# Patient Record
Sex: MALE | Race: WHITE | NOT HISPANIC OR LATINO | Employment: OTHER | URBAN - METROPOLITAN AREA
[De-identification: names, ages, dates, MRNs, and addresses within clinical notes are randomized per-mention and may not be internally consistent; named-entity substitution may affect disease eponyms.]

---

## 2018-01-30 PROBLEM — R79.89 ELEVATED LFTS: Status: ACTIVE | Noted: 2018-01-30

## 2018-10-01 PROBLEM — Z72.0 TOBACCO ABUSE: Status: ACTIVE | Noted: 2018-10-01

## 2019-02-04 PROBLEM — F10.10 ALCOHOL ABUSE: Status: ACTIVE | Noted: 2019-02-04

## 2019-04-10 PROBLEM — Z95.9 S/P ARTERIAL STENT: Chronic | Status: ACTIVE | Noted: 2019-04-10

## 2019-04-10 PROBLEM — I73.9 PAD (PERIPHERAL ARTERY DISEASE): Chronic | Status: ACTIVE | Noted: 2019-04-10

## 2019-04-10 PROBLEM — J43.2 CENTRILOBULAR EMPHYSEMA: Status: ACTIVE | Noted: 2019-04-10

## 2021-01-01 ENCOUNTER — PATIENT OUTREACH (OUTPATIENT)
Dept: ADMINISTRATIVE | Facility: OTHER | Age: 66
End: 2021-01-01
Payer: MEDICARE

## 2021-01-01 ENCOUNTER — CLINICAL SUPPORT (OUTPATIENT)
Dept: SMOKING CESSATION | Facility: CLINIC | Age: 66
End: 2021-01-01
Payer: COMMERCIAL

## 2021-01-01 ENCOUNTER — HOSPITAL ENCOUNTER (INPATIENT)
Facility: HOSPITAL | Age: 66
LOS: 26 days | DRG: 853 | End: 2022-01-17
Attending: EMERGENCY MEDICINE | Admitting: INTERNAL MEDICINE
Payer: MEDICARE

## 2021-01-01 ENCOUNTER — CLINICAL SUPPORT (OUTPATIENT)
Dept: CARDIOLOGY | Facility: HOSPITAL | Age: 66
DRG: 853 | End: 2021-01-01
Payer: MEDICARE

## 2021-01-01 ENCOUNTER — ANESTHESIA (OUTPATIENT)
Dept: ENDOSCOPY | Facility: HOSPITAL | Age: 66
DRG: 378 | End: 2021-01-01
Payer: MEDICARE

## 2021-01-01 ENCOUNTER — CLINICAL SUPPORT (OUTPATIENT)
Dept: SMOKING CESSATION | Facility: CLINIC | Age: 66
End: 2021-01-01

## 2021-01-01 ENCOUNTER — HOSPITAL ENCOUNTER (EMERGENCY)
Facility: HOSPITAL | Age: 66
Discharge: HOME | End: 2021-06-04
Attending: EMERGENCY MEDICINE
Payer: MEDICARE

## 2021-01-01 ENCOUNTER — HOSPITAL ENCOUNTER (OUTPATIENT)
Dept: RADIOLOGY | Facility: HOSPITAL | Age: 66
Discharge: HOME | End: 2021-07-13
Attending: INTERNAL MEDICINE
Payer: MEDICARE

## 2021-01-01 ENCOUNTER — HOSPITAL ENCOUNTER (OUTPATIENT)
Dept: RADIOLOGY | Facility: HOSPITAL | Age: 66
Discharge: HOME | End: 2021-10-22
Attending: INTERNAL MEDICINE
Payer: MEDICARE

## 2021-01-01 ENCOUNTER — CLINICAL SUPPORT (OUTPATIENT)
Dept: SMOKING CESSATION | Facility: CLINIC | Age: 66
End: 2021-01-01
Payer: MEDICARE

## 2021-01-01 ENCOUNTER — HOSPITAL ENCOUNTER (INPATIENT)
Facility: HOSPITAL | Age: 66
LOS: 5 days | Discharge: HOME HEALTH | DRG: 378 | End: 2021-11-28
Attending: SPECIALIST | Admitting: SPECIALIST
Payer: MEDICARE

## 2021-01-01 ENCOUNTER — HOSPITAL ENCOUNTER (EMERGENCY)
Facility: HOSPITAL | Age: 66
Discharge: ANOTHER HOSPITAL | End: 2021-11-22
Attending: EMERGENCY MEDICINE
Payer: MEDICARE

## 2021-01-01 ENCOUNTER — ANESTHESIA EVENT (OUTPATIENT)
Dept: ENDOSCOPY | Facility: HOSPITAL | Age: 66
DRG: 378 | End: 2021-01-01
Payer: MEDICARE

## 2021-01-01 ENCOUNTER — PATIENT OUTREACH (OUTPATIENT)
Dept: ADMINISTRATIVE | Facility: CLINIC | Age: 66
End: 2021-01-01
Payer: MEDICARE

## 2021-01-01 ENCOUNTER — HOSPITAL ENCOUNTER (EMERGENCY)
Facility: HOSPITAL | Age: 66
Discharge: HOME | End: 2021-12-01
Attending: STUDENT IN AN ORGANIZED HEALTH CARE EDUCATION/TRAINING PROGRAM
Payer: MEDICARE

## 2021-01-01 VITALS
DIASTOLIC BLOOD PRESSURE: 83 MMHG | SYSTOLIC BLOOD PRESSURE: 131 MMHG | TEMPERATURE: 98 F | BODY MASS INDEX: 24.68 KG/M2 | WEIGHT: 172 LBS | OXYGEN SATURATION: 98 % | RESPIRATION RATE: 16 BRPM | HEART RATE: 132 BPM

## 2021-01-01 VITALS
TEMPERATURE: 98 F | HEART RATE: 138 BPM | RESPIRATION RATE: 18 BRPM | BODY MASS INDEX: 23.62 KG/M2 | DIASTOLIC BLOOD PRESSURE: 69 MMHG | HEIGHT: 70 IN | OXYGEN SATURATION: 95 % | WEIGHT: 165 LBS | SYSTOLIC BLOOD PRESSURE: 129 MMHG

## 2021-01-01 VITALS
SYSTOLIC BLOOD PRESSURE: 145 MMHG | WEIGHT: 149 LBS | OXYGEN SATURATION: 97 % | DIASTOLIC BLOOD PRESSURE: 92 MMHG | TEMPERATURE: 98 F | RESPIRATION RATE: 16 BRPM | HEART RATE: 88 BPM | BODY MASS INDEX: 20.78 KG/M2

## 2021-01-01 VITALS
DIASTOLIC BLOOD PRESSURE: 87 MMHG | TEMPERATURE: 98 F | HEIGHT: 70 IN | HEART RATE: 91 BPM | SYSTOLIC BLOOD PRESSURE: 136 MMHG | OXYGEN SATURATION: 95 % | BODY MASS INDEX: 24.08 KG/M2 | RESPIRATION RATE: 18 BRPM | WEIGHT: 168.19 LBS

## 2021-01-01 VITALS
WEIGHT: 170 LBS | BODY MASS INDEX: 24.34 KG/M2 | DIASTOLIC BLOOD PRESSURE: 74 MMHG | HEIGHT: 70 IN | SYSTOLIC BLOOD PRESSURE: 108 MMHG

## 2021-01-01 DIAGNOSIS — F17.210 CIGARETTE SMOKER: Primary | ICD-10-CM

## 2021-01-01 DIAGNOSIS — I48.91 ATRIAL FIBRILLATION WITH RAPID VENTRICULAR RESPONSE: ICD-10-CM

## 2021-01-01 DIAGNOSIS — F17.210 MODERATE CIGARETTE SMOKER (10-19 PER DAY): Primary | ICD-10-CM

## 2021-01-01 DIAGNOSIS — F10.10 ALCOHOL ABUSE: ICD-10-CM

## 2021-01-01 DIAGNOSIS — D64.9 ANEMIA, UNSPECIFIED TYPE: ICD-10-CM

## 2021-01-01 DIAGNOSIS — K92.2 GASTROINTESTINAL HEMORRHAGE, UNSPECIFIED GASTROINTESTINAL HEMORRHAGE TYPE: ICD-10-CM

## 2021-01-01 DIAGNOSIS — R52 PAIN: ICD-10-CM

## 2021-01-01 DIAGNOSIS — F10.931 ALCOHOL WITHDRAWAL SYNDROME, WITH DELIRIUM: ICD-10-CM

## 2021-01-01 DIAGNOSIS — I48.91 ATRIAL FIBRILLATION WITH RVR: Primary | ICD-10-CM

## 2021-01-01 DIAGNOSIS — S20.212A CONTUSION OF LEFT CHEST WALL, INITIAL ENCOUNTER: Primary | ICD-10-CM

## 2021-01-01 DIAGNOSIS — I10 PRIMARY HYPERTENSION: ICD-10-CM

## 2021-01-01 DIAGNOSIS — I73.9 PAD (PERIPHERAL ARTERY DISEASE): Chronic | ICD-10-CM

## 2021-01-01 DIAGNOSIS — Z72.0 TOBACCO USE: ICD-10-CM

## 2021-01-01 DIAGNOSIS — R00.0 TACHYCARDIA: ICD-10-CM

## 2021-01-01 DIAGNOSIS — Z01.810 PREOP CARDIOVASCULAR EXAM: Primary | ICD-10-CM
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
## 2021-01-01 DIAGNOSIS — K29.20 CHRONIC ALCOHOLIC GASTRITIS WITHOUT HEMORRHAGE: ICD-10-CM

## 2021-01-01 DIAGNOSIS — J96.21 ACUTE ON CHRONIC RESPIRATORY FAILURE WITH HYPOXIA: ICD-10-CM

## 2021-01-01 DIAGNOSIS — E87.6 HYPOKALEMIA: ICD-10-CM

## 2021-01-01 DIAGNOSIS — I73.9 PERIPHERAL VASCULAR DISEASE, UNSPECIFIED: Primary | ICD-10-CM

## 2021-01-01 DIAGNOSIS — I48.91 ATRIAL FIBRILLATION WITH RVR: ICD-10-CM

## 2021-01-01 DIAGNOSIS — K92.1 GASTROINTESTINAL HEMORRHAGE WITH MELENA: ICD-10-CM

## 2021-01-01 DIAGNOSIS — D50.0 IRON DEFICIENCY ANEMIA DUE TO CHRONIC BLOOD LOSS: ICD-10-CM

## 2021-01-01 DIAGNOSIS — R53.1 WEAKNESS: ICD-10-CM

## 2021-01-01 DIAGNOSIS — J96.20 ACUTE ON CHRONIC RESPIRATORY FAILURE: ICD-10-CM

## 2021-01-01 DIAGNOSIS — Z01.810 PREOP CARDIOVASCULAR EXAM: ICD-10-CM

## 2021-01-01 DIAGNOSIS — F10.929 ALCOHOLIC INTOXICATION WITH COMPLICATION: ICD-10-CM

## 2021-01-01 DIAGNOSIS — K92.1 MELENA: Primary | ICD-10-CM

## 2021-01-01 DIAGNOSIS — Z72.0 TOBACCO ABUSE: ICD-10-CM

## 2021-01-01 DIAGNOSIS — K92.2 GI BLEED: ICD-10-CM

## 2021-01-01 DIAGNOSIS — F10.20 ALCOHOLISM: ICD-10-CM

## 2021-01-01 DIAGNOSIS — R00.1 VAGAL BRADYCARDIA: ICD-10-CM

## 2021-01-01 DIAGNOSIS — I73.9 PERIPHERAL VASCULAR DISEASE, UNSPECIFIED: ICD-10-CM

## 2021-01-01 DIAGNOSIS — I48.91 AF (ATRIAL FIBRILLATION): ICD-10-CM

## 2021-01-01 DIAGNOSIS — R06.02 SHORTNESS OF BREATH: ICD-10-CM

## 2021-01-01 DIAGNOSIS — N28.9 RENAL INSUFFICIENCY: ICD-10-CM

## 2021-01-01 DIAGNOSIS — J90 PLEURAL EFFUSION: ICD-10-CM

## 2021-01-01 DIAGNOSIS — F10.229 ALCOHOL DEPENDENCE WITH INTOXICATION WITH COMPLICATION: ICD-10-CM

## 2021-01-01 DIAGNOSIS — S22.31XA CLOSED FRACTURE OF ONE RIB OF RIGHT SIDE, INITIAL ENCOUNTER: ICD-10-CM

## 2021-01-01 DIAGNOSIS — F10.231 ALCOHOL DEPENDENCE WITH WITHDRAWAL DELIRIUM: ICD-10-CM

## 2021-01-01 DIAGNOSIS — K92.2 GASTROINTESTINAL HEMORRHAGE, UNSPECIFIED GASTROINTESTINAL HEMORRHAGE TYPE: Primary | ICD-10-CM

## 2021-01-01 DIAGNOSIS — R06.02 SOB (SHORTNESS OF BREATH): ICD-10-CM

## 2021-01-01 LAB
ABO + RH BLD: NORMAL
AC: 14
AC: 18
AC: 24
ALBUMIN SERPL BCP-MCNC: 1.6 G/DL (ref 3.5–5.2)
ALBUMIN SERPL BCP-MCNC: 1.7 G/DL (ref 3.5–5.2)
ALBUMIN SERPL BCP-MCNC: 1.7 G/DL (ref 3.5–5.2)
ALBUMIN SERPL BCP-MCNC: 1.8 G/DL (ref 3.5–5.2)
ALBUMIN SERPL BCP-MCNC: 1.8 G/DL (ref 3.5–5.2)
ALBUMIN SERPL BCP-MCNC: 2 G/DL (ref 3.5–5.2)
ALBUMIN SERPL BCP-MCNC: 2.4 G/DL (ref 3.5–5.2)
ALBUMIN SERPL BCP-MCNC: 2.5 G/DL (ref 3.5–5.2)
ALBUMIN SERPL BCP-MCNC: 2.6 G/DL (ref 3.5–5.2)
ALBUMIN SERPL BCP-MCNC: 2.6 G/DL (ref 3.5–5.2)
ALBUMIN SERPL BCP-MCNC: 2.7 G/DL (ref 3.5–5.2)
ALBUMIN SERPL BCP-MCNC: 2.8 G/DL (ref 3.5–5.2)
ALBUMIN SERPL BCP-MCNC: 2.9 G/DL (ref 3.5–5.2)
ALBUMIN SERPL BCP-MCNC: 2.9 G/DL (ref 3.5–5.2)
ALBUMIN SERPL BCP-MCNC: 3 G/DL (ref 3.5–5.2)
ALBUMIN SERPL BCP-MCNC: 3.1 G/DL (ref 3.5–5.2)
ALBUMIN SERPL BCP-MCNC: 3.2 G/DL (ref 3.5–5.2)
ALBUMIN SERPL BCP-MCNC: 3.2 G/DL (ref 3.5–5.2)
ALBUMIN SERPL BCP-MCNC: 3.3 G/DL (ref 3.5–5.2)
ALBUMIN SERPL BCP-MCNC: 3.7 G/DL (ref 3.5–5.2)
ALP SERPL-CCNC: 102 U/L (ref 55–135)
ALP SERPL-CCNC: 108 U/L (ref 55–135)
ALP SERPL-CCNC: 110 U/L (ref 55–135)
ALP SERPL-CCNC: 111 U/L (ref 55–135)
ALP SERPL-CCNC: 111 U/L (ref 55–135)
ALP SERPL-CCNC: 117 U/L (ref 55–135)
ALP SERPL-CCNC: 124 U/L (ref 55–135)
ALP SERPL-CCNC: 126 U/L (ref 55–135)
ALP SERPL-CCNC: 128 U/L (ref 55–135)
ALP SERPL-CCNC: 134 U/L (ref 55–135)
ALP SERPL-CCNC: 135 U/L (ref 55–135)
ALP SERPL-CCNC: 76 U/L (ref 55–135)
ALP SERPL-CCNC: 78 U/L (ref 55–135)
ALP SERPL-CCNC: 78 U/L (ref 55–135)
ALP SERPL-CCNC: 79 U/L (ref 55–135)
ALP SERPL-CCNC: 80 U/L (ref 55–135)
ALP SERPL-CCNC: 81 U/L (ref 55–135)
ALP SERPL-CCNC: 86 U/L (ref 55–135)
ALP SERPL-CCNC: 89 U/L (ref 55–135)
ALP SERPL-CCNC: 97 U/L (ref 55–135)
ALT SERPL W/O P-5'-P-CCNC: 10 U/L (ref 10–44)
ALT SERPL W/O P-5'-P-CCNC: 10 U/L (ref 10–44)
ALT SERPL W/O P-5'-P-CCNC: 11 U/L (ref 10–44)
ALT SERPL W/O P-5'-P-CCNC: 12 U/L (ref 10–44)
ALT SERPL W/O P-5'-P-CCNC: 135 U/L (ref 10–44)
ALT SERPL W/O P-5'-P-CCNC: 16 U/L (ref 10–44)
ALT SERPL W/O P-5'-P-CCNC: 17 U/L (ref 10–44)
ALT SERPL W/O P-5'-P-CCNC: 18 U/L (ref 10–44)
ALT SERPL W/O P-5'-P-CCNC: 19 U/L (ref 10–44)
ALT SERPL W/O P-5'-P-CCNC: 190 U/L (ref 10–44)
ALT SERPL W/O P-5'-P-CCNC: 20 U/L (ref 10–44)
ALT SERPL W/O P-5'-P-CCNC: 22 U/L (ref 10–44)
ALT SERPL W/O P-5'-P-CCNC: 240 U/L (ref 10–44)
ALT SERPL W/O P-5'-P-CCNC: 35 U/L (ref 10–44)
ALT SERPL W/O P-5'-P-CCNC: 41 U/L (ref 10–44)
ALT SERPL W/O P-5'-P-CCNC: 42 U/L (ref 10–44)
ALT SERPL W/O P-5'-P-CCNC: 56 U/L (ref 10–44)
ALT SERPL W/O P-5'-P-CCNC: 74 U/L (ref 10–44)
ALT SERPL W/O P-5'-P-CCNC: 9 U/L (ref 10–44)
ALT SERPL W/O P-5'-P-CCNC: 98 U/L (ref 10–44)
AMMONIA PLAS-SCNC: 30 UMOL/L (ref 10–50)
AMPHET+METHAMPHET UR QL: ABNORMAL
AMPHET+METHAMPHET UR QL: NEGATIVE
ANION GAP SERPL CALC-SCNC: 10 MMOL/L (ref 8–16)
ANION GAP SERPL CALC-SCNC: 10 MMOL/L (ref 8–16)
ANION GAP SERPL CALC-SCNC: 11 MMOL/L (ref 8–16)
ANION GAP SERPL CALC-SCNC: 12 MMOL/L (ref 8–16)
ANION GAP SERPL CALC-SCNC: 13 MMOL/L (ref 8–16)
ANION GAP SERPL CALC-SCNC: 15 MMOL/L (ref 8–16)
ANION GAP SERPL CALC-SCNC: 15 MMOL/L (ref 8–16)
ANION GAP SERPL CALC-SCNC: 16 MMOL/L (ref 8–16)
ANION GAP SERPL CALC-SCNC: 20 MMOL/L (ref 8–16)
ANION GAP SERPL CALC-SCNC: 5 MMOL/L (ref 8–16)
ANION GAP SERPL CALC-SCNC: 6 MMOL/L (ref 8–16)
ANION GAP SERPL CALC-SCNC: 7 MMOL/L (ref 8–16)
ANION GAP SERPL CALC-SCNC: 7 MMOL/L (ref 8–16)
ANION GAP SERPL CALC-SCNC: 8 MMOL/L (ref 8–16)
ANION GAP SERPL CALC-SCNC: 9 MMOL/L (ref 8–16)
ANION GAP SERPL CALC-SCNC: 9 MMOL/L (ref 8–16)
AORTIC ROOT ANNULUS: 3.06 CM
AORTIC VALVE CUSP SEPERATION: 1.21 CM
APTT BLDCRRT: 23.4 SEC (ref 21–32)
APTT BLDCRRT: 26.4 SEC (ref 21–32)
AST SERPL-CCNC: 118 U/L (ref 10–40)
AST SERPL-CCNC: 122 U/L (ref 10–40)
AST SERPL-CCNC: 139 U/L (ref 10–40)
AST SERPL-CCNC: 235 U/L (ref 10–40)
AST SERPL-CCNC: 26 U/L (ref 10–40)
AST SERPL-CCNC: 27 U/L (ref 10–40)
AST SERPL-CCNC: 28 U/L (ref 10–40)
AST SERPL-CCNC: 29 U/L (ref 10–40)
AST SERPL-CCNC: 30 U/L (ref 10–40)
AST SERPL-CCNC: 32 U/L (ref 10–40)
AST SERPL-CCNC: 36 U/L (ref 10–40)
AST SERPL-CCNC: 40 U/L (ref 10–40)
AST SERPL-CCNC: 45 U/L (ref 10–40)
AST SERPL-CCNC: 468 U/L (ref 10–40)
AST SERPL-CCNC: 47 U/L (ref 10–40)
AST SERPL-CCNC: 49 U/L (ref 10–40)
AST SERPL-CCNC: 51 U/L (ref 10–40)
AST SERPL-CCNC: 69 U/L (ref 10–40)
AST SERPL-CCNC: 77 U/L (ref 10–40)
AST SERPL-CCNC: 899 U/L (ref 10–40)
AV INDEX (PROSTH): 0.55
AV MEAN GRADIENT: 4 MMHG
AV PEAK GRADIENT: 6 MMHG
AV VALVE AREA: 1.68 CM2
AV VELOCITY RATIO: 0.54
BACTERIA #/AREA URNS HPF: ABNORMAL /HPF
BACTERIA #/AREA URNS HPF: NEGATIVE /HPF
BACTERIA BLD CULT: NORMAL
BACTERIA BLD CULT: NORMAL
BACTERIA SPEC AEROBE CULT: NORMAL
BACTERIA UR CULT: ABNORMAL
BACTERIA UR CULT: NO GROWTH
BARBITURATES UR QL SCN>200 NG/ML: NEGATIVE
BARBITURATES UR QL SCN>200 NG/ML: NEGATIVE
BASOPHILS # BLD AUTO: 0.02 K/UL (ref 0–0.2)
BASOPHILS # BLD AUTO: 0.03 K/UL (ref 0–0.2)
BASOPHILS # BLD AUTO: 0.04 K/UL (ref 0–0.2)
BASOPHILS # BLD AUTO: 0.05 K/UL (ref 0–0.2)
BASOPHILS # BLD AUTO: 0.06 K/UL (ref 0–0.2)
BASOPHILS # BLD AUTO: 0.08 K/UL (ref 0–0.2)
BASOPHILS # BLD AUTO: 0.09 K/UL (ref 0–0.2)
BASOPHILS # BLD AUTO: 0.13 K/UL (ref 0–0.2)
BASOPHILS # BLD AUTO: ABNORMAL K/UL (ref 0–0.2)
BASOPHILS NFR BLD: 0 % (ref 0–1.9)
BASOPHILS NFR BLD: 0.2 % (ref 0–1.9)
BASOPHILS NFR BLD: 0.2 % (ref 0–1.9)
BASOPHILS NFR BLD: 0.3 % (ref 0–1.9)
BASOPHILS NFR BLD: 0.3 % (ref 0–1.9)
BASOPHILS NFR BLD: 0.4 % (ref 0–1.9)
BASOPHILS NFR BLD: 0.5 % (ref 0–1.9)
BASOPHILS NFR BLD: 0.6 % (ref 0–1.9)
BASOPHILS NFR BLD: 0.8 % (ref 0–1.9)
BASOPHILS NFR BLD: 0.8 % (ref 0–1.9)
BASOPHILS NFR BLD: 1 % (ref 0–1.9)
BASOPHILS NFR BLD: 1.2 % (ref 0–1.9)
BASOPHILS NFR BLD: 1.7 % (ref 0–1.9)
BASOPHILS NFR BLD: 1.7 % (ref 0–1.9)
BENZODIAZ UR QL SCN>200 NG/ML: ABNORMAL
BENZODIAZ UR QL SCN>200 NG/ML: ABNORMAL
BILIRUB SERPL-MCNC: 0.4 MG/DL (ref 0.1–1)
BILIRUB SERPL-MCNC: 0.5 MG/DL (ref 0.1–1)
BILIRUB SERPL-MCNC: 0.5 MG/DL (ref 0.1–1)
BILIRUB SERPL-MCNC: 0.6 MG/DL (ref 0.1–1)
BILIRUB SERPL-MCNC: 0.7 MG/DL (ref 0.1–1)
BILIRUB SERPL-MCNC: 0.8 MG/DL (ref 0.1–1)
BILIRUB SERPL-MCNC: 0.9 MG/DL (ref 0.1–1)
BILIRUB SERPL-MCNC: 0.9 MG/DL (ref 0.1–1)
BILIRUB SERPL-MCNC: 1 MG/DL (ref 0.1–1)
BILIRUB SERPL-MCNC: 1.1 MG/DL (ref 0.1–1)
BILIRUB SERPL-MCNC: 1.3 MG/DL (ref 0.1–1)
BILIRUB UR QL STRIP: ABNORMAL
BILIRUB UR QL STRIP: NEGATIVE
BILIRUB UR QL STRIP: NEGATIVE
BLD GP AB SCN CELLS X3 SERPL QL: NORMAL
BLD PROD TYP BPU: NORMAL
BLOOD UNIT EXPIRATION DATE: NORMAL
BLOOD UNIT TYPE CODE: 5100
BLOOD UNIT TYPE: NORMAL
BSA FOR ECHO PROCEDURE: 1.95 M2
BUN SERPL-MCNC: 13 MG/DL (ref 8–23)
BUN SERPL-MCNC: 14 MG/DL (ref 8–23)
BUN SERPL-MCNC: 17 MG/DL (ref 8–23)
BUN SERPL-MCNC: 18 MG/DL (ref 8–23)
BUN SERPL-MCNC: 18 MG/DL (ref 8–23)
BUN SERPL-MCNC: 20 MG/DL (ref 8–23)
BUN SERPL-MCNC: 21 MG/DL (ref 8–23)
BUN SERPL-MCNC: 21 MG/DL (ref 8–23)
BUN SERPL-MCNC: 22 MG/DL (ref 8–23)
BUN SERPL-MCNC: 22 MG/DL (ref 8–23)
BUN SERPL-MCNC: 23 MG/DL (ref 8–23)
BUN SERPL-MCNC: 25 MG/DL (ref 8–23)
BUN SERPL-MCNC: 26 MG/DL (ref 8–23)
BUN SERPL-MCNC: 27 MG/DL (ref 8–23)
BUN SERPL-MCNC: 28 MG/DL (ref 8–23)
BUN SERPL-MCNC: 30 MG/DL (ref 8–23)
BUN SERPL-MCNC: 33 MG/DL (ref 8–23)
BUN SERPL-MCNC: 38 MG/DL (ref 8–23)
BZE UR QL SCN: NEGATIVE
BZE UR QL SCN: NEGATIVE
CALCIUM SERPL-MCNC: 7.6 MG/DL (ref 8.7–10.5)
CALCIUM SERPL-MCNC: 7.7 MG/DL (ref 8.7–10.5)
CALCIUM SERPL-MCNC: 7.7 MG/DL (ref 8.7–10.5)
CALCIUM SERPL-MCNC: 7.8 MG/DL (ref 8.7–10.5)
CALCIUM SERPL-MCNC: 7.9 MG/DL (ref 8.7–10.5)
CALCIUM SERPL-MCNC: 7.9 MG/DL (ref 8.7–10.5)
CALCIUM SERPL-MCNC: 8.1 MG/DL (ref 8.7–10.5)
CALCIUM SERPL-MCNC: 8.1 MG/DL (ref 8.7–10.5)
CALCIUM SERPL-MCNC: 8.3 MG/DL (ref 8.7–10.5)
CALCIUM SERPL-MCNC: 8.3 MG/DL (ref 8.7–10.5)
CALCIUM SERPL-MCNC: 8.4 MG/DL (ref 8.7–10.5)
CALCIUM SERPL-MCNC: 8.4 MG/DL (ref 8.7–10.5)
CALCIUM SERPL-MCNC: 8.5 MG/DL (ref 8.7–10.5)
CALCIUM SERPL-MCNC: 8.6 MG/DL (ref 8.7–10.5)
CALCIUM SERPL-MCNC: 8.8 MG/DL (ref 8.7–10.5)
CALCIUM SERPL-MCNC: 8.9 MG/DL (ref 8.7–10.5)
CALCIUM SERPL-MCNC: 9.1 MG/DL (ref 8.7–10.5)
CANNABINOIDS UR QL SCN: NEGATIVE
CANNABINOIDS UR QL SCN: NEGATIVE
CHLORIDE SERPL-SCNC: 100 MMOL/L (ref 95–110)
CHLORIDE SERPL-SCNC: 103 MMOL/L (ref 95–110)
CHLORIDE SERPL-SCNC: 106 MMOL/L (ref 95–110)
CHLORIDE SERPL-SCNC: 106 MMOL/L (ref 95–110)
CHLORIDE SERPL-SCNC: 107 MMOL/L (ref 95–110)
CHLORIDE SERPL-SCNC: 108 MMOL/L (ref 95–110)
CHLORIDE SERPL-SCNC: 109 MMOL/L (ref 95–110)
CHLORIDE SERPL-SCNC: 110 MMOL/L (ref 95–110)
CHLORIDE SERPL-SCNC: 98 MMOL/L (ref 95–110)
CLARITY UR: ABNORMAL
CLARITY UR: CLEAR
CLARITY UR: CLEAR
CO2 SERPL-SCNC: 18 MMOL/L (ref 23–29)
CO2 SERPL-SCNC: 19 MMOL/L (ref 23–29)
CO2 SERPL-SCNC: 21 MMOL/L (ref 23–29)
CO2 SERPL-SCNC: 24 MMOL/L (ref 23–29)
CO2 SERPL-SCNC: 24 MMOL/L (ref 23–29)
CO2 SERPL-SCNC: 25 MMOL/L (ref 23–29)
CO2 SERPL-SCNC: 27 MMOL/L (ref 23–29)
CO2 SERPL-SCNC: 27 MMOL/L (ref 23–29)
CO2 SERPL-SCNC: 28 MMOL/L (ref 23–29)
CO2 SERPL-SCNC: 29 MMOL/L (ref 23–29)
CO2 SERPL-SCNC: 30 MMOL/L (ref 23–29)
CO2 SERPL-SCNC: 32 MMOL/L (ref 23–29)
CODING SYSTEM: NORMAL
COLOR UR: YELLOW
CORRECTED TEMPERATURE (PCO2): 29.8 MMHG
CORRECTED TEMPERATURE (PCO2): 31.8 MMHG
CORRECTED TEMPERATURE (PCO2): 33.1 MMHG
CORRECTED TEMPERATURE (PCO2): 35.5 MMHG
CORRECTED TEMPERATURE (PCO2): 39.9 MMHG
CORRECTED TEMPERATURE (PCO2): 40.7 MMHG
CORRECTED TEMPERATURE (PCO2): 43.3 MMHG
CORRECTED TEMPERATURE (PCO2): 48.7 MMHG
CORRECTED TEMPERATURE (PCO2): 51 MMHG
CORRECTED TEMPERATURE (PH): 7.11
CORRECTED TEMPERATURE (PH): 7.34
CORRECTED TEMPERATURE (PH): 7.37
CORRECTED TEMPERATURE (PH): 7.43
CORRECTED TEMPERATURE (PH): 7.45
CORRECTED TEMPERATURE (PH): 7.46
CORRECTED TEMPERATURE (PH): 7.51
CORRECTED TEMPERATURE (PH): 7.55
CORRECTED TEMPERATURE (PH): 7.56
CORRECTED TEMPERATURE (PO2): 121 MMHG
CORRECTED TEMPERATURE (PO2): 122 MMHG
CORRECTED TEMPERATURE (PO2): 140 MMHG
CORRECTED TEMPERATURE (PO2): 191 MMHG
CORRECTED TEMPERATURE (PO2): 252 MMHG
CORRECTED TEMPERATURE (PO2): 257 MMHG
CORRECTED TEMPERATURE (PO2): 63.9 MMHG
CORRECTED TEMPERATURE (PO2): 79.6 MMHG
CORRECTED TEMPERATURE (PO2): 95.8 MMHG
CREAT SERPL-MCNC: 1.1 MG/DL (ref 0.5–1.4)
CREAT SERPL-MCNC: 1.2 MG/DL (ref 0.5–1.4)
CREAT SERPL-MCNC: 1.3 MG/DL (ref 0.5–1.4)
CREAT SERPL-MCNC: 1.4 MG/DL (ref 0.5–1.4)
CREAT SERPL-MCNC: 1.4 MG/DL (ref 0.5–1.4)
CREAT SERPL-MCNC: 1.5 MG/DL (ref 0.5–1.4)
CREAT SERPL-MCNC: 1.5 MG/DL (ref 0.5–1.4)
CREAT SERPL-MCNC: 1.6 MG/DL (ref 0.5–1.4)
CREAT SERPL-MCNC: 1.7 MG/DL (ref 0.5–1.4)
CREAT SERPL-MCNC: 1.9 MG/DL (ref 0.5–1.4)
CREAT SERPL-MCNC: 2.2 MG/DL (ref 0.5–1.4)
CREAT SERPL-MCNC: 2.6 MG/DL (ref 0.5–1.4)
CREAT SERPL-MCNC: 2.9 MG/DL (ref 0.5–1.4)
CREAT SERPL-MCNC: 2.9 MG/DL (ref 0.5–1.4)
CREAT UR-MCNC: 231 MG/DL (ref 23–375)
CREAT UR-MCNC: 73.8 MG/DL (ref 23–375)
CREAT UR-MCNC: 77.1 MG/DL (ref 23–375)
CTP QC/QA: YES
CTP QC/QA: YES
CV ECHO LV RWT: 0.61 CM
DIFFERENTIAL METHOD: ABNORMAL
DISPENSE STATUS: NORMAL
DOP CALC AO PEAK VEL: 1.2 M/S
DOP CALC AO VTI: 21.91 CM
DOP CALC LVOT AREA: 3.1 CM2
DOP CALC LVOT DIAMETER: 1.98 CM
DOP CALC LVOT PEAK VEL: 0.65 M/S
DOP CALC LVOT STROKE VOLUME: 36.9 CM3
DOP CALCLVOT PEAK VEL VTI: 11.99 CM
E WAVE DECELERATION TIME: 170.36 MSEC
E/A RATIO: 1.83
ECHO LV POSTERIOR WALL: 1.22 CM (ref 0.6–1.1)
EJECTION FRACTION: 46 %
EOSINOPHIL # BLD AUTO: 0 K/UL (ref 0–0.5)
EOSINOPHIL # BLD AUTO: 0.1 K/UL (ref 0–0.5)
EOSINOPHIL # BLD AUTO: 0.3 K/UL (ref 0–0.5)
EOSINOPHIL # BLD AUTO: ABNORMAL K/UL (ref 0–0.5)
EOSINOPHIL NFR BLD: 0 % (ref 0–8)
EOSINOPHIL NFR BLD: 0.1 % (ref 0–8)
EOSINOPHIL NFR BLD: 0.2 % (ref 0–8)
EOSINOPHIL NFR BLD: 0.3 % (ref 0–8)
EOSINOPHIL NFR BLD: 0.4 % (ref 0–8)
EOSINOPHIL NFR BLD: 0.6 % (ref 0–8)
EOSINOPHIL NFR BLD: 0.8 % (ref 0–8)
EOSINOPHIL NFR BLD: 1 % (ref 0–8)
EOSINOPHIL NFR BLD: 1 % (ref 0–8)
EOSINOPHIL NFR BLD: 1.1 % (ref 0–8)
EOSINOPHIL NFR BLD: 1.3 % (ref 0–8)
EOSINOPHIL NFR BLD: 1.5 % (ref 0–8)
EOSINOPHIL NFR BLD: 2 % (ref 0–8)
EOSINOPHIL NFR BLD: 2.1 % (ref 0–8)
EOSINOPHIL NFR BLD: 2.4 % (ref 0–8)
EOSINOPHIL NFR BLD: 2.6 % (ref 0–8)
EOSINOPHIL NFR BLD: 3 % (ref 0–8)
ERYTHROCYTE [DISTWIDTH] IN BLOOD BY AUTOMATED COUNT: 14.5 % (ref 11.5–14.5)
ERYTHROCYTE [DISTWIDTH] IN BLOOD BY AUTOMATED COUNT: 14.9 % (ref 11.5–14.5)
ERYTHROCYTE [DISTWIDTH] IN BLOOD BY AUTOMATED COUNT: 15.5 % (ref 11.5–14.5)
ERYTHROCYTE [DISTWIDTH] IN BLOOD BY AUTOMATED COUNT: 15.5 % (ref 11.5–14.5)
ERYTHROCYTE [DISTWIDTH] IN BLOOD BY AUTOMATED COUNT: 15.8 % (ref 11.5–14.5)
ERYTHROCYTE [DISTWIDTH] IN BLOOD BY AUTOMATED COUNT: 15.8 % (ref 11.5–14.5)
ERYTHROCYTE [DISTWIDTH] IN BLOOD BY AUTOMATED COUNT: 15.9 % (ref 11.5–14.5)
ERYTHROCYTE [DISTWIDTH] IN BLOOD BY AUTOMATED COUNT: 16 % (ref 11.5–14.5)
ERYTHROCYTE [DISTWIDTH] IN BLOOD BY AUTOMATED COUNT: 16.3 % (ref 11.5–14.5)
ERYTHROCYTE [DISTWIDTH] IN BLOOD BY AUTOMATED COUNT: 16.4 % (ref 11.5–14.5)
ERYTHROCYTE [DISTWIDTH] IN BLOOD BY AUTOMATED COUNT: 16.6 % (ref 11.5–14.5)
ERYTHROCYTE [DISTWIDTH] IN BLOOD BY AUTOMATED COUNT: 17 % (ref 11.5–14.5)
ERYTHROCYTE [DISTWIDTH] IN BLOOD BY AUTOMATED COUNT: 17.1 % (ref 11.5–14.5)
ERYTHROCYTE [DISTWIDTH] IN BLOOD BY AUTOMATED COUNT: 17.2 % (ref 11.5–14.5)
ERYTHROCYTE [DISTWIDTH] IN BLOOD BY AUTOMATED COUNT: 17.2 % (ref 11.5–14.5)
ERYTHROCYTE [DISTWIDTH] IN BLOOD BY AUTOMATED COUNT: 17.3 % (ref 11.5–14.5)
ERYTHROCYTE [DISTWIDTH] IN BLOOD BY AUTOMATED COUNT: 17.4 % (ref 11.5–14.5)
EST. GFR  (AFRICAN AMERICAN): 24.9 ML/MIN/1.73 M^2
EST. GFR  (AFRICAN AMERICAN): 24.9 ML/MIN/1.73 M^2
EST. GFR  (AFRICAN AMERICAN): 28.4 ML/MIN/1.73 M^2
EST. GFR  (AFRICAN AMERICAN): 34.8 ML/MIN/1.73 M^2
EST. GFR  (AFRICAN AMERICAN): 41.5 ML/MIN/1.73 M^2
EST. GFR  (AFRICAN AMERICAN): 47.5 ML/MIN/1.73 M^2
EST. GFR  (AFRICAN AMERICAN): 51 ML/MIN/1.73 M^2
EST. GFR  (AFRICAN AMERICAN): 51 ML/MIN/1.73 M^2
EST. GFR  (AFRICAN AMERICAN): 51.1 ML/MIN/1.73 M^2
EST. GFR  (AFRICAN AMERICAN): 55 ML/MIN/1.73 M^2
EST. GFR  (AFRICAN AMERICAN): 55.3 ML/MIN/1.73 M^2
EST. GFR  (AFRICAN AMERICAN): >60 ML/MIN/1.73 M^2
EST. GFR  (NON AFRICAN AMERICAN): 21.6 ML/MIN/1.73 M^2
EST. GFR  (NON AFRICAN AMERICAN): 21.6 ML/MIN/1.73 M^2
EST. GFR  (NON AFRICAN AMERICAN): 24.6 ML/MIN/1.73 M^2
EST. GFR  (NON AFRICAN AMERICAN): 30.1 ML/MIN/1.73 M^2
EST. GFR  (NON AFRICAN AMERICAN): 35.9 ML/MIN/1.73 M^2
EST. GFR  (NON AFRICAN AMERICAN): 41.1 ML/MIN/1.73 M^2
EST. GFR  (NON AFRICAN AMERICAN): 44 ML/MIN/1.73 M^2
EST. GFR  (NON AFRICAN AMERICAN): 44 ML/MIN/1.73 M^2
EST. GFR  (NON AFRICAN AMERICAN): 44.2 ML/MIN/1.73 M^2
EST. GFR  (NON AFRICAN AMERICAN): 47.8 ML/MIN/1.73 M^2
EST. GFR  (NON AFRICAN AMERICAN): 48 ML/MIN/1.73 M^2
EST. GFR  (NON AFRICAN AMERICAN): 52 ML/MIN/1.73 M^2
EST. GFR  (NON AFRICAN AMERICAN): 52 ML/MIN/1.73 M^2
EST. GFR  (NON AFRICAN AMERICAN): 56.9 ML/MIN/1.73 M^2
EST. GFR  (NON AFRICAN AMERICAN): >60 ML/MIN/1.73 M^2
ESTIMATED AVG GLUCOSE: 85 MG/DL (ref 68–131)
ETHANOL SERPL-MCNC: 121 MG/DL
ETHANOL SERPL-MCNC: 363 MG/DL
ETHANOL SERPL-MCNC: 406 MG/DL
FERRITIN SERPL-MCNC: 95 NG/ML (ref 20–300)
FIO2: 100 %
FIO2: 21 %
FIO2: 65 %
FIO2: 70 %
FIO2: 70 %
FIO2: 80 %
FRACTIONAL SHORTENING: 22 % (ref 28–44)
GLUCOSE SERPL-MCNC: 102 MG/DL (ref 70–110)
GLUCOSE SERPL-MCNC: 104 MG/DL (ref 70–110)
GLUCOSE SERPL-MCNC: 107 MG/DL (ref 70–110)
GLUCOSE SERPL-MCNC: 107 MG/DL (ref 70–110)
GLUCOSE SERPL-MCNC: 108 MG/DL (ref 70–110)
GLUCOSE SERPL-MCNC: 109 MG/DL (ref 70–110)
GLUCOSE SERPL-MCNC: 110 MG/DL (ref 70–110)
GLUCOSE SERPL-MCNC: 110 MG/DL (ref 70–110)
GLUCOSE SERPL-MCNC: 116 MG/DL (ref 70–110)
GLUCOSE SERPL-MCNC: 117 MG/DL (ref 70–110)
GLUCOSE SERPL-MCNC: 119 MG/DL (ref 70–110)
GLUCOSE SERPL-MCNC: 125 MG/DL (ref 70–110)
GLUCOSE SERPL-MCNC: 128 MG/DL (ref 70–110)
GLUCOSE SERPL-MCNC: 131 MG/DL (ref 70–110)
GLUCOSE SERPL-MCNC: 147 MG/DL (ref 70–110)
GLUCOSE SERPL-MCNC: 58 MG/DL (ref 70–110)
GLUCOSE SERPL-MCNC: 66 MG/DL (ref 70–110)
GLUCOSE SERPL-MCNC: 74 MG/DL (ref 70–110)
GLUCOSE SERPL-MCNC: 98 MG/DL (ref 70–110)
GLUCOSE SERPL-MCNC: 98 MG/DL (ref 70–110)
GLUCOSE UR QL STRIP: ABNORMAL
GLUCOSE UR QL STRIP: NEGATIVE
GLUCOSE UR QL STRIP: NEGATIVE
GRAM STN SPEC: NORMAL
HBA1C MFR BLD: 4.6 % (ref 4–5.6)
HCO3 UR-SCNC: 14 MMOL/L
HCO3 UR-SCNC: 22.5 MMOL/L
HCO3 UR-SCNC: 26.9 MMOL/L
HCO3 UR-SCNC: 28.2 MMOL/L
HCO3 UR-SCNC: 29.8 MMOL/L
HCO3 UR-SCNC: 32.5 MMOL/L
HCT VFR BLD AUTO: 20.1 % (ref 40–54)
HCT VFR BLD AUTO: 22.6 % (ref 40–54)
HCT VFR BLD AUTO: 25.5 % (ref 40–54)
HCT VFR BLD AUTO: 25.8 % (ref 40–54)
HCT VFR BLD AUTO: 26.1 % (ref 40–54)
HCT VFR BLD AUTO: 26.1 % (ref 40–54)
HCT VFR BLD AUTO: 26.2 % (ref 40–54)
HCT VFR BLD AUTO: 26.3 % (ref 40–54)
HCT VFR BLD AUTO: 26.3 % (ref 40–54)
HCT VFR BLD AUTO: 26.6 % (ref 40–54)
HCT VFR BLD AUTO: 27.3 % (ref 40–54)
HCT VFR BLD AUTO: 27.3 % (ref 40–54)
HCT VFR BLD AUTO: 27.5 % (ref 40–54)
HCT VFR BLD AUTO: 27.9 % (ref 40–54)
HCT VFR BLD AUTO: 28.1 % (ref 40–54)
HCT VFR BLD AUTO: 28.4 % (ref 40–54)
HCT VFR BLD AUTO: 29 % (ref 40–54)
HCT VFR BLD AUTO: 29.3 % (ref 40–54)
HCT VFR BLD AUTO: 34.4 % (ref 40–54)
HCT VFR BLD AUTO: 42.9 % (ref 40–54)
HGB BLD-MCNC: 10.4 G/DL (ref 14–18)
HGB BLD-MCNC: 13.8 G/DL (ref 14–18)
HGB BLD-MCNC: 6 G/DL (ref 14–18)
HGB BLD-MCNC: 6.8 G/DL (ref 14–18)
HGB BLD-MCNC: 7.7 G/DL (ref 14–18)
HGB BLD-MCNC: 7.7 G/DL (ref 14–18)
HGB BLD-MCNC: 7.8 G/DL (ref 14–18)
HGB BLD-MCNC: 7.9 G/DL (ref 14–18)
HGB BLD-MCNC: 8 G/DL (ref 14–18)
HGB BLD-MCNC: 8 G/DL (ref 14–18)
HGB BLD-MCNC: 8.1 G/DL (ref 14–18)
HGB BLD-MCNC: 8.2 G/DL (ref 14–18)
HGB BLD-MCNC: 8.2 G/DL (ref 14–18)
HGB BLD-MCNC: 8.4 G/DL (ref 14–18)
HGB BLD-MCNC: 8.4 G/DL (ref 14–18)
HGB BLD-MCNC: 8.5 G/DL (ref 14–18)
HGB BLD-MCNC: 8.7 G/DL (ref 14–18)
HGB BLD-MCNC: 8.8 G/DL (ref 14–18)
HGB UR QL STRIP: ABNORMAL
HGB UR QL STRIP: NEGATIVE
HGB UR QL STRIP: NEGATIVE
HYALINE CASTS #/AREA URNS LPF: 10 /LPF
HYALINE CASTS #/AREA URNS LPF: 52 /LPF
IMM GRANULOCYTES # BLD AUTO: 0.02 K/UL (ref 0–0.04)
IMM GRANULOCYTES # BLD AUTO: 0.03 K/UL (ref 0–0.04)
IMM GRANULOCYTES # BLD AUTO: 0.03 K/UL (ref 0–0.04)
IMM GRANULOCYTES # BLD AUTO: 0.04 K/UL (ref 0–0.04)
IMM GRANULOCYTES # BLD AUTO: 0.04 K/UL (ref 0–0.04)
IMM GRANULOCYTES # BLD AUTO: 0.05 K/UL (ref 0–0.04)
IMM GRANULOCYTES # BLD AUTO: 0.06 K/UL (ref 0–0.04)
IMM GRANULOCYTES # BLD AUTO: 0.08 K/UL (ref 0–0.04)
IMM GRANULOCYTES # BLD AUTO: 0.1 K/UL (ref 0–0.04)
IMM GRANULOCYTES # BLD AUTO: ABNORMAL K/UL (ref 0–0.04)
IMM GRANULOCYTES NFR BLD AUTO: 0.3 % (ref 0–0.5)
IMM GRANULOCYTES NFR BLD AUTO: 0.4 % (ref 0–0.5)
IMM GRANULOCYTES NFR BLD AUTO: 0.4 % (ref 0–0.5)
IMM GRANULOCYTES NFR BLD AUTO: 0.5 % (ref 0–0.5)
IMM GRANULOCYTES NFR BLD AUTO: 0.7 % (ref 0–0.5)
IMM GRANULOCYTES NFR BLD AUTO: 0.7 % (ref 0–0.5)
IMM GRANULOCYTES NFR BLD AUTO: 0.8 % (ref 0–0.5)
IMM GRANULOCYTES NFR BLD AUTO: 0.8 % (ref 0–0.5)
IMM GRANULOCYTES NFR BLD AUTO: 1 % (ref 0–0.5)
IMM GRANULOCYTES NFR BLD AUTO: 1.1 % (ref 0–0.5)
IMM GRANULOCYTES NFR BLD AUTO: ABNORMAL % (ref 0–0.5)
INR PPP: 1 (ref 0.8–1.2)
INR PPP: 1 (ref 0.8–1.2)
INTERVENTRICULAR SEPTUM: 1.08 CM (ref 0.6–1.1)
IRON SERPL-MCNC: 13 UG/DL (ref 45–160)
KETONES UR QL STRIP: ABNORMAL
LACTATE SERPL-SCNC: 8.4 MMOL/L (ref 0.5–2.2)
LEFT ATRIUM SIZE: 4.02 CM
LEFT INTERNAL DIMENSION IN SYSTOLE: 3.13 CM (ref 2.1–4)
LEFT VENTRICLE DIASTOLIC VOLUME INDEX: 35.77 ML/M2
LEFT VENTRICLE DIASTOLIC VOLUME: 69.75 ML
LEFT VENTRICLE MASS INDEX: 79 G/M2
LEFT VENTRICLE SYSTOLIC VOLUME INDEX: 19.9 ML/M2
LEFT VENTRICLE SYSTOLIC VOLUME: 38.82 ML
LEFT VENTRICULAR INTERNAL DIMENSION IN DIASTOLE: 3.99 CM (ref 3.5–6)
LEFT VENTRICULAR MASS: 154.8 G
LEUKOCYTE ESTERASE UR QL STRIP: ABNORMAL
LEUKOCYTE ESTERASE UR QL STRIP: ABNORMAL
LEUKOCYTE ESTERASE UR QL STRIP: NEGATIVE
LYMPHOCYTES # BLD AUTO: 0.6 K/UL (ref 1–4.8)
LYMPHOCYTES # BLD AUTO: 0.7 K/UL (ref 1–4.8)
LYMPHOCYTES # BLD AUTO: 0.8 K/UL (ref 1–4.8)
LYMPHOCYTES # BLD AUTO: 0.9 K/UL (ref 1–4.8)
LYMPHOCYTES # BLD AUTO: 1.4 K/UL (ref 1–4.8)
LYMPHOCYTES # BLD AUTO: 1.5 K/UL (ref 1–4.8)
LYMPHOCYTES # BLD AUTO: 1.8 K/UL (ref 1–4.8)
LYMPHOCYTES # BLD AUTO: ABNORMAL K/UL (ref 1–4.8)
LYMPHOCYTES NFR BLD: 11 % (ref 18–48)
LYMPHOCYTES NFR BLD: 11.5 % (ref 18–48)
LYMPHOCYTES NFR BLD: 12 % (ref 18–48)
LYMPHOCYTES NFR BLD: 12 % (ref 18–48)
LYMPHOCYTES NFR BLD: 13 % (ref 18–48)
LYMPHOCYTES NFR BLD: 14.3 % (ref 18–48)
LYMPHOCYTES NFR BLD: 14.5 % (ref 18–48)
LYMPHOCYTES NFR BLD: 15 % (ref 18–48)
LYMPHOCYTES NFR BLD: 15 % (ref 18–48)
LYMPHOCYTES NFR BLD: 15.8 % (ref 18–48)
LYMPHOCYTES NFR BLD: 16.9 % (ref 18–48)
LYMPHOCYTES NFR BLD: 17.6 % (ref 18–48)
LYMPHOCYTES NFR BLD: 22.3 % (ref 18–48)
LYMPHOCYTES NFR BLD: 25 % (ref 18–48)
LYMPHOCYTES NFR BLD: 39.4 % (ref 18–48)
LYMPHOCYTES NFR BLD: 42 % (ref 18–48)
LYMPHOCYTES NFR BLD: 7.4 % (ref 18–48)
LYMPHOCYTES NFR BLD: 9 % (ref 18–48)
LYMPHOCYTES NFR BLD: 9 % (ref 18–48)
LYMPHOCYTES NFR BLD: 9.6 % (ref 18–48)
LYMPHOCYTES NFR BLD: 9.6 % (ref 18–48)
Lab: ABNORMAL
MAGNESIUM SERPL-MCNC: 1.4 MG/DL (ref 1.6–2.6)
MAGNESIUM SERPL-MCNC: 1.4 MG/DL (ref 1.6–2.6)
MAGNESIUM SERPL-MCNC: 1.5 MG/DL (ref 1.6–2.6)
MAGNESIUM SERPL-MCNC: 1.6 MG/DL (ref 1.6–2.6)
MAGNESIUM SERPL-MCNC: 1.7 MG/DL (ref 1.6–2.6)
MAGNESIUM SERPL-MCNC: 1.7 MG/DL (ref 1.6–2.6)
MAGNESIUM SERPL-MCNC: 1.8 MG/DL (ref 1.6–2.6)
MAGNESIUM SERPL-MCNC: 1.8 MG/DL (ref 1.6–2.6)
MAGNESIUM SERPL-MCNC: 1.9 MG/DL (ref 1.6–2.6)
MAGNESIUM SERPL-MCNC: 2 MG/DL (ref 1.6–2.6)
MAGNESIUM SERPL-MCNC: 2 MG/DL (ref 1.6–2.6)
MAGNESIUM SERPL-MCNC: 2.1 MG/DL (ref 1.6–2.6)
MAGNESIUM SERPL-MCNC: 2.2 MG/DL (ref 1.6–2.6)
MCH RBC QN AUTO: 27.2 PG (ref 27–31)
MCH RBC QN AUTO: 27.3 PG (ref 27–31)
MCH RBC QN AUTO: 27.6 PG (ref 27–31)
MCH RBC QN AUTO: 27.6 PG (ref 27–31)
MCH RBC QN AUTO: 27.7 PG (ref 27–31)
MCH RBC QN AUTO: 27.7 PG (ref 27–31)
MCH RBC QN AUTO: 27.8 PG (ref 27–31)
MCH RBC QN AUTO: 28.1 PG (ref 27–31)
MCH RBC QN AUTO: 28.4 PG (ref 27–31)
MCH RBC QN AUTO: 28.8 PG (ref 27–31)
MCH RBC QN AUTO: 29.1 PG (ref 27–31)
MCH RBC QN AUTO: 29.3 PG (ref 27–31)
MCH RBC QN AUTO: 29.6 PG (ref 27–31)
MCH RBC QN AUTO: 29.6 PG (ref 27–31)
MCH RBC QN AUTO: 29.8 PG (ref 27–31)
MCH RBC QN AUTO: 29.9 PG (ref 27–31)
MCH RBC QN AUTO: 30.4 PG (ref 27–31)
MCH RBC QN AUTO: 30.5 PG (ref 27–31)
MCH RBC QN AUTO: 30.7 PG (ref 27–31)
MCHC RBC AUTO-ENTMCNC: 28.2 G/DL (ref 32–36)
MCHC RBC AUTO-ENTMCNC: 28.8 G/DL (ref 32–36)
MCHC RBC AUTO-ENTMCNC: 29.6 G/DL (ref 32–36)
MCHC RBC AUTO-ENTMCNC: 29.7 G/DL (ref 32–36)
MCHC RBC AUTO-ENTMCNC: 29.7 G/DL (ref 32–36)
MCHC RBC AUTO-ENTMCNC: 29.8 G/DL (ref 32–36)
MCHC RBC AUTO-ENTMCNC: 30 G/DL (ref 32–36)
MCHC RBC AUTO-ENTMCNC: 30.1 G/DL (ref 32–36)
MCHC RBC AUTO-ENTMCNC: 30.2 G/DL (ref 32–36)
MCHC RBC AUTO-ENTMCNC: 30.3 G/DL (ref 32–36)
MCHC RBC AUTO-ENTMCNC: 30.5 G/DL (ref 32–36)
MCHC RBC AUTO-ENTMCNC: 30.8 G/DL (ref 32–36)
MCHC RBC AUTO-ENTMCNC: 30.9 G/DL (ref 32–36)
MCHC RBC AUTO-ENTMCNC: 30.9 G/DL (ref 32–36)
MCHC RBC AUTO-ENTMCNC: 31 G/DL (ref 32–36)
MCHC RBC AUTO-ENTMCNC: 31.3 G/DL (ref 32–36)
MCHC RBC AUTO-ENTMCNC: 32.2 G/DL (ref 32–36)
MCV RBC AUTO: 100 FL (ref 82–98)
MCV RBC AUTO: 100 FL (ref 82–98)
MCV RBC AUTO: 89 FL (ref 82–98)
MCV RBC AUTO: 90 FL (ref 82–98)
MCV RBC AUTO: 91 FL (ref 82–98)
MCV RBC AUTO: 92 FL (ref 82–98)
MCV RBC AUTO: 92 FL (ref 82–98)
MCV RBC AUTO: 94 FL (ref 82–98)
MCV RBC AUTO: 95 FL (ref 82–98)
MCV RBC AUTO: 96 FL (ref 82–98)
MCV RBC AUTO: 96 FL (ref 82–98)
MCV RBC AUTO: 97 FL (ref 82–98)
MCV RBC AUTO: 98 FL (ref 82–98)
MCV RBC AUTO: 99 FL (ref 82–98)
MCV RBC AUTO: 99 FL (ref 82–98)
METAMYELOCYTES NFR BLD MANUAL: 1 %
METHADONE UR QL SCN>300 NG/ML: NEGATIVE
METHADONE UR QL SCN>300 NG/ML: NEGATIVE
MICROSCOPIC COMMENT: ABNORMAL
MICROSCOPIC COMMENT: ABNORMAL
MODIFIED ALLEN'S TEST: ABNORMAL
MONOCYTES # BLD AUTO: 0.5 K/UL (ref 0.3–1)
MONOCYTES # BLD AUTO: 0.6 K/UL (ref 0.3–1)
MONOCYTES # BLD AUTO: 0.7 K/UL (ref 0.3–1)
MONOCYTES # BLD AUTO: 0.8 K/UL (ref 0.3–1)
MONOCYTES # BLD AUTO: 0.8 K/UL (ref 0.3–1)
MONOCYTES # BLD AUTO: 1 K/UL (ref 0.3–1)
MONOCYTES # BLD AUTO: 1.1 K/UL (ref 0.3–1)
MONOCYTES # BLD AUTO: 1.3 K/UL (ref 0.3–1)
MONOCYTES # BLD AUTO: 3.2 K/UL (ref 0.3–1)
MONOCYTES # BLD AUTO: ABNORMAL K/UL (ref 0.3–1)
MONOCYTES NFR BLD: 10.4 % (ref 4–15)
MONOCYTES NFR BLD: 10.4 % (ref 4–15)
MONOCYTES NFR BLD: 11.2 % (ref 4–15)
MONOCYTES NFR BLD: 11.5 % (ref 4–15)
MONOCYTES NFR BLD: 11.5 % (ref 4–15)
MONOCYTES NFR BLD: 11.8 % (ref 4–15)
MONOCYTES NFR BLD: 13 % (ref 4–15)
MONOCYTES NFR BLD: 13.1 % (ref 4–15)
MONOCYTES NFR BLD: 13.4 % (ref 4–15)
MONOCYTES NFR BLD: 13.6 % (ref 4–15)
MONOCYTES NFR BLD: 13.8 % (ref 4–15)
MONOCYTES NFR BLD: 17.1 % (ref 4–15)
MONOCYTES NFR BLD: 20 % (ref 4–15)
MONOCYTES NFR BLD: 27.4 % (ref 4–15)
MONOCYTES NFR BLD: 29 % (ref 4–15)
MONOCYTES NFR BLD: 31 % (ref 4–15)
MONOCYTES NFR BLD: 32 % (ref 4–15)
MONOCYTES NFR BLD: 6 % (ref 4–15)
MONOCYTES NFR BLD: 8.7 % (ref 4–15)
MONOCYTES NFR BLD: 9 % (ref 4–15)
MONOCYTES NFR BLD: 9.1 % (ref 4–15)
MV PEAK A VEL: 0.59 M/S
MV PEAK E VEL: 1.08 M/S
MV STENOSIS PRESSURE HALF TIME: 49.4 MS
MV VALVE AREA P 1/2 METHOD: 4.45 CM2
NEUTROPHILS # BLD AUTO: 2.1 K/UL (ref 1.8–7.7)
NEUTROPHILS # BLD AUTO: 2.3 K/UL (ref 1.8–7.7)
NEUTROPHILS # BLD AUTO: 3.2 K/UL (ref 1.8–7.7)
NEUTROPHILS # BLD AUTO: 3.7 K/UL (ref 1.8–7.7)
NEUTROPHILS # BLD AUTO: 3.9 K/UL (ref 1.8–7.7)
NEUTROPHILS # BLD AUTO: 4 K/UL (ref 1.8–7.7)
NEUTROPHILS # BLD AUTO: 4.5 K/UL (ref 1.8–7.7)
NEUTROPHILS # BLD AUTO: 4.5 K/UL (ref 1.8–7.7)
NEUTROPHILS # BLD AUTO: 4.7 K/UL (ref 1.8–7.7)
NEUTROPHILS # BLD AUTO: 5.1 K/UL (ref 1.8–7.7)
NEUTROPHILS # BLD AUTO: 5.8 K/UL (ref 1.8–7.7)
NEUTROPHILS # BLD AUTO: 6 K/UL (ref 1.8–7.7)
NEUTROPHILS # BLD AUTO: 6.4 K/UL (ref 1.8–7.7)
NEUTROPHILS # BLD AUTO: 6.9 K/UL (ref 1.8–7.7)
NEUTROPHILS # BLD AUTO: 7.2 K/UL (ref 1.8–7.7)
NEUTROPHILS NFR BLD: 40 % (ref 38–73)
NEUTROPHILS NFR BLD: 44 % (ref 38–73)
NEUTROPHILS NFR BLD: 44.9 % (ref 38–73)
NEUTROPHILS NFR BLD: 49 % (ref 38–73)
NEUTROPHILS NFR BLD: 50 % (ref 38–73)
NEUTROPHILS NFR BLD: 54 % (ref 38–73)
NEUTROPHILS NFR BLD: 56.4 % (ref 38–73)
NEUTROPHILS NFR BLD: 61.3 % (ref 38–73)
NEUTROPHILS NFR BLD: 66.5 % (ref 38–73)
NEUTROPHILS NFR BLD: 67.1 % (ref 38–73)
NEUTROPHILS NFR BLD: 69.5 % (ref 38–73)
NEUTROPHILS NFR BLD: 69.5 % (ref 38–73)
NEUTROPHILS NFR BLD: 71.9 % (ref 38–73)
NEUTROPHILS NFR BLD: 73 % (ref 38–73)
NEUTROPHILS NFR BLD: 73.8 % (ref 38–73)
NEUTROPHILS NFR BLD: 74.4 % (ref 38–73)
NEUTROPHILS NFR BLD: 74.4 % (ref 38–73)
NEUTROPHILS NFR BLD: 78 % (ref 38–73)
NEUTROPHILS NFR BLD: 78 % (ref 38–73)
NEUTROPHILS NFR BLD: 78.1 % (ref 38–73)
NEUTROPHILS NFR BLD: 79.7 % (ref 38–73)
NEUTS BAND NFR BLD MANUAL: 1 %
NEUTS BAND NFR BLD MANUAL: 10 %
NEUTS BAND NFR BLD MANUAL: 10 %
NEUTS BAND NFR BLD MANUAL: 2 %
NEUTS BAND NFR BLD MANUAL: 7 %
NEUTS BAND NFR BLD MANUAL: 9 %
NITRITE UR QL STRIP: NEGATIVE
NOTIFIED BY: ABNORMAL
NRBC BLD-RTO: 0 /100 WBC
NT-PROBNP SERPL-MCNC: 378 PG/ML (ref 5–900)
NT-PROBNP SERPL-MCNC: 5969 PG/ML (ref 5–900)
NT-PROBNP SERPL-MCNC: 783 PG/ML (ref 5–900)
NUM UNITS TRANS PACKED RBC: NORMAL
O2DEVICE: ABNORMAL
OB PNL STL: POSITIVE
OPIATES UR QL SCN: NEGATIVE
OPIATES UR QL SCN: NEGATIVE
OSMOLALITY SERPL: 302 MOSM/KG (ref 280–300)
OSMOLALITY UR: 360 MOSM/KG (ref 50–1200)
PCO2 BLDA: 29.8 MMHG (ref 35–45)
PCO2 BLDA: 31.8 MMHG (ref 35–45)
PCO2 BLDA: 33.1 MMHG (ref 35–45)
PCO2 BLDA: 35.5 MMHG (ref 35–45)
PCO2 BLDA: 39.9 MMHG (ref 35–45)
PCO2 BLDA: 40.7 MMHG (ref 35–45)
PCO2 BLDA: 43.3 MMHG (ref 35–45)
PCO2 BLDA: 48.7 MMHG (ref 35–45)
PCO2 BLDA: 51 MMHG (ref 35–45)
PCP UR QL SCN>25 NG/ML: NEGATIVE
PCP UR QL SCN>25 NG/ML: NEGATIVE
PEAK FLOW: 60
PEAK FLOW: 60
PEEP: 12
PEEP: 12
PEEP: 5
PEEP: 7
PEEP: 7
PEEP: 8
PEEP: 8
PH SMN: 7.11 [PH] (ref 7.34–7.45)
PH SMN: 7.34 [PH] (ref 7.34–7.45)
PH SMN: 7.37 [PH] (ref 7.34–7.45)
PH SMN: 7.43 [PH] (ref 7.34–7.45)
PH SMN: 7.45 [PH] (ref 7.34–7.45)
PH SMN: 7.46 [PH] (ref 7.34–7.45)
PH SMN: 7.51 [PH] (ref 7.34–7.45)
PH SMN: 7.55 [PH] (ref 7.34–7.45)
PH SMN: 7.56 [PH] (ref 7.34–7.45)
PH UR STRIP: 5 [PH] (ref 5–8)
PH UR STRIP: 6 [PH] (ref 5–8)
PH UR STRIP: 6 [PH] (ref 5–8)
PHOSPHATE SERPL-MCNC: 2.4 MG/DL (ref 2.7–4.5)
PHOSPHATE SERPL-MCNC: 3.1 MG/DL (ref 2.7–4.5)
PHOSPHATE SERPL-MCNC: 3.4 MG/DL (ref 2.7–4.5)
PHOSPHATE SERPL-MCNC: 3.5 MG/DL (ref 2.7–4.5)
PHOSPHATE SERPL-MCNC: 3.7 MG/DL (ref 2.7–4.5)
PHOSPHATE SERPL-MCNC: 4.1 MG/DL (ref 2.7–4.5)
PISA MRMAX VEL: 0.03 M/S
PISA TR MAX VEL: 3.1 M/S
PLATELET # BLD AUTO: 105 K/UL (ref 150–450)
PLATELET # BLD AUTO: 124 K/UL (ref 150–450)
PLATELET # BLD AUTO: 148 K/UL (ref 150–450)
PLATELET # BLD AUTO: 159 K/UL (ref 150–450)
PLATELET # BLD AUTO: 159 K/UL (ref 150–450)
PLATELET # BLD AUTO: 174 K/UL (ref 150–450)
PLATELET # BLD AUTO: 207 K/UL (ref 150–450)
PLATELET # BLD AUTO: 45 K/UL (ref 150–450)
PLATELET # BLD AUTO: 54 K/UL (ref 150–450)
PLATELET # BLD AUTO: 57 K/UL (ref 150–450)
PLATELET # BLD AUTO: 61 K/UL (ref 150–450)
PLATELET # BLD AUTO: 65 K/UL (ref 150–450)
PLATELET # BLD AUTO: 66 K/UL (ref 150–450)
PLATELET # BLD AUTO: 67 K/UL (ref 150–450)
PLATELET # BLD AUTO: 67 K/UL (ref 150–450)
PLATELET # BLD AUTO: 72 K/UL (ref 150–450)
PLATELET # BLD AUTO: 78 K/UL (ref 150–450)
PLATELET # BLD AUTO: 84 K/UL (ref 150–450)
PLATELET # BLD AUTO: 85 K/UL (ref 150–450)
PLATELET # BLD AUTO: 97 K/UL (ref 150–450)
PLATELET # BLD AUTO: 97 K/UL (ref 150–450)
PLATELET BLD QL SMEAR: ABNORMAL
PLATELET BLD QL SMEAR: ABNORMAL
PMV BLD AUTO: 10 FL (ref 9.2–12.9)
PMV BLD AUTO: 10.1 FL (ref 9.2–12.9)
PMV BLD AUTO: 10.2 FL (ref 9.2–12.9)
PMV BLD AUTO: 10.8 FL (ref 9.2–12.9)
PMV BLD AUTO: 10.9 FL (ref 9.2–12.9)
PMV BLD AUTO: 11 FL (ref 9.2–12.9)
PMV BLD AUTO: 11 FL (ref 9.2–12.9)
PMV BLD AUTO: 11.1 FL (ref 9.2–12.9)
PMV BLD AUTO: 11.1 FL (ref 9.2–12.9)
PMV BLD AUTO: 11.2 FL (ref 9.2–12.9)
PMV BLD AUTO: 11.2 FL (ref 9.2–12.9)
PMV BLD AUTO: 11.4 FL (ref 9.2–12.9)
PMV BLD AUTO: 11.4 FL (ref 9.2–12.9)
PMV BLD AUTO: 11.5 FL (ref 9.2–12.9)
PMV BLD AUTO: 11.6 FL (ref 9.2–12.9)
PMV BLD AUTO: 11.7 FL (ref 9.2–12.9)
PMV BLD AUTO: 12.5 FL (ref 9.2–12.9)
PMV BLD AUTO: 12.9 FL (ref 9.2–12.9)
PMV BLD AUTO: 13 FL (ref 9.2–12.9)
PMV BLD AUTO: 9.5 FL (ref 9.2–12.9)
PMV BLD AUTO: 9.9 FL (ref 9.2–12.9)
PO2 BLDA: 121 MMHG (ref 80–100)
PO2 BLDA: 122 MMHG (ref 80–100)
PO2 BLDA: 140 MMHG (ref 80–100)
PO2 BLDA: 191 MMHG (ref 80–100)
PO2 BLDA: 252 MMHG (ref 80–100)
PO2 BLDA: 257 MMHG (ref 80–100)
PO2 BLDA: 63.9 MMHG (ref 80–100)
PO2 BLDA: 79.6 MMHG (ref 80–100)
PO2 BLDA: 95.8 MMHG (ref 80–100)
POC BASE DEFICIT: -14.1 MMOL/L
POC BASE DEFICIT: -2.4 MMOL/L
POC BASE DEFICIT: -6.6 MMOL/L
POC BASE DEFICIT: 2.7 MMOL/L
POC BASE DEFICIT: 4.1 MMOL/L
POC BASE DEFICIT: 4.2 MMOL/L
POC BASE DEFICIT: 6.2 MMOL/L
POC BASE DEFICIT: 7.1 MMOL/L
POC BASE DEFICIT: 9.8 MMOL/L
POC NOTIFIED NOTE: ABNORMAL
POC PERFORMED BY: ABNORMAL
POC SATURATED O2: 91 %
POC SATURATED O2: 97.3 %
POC SATURATED O2: 98.6 %
POC SATURATED O2: 98.8 %
POC SATURATED O2: 99.5 %
POC SATURATED O2: 99.8 %
POC TCO2: 15.7 MMOL/L
POC TCO2: 21.8 MMOL/L
POC TCO2: 24.2 MMOL/L
POC TCO2: 24.6 MMOL/L
POC TCO2: 26.7 MMOL/L
POC TCO2: 32 MMOL/L
POC TEMPERATURE: 37 C
POCT GLUCOSE: 100 MG/DL (ref 70–110)
POCT GLUCOSE: 102 MG/DL (ref 70–110)
POCT GLUCOSE: 114 MG/DL (ref 70–110)
POCT GLUCOSE: 115 MG/DL (ref 70–110)
POCT GLUCOSE: 115 MG/DL (ref 70–110)
POCT GLUCOSE: 123 MG/DL (ref 70–110)
POCT GLUCOSE: 127 MG/DL (ref 70–110)
POCT GLUCOSE: 129 MG/DL (ref 70–110)
POCT GLUCOSE: 134 MG/DL (ref 70–110)
POCT GLUCOSE: 145 MG/DL (ref 70–110)
POCT GLUCOSE: 86 MG/DL (ref 70–110)
POCT GLUCOSE: 87 MG/DL (ref 70–110)
POTASSIUM SERPL-SCNC: 3 MMOL/L (ref 3.5–5.1)
POTASSIUM SERPL-SCNC: 3 MMOL/L (ref 3.5–5.1)
POTASSIUM SERPL-SCNC: 3.4 MMOL/L (ref 3.5–5.1)
POTASSIUM SERPL-SCNC: 3.6 MMOL/L (ref 3.5–5.1)
POTASSIUM SERPL-SCNC: 3.6 MMOL/L (ref 3.5–5.1)
POTASSIUM SERPL-SCNC: 3.7 MMOL/L (ref 3.5–5.1)
POTASSIUM SERPL-SCNC: 3.8 MMOL/L (ref 3.5–5.1)
POTASSIUM SERPL-SCNC: 3.9 MMOL/L (ref 3.5–5.1)
POTASSIUM SERPL-SCNC: 3.9 MMOL/L (ref 3.5–5.1)
POTASSIUM SERPL-SCNC: 4 MMOL/L (ref 3.5–5.1)
POTASSIUM SERPL-SCNC: 4.2 MMOL/L (ref 3.5–5.1)
POTASSIUM SERPL-SCNC: 4.4 MMOL/L (ref 3.5–5.1)
POTASSIUM SERPL-SCNC: 4.4 MMOL/L (ref 3.5–5.1)
POTASSIUM SERPL-SCNC: 4.5 MMOL/L (ref 3.5–5.1)
POTASSIUM SERPL-SCNC: 4.9 MMOL/L (ref 3.5–5.1)
POTASSIUM SERPL-SCNC: 5 MMOL/L (ref 3.5–5.1)
PROT SERPL-MCNC: 5 G/DL (ref 6–8.4)
PROT SERPL-MCNC: 5 G/DL (ref 6–8.4)
PROT SERPL-MCNC: 5.3 G/DL (ref 6–8.4)
PROT SERPL-MCNC: 5.3 G/DL (ref 6–8.4)
PROT SERPL-MCNC: 5.4 G/DL (ref 6–8.4)
PROT SERPL-MCNC: 5.5 G/DL (ref 6–8.4)
PROT SERPL-MCNC: 5.6 G/DL (ref 6–8.4)
PROT SERPL-MCNC: 5.8 G/DL (ref 6–8.4)
PROT SERPL-MCNC: 5.8 G/DL (ref 6–8.4)
PROT SERPL-MCNC: 5.9 G/DL (ref 6–8.4)
PROT SERPL-MCNC: 6.1 G/DL (ref 6–8.4)
PROT SERPL-MCNC: 6.1 G/DL (ref 6–8.4)
PROT SERPL-MCNC: 6.3 G/DL (ref 6–8.4)
PROT SERPL-MCNC: 6.4 G/DL (ref 6–8.4)
PROT SERPL-MCNC: 6.8 G/DL (ref 6–8.4)
PROT SERPL-MCNC: 7.2 G/DL (ref 6–8.4)
PROT SERPL-MCNC: 7.4 G/DL (ref 6–8.4)
PROT SERPL-MCNC: 7.5 G/DL (ref 6–8.4)
PROT UR QL STRIP: ABNORMAL
PROT UR-MCNC: 252.6 MG/DL (ref 0–15)
PROT/CREAT UR: 1.09 MG/G{CREAT} (ref 0–0.2)
PROTHROMBIN TIME: 10.7 SEC (ref 9–12.5)
PROTHROMBIN TIME: 10.9 SEC (ref 9–12.5)
PROVIDER NOTIFIED: ABNORMAL
PV PEAK VELOCITY: 0.61 CM/S
RA PRESSURE: 8 MMHG
RA WIDTH: 5.1 CM
RBC # BLD AUTO: 2.3 M/UL (ref 4.6–6.2)
RBC # BLD AUTO: 2.59 M/UL (ref 4.6–6.2)
RBC # BLD AUTO: 2.63 M/UL (ref 4.6–6.2)
RBC # BLD AUTO: 2.63 M/UL (ref 4.6–6.2)
RBC # BLD AUTO: 2.67 M/UL (ref 4.6–6.2)
RBC # BLD AUTO: 2.7 M/UL (ref 4.6–6.2)
RBC # BLD AUTO: 2.74 M/UL (ref 4.6–6.2)
RBC # BLD AUTO: 2.78 M/UL (ref 4.6–6.2)
RBC # BLD AUTO: 2.81 M/UL (ref 4.6–6.2)
RBC # BLD AUTO: 2.85 M/UL (ref 4.6–6.2)
RBC # BLD AUTO: 2.85 M/UL (ref 4.6–6.2)
RBC # BLD AUTO: 2.91 M/UL (ref 4.6–6.2)
RBC # BLD AUTO: 2.91 M/UL (ref 4.6–6.2)
RBC # BLD AUTO: 2.92 M/UL (ref 4.6–6.2)
RBC # BLD AUTO: 2.92 M/UL (ref 4.6–6.2)
RBC # BLD AUTO: 2.93 M/UL (ref 4.6–6.2)
RBC # BLD AUTO: 2.99 M/UL (ref 4.6–6.2)
RBC # BLD AUTO: 3.2 M/UL (ref 4.6–6.2)
RBC # BLD AUTO: 3.22 M/UL (ref 4.6–6.2)
RBC # BLD AUTO: 3.77 M/UL (ref 4.6–6.2)
RBC # BLD AUTO: 4.75 M/UL (ref 4.6–6.2)
RBC #/AREA URNS HPF: 3 /HPF (ref 0–4)
RBC #/AREA URNS HPF: 32 /HPF (ref 0–4)
RETICS/RBC NFR AUTO: 2.1 % (ref 0.4–2)
RIGHT VENTRICULAR END-DIASTOLIC DIMENSION: 2.83 CM
SARS-COV-2 RDRP RESP QL NAA+PROBE: NEGATIVE
SARS-COV-2 RDRP RESP QL NAA+PROBE: NEGATIVE
SATURATED IRON: 4 % (ref 20–50)
SODIUM SERPL-SCNC: 138 MMOL/L (ref 136–145)
SODIUM SERPL-SCNC: 139 MMOL/L (ref 136–145)
SODIUM SERPL-SCNC: 139 MMOL/L (ref 136–145)
SODIUM SERPL-SCNC: 140 MMOL/L (ref 136–145)
SODIUM SERPL-SCNC: 140 MMOL/L (ref 136–145)
SODIUM SERPL-SCNC: 141 MMOL/L (ref 136–145)
SODIUM SERPL-SCNC: 142 MMOL/L (ref 136–145)
SODIUM SERPL-SCNC: 143 MMOL/L (ref 136–145)
SODIUM SERPL-SCNC: 144 MMOL/L (ref 136–145)
SP GR UR STRIP: 1.02 (ref 1–1.03)
SP GR UR STRIP: 1.02 (ref 1–1.03)
SP GR UR STRIP: >=1.03 (ref 1–1.03)
SPECIMEN SOURCE: ABNORMAL
SQUAMOUS #/AREA URNS HPF: 0 /HPF
SQUAMOUS #/AREA URNS HPF: 7 /HPF
TOTAL IRON BINDING CAPACITY: 339 UG/DL (ref 250–450)
TOXICOLOGY INFORMATION: ABNORMAL
TOXICOLOGY INFORMATION: ABNORMAL
TR MAX PG: 38 MMHG
TRANSFERRIN SERPL-MCNC: 229 MG/DL (ref 200–375)
TROPONIN I SERPL DL<=0.01 NG/ML-MCNC: 26.9 PG/ML (ref 0–60)
TROPONIN I SERPL DL<=0.01 NG/ML-MCNC: 28.2 PG/ML (ref 0–60)
TROPONIN I SERPL DL<=0.01 NG/ML-MCNC: 31.1 PG/ML (ref 0–60)
TROPONIN I SERPL DL<=0.01 NG/ML-MCNC: 35 PG/ML (ref 0–60)
TROPONIN I SERPL DL<=0.01 NG/ML-MCNC: <0.02 NG/ML (ref 0–0.03)
TROPONIN I SERPL DL<=0.01 NG/ML-MCNC: <0.02 NG/ML (ref 0–0.03)
TSH SERPL DL<=0.005 MIU/L-ACNC: 2.08 UIU/ML (ref 0.4–4)
TSH SERPL DL<=0.005 MIU/L-ACNC: 3.64 UIU/ML (ref 0.4–4)
TV REST PULMONARY ARTERY PRESSURE: 46 MMHG
URN SPEC COLLECT METH UR: ABNORMAL
UROBILINOGEN UR STRIP-ACNC: 1 EU/DL
UROBILINOGEN UR STRIP-ACNC: NEGATIVE EU/DL
UROBILINOGEN UR STRIP-ACNC: NEGATIVE EU/DL
VT: 400
VT: 450
WBC # BLD AUTO: 11.67 K/UL (ref 3.9–12.7)
WBC # BLD AUTO: 14.04 K/UL (ref 3.9–12.7)
WBC # BLD AUTO: 3.48 K/UL (ref 3.9–12.7)
WBC # BLD AUTO: 4.04 K/UL (ref 3.9–12.7)
WBC # BLD AUTO: 4.38 K/UL (ref 3.9–12.7)
WBC # BLD AUTO: 4.59 K/UL (ref 3.9–12.7)
WBC # BLD AUTO: 4.72 K/UL (ref 3.9–12.7)
WBC # BLD AUTO: 4.91 K/UL (ref 3.9–12.7)
WBC # BLD AUTO: 5.4 K/UL (ref 3.9–12.7)
WBC # BLD AUTO: 5.81 K/UL (ref 3.9–12.7)
WBC # BLD AUTO: 5.99 K/UL (ref 3.9–12.7)
WBC # BLD AUTO: 6 K/UL (ref 3.9–12.7)
WBC # BLD AUTO: 6.14 K/UL (ref 3.9–12.7)
WBC # BLD AUTO: 6.27 K/UL (ref 3.9–12.7)
WBC # BLD AUTO: 6.56 K/UL (ref 3.9–12.7)
WBC # BLD AUTO: 7.49 K/UL (ref 3.9–12.7)
WBC # BLD AUTO: 7.57 K/UL (ref 3.9–12.7)
WBC # BLD AUTO: 7.69 K/UL (ref 3.9–12.7)
WBC # BLD AUTO: 8.89 K/UL (ref 3.9–12.7)
WBC # BLD AUTO: 9.26 K/UL (ref 3.9–12.7)
WBC # BLD AUTO: 9.27 K/UL (ref 3.9–12.7)
WBC #/AREA URNS HPF: 19 /HPF (ref 0–5)
WBC #/AREA URNS HPF: 36 /HPF (ref 0–5)

## 2021-01-01 PROCEDURE — 63600175 PHARM REV CODE 636 W HCPCS: Performed by: STUDENT IN AN ORGANIZED HEALTH CARE EDUCATION/TRAINING PROGRAM

## 2021-01-01 PROCEDURE — 93005 ELECTROCARDIOGRAM TRACING: CPT

## 2021-01-01 PROCEDURE — 36600 WITHDRAWAL OF ARTERIAL BLOOD: CPT

## 2021-01-01 PROCEDURE — 99900035 HC TECH TIME PER 15 MIN (STAT)

## 2021-01-01 PROCEDURE — 96366 THER/PROPH/DIAG IV INF ADDON: CPT

## 2021-01-01 PROCEDURE — 63600175 PHARM REV CODE 636 W HCPCS: Performed by: EMERGENCY MEDICINE

## 2021-01-01 PROCEDURE — 96360 HYDRATION IV INFUSION INIT: CPT

## 2021-01-01 PROCEDURE — 25000003 PHARM REV CODE 250: Performed by: INTERNAL MEDICINE

## 2021-01-01 PROCEDURE — 27000221 HC OXYGEN, UP TO 24 HOURS

## 2021-01-01 PROCEDURE — C9113 INJ PANTOPRAZOLE SODIUM, VIA: HCPCS | Performed by: INTERNAL MEDICINE

## 2021-01-01 PROCEDURE — 25000003 PHARM REV CODE 250: Performed by: NURSE PRACTITIONER

## 2021-01-01 PROCEDURE — 83605 ASSAY OF LACTIC ACID: CPT | Performed by: INTERNAL MEDICINE

## 2021-01-01 PROCEDURE — 83735 ASSAY OF MAGNESIUM: CPT | Performed by: STUDENT IN AN ORGANIZED HEALTH CARE EDUCATION/TRAINING PROGRAM

## 2021-01-01 PROCEDURE — 99900026 HC AIRWAY MAINTENANCE (STAT)

## 2021-01-01 PROCEDURE — 25000003 PHARM REV CODE 250: Performed by: STUDENT IN AN ORGANIZED HEALTH CARE EDUCATION/TRAINING PROGRAM

## 2021-01-01 PROCEDURE — 93010 ELECTROCARDIOGRAM REPORT: CPT | Mod: ,,, | Performed by: INTERNAL MEDICINE

## 2021-01-01 PROCEDURE — 85027 COMPLETE CBC AUTOMATED: CPT | Performed by: NURSE PRACTITIONER

## 2021-01-01 PROCEDURE — S4991 NICOTINE PATCH NONLEGEND: HCPCS | Performed by: INTERNAL MEDICINE

## 2021-01-01 PROCEDURE — 63600175 PHARM REV CODE 636 W HCPCS: Performed by: INTERNAL MEDICINE

## 2021-01-01 PROCEDURE — 94003 VENT MGMT INPAT SUBQ DAY: CPT

## 2021-01-01 PROCEDURE — 83880 ASSAY OF NATRIURETIC PEPTIDE: CPT | Performed by: STUDENT IN AN ORGANIZED HEALTH CARE EDUCATION/TRAINING PROGRAM

## 2021-01-01 PROCEDURE — 36415 COLL VENOUS BLD VENIPUNCTURE: CPT | Performed by: STUDENT IN AN ORGANIZED HEALTH CARE EDUCATION/TRAINING PROGRAM

## 2021-01-01 PROCEDURE — 96376 TX/PRO/DX INJ SAME DRUG ADON: CPT | Mod: 59

## 2021-01-01 PROCEDURE — 80053 COMPREHEN METABOLIC PANEL: CPT | Performed by: NURSE PRACTITIONER

## 2021-01-01 PROCEDURE — 36415 COLL VENOUS BLD VENIPUNCTURE: CPT | Performed by: NURSE PRACTITIONER

## 2021-01-01 PROCEDURE — U0002 COVID-19 LAB TEST NON-CDC: HCPCS | Performed by: EMERGENCY MEDICINE

## 2021-01-01 PROCEDURE — 94640 AIRWAY INHALATION TREATMENT: CPT

## 2021-01-01 PROCEDURE — 99291 CRITICAL CARE FIRST HOUR: CPT | Mod: 25

## 2021-01-01 PROCEDURE — 11000001 HC ACUTE MED/SURG PRIVATE ROOM

## 2021-01-01 PROCEDURE — 86900 BLOOD TYPING SEROLOGIC ABO: CPT | Performed by: STUDENT IN AN ORGANIZED HEALTH CARE EDUCATION/TRAINING PROGRAM

## 2021-01-01 PROCEDURE — 20000000 HC ICU ROOM

## 2021-01-01 PROCEDURE — 25000003 PHARM REV CODE 250: Performed by: EMERGENCY MEDICINE

## 2021-01-01 PROCEDURE — 83735 ASSAY OF MAGNESIUM: CPT | Performed by: NURSE PRACTITIONER

## 2021-01-01 PROCEDURE — 25000003 PHARM REV CODE 250

## 2021-01-01 PROCEDURE — 92526 ORAL FUNCTION THERAPY: CPT

## 2021-01-01 PROCEDURE — 81003 URINALYSIS AUTO W/O SCOPE: CPT | Mod: 59 | Performed by: STUDENT IN AN ORGANIZED HEALTH CARE EDUCATION/TRAINING PROGRAM

## 2021-01-01 PROCEDURE — 84443 ASSAY THYROID STIM HORMONE: CPT | Performed by: EMERGENCY MEDICINE

## 2021-01-01 PROCEDURE — 87040 BLOOD CULTURE FOR BACTERIA: CPT | Performed by: INTERNAL MEDICINE

## 2021-01-01 PROCEDURE — 80053 COMPREHEN METABOLIC PANEL: CPT | Performed by: EMERGENCY MEDICINE

## 2021-01-01 PROCEDURE — 84484 ASSAY OF TROPONIN QUANT: CPT | Performed by: EMERGENCY MEDICINE

## 2021-01-01 PROCEDURE — 99900031 HC PATIENT EDUCATION (STAT)

## 2021-01-01 PROCEDURE — 25000242 PHARM REV CODE 250 ALT 637 W/ HCPCS: Performed by: INTERNAL MEDICINE

## 2021-01-01 PROCEDURE — C9113 INJ PANTOPRAZOLE SODIUM, VIA: HCPCS | Performed by: STUDENT IN AN ORGANIZED HEALTH CARE EDUCATION/TRAINING PROGRAM

## 2021-01-01 PROCEDURE — 80307 DRUG TEST PRSMV CHEM ANLYZR: CPT | Performed by: EMERGENCY MEDICINE

## 2021-01-01 PROCEDURE — 85045 AUTOMATED RETICULOCYTE COUNT: CPT | Performed by: STUDENT IN AN ORGANIZED HEALTH CARE EDUCATION/TRAINING PROGRAM

## 2021-01-01 PROCEDURE — 63600175 PHARM REV CODE 636 W HCPCS

## 2021-01-01 PROCEDURE — 96365 THER/PROPH/DIAG IV INF INIT: CPT

## 2021-01-01 PROCEDURE — 94761 N-INVAS EAR/PLS OXIMETRY MLT: CPT

## 2021-01-01 PROCEDURE — 82803 BLOOD GASES ANY COMBINATION: CPT

## 2021-01-01 PROCEDURE — 85007 BL SMEAR W/DIFF WBC COUNT: CPT | Performed by: NURSE PRACTITIONER

## 2021-01-01 PROCEDURE — 84100 ASSAY OF PHOSPHORUS: CPT | Performed by: STUDENT IN AN ORGANIZED HEALTH CARE EDUCATION/TRAINING PROGRAM

## 2021-01-01 PROCEDURE — 27100108

## 2021-01-01 PROCEDURE — 82272 OCCULT BLD FECES 1-3 TESTS: CPT | Performed by: EMERGENCY MEDICINE

## 2021-01-01 PROCEDURE — 96375 TX/PRO/DX INJ NEW DRUG ADDON: CPT | Mod: 59

## 2021-01-01 PROCEDURE — 85025 COMPLETE CBC W/AUTO DIFF WBC: CPT | Performed by: NURSE PRACTITIONER

## 2021-01-01 PROCEDURE — 27100080 HC AIRWAY ADAPTER-END TIDAL CO2

## 2021-01-01 PROCEDURE — 85025 COMPLETE CBC W/AUTO DIFF WBC: CPT | Performed by: EMERGENCY MEDICINE

## 2021-01-01 PROCEDURE — 85014 HEMATOCRIT: CPT | Performed by: EMERGENCY MEDICINE

## 2021-01-01 PROCEDURE — S4991 NICOTINE PATCH NONLEGEND: HCPCS | Performed by: STUDENT IN AN ORGANIZED HEALTH CARE EDUCATION/TRAINING PROGRAM

## 2021-01-01 PROCEDURE — 83880 ASSAY OF NATRIURETIC PEPTIDE: CPT | Performed by: EMERGENCY MEDICINE

## 2021-01-01 PROCEDURE — 87086 URINE CULTURE/COLONY COUNT: CPT | Performed by: EMERGENCY MEDICINE

## 2021-01-01 PROCEDURE — 36415 COLL VENOUS BLD VENIPUNCTURE: CPT | Mod: 59 | Performed by: STUDENT IN AN ORGANIZED HEALTH CARE EDUCATION/TRAINING PROGRAM

## 2021-01-01 PROCEDURE — 99284 EMERGENCY DEPT VISIT MOD MDM: CPT | Mod: 25

## 2021-01-01 PROCEDURE — 87077 CULTURE AEROBIC IDENTIFY: CPT | Performed by: EMERGENCY MEDICINE

## 2021-01-01 PROCEDURE — 96361 HYDRATE IV INFUSION ADD-ON: CPT

## 2021-01-01 PROCEDURE — 82140 ASSAY OF AMMONIA: CPT | Performed by: EMERGENCY MEDICINE

## 2021-01-01 PROCEDURE — 94002 VENT MGMT INPAT INIT DAY: CPT

## 2021-01-01 PROCEDURE — 85027 COMPLETE CBC AUTOMATED: CPT | Performed by: STUDENT IN AN ORGANIZED HEALTH CARE EDUCATION/TRAINING PROGRAM

## 2021-01-01 PROCEDURE — 97535 SELF CARE MNGMENT TRAINING: CPT

## 2021-01-01 PROCEDURE — P9016 RBC LEUKOCYTES REDUCED: HCPCS | Performed by: EMERGENCY MEDICINE

## 2021-01-01 PROCEDURE — 80053 COMPREHEN METABOLIC PANEL: CPT | Performed by: STUDENT IN AN ORGANIZED HEALTH CARE EDUCATION/TRAINING PROGRAM

## 2021-01-01 PROCEDURE — 84484 ASSAY OF TROPONIN QUANT: CPT | Mod: 91 | Performed by: EMERGENCY MEDICINE

## 2021-01-01 PROCEDURE — 97530 THERAPEUTIC ACTIVITIES: CPT

## 2021-01-01 PROCEDURE — 96376 TX/PRO/DX INJ SAME DRUG ADON: CPT

## 2021-01-01 PROCEDURE — 99233 SBSQ HOSP IP/OBS HIGH 50: CPT | Mod: GC,,, | Performed by: INTERNAL MEDICINE

## 2021-01-01 PROCEDURE — C1751 CATH, INF, PER/CENT/MIDLINE: HCPCS

## 2021-01-01 PROCEDURE — 85610 PROTHROMBIN TIME: CPT | Performed by: EMERGENCY MEDICINE

## 2021-01-01 PROCEDURE — 96374 THER/PROPH/DIAG INJ IV PUSH: CPT

## 2021-01-01 PROCEDURE — 87070 CULTURE OTHR SPECIMN AEROBIC: CPT | Performed by: INTERNAL MEDICINE

## 2021-01-01 PROCEDURE — 99999 PR PBB SHADOW E&M-EST. PATIENT-LVL I: CPT | Mod: PBBFAC,,,

## 2021-01-01 PROCEDURE — 93010 EKG 12-LEAD: ICD-10-PCS | Mod: ,,, | Performed by: INTERNAL MEDICINE

## 2021-01-01 PROCEDURE — 99402 PR PREVENT COUNSEL,INDIV,30 MIN: ICD-10-PCS | Mod: S$GLB,,,

## 2021-01-01 PROCEDURE — 25500020 PHARM REV CODE 255: Performed by: EMERGENCY MEDICINE

## 2021-01-01 PROCEDURE — 36415 COLL VENOUS BLD VENIPUNCTURE: CPT | Performed by: EMERGENCY MEDICINE

## 2021-01-01 PROCEDURE — 84466 ASSAY OF TRANSFERRIN: CPT | Performed by: STUDENT IN AN ORGANIZED HEALTH CARE EDUCATION/TRAINING PROGRAM

## 2021-01-01 PROCEDURE — 93925 LOWER EXTREMITY STUDY: CPT | Mod: TC

## 2021-01-01 PROCEDURE — 96375 TX/PRO/DX INJ NEW DRUG ADDON: CPT

## 2021-01-01 PROCEDURE — 85007 BL SMEAR W/DIFF WBC COUNT: CPT | Performed by: STUDENT IN AN ORGANIZED HEALTH CARE EDUCATION/TRAINING PROGRAM

## 2021-01-01 PROCEDURE — 25000003 PHARM REV CODE 250: Performed by: NURSE ANESTHETIST, CERTIFIED REGISTERED

## 2021-01-01 PROCEDURE — 82728 ASSAY OF FERRITIN: CPT | Performed by: STUDENT IN AN ORGANIZED HEALTH CARE EDUCATION/TRAINING PROGRAM

## 2021-01-01 PROCEDURE — 43235 EGD DIAGNOSTIC BRUSH WASH: CPT | Performed by: INTERNAL MEDICINE

## 2021-01-01 PROCEDURE — 27200966 HC CLOSED SUCTION SYSTEM

## 2021-01-01 PROCEDURE — 86920 COMPATIBILITY TEST SPIN: CPT | Mod: 91 | Performed by: EMERGENCY MEDICINE

## 2021-01-01 PROCEDURE — 85610 PROTHROMBIN TIME: CPT | Performed by: STUDENT IN AN ORGANIZED HEALTH CARE EDUCATION/TRAINING PROGRAM

## 2021-01-01 PROCEDURE — 96360 HYDRATION IV INFUSION INIT: CPT | Mod: 59

## 2021-01-01 PROCEDURE — 63600175 PHARM REV CODE 636 W HCPCS: Performed by: NURSE ANESTHETIST, CERTIFIED REGISTERED

## 2021-01-01 PROCEDURE — 63600175 PHARM REV CODE 636 W HCPCS: Performed by: NURSE PRACTITIONER

## 2021-01-01 PROCEDURE — 99404 PREV MED CNSL INDIV APPRX 60: CPT | Mod: S$GLB,,,

## 2021-01-01 PROCEDURE — 93010 EKG 12-LEAD: ICD-10-PCS | Mod: 77,,, | Performed by: INTERNAL MEDICINE

## 2021-01-01 PROCEDURE — 85730 THROMBOPLASTIN TIME PARTIAL: CPT | Performed by: EMERGENCY MEDICINE

## 2021-01-01 PROCEDURE — 83930 ASSAY OF BLOOD OSMOLALITY: CPT | Performed by: INTERNAL MEDICINE

## 2021-01-01 PROCEDURE — 99223 1ST HOSP IP/OBS HIGH 75: CPT | Mod: ,,, | Performed by: INTERNAL MEDICINE

## 2021-01-01 PROCEDURE — 83036 HEMOGLOBIN GLYCOSYLATED A1C: CPT | Performed by: STUDENT IN AN ORGANIZED HEALTH CARE EDUCATION/TRAINING PROGRAM

## 2021-01-01 PROCEDURE — 81000 URINALYSIS NONAUTO W/SCOPE: CPT | Performed by: INTERNAL MEDICINE

## 2021-01-01 PROCEDURE — 85730 THROMBOPLASTIN TIME PARTIAL: CPT | Performed by: STUDENT IN AN ORGANIZED HEALTH CARE EDUCATION/TRAINING PROGRAM

## 2021-01-01 PROCEDURE — 36410 VNPNXR 3YR/> PHY/QHP DX/THER: CPT

## 2021-01-01 PROCEDURE — 71046 X-RAY EXAM CHEST 2 VIEWS: CPT | Mod: TC

## 2021-01-01 PROCEDURE — 85025 COMPLETE CBC W/AUTO DIFF WBC: CPT | Mod: 91 | Performed by: STUDENT IN AN ORGANIZED HEALTH CARE EDUCATION/TRAINING PROGRAM

## 2021-01-01 PROCEDURE — 85025 COMPLETE CBC W/AUTO DIFF WBC: CPT | Performed by: STUDENT IN AN ORGANIZED HEALTH CARE EDUCATION/TRAINING PROGRAM

## 2021-01-01 PROCEDURE — 82077 ASSAY SPEC XCP UR&BREATH IA: CPT | Performed by: EMERGENCY MEDICINE

## 2021-01-01 PROCEDURE — 82570 ASSAY OF URINE CREATININE: CPT | Performed by: INTERNAL MEDICINE

## 2021-01-01 PROCEDURE — 36430 TRANSFUSION BLD/BLD COMPNT: CPT

## 2021-01-01 PROCEDURE — 87086 URINE CULTURE/COLONY COUNT: CPT | Performed by: INTERNAL MEDICINE

## 2021-01-01 PROCEDURE — 99407 BEHAV CHNG SMOKING > 10 MIN: CPT | Mod: S$PBB,,,

## 2021-01-01 PROCEDURE — 97165 OT EVAL LOW COMPLEX 30 MIN: CPT

## 2021-01-01 PROCEDURE — 99402 PREV MED CNSL INDIV APPRX 30: CPT | Mod: S$GLB,,,

## 2021-01-01 PROCEDURE — 25500020 PHARM REV CODE 255: Performed by: STUDENT IN AN ORGANIZED HEALTH CARE EDUCATION/TRAINING PROGRAM

## 2021-01-01 PROCEDURE — C9113 INJ PANTOPRAZOLE SODIUM, VIA: HCPCS | Performed by: EMERGENCY MEDICINE

## 2021-01-01 PROCEDURE — 97161 PT EVAL LOW COMPLEX 20 MIN: CPT

## 2021-01-01 PROCEDURE — 84484 ASSAY OF TROPONIN QUANT: CPT | Performed by: STUDENT IN AN ORGANIZED HEALTH CARE EDUCATION/TRAINING PROGRAM

## 2021-01-01 PROCEDURE — 85018 HEMOGLOBIN: CPT | Mod: 91 | Performed by: EMERGENCY MEDICINE

## 2021-01-01 PROCEDURE — 97530 THERAPEUTIC ACTIVITIES: CPT | Mod: CQ

## 2021-01-01 PROCEDURE — 37000008 HC ANESTHESIA 1ST 15 MINUTES: Performed by: INTERNAL MEDICINE

## 2021-01-01 PROCEDURE — 92610 EVALUATE SWALLOWING FUNCTION: CPT

## 2021-01-01 PROCEDURE — 93010 ELECTROCARDIOGRAM REPORT: CPT | Mod: 77,,, | Performed by: INTERNAL MEDICINE

## 2021-01-01 PROCEDURE — 86900 BLOOD TYPING SEROLOGIC ABO: CPT | Performed by: EMERGENCY MEDICINE

## 2021-01-01 PROCEDURE — 84443 ASSAY THYROID STIM HORMONE: CPT | Performed by: STUDENT IN AN ORGANIZED HEALTH CARE EDUCATION/TRAINING PROGRAM

## 2021-01-01 PROCEDURE — 97116 GAIT TRAINING THERAPY: CPT | Mod: CQ

## 2021-01-01 PROCEDURE — 83935 ASSAY OF URINE OSMOLALITY: CPT | Performed by: INTERNAL MEDICINE

## 2021-01-01 PROCEDURE — 99223 PR INITIAL HOSPITAL CARE,LEVL III: ICD-10-PCS | Mod: ,,, | Performed by: INTERNAL MEDICINE

## 2021-01-01 PROCEDURE — 80307 DRUG TEST PRSMV CHEM ANLYZR: CPT | Performed by: STUDENT IN AN ORGANIZED HEALTH CARE EDUCATION/TRAINING PROGRAM

## 2021-01-01 PROCEDURE — 99407 PR TOBACCO USE CESSATION INTENSIVE >10 MINUTES: ICD-10-PCS | Mod: S$PBB,,,

## 2021-01-01 PROCEDURE — 83735 ASSAY OF MAGNESIUM: CPT | Performed by: EMERGENCY MEDICINE

## 2021-01-01 PROCEDURE — 99406 BEHAV CHNG SMOKING 3-10 MIN: CPT | Mod: S$GLB,,,

## 2021-01-01 PROCEDURE — 87205 SMEAR GRAM STAIN: CPT | Performed by: INTERNAL MEDICINE

## 2021-01-01 PROCEDURE — 87186 SC STD MICRODIL/AGAR DIL: CPT | Performed by: EMERGENCY MEDICINE

## 2021-01-01 PROCEDURE — 36415 COLL VENOUS BLD VENIPUNCTURE: CPT | Performed by: INTERNAL MEDICINE

## 2021-01-01 PROCEDURE — 43235 PR EGD, FLEX, DIAGNOSTIC: ICD-10-PCS | Mod: ,,, | Performed by: INTERNAL MEDICINE

## 2021-01-01 PROCEDURE — 99999 PR PBB SHADOW E&M-EST. PATIENT-LVL I: ICD-10-PCS | Mod: PBBFAC,,,

## 2021-01-01 PROCEDURE — 37000009 HC ANESTHESIA EA ADD 15 MINS: Performed by: INTERNAL MEDICINE

## 2021-01-01 PROCEDURE — 99406 PT REFUSED TOBACCO CESSATION: ICD-10-PCS | Mod: S$GLB,,,

## 2021-01-01 PROCEDURE — 99404 PR PREVENT COUNSEL,INDIV,60 MIN: ICD-10-PCS | Mod: S$GLB,,,

## 2021-01-01 PROCEDURE — 87088 URINE BACTERIA CULTURE: CPT | Performed by: EMERGENCY MEDICINE

## 2021-01-01 PROCEDURE — 80053 COMPREHEN METABOLIC PANEL: CPT | Mod: 91 | Performed by: STUDENT IN AN ORGANIZED HEALTH CARE EDUCATION/TRAINING PROGRAM

## 2021-01-01 PROCEDURE — 93306 TTE W/DOPPLER COMPLETE: CPT

## 2021-01-01 PROCEDURE — 99233 PR SUBSEQUENT HOSPITAL CARE,LEVL III: ICD-10-PCS | Mod: GC,,, | Performed by: INTERNAL MEDICINE

## 2021-01-01 PROCEDURE — 43235 EGD DIAGNOSTIC BRUSH WASH: CPT | Mod: ,,, | Performed by: INTERNAL MEDICINE

## 2021-01-01 PROCEDURE — 83735 ASSAY OF MAGNESIUM: CPT | Mod: 91 | Performed by: STUDENT IN AN ORGANIZED HEALTH CARE EDUCATION/TRAINING PROGRAM

## 2021-01-01 PROCEDURE — 81000 URINALYSIS NONAUTO W/SCOPE: CPT | Mod: 59 | Performed by: EMERGENCY MEDICINE

## 2021-01-01 PROCEDURE — 94760 N-INVAS EAR/PLS OXIMETRY 1: CPT

## 2021-01-01 PROCEDURE — 25000242 PHARM REV CODE 250 ALT 637 W/ HCPCS: Performed by: NURSE PRACTITIONER

## 2021-01-01 RX ORDER — ATORVASTATIN CALCIUM 40 MG/1
40 TABLET, FILM COATED ORAL DAILY
Status: DISCONTINUED | OUTPATIENT
Start: 2021-01-01 | End: 2022-01-01

## 2021-01-01 RX ORDER — DILTIAZEM HYDROCHLORIDE 5 MG/ML
10 INJECTION INTRAVENOUS
Status: COMPLETED | OUTPATIENT
Start: 2021-01-01 | End: 2021-01-01

## 2021-01-01 RX ORDER — LINEZOLID 2 MG/ML
600 INJECTION, SOLUTION INTRAVENOUS
Status: COMPLETED | OUTPATIENT
Start: 2021-01-01 | End: 2022-01-01

## 2021-01-01 RX ORDER — LORAZEPAM 2 MG/ML
1 INJECTION INTRAMUSCULAR
Status: DISCONTINUED | OUTPATIENT
Start: 2021-01-01 | End: 2021-01-01

## 2021-01-01 RX ORDER — MAGNESIUM SULFATE 1 G/100ML
1 INJECTION INTRAVENOUS ONCE
Status: COMPLETED | OUTPATIENT
Start: 2021-01-01 | End: 2021-01-01

## 2021-01-01 RX ORDER — MIDAZOLAM HYDROCHLORIDE 1 MG/ML
INJECTION, SOLUTION INTRAMUSCULAR; INTRAVENOUS
Status: DISCONTINUED | OUTPATIENT
Start: 2021-01-01 | End: 2021-01-01

## 2021-01-01 RX ORDER — ENOXAPARIN SODIUM 100 MG/ML
30 INJECTION SUBCUTANEOUS EVERY 24 HOURS
Status: DISCONTINUED | OUTPATIENT
Start: 2021-01-01 | End: 2021-01-01

## 2021-01-01 RX ORDER — FOLIC ACID 1 MG/1
1 TABLET ORAL DAILY
Qty: 90 TABLET | Refills: 3 | OUTPATIENT
Start: 2021-01-01 | End: 2022-11-28

## 2021-01-01 RX ORDER — MICONAZOLE NITRATE 2 %
CREAM (GRAM) TOPICAL
Qty: 380 EACH | Refills: 0 | Status: SHIPPED | OUTPATIENT
Start: 2021-01-01

## 2021-01-01 RX ORDER — SODIUM CHLORIDE, SODIUM LACTATE, POTASSIUM CHLORIDE, CALCIUM CHLORIDE 600; 310; 30; 20 MG/100ML; MG/100ML; MG/100ML; MG/100ML
INJECTION, SOLUTION INTRAVENOUS CONTINUOUS
Status: DISCONTINUED | OUTPATIENT
Start: 2021-01-01 | End: 2021-01-01

## 2021-01-01 RX ORDER — METOPROLOL TARTRATE 50 MG/1
50 TABLET ORAL 2 TIMES DAILY
COMMUNITY

## 2021-01-01 RX ORDER — MAGNESIUM SULFATE HEPTAHYDRATE 40 MG/ML
4 INJECTION, SOLUTION INTRAVENOUS ONCE
Status: COMPLETED | OUTPATIENT
Start: 2021-01-01 | End: 2021-01-01

## 2021-01-01 RX ORDER — HYDROCODONE BITARTRATE AND ACETAMINOPHEN 500; 5 MG/1; MG/1
TABLET ORAL
Status: DISCONTINUED | OUTPATIENT
Start: 2021-01-01 | End: 2021-01-01 | Stop reason: HOSPADM

## 2021-01-01 RX ORDER — LANOLIN ALCOHOL/MO/W.PET/CERES
1 CREAM (GRAM) TOPICAL DAILY
Status: DISCONTINUED | OUTPATIENT
Start: 2021-01-01 | End: 2021-01-01 | Stop reason: HOSPADM

## 2021-01-01 RX ORDER — DILTIAZEM HCL 1 MG/ML
5 INJECTION, SOLUTION INTRAVENOUS CONTINUOUS
Status: DISCONTINUED | OUTPATIENT
Start: 2021-01-01 | End: 2021-01-01

## 2021-01-01 RX ORDER — ACETAMINOPHEN 500 MG
500 TABLET ORAL EVERY 6 HOURS PRN
Status: DISCONTINUED | OUTPATIENT
Start: 2021-01-01 | End: 2022-01-01 | Stop reason: HOSPADM

## 2021-01-01 RX ORDER — METOPROLOL TARTRATE 1 MG/ML
5 INJECTION, SOLUTION INTRAVENOUS
Status: COMPLETED | OUTPATIENT
Start: 2021-01-01 | End: 2021-01-01

## 2021-01-01 RX ORDER — DILTIAZEM HCL 1 MG/ML
0-15 INJECTION, SOLUTION INTRAVENOUS CONTINUOUS
Status: DISCONTINUED | OUTPATIENT
Start: 2021-01-01 | End: 2021-01-01

## 2021-01-01 RX ORDER — ONDANSETRON 2 MG/ML
4 INJECTION INTRAMUSCULAR; INTRAVENOUS EVERY 8 HOURS PRN
Status: DISCONTINUED | OUTPATIENT
Start: 2021-01-01 | End: 2021-01-01 | Stop reason: HOSPADM

## 2021-01-01 RX ORDER — LEVOFLOXACIN 5 MG/ML
750 INJECTION, SOLUTION INTRAVENOUS
Status: DISCONTINUED | OUTPATIENT
Start: 2021-01-01 | End: 2022-01-01

## 2021-01-01 RX ORDER — METOPROLOL TARTRATE 1 MG/ML
5 INJECTION, SOLUTION INTRAVENOUS EVERY 5 MIN PRN
Status: DISCONTINUED | OUTPATIENT
Start: 2021-01-01 | End: 2021-01-01

## 2021-01-01 RX ORDER — SODIUM CHLORIDE 0.9 % (FLUSH) 0.9 %
10 SYRINGE (ML) INJECTION
Status: DISCONTINUED | OUTPATIENT
Start: 2021-01-01 | End: 2021-01-01 | Stop reason: HOSPADM

## 2021-01-01 RX ORDER — DEXMEDETOMIDINE HYDROCHLORIDE 4 UG/ML
0-1.4 INJECTION, SOLUTION INTRAVENOUS CONTINUOUS
Status: DISCONTINUED | OUTPATIENT
Start: 2021-01-01 | End: 2022-01-01 | Stop reason: HOSPADM

## 2021-01-01 RX ORDER — ESMOLOL HYDROCHLORIDE 20 MG/ML
0-300 INJECTION, SOLUTION INTRAVENOUS CONTINUOUS
Status: DISCONTINUED | OUTPATIENT
Start: 2021-01-01 | End: 2021-01-01

## 2021-01-01 RX ORDER — POTASSIUM CHLORIDE 14.9 MG/ML
40 INJECTION INTRAVENOUS ONCE
Status: DISCONTINUED | OUTPATIENT
Start: 2021-01-01 | End: 2021-01-01

## 2021-01-01 RX ORDER — SODIUM CHLORIDE, SODIUM LACTATE, POTASSIUM CHLORIDE, CALCIUM CHLORIDE 600; 310; 30; 20 MG/100ML; MG/100ML; MG/100ML; MG/100ML
INJECTION, SOLUTION INTRAVENOUS CONTINUOUS
Status: ACTIVE | OUTPATIENT
Start: 2021-01-01 | End: 2021-01-01

## 2021-01-01 RX ORDER — LORAZEPAM 2 MG/ML
2 INJECTION INTRAMUSCULAR ONCE
Status: COMPLETED | OUTPATIENT
Start: 2021-01-01 | End: 2021-01-01

## 2021-01-01 RX ORDER — METOPROLOL TARTRATE 1 MG/ML
5 INJECTION, SOLUTION INTRAVENOUS
Status: DISCONTINUED | OUTPATIENT
Start: 2021-01-01 | End: 2021-01-01

## 2021-01-01 RX ORDER — LORAZEPAM 2 MG/ML
INJECTION INTRAMUSCULAR
Status: DISCONTINUED
Start: 2021-01-01 | End: 2021-01-01 | Stop reason: WASHOUT

## 2021-01-01 RX ORDER — DILTIAZEM HYDROCHLORIDE 120 MG/1
240 CAPSULE, COATED, EXTENDED RELEASE ORAL DAILY
Status: DISCONTINUED | OUTPATIENT
Start: 2021-01-01 | End: 2021-01-01

## 2021-01-01 RX ORDER — SODIUM,POTASSIUM PHOSPHATES 280-250MG
1 POWDER IN PACKET (EA) ORAL
Status: COMPLETED | OUTPATIENT
Start: 2021-01-01 | End: 2021-01-01

## 2021-01-01 RX ORDER — METOPROLOL TARTRATE 1 MG/ML
2.5 INJECTION, SOLUTION INTRAVENOUS
Status: COMPLETED | OUTPATIENT
Start: 2021-01-01 | End: 2021-01-01

## 2021-01-01 RX ORDER — MAGNESIUM SULFATE HEPTAHYDRATE 40 MG/ML
2 INJECTION, SOLUTION INTRAVENOUS ONCE
Status: COMPLETED | OUTPATIENT
Start: 2021-01-01 | End: 2021-01-01

## 2021-01-01 RX ORDER — LIDOCAINE HCL/PF 100 MG/5ML
SYRINGE (ML) INTRAVENOUS
Status: DISCONTINUED | OUTPATIENT
Start: 2021-01-01 | End: 2021-01-01

## 2021-01-01 RX ORDER — ATORVASTATIN CALCIUM 20 MG/1
20 TABLET, FILM COATED ORAL DAILY
Status: DISCONTINUED | OUTPATIENT
Start: 2021-01-01 | End: 2021-01-01

## 2021-01-01 RX ORDER — SODIUM CHLORIDE 9 MG/ML
INJECTION, SOLUTION INTRAVENOUS CONTINUOUS
Status: DISCONTINUED | OUTPATIENT
Start: 2021-01-01 | End: 2021-01-01 | Stop reason: HOSPADM

## 2021-01-01 RX ORDER — HALOPERIDOL 5 MG/ML
10 INJECTION INTRAMUSCULAR EVERY 4 HOURS PRN
Status: DISCONTINUED | OUTPATIENT
Start: 2021-01-01 | End: 2021-01-01

## 2021-01-01 RX ORDER — LORAZEPAM 2 MG/ML
4 INJECTION INTRAMUSCULAR ONCE
Status: COMPLETED | OUTPATIENT
Start: 2021-01-01 | End: 2021-01-01

## 2021-01-01 RX ORDER — LORAZEPAM 2 MG/ML
1 INJECTION INTRAMUSCULAR
Status: COMPLETED | OUTPATIENT
Start: 2021-01-01 | End: 2021-01-01

## 2021-01-01 RX ORDER — BENZONATATE 100 MG/1
100 CAPSULE ORAL EVERY 8 HOURS PRN
Status: DISCONTINUED | OUTPATIENT
Start: 2021-01-01 | End: 2021-01-01 | Stop reason: HOSPADM

## 2021-01-01 RX ORDER — DM/P-EPHED/ACETAMINOPH/DOXYLAM 30-7.5/3
LIQUID (ML) ORAL
Qty: 168 LOZENGE | Refills: 0 | Status: SHIPPED | OUTPATIENT
Start: 2021-01-01

## 2021-01-01 RX ORDER — CHLORDIAZEPOXIDE HYDROCHLORIDE 25 MG/1
25 CAPSULE, GELATIN COATED ORAL EVERY 12 HOURS
Status: DISCONTINUED | OUTPATIENT
Start: 2021-01-01 | End: 2021-01-01

## 2021-01-01 RX ORDER — FLUCONAZOLE 2 MG/ML
200 INJECTION, SOLUTION INTRAVENOUS
Status: DISCONTINUED | OUTPATIENT
Start: 2021-01-01 | End: 2022-01-01

## 2021-01-01 RX ORDER — ATROPINE SULFATE 0.1 MG/ML
INJECTION INTRAVENOUS
Status: COMPLETED
Start: 2021-01-01 | End: 2021-01-01

## 2021-01-01 RX ORDER — DILTIAZEM HYDROCHLORIDE 5 MG/ML
10 INJECTION INTRAVENOUS ONCE
Status: COMPLETED | OUTPATIENT
Start: 2021-01-01 | End: 2021-01-01

## 2021-01-01 RX ORDER — SODIUM CHLORIDE 9 MG/ML
INJECTION, SOLUTION INTRAVENOUS ONCE
Status: COMPLETED | OUTPATIENT
Start: 2021-01-01 | End: 2021-01-01

## 2021-01-01 RX ORDER — PROPOFOL 10 MG/ML
0-50 INJECTION, EMULSION INTRAVENOUS CONTINUOUS
Status: DISCONTINUED | OUTPATIENT
Start: 2021-01-01 | End: 2022-01-01 | Stop reason: HOSPADM

## 2021-01-01 RX ORDER — ATROPINE SULFATE 0.1 MG/ML
0.4 INJECTION INTRAVENOUS ONCE
Status: COMPLETED | OUTPATIENT
Start: 2021-01-01 | End: 2021-01-01

## 2021-01-01 RX ORDER — ROSUVASTATIN CALCIUM 20 MG/1
20 TABLET, COATED ORAL DAILY
COMMUNITY
End: 2021-01-01 | Stop reason: CLARIF

## 2021-01-01 RX ORDER — CALCIUM CARBONATE 200(500)MG
1000 TABLET,CHEWABLE ORAL EVERY 12 HOURS
Status: DISCONTINUED | OUTPATIENT
Start: 2021-01-01 | End: 2021-01-01

## 2021-01-01 RX ORDER — EPINEPHRINE 1 MG/ML
INJECTION, SOLUTION INTRACARDIAC; INTRAMUSCULAR; INTRAVENOUS; SUBCUTANEOUS
Status: COMPLETED
Start: 2021-01-01 | End: 2021-01-01

## 2021-01-01 RX ORDER — LORAZEPAM 2 MG/ML
2 INJECTION INTRAMUSCULAR
Status: DISCONTINUED | OUTPATIENT
Start: 2021-01-01 | End: 2021-01-01

## 2021-01-01 RX ORDER — FUROSEMIDE 10 MG/ML
40 INJECTION INTRAMUSCULAR; INTRAVENOUS
Status: DISCONTINUED | OUTPATIENT
Start: 2021-01-01 | End: 2022-01-01

## 2021-01-01 RX ORDER — CARVEDILOL 6.25 MG/1
6.25 TABLET ORAL 2 TIMES DAILY WITH MEALS
Status: DISCONTINUED | OUTPATIENT
Start: 2021-01-01 | End: 2021-01-01 | Stop reason: HOSPADM

## 2021-01-01 RX ORDER — POTASSIUM CHLORIDE 7.45 MG/ML
10 INJECTION INTRAVENOUS
Status: COMPLETED | OUTPATIENT
Start: 2021-01-01 | End: 2021-01-01

## 2021-01-01 RX ORDER — DILTIAZEM HCL 1 MG/ML
5 INJECTION, SOLUTION INTRAVENOUS
Status: COMPLETED | OUTPATIENT
Start: 2021-01-01 | End: 2021-01-01

## 2021-01-01 RX ORDER — LORAZEPAM 2 MG/ML
1 INJECTION INTRAMUSCULAR ONCE
Status: COMPLETED | OUTPATIENT
Start: 2021-01-01 | End: 2021-01-01

## 2021-01-01 RX ORDER — HYDROCORTISONE 25 MG/G
CREAM TOPICAL 2 TIMES DAILY
Status: DISCONTINUED | OUTPATIENT
Start: 2021-01-01 | End: 2022-01-01

## 2021-01-01 RX ORDER — ROCURONIUM BROMIDE 10 MG/ML
INJECTION, SOLUTION INTRAVENOUS
Status: COMPLETED
Start: 2021-01-01 | End: 2021-01-01

## 2021-01-01 RX ORDER — PANTOPRAZOLE SODIUM 40 MG/10ML
40 INJECTION, POWDER, LYOPHILIZED, FOR SOLUTION INTRAVENOUS DAILY
Status: DISCONTINUED | OUTPATIENT
Start: 2021-01-01 | End: 2022-01-01 | Stop reason: HOSPADM

## 2021-01-01 RX ORDER — CLOPIDOGREL BISULFATE 75 MG/1
75 TABLET ORAL DAILY
Status: DISCONTINUED | OUTPATIENT
Start: 2021-01-01 | End: 2021-01-01 | Stop reason: HOSPADM

## 2021-01-01 RX ORDER — ONDANSETRON 2 MG/ML
4 INJECTION INTRAMUSCULAR; INTRAVENOUS
Status: COMPLETED | OUTPATIENT
Start: 2021-01-01 | End: 2021-01-01

## 2021-01-01 RX ORDER — SODIUM BICARBONATE 1 MEQ/ML
SYRINGE (ML) INTRAVENOUS
Status: COMPLETED
Start: 2021-01-01 | End: 2021-01-01

## 2021-01-01 RX ORDER — IBUPROFEN 200 MG
1 TABLET ORAL DAILY
Status: DISCONTINUED | OUTPATIENT
Start: 2021-01-01 | End: 2021-01-01

## 2021-01-01 RX ORDER — VANCOMYCIN HCL IN 5 % DEXTROSE 1G/250ML
1000 PLASTIC BAG, INJECTION (ML) INTRAVENOUS
Status: DISCONTINUED | OUTPATIENT
Start: 2021-01-01 | End: 2021-01-01

## 2021-01-01 RX ORDER — ACETAMINOPHEN 325 MG/1
650 TABLET ORAL EVERY 6 HOURS PRN
Status: COMPLETED | OUTPATIENT
Start: 2021-01-01 | End: 2021-01-01

## 2021-01-01 RX ORDER — SODIUM CHLORIDE 9 MG/ML
INJECTION, SOLUTION INTRAVENOUS CONTINUOUS
Status: DISCONTINUED | OUTPATIENT
Start: 2021-01-01 | End: 2021-01-01

## 2021-01-01 RX ORDER — FUROSEMIDE 10 MG/ML
20 INJECTION INTRAMUSCULAR; INTRAVENOUS DAILY
Status: DISCONTINUED | OUTPATIENT
Start: 2021-01-01 | End: 2021-01-01

## 2021-01-01 RX ORDER — ETOMIDATE 2 MG/ML
INJECTION INTRAVENOUS
Status: COMPLETED
Start: 2021-01-01 | End: 2021-01-01

## 2021-01-01 RX ORDER — AMIODARONE HYDROCHLORIDE 200 MG/1
200 TABLET ORAL DAILY
Status: DISCONTINUED | OUTPATIENT
Start: 2021-01-01 | End: 2021-01-01

## 2021-01-01 RX ORDER — AMIODARONE HYDROCHLORIDE 200 MG/1
400 TABLET ORAL DAILY
Status: DISCONTINUED | OUTPATIENT
Start: 2021-01-01 | End: 2021-01-01 | Stop reason: HOSPADM

## 2021-01-01 RX ORDER — SUCCINYLCHOLINE CHLORIDE 20 MG/ML
INJECTION INTRAMUSCULAR; INTRAVENOUS
Status: DISCONTINUED
Start: 2021-01-01 | End: 2021-01-01 | Stop reason: WASHOUT

## 2021-01-01 RX ORDER — FOLIC ACID 5 MG/ML
1 INJECTION, SOLUTION INTRAMUSCULAR; INTRAVENOUS; SUBCUTANEOUS DAILY
Status: DISCONTINUED | OUTPATIENT
Start: 2021-01-01 | End: 2021-01-01

## 2021-01-01 RX ORDER — MAGNESIUM SULFATE 1 G/100ML
1 INJECTION INTRAVENOUS
Status: COMPLETED | OUTPATIENT
Start: 2021-01-01 | End: 2021-01-01

## 2021-01-01 RX ORDER — LOSARTAN POTASSIUM 50 MG/1
50 TABLET ORAL DAILY
COMMUNITY
End: 2021-01-01 | Stop reason: CLARIF

## 2021-01-01 RX ORDER — ETOMIDATE 2 MG/ML
15 INJECTION INTRAVENOUS
Status: COMPLETED | OUTPATIENT
Start: 2021-01-01 | End: 2021-01-01

## 2021-01-01 RX ORDER — MAGNESIUM SULFATE 1 G/100ML
1 INJECTION INTRAVENOUS ONCE
Status: DISCONTINUED | OUTPATIENT
Start: 2021-01-01 | End: 2021-01-01

## 2021-01-01 RX ORDER — LEVOFLOXACIN 5 MG/ML
500 INJECTION, SOLUTION INTRAVENOUS
Status: DISCONTINUED | OUTPATIENT
Start: 2021-01-01 | End: 2021-01-01

## 2021-01-01 RX ORDER — PROPOFOL 10 MG/ML
VIAL (ML) INTRAVENOUS
Status: DISCONTINUED | OUTPATIENT
Start: 2021-01-01 | End: 2021-01-01

## 2021-01-01 RX ORDER — PANTOPRAZOLE SODIUM 40 MG/10ML
40 INJECTION, POWDER, LYOPHILIZED, FOR SOLUTION INTRAVENOUS EVERY 12 HOURS
Status: DISCONTINUED | OUTPATIENT
Start: 2021-01-01 | End: 2021-01-01 | Stop reason: HOSPADM

## 2021-01-01 RX ORDER — DILTIAZEM HYDROCHLORIDE 5 MG/ML
0.25 INJECTION INTRAVENOUS
Status: COMPLETED | OUTPATIENT
Start: 2021-01-01 | End: 2021-01-01

## 2021-01-01 RX ORDER — DILTIAZEM HYDROCHLORIDE 5 MG/ML
0.35 INJECTION INTRAVENOUS
Status: COMPLETED | OUTPATIENT
Start: 2021-01-01 | End: 2021-01-01

## 2021-01-01 RX ORDER — GUAIFENESIN/DEXTROMETHORPHAN 100-10MG/5
5 SYRUP ORAL EVERY 4 HOURS PRN
Status: DISCONTINUED | OUTPATIENT
Start: 2021-01-01 | End: 2021-01-01 | Stop reason: HOSPADM

## 2021-01-01 RX ORDER — IBUPROFEN 200 MG
1 TABLET ORAL DAILY
Status: DISCONTINUED | OUTPATIENT
Start: 2021-01-01 | End: 2021-01-01 | Stop reason: HOSPADM

## 2021-01-01 RX ORDER — MUPIROCIN 20 MG/G
OINTMENT TOPICAL 2 TIMES DAILY
Status: COMPLETED | OUTPATIENT
Start: 2021-01-01 | End: 2021-01-01

## 2021-01-01 RX ORDER — PANTOPRAZOLE SODIUM 40 MG/10ML
80 INJECTION, POWDER, LYOPHILIZED, FOR SOLUTION INTRAVENOUS
Status: COMPLETED | OUTPATIENT
Start: 2021-01-01 | End: 2021-01-01

## 2021-01-01 RX ORDER — DEXTROMETHORPHAN/PSEUDOEPHED 2.5-7.5/.8
DROPS ORAL
Status: COMPLETED | OUTPATIENT
Start: 2021-01-01 | End: 2021-01-01

## 2021-01-01 RX ORDER — PROPOFOL 10 MG/ML
INJECTION, EMULSION INTRAVENOUS
Status: DISCONTINUED
Start: 2021-01-01 | End: 2021-01-01 | Stop reason: WASHOUT

## 2021-01-01 RX ORDER — FOLIC ACID 1 MG/1
1 TABLET ORAL DAILY
Status: DISCONTINUED | OUTPATIENT
Start: 2021-01-01 | End: 2021-01-01 | Stop reason: HOSPADM

## 2021-01-01 RX ORDER — AMIODARONE HYDROCHLORIDE 200 MG/1
400 TABLET ORAL DAILY
Status: DISCONTINUED | OUTPATIENT
Start: 2021-01-01 | End: 2021-01-01

## 2021-01-01 RX ORDER — ROCURONIUM BROMIDE 10 MG/ML
INJECTION, SOLUTION INTRAVENOUS
Status: DISCONTINUED
Start: 2021-01-01 | End: 2021-01-01 | Stop reason: WASHOUT

## 2021-01-01 RX ORDER — LORAZEPAM 2 MG/ML
2 INJECTION INTRAMUSCULAR EVERY 4 HOURS PRN
Status: DISCONTINUED | OUTPATIENT
Start: 2021-01-01 | End: 2021-01-01

## 2021-01-01 RX ORDER — SODIUM CHLORIDE 0.9 % (FLUSH) 0.9 %
10 SYRINGE (ML) INJECTION
Status: DISCONTINUED | OUTPATIENT
Start: 2021-01-01 | End: 2022-01-01 | Stop reason: HOSPADM

## 2021-01-01 RX ORDER — AMIODARONE HYDROCHLORIDE 200 MG/1
400 TABLET ORAL 2 TIMES DAILY
Status: DISCONTINUED | OUTPATIENT
Start: 2021-01-01 | End: 2022-01-01

## 2021-01-01 RX ORDER — DIAZEPAM 10 MG/2ML
10 INJECTION INTRAMUSCULAR EVERY 4 HOURS PRN
Status: DISCONTINUED | OUTPATIENT
Start: 2021-01-01 | End: 2021-01-01

## 2021-01-01 RX ORDER — ASPIRIN 81 MG/1
81 TABLET ORAL DAILY
Status: DISCONTINUED | OUTPATIENT
Start: 2021-01-01 | End: 2021-01-01 | Stop reason: HOSPADM

## 2021-01-01 RX ORDER — DILTIAZEM HYDROCHLORIDE 5 MG/ML
INJECTION INTRAVENOUS
Status: COMPLETED
Start: 2021-01-01 | End: 2021-01-01

## 2021-01-01 RX ORDER — LEVALBUTEROL 1.25 MG/.5ML
1.25 SOLUTION, CONCENTRATE RESPIRATORY (INHALATION)
Status: DISCONTINUED | OUTPATIENT
Start: 2021-01-01 | End: 2022-01-01

## 2021-01-01 RX ORDER — LANOLIN ALCOHOL/MO/W.PET/CERES
400 CREAM (GRAM) TOPICAL 2 TIMES DAILY
Status: DISCONTINUED | OUTPATIENT
Start: 2021-01-01 | End: 2022-01-01 | Stop reason: HOSPADM

## 2021-01-01 RX ORDER — METOPROLOL TARTRATE 1 MG/ML
5 INJECTION, SOLUTION INTRAVENOUS
Status: ACTIVE | OUTPATIENT
Start: 2021-01-01 | End: 2021-01-01

## 2021-01-01 RX ORDER — ENOXAPARIN SODIUM 100 MG/ML
40 INJECTION SUBCUTANEOUS EVERY 24 HOURS
Status: DISCONTINUED | OUTPATIENT
Start: 2021-01-01 | End: 2022-01-01

## 2021-01-01 RX ORDER — ROCURONIUM BROMIDE 10 MG/ML
100 INJECTION, SOLUTION INTRAVENOUS ONCE
Status: COMPLETED | OUTPATIENT
Start: 2021-01-01 | End: 2021-01-01

## 2021-01-01 RX ORDER — LANSOPRAZOLE 15 MG/1
15 TABLET, ORALLY DISINTEGRATING, DELAYED RELEASE ORAL DAILY
Qty: 30 TABLET | Refills: 11 | Status: SHIPPED | OUTPATIENT
Start: 2021-01-01 | End: 2021-01-01 | Stop reason: CLARIF

## 2021-01-01 RX ORDER — METOPROLOL TARTRATE 50 MG/1
50 TABLET ORAL 2 TIMES DAILY
Status: DISCONTINUED | OUTPATIENT
Start: 2021-01-01 | End: 2021-01-01

## 2021-01-01 RX ORDER — ATORVASTATIN CALCIUM 20 MG/1
20 TABLET, FILM COATED ORAL ONCE
Status: COMPLETED | OUTPATIENT
Start: 2021-01-01 | End: 2021-01-01

## 2021-01-01 RX ORDER — LORAZEPAM 2 MG/ML
1 INJECTION INTRAMUSCULAR EVERY 4 HOURS PRN
Status: DISCONTINUED | OUTPATIENT
Start: 2021-01-01 | End: 2021-01-01

## 2021-01-01 RX ORDER — IPRATROPIUM BROMIDE AND ALBUTEROL SULFATE 2.5; .5 MG/3ML; MG/3ML
3 SOLUTION RESPIRATORY (INHALATION) EVERY 6 HOURS PRN
Status: DISCONTINUED | OUTPATIENT
Start: 2021-01-01 | End: 2021-01-01 | Stop reason: HOSPADM

## 2021-01-01 RX ORDER — LEVALBUTEROL 1.25 MG/.5ML
1.25 SOLUTION, CONCENTRATE RESPIRATORY (INHALATION) EVERY 8 HOURS
Status: DISCONTINUED | OUTPATIENT
Start: 2021-01-01 | End: 2021-01-01

## 2021-01-01 RX ORDER — LOSARTAN POTASSIUM 50 MG/1
50 TABLET ORAL DAILY
Status: DISCONTINUED | OUTPATIENT
Start: 2021-01-01 | End: 2021-01-01 | Stop reason: HOSPADM

## 2021-01-01 RX ORDER — TALC
6 POWDER (GRAM) TOPICAL NIGHTLY PRN
Status: DISCONTINUED | OUTPATIENT
Start: 2021-01-01 | End: 2021-01-01

## 2021-01-01 RX ADMIN — PROPOFOL 35 MCG/KG/MIN: 10 INJECTION, EMULSION INTRAVENOUS at 03:12

## 2021-01-01 RX ADMIN — AZITHROMYCIN MONOHYDRATE 500 MG: 500 INJECTION, POWDER, LYOPHILIZED, FOR SOLUTION INTRAVENOUS at 05:12

## 2021-01-01 RX ADMIN — NOREPINEPHRINE BITARTRATE 0.6 MCG/KG/MIN: 1 INJECTION, SOLUTION, CONCENTRATE INTRAVENOUS at 12:12

## 2021-01-01 RX ADMIN — LORAZEPAM 2 MG: 2 INJECTION INTRAMUSCULAR; INTRAVENOUS at 02:11

## 2021-01-01 RX ADMIN — FOLIC ACID 1 MG: 1 TABLET ORAL at 08:11

## 2021-01-01 RX ADMIN — ATORVASTATIN CALCIUM 40 MG: 40 TABLET, FILM COATED ORAL at 08:12

## 2021-01-01 RX ADMIN — CEFTRIAXONE 1 G: 1 INJECTION, SOLUTION INTRAVENOUS at 04:12

## 2021-01-01 RX ADMIN — Medication 15 MG/HR: at 05:12

## 2021-01-01 RX ADMIN — LEVALBUTEROL 1.25 MG: 1.25 SOLUTION, CONCENTRATE RESPIRATORY (INHALATION) at 07:12

## 2021-01-01 RX ADMIN — ENOXAPARIN SODIUM 40 MG: 40 INJECTION SUBCUTANEOUS at 05:12

## 2021-01-01 RX ADMIN — ESMOLOL HYDROCHLORIDE 50 MCG/KG/MIN: 20 INJECTION INTRAVENOUS at 11:11

## 2021-01-01 RX ADMIN — PANTOPRAZOLE SODIUM 40 MG: 40 INJECTION, POWDER, LYOPHILIZED, FOR SOLUTION INTRAVENOUS at 10:11

## 2021-01-01 RX ADMIN — MUPIROCIN: 20 OINTMENT TOPICAL at 08:11

## 2021-01-01 RX ADMIN — LEVALBUTEROL 1.25 MG: 1.25 SOLUTION, CONCENTRATE RESPIRATORY (INHALATION) at 12:12

## 2021-01-01 RX ADMIN — PANTOPRAZOLE SODIUM 40 MG: 40 INJECTION, POWDER, LYOPHILIZED, FOR SOLUTION INTRAVENOUS at 09:11

## 2021-01-01 RX ADMIN — PROPOFOL 40 MCG/KG/MIN: 10 INJECTION, EMULSION INTRAVENOUS at 08:12

## 2021-01-01 RX ADMIN — SODIUM CHLORIDE: 0.9 INJECTION, SOLUTION INTRAVENOUS at 04:12

## 2021-01-01 RX ADMIN — SODIUM CHLORIDE: 0.9 INJECTION, SOLUTION INTRAVENOUS at 10:12

## 2021-01-01 RX ADMIN — POTASSIUM BICARBONATE 20 MEQ: 391 TABLET, EFFERVESCENT ORAL at 03:12

## 2021-01-01 RX ADMIN — SODIUM CHLORIDE 10 ML/HR: 0.9 INJECTION, SOLUTION INTRAVENOUS at 09:11

## 2021-01-01 RX ADMIN — POTASSIUM CHLORIDE 10 MEQ: 7.46 INJECTION, SOLUTION INTRAVENOUS at 11:12

## 2021-01-01 RX ADMIN — SODIUM CHLORIDE 1000 ML: 0.9 INJECTION, SOLUTION INTRAVENOUS at 04:11

## 2021-01-01 RX ADMIN — CEFTRIAXONE 1 G: 1 INJECTION, SOLUTION INTRAVENOUS at 08:12

## 2021-01-01 RX ADMIN — DEXMEDETOMIDINE HYDROCHLORIDE 1.1 MCG/KG/HR: 4 INJECTION, SOLUTION INTRAVENOUS at 12:12

## 2021-01-01 RX ADMIN — AMIODARONE HYDROCHLORIDE 400 MG: 200 TABLET ORAL at 08:12

## 2021-01-01 RX ADMIN — IOHEXOL 80 ML: 350 INJECTION, SOLUTION INTRAVENOUS at 05:12

## 2021-01-01 RX ADMIN — CARVEDILOL 6.25 MG: 6.25 TABLET, FILM COATED ORAL at 09:11

## 2021-01-01 RX ADMIN — DILTIAZEM HYDROCHLORIDE 10 MG: 5 INJECTION INTRAVENOUS at 06:12

## 2021-01-01 RX ADMIN — LORAZEPAM 1 MG: 2 INJECTION INTRAMUSCULAR; INTRAVENOUS at 12:11

## 2021-01-01 RX ADMIN — NOREPINEPHRINE BITARTRATE 0.07 MCG/KG/MIN: 1 INJECTION, SOLUTION, CONCENTRATE INTRAVENOUS at 06:12

## 2021-01-01 RX ADMIN — PROPOFOL 30 MCG/KG/MIN: 10 INJECTION, EMULSION INTRAVENOUS at 02:12

## 2021-01-01 RX ADMIN — Medication 1 PATCH: at 08:12

## 2021-01-01 RX ADMIN — ENOXAPARIN SODIUM 40 MG: 40 INJECTION SUBCUTANEOUS at 04:12

## 2021-01-01 RX ADMIN — ROCURONIUM BROMIDE: 10 INJECTION, SOLUTION INTRAVENOUS at 10:12

## 2021-01-01 RX ADMIN — DILTIAZEM HYDROCHLORIDE 240 MG: 120 CAPSULE, COATED, EXTENDED RELEASE ORAL at 09:12

## 2021-01-01 RX ADMIN — APIXABAN 5 MG: 5 TABLET, FILM COATED ORAL at 09:12

## 2021-01-01 RX ADMIN — HYDROCORTISONE: 25 CREAM TOPICAL at 08:12

## 2021-01-01 RX ADMIN — DEXMEDETOMIDINE HYDROCHLORIDE 0.6 MCG/KG/HR: 4 INJECTION, SOLUTION INTRAVENOUS at 08:12

## 2021-01-01 RX ADMIN — Medication 1 PATCH: at 09:11

## 2021-01-01 RX ADMIN — ATROPINE SULFATE: 0.1 INJECTION PARENTERAL at 10:12

## 2021-01-01 RX ADMIN — PROPOFOL 35 MCG/KG/MIN: 10 INJECTION, EMULSION INTRAVENOUS at 01:12

## 2021-01-01 RX ADMIN — ONDANSETRON HYDROCHLORIDE 4 MG: 2 SOLUTION INTRAMUSCULAR; INTRAVENOUS at 05:12

## 2021-01-01 RX ADMIN — Medication 400 MG: at 08:12

## 2021-01-01 RX ADMIN — NOREPINEPHRINE BITARTRATE 1 MCG/KG/MIN: 1 INJECTION, SOLUTION, CONCENTRATE INTRAVENOUS at 12:12

## 2021-01-01 RX ADMIN — MUPIROCIN: 20 OINTMENT TOPICAL at 09:11

## 2021-01-01 RX ADMIN — DILTIAZEM HYDROCHLORIDE 27.5 MG: 5 INJECTION INTRAVENOUS at 03:12

## 2021-01-01 RX ADMIN — METOROPROLOL TARTRATE 5 MG: 5 INJECTION, SOLUTION INTRAVENOUS at 09:12

## 2021-01-01 RX ADMIN — PANTOPRAZOLE SODIUM 40 MG: 40 INJECTION, POWDER, FOR SOLUTION INTRAVENOUS at 08:12

## 2021-01-01 RX ADMIN — AMIODARONE HYDROCHLORIDE 0.5 MG/MIN: 1.8 INJECTION, SOLUTION INTRAVENOUS at 01:11

## 2021-01-01 RX ADMIN — DEXMEDETOMIDINE HYDROCHLORIDE 0.8 MCG/KG/HR: 4 INJECTION, SOLUTION INTRAVENOUS at 03:12

## 2021-01-01 RX ADMIN — MUPIROCIN: 20 OINTMENT TOPICAL at 08:12

## 2021-01-01 RX ADMIN — GUAIFENESIN AND DEXTROMETHORPHAN 5 ML: 100; 10 SYRUP ORAL at 06:11

## 2021-01-01 RX ADMIN — DILTIAZEM HYDROCHLORIDE 10 MG: 5 INJECTION INTRAVENOUS at 12:11

## 2021-01-01 RX ADMIN — DEXMEDETOMIDINE HYDROCHLORIDE 0.6 MCG/KG/HR: 4 INJECTION, SOLUTION INTRAVENOUS at 11:12

## 2021-01-01 RX ADMIN — MIDAZOLAM 2 MG: 1 INJECTION INTRAMUSCULAR; INTRAVENOUS at 02:11

## 2021-01-01 RX ADMIN — METOPROLOL TARTRATE 50 MG: 50 TABLET, FILM COATED ORAL at 09:12

## 2021-01-01 RX ADMIN — AZITHROMYCIN MONOHYDRATE 500 MG: 500 INJECTION, POWDER, LYOPHILIZED, FOR SOLUTION INTRAVENOUS at 04:12

## 2021-01-01 RX ADMIN — METOPROLOL TARTRATE 75 MG: 50 TABLET, FILM COATED ORAL at 08:12

## 2021-01-01 RX ADMIN — PROPOFOL 40 MCG/KG/MIN: 10 INJECTION, EMULSION INTRAVENOUS at 01:12

## 2021-01-01 RX ADMIN — DEXMEDETOMIDINE HYDROCHLORIDE 0.2 MCG/KG/HR: 4 INJECTION, SOLUTION INTRAVENOUS at 02:12

## 2021-01-01 RX ADMIN — ATORVASTATIN CALCIUM 20 MG: 20 TABLET, FILM COATED ORAL at 12:12

## 2021-01-01 RX ADMIN — PROPOFOL 50 MCG/KG/MIN: 10 INJECTION, EMULSION INTRAVENOUS at 08:12

## 2021-01-01 RX ADMIN — PANTOPRAZOLE SODIUM 80 MG: 40 INJECTION, POWDER, FOR SOLUTION INTRAVENOUS at 03:11

## 2021-01-01 RX ADMIN — PIPERACILLIN AND TAZOBACTAM 4.5 G: 4; .5 INJECTION, POWDER, LYOPHILIZED, FOR SOLUTION INTRAVENOUS; PARENTERAL at 09:12

## 2021-01-01 RX ADMIN — Medication 1 PATCH: at 11:11

## 2021-01-01 RX ADMIN — ETOMIDATE: 2 INJECTION INTRAVENOUS at 10:12

## 2021-01-01 RX ADMIN — DILTIAZEM HYDROCHLORIDE 19.5 MG: 5 INJECTION INTRAVENOUS at 02:12

## 2021-01-01 RX ADMIN — SODIUM CHLORIDE, SODIUM LACTATE, POTASSIUM CHLORIDE, AND CALCIUM CHLORIDE 1000 ML: .6; .31; .03; .02 INJECTION, SOLUTION INTRAVENOUS at 06:12

## 2021-01-01 RX ADMIN — LORAZEPAM 1 MG: 2 INJECTION INTRAMUSCULAR; INTRAVENOUS at 02:12

## 2021-01-01 RX ADMIN — CARVEDILOL 6.25 MG: 6.25 TABLET, FILM COATED ORAL at 08:11

## 2021-01-01 RX ADMIN — AMIODARONE HYDROCHLORIDE 400 MG: 200 TABLET ORAL at 01:12

## 2021-01-01 RX ADMIN — FUROSEMIDE 40 MG: 10 INJECTION, SOLUTION INTRAVENOUS at 08:12

## 2021-01-01 RX ADMIN — HYDROCORTISONE: 25 CREAM TOPICAL at 10:12

## 2021-01-01 RX ADMIN — DEXMEDETOMIDINE HYDROCHLORIDE 0.4 MCG/KG/HR: 4 INJECTION, SOLUTION INTRAVENOUS at 07:12

## 2021-01-01 RX ADMIN — AMIODARONE HYDROCHLORIDE 0.5 MG/MIN: 1.8 INJECTION, SOLUTION INTRAVENOUS at 06:11

## 2021-01-01 RX ADMIN — POTASSIUM CHLORIDE 10 MEQ: 7.46 INJECTION, SOLUTION INTRAVENOUS at 12:12

## 2021-01-01 RX ADMIN — AMIODARONE HYDROCHLORIDE 200 MG: 200 TABLET ORAL at 09:11

## 2021-01-01 RX ADMIN — SODIUM CHLORIDE, SODIUM LACTATE, POTASSIUM CHLORIDE, AND CALCIUM CHLORIDE: .6; .31; .03; .02 INJECTION, SOLUTION INTRAVENOUS at 06:11

## 2021-01-01 RX ADMIN — FUROSEMIDE 20 MG: 10 INJECTION, SOLUTION INTRAMUSCULAR; INTRAVENOUS at 08:12

## 2021-01-01 RX ADMIN — FOLIC ACID: 5 INJECTION, SOLUTION INTRAMUSCULAR; INTRAVENOUS; SUBCUTANEOUS at 04:11

## 2021-01-01 RX ADMIN — LORAZEPAM 2 MG: 2 INJECTION INTRAMUSCULAR; INTRAVENOUS at 08:11

## 2021-01-01 RX ADMIN — DEXMEDETOMIDINE HYDROCHLORIDE 0.6 MCG/KG/HR: 4 INJECTION, SOLUTION INTRAVENOUS at 02:12

## 2021-01-01 RX ADMIN — CLOPIDOGREL 75 MG: 75 TABLET, FILM COATED ORAL at 09:11

## 2021-01-01 RX ADMIN — SODIUM CHLORIDE 1000 ML: 0.9 INJECTION, SOLUTION INTRAVENOUS at 02:11

## 2021-01-01 RX ADMIN — AMIODARONE HYDROCHLORIDE 1 MG/MIN: 1.8 INJECTION, SOLUTION INTRAVENOUS at 04:11

## 2021-01-01 RX ADMIN — Medication 1 PATCH: at 09:12

## 2021-01-01 RX ADMIN — MAGNESIUM SULFATE 2 G: 2 INJECTION INTRAVENOUS at 12:12

## 2021-01-01 RX ADMIN — ASPIRIN 81 MG: 81 TABLET, COATED ORAL at 09:11

## 2021-01-01 RX ADMIN — AMIODARONE HYDROCHLORIDE 0.5 MG/MIN: 1.8 INJECTION, SOLUTION INTRAVENOUS at 10:11

## 2021-01-01 RX ADMIN — LEVALBUTEROL 1.25 MG: 1.25 SOLUTION, CONCENTRATE RESPIRATORY (INHALATION) at 01:12

## 2021-01-01 RX ADMIN — DILTIAZEM HYDROCHLORIDE 10 MG: 5 INJECTION INTRAVENOUS at 04:11

## 2021-01-01 RX ADMIN — METOPROLOL TARTRATE 75 MG: 50 TABLET, FILM COATED ORAL at 09:12

## 2021-01-01 RX ADMIN — PROPOFOL 35 MCG/KG/MIN: 10 INJECTION, EMULSION INTRAVENOUS at 06:12

## 2021-01-01 RX ADMIN — THIAMINE HYDROCHLORIDE 100 MG: 100 INJECTION, SOLUTION INTRAMUSCULAR; INTRAVENOUS at 02:11

## 2021-01-01 RX ADMIN — APIXABAN 5 MG: 5 TABLET, FILM COATED ORAL at 08:12

## 2021-01-01 RX ADMIN — DEXMEDETOMIDINE HYDROCHLORIDE 0.8 MCG/KG/HR: 4 INJECTION, SOLUTION INTRAVENOUS at 02:12

## 2021-01-01 RX ADMIN — NOREPINEPHRINE BITARTRATE 0.5 MCG/KG/MIN: 1 INJECTION, SOLUTION, CONCENTRATE INTRAVENOUS at 02:12

## 2021-01-01 RX ADMIN — PROPOFOL 50 MCG/KG/MIN: 10 INJECTION, EMULSION INTRAVENOUS at 12:12

## 2021-01-01 RX ADMIN — PIPERACILLIN AND TAZOBACTAM 4.5 G: 4; .5 INJECTION, POWDER, LYOPHILIZED, FOR SOLUTION INTRAVENOUS; PARENTERAL at 05:12

## 2021-01-01 RX ADMIN — POTASSIUM & SODIUM PHOSPHATES POWDER PACK 280-160-250 MG 1 PACKET: 280-160-250 PACK at 02:11

## 2021-01-01 RX ADMIN — THIAMINE HYDROCHLORIDE 100 MG: 100 INJECTION, SOLUTION INTRAMUSCULAR; INTRAVENOUS at 09:11

## 2021-01-01 RX ADMIN — SODIUM CHLORIDE: 0.9 INJECTION, SOLUTION INTRAVENOUS at 05:12

## 2021-01-01 RX ADMIN — AMIODARONE HYDROCHLORIDE 150 MG: 1.5 INJECTION, SOLUTION INTRAVENOUS at 04:11

## 2021-01-01 RX ADMIN — PANTOPRAZOLE SODIUM 40 MG: 40 INJECTION, POWDER, LYOPHILIZED, FOR SOLUTION INTRAVENOUS at 08:11

## 2021-01-01 RX ADMIN — PROPOFOL 100 MG: 10 INJECTION, EMULSION INTRAVENOUS at 02:11

## 2021-01-01 RX ADMIN — PANTOPRAZOLE SODIUM 8 MG/HR: 40 INJECTION, POWDER, FOR SOLUTION INTRAVENOUS at 03:11

## 2021-01-01 RX ADMIN — AMIODARONE HYDROCHLORIDE 400 MG: 200 TABLET ORAL at 09:12

## 2021-01-01 RX ADMIN — NOREPINEPHRINE BITARTRATE 0.08 MCG/KG/MIN: 1 INJECTION, SOLUTION, CONCENTRATE INTRAVENOUS at 12:12

## 2021-01-01 RX ADMIN — DEXMEDETOMIDINE HYDROCHLORIDE 0.8 MCG/KG/HR: 4 INJECTION, SOLUTION INTRAVENOUS at 07:12

## 2021-01-01 RX ADMIN — CARVEDILOL 6.25 MG: 6.25 TABLET, FILM COATED ORAL at 04:11

## 2021-01-01 RX ADMIN — LOSARTAN POTASSIUM 50 MG: 50 TABLET, FILM COATED ORAL at 09:11

## 2021-01-01 RX ADMIN — ONDANSETRON 4 MG: 2 INJECTION INTRAMUSCULAR; INTRAVENOUS at 02:11

## 2021-01-01 RX ADMIN — APIXABAN 5 MG: 5 TABLET, FILM COATED ORAL at 10:11

## 2021-01-01 RX ADMIN — DEXTROSE 8 MG/HR: 50 INJECTION, SOLUTION INTRAVENOUS at 06:11

## 2021-01-01 RX ADMIN — DEXTROSE 8 MG/HR: 50 INJECTION, SOLUTION INTRAVENOUS at 02:11

## 2021-01-01 RX ADMIN — MAGNESIUM SULFATE IN DEXTROSE 1 G: 10 INJECTION, SOLUTION INTRAVENOUS at 03:12

## 2021-01-01 RX ADMIN — SODIUM BICARBONATE: 84 INJECTION, SOLUTION INTRAVENOUS at 03:12

## 2021-01-01 RX ADMIN — LEVALBUTEROL 1.25 MG: 1.25 SOLUTION, CONCENTRATE RESPIRATORY (INHALATION) at 04:12

## 2021-01-01 RX ADMIN — SODIUM CHLORIDE, SODIUM LACTATE, POTASSIUM CHLORIDE, AND CALCIUM CHLORIDE: .6; .31; .03; .02 INJECTION, SOLUTION INTRAVENOUS at 01:11

## 2021-01-01 RX ADMIN — LIDOCAINE HYDROCHLORIDE 100 MG: 20 INJECTION, SOLUTION INTRAVENOUS at 02:11

## 2021-01-01 RX ADMIN — LORAZEPAM 2 MG: 2 INJECTION INTRAMUSCULAR; INTRAVENOUS at 04:11

## 2021-01-01 RX ADMIN — NOREPINEPHRINE BITARTRATE 0.04 MCG/KG/MIN: 1 INJECTION, SOLUTION, CONCENTRATE INTRAVENOUS at 06:12

## 2021-01-01 RX ADMIN — Medication 5 MG/HR: at 06:12

## 2021-01-01 RX ADMIN — MUPIROCIN: 20 OINTMENT TOPICAL at 09:12

## 2021-01-01 RX ADMIN — PROPOFOL 40 MCG/KG/MIN: 10 INJECTION, EMULSION INTRAVENOUS at 10:12

## 2021-01-01 RX ADMIN — PROPOFOL 10 MCG/KG/MIN: 10 INJECTION, EMULSION INTRAVENOUS at 06:12

## 2021-01-01 RX ADMIN — LEVALBUTEROL 1.25 MG: 1.25 SOLUTION, CONCENTRATE RESPIRATORY (INHALATION) at 08:12

## 2021-01-01 RX ADMIN — MAGNESIUM SULFATE IN WATER 2 G: 40 INJECTION, SOLUTION INTRAVENOUS at 08:11

## 2021-01-01 RX ADMIN — SODIUM CHLORIDE: 0.9 INJECTION, SOLUTION INTRAVENOUS at 09:12

## 2021-01-01 RX ADMIN — LOSARTAN POTASSIUM 50 MG: 50 TABLET, FILM COATED ORAL at 08:11

## 2021-01-01 RX ADMIN — METOROPROLOL TARTRATE 5 MG: 5 INJECTION, SOLUTION INTRAVENOUS at 06:12

## 2021-01-01 RX ADMIN — PROPOFOL 35 MCG/KG/MIN: 10 INJECTION, EMULSION INTRAVENOUS at 12:12

## 2021-01-01 RX ADMIN — DEXMEDETOMIDINE HYDROCHLORIDE 1.1 MCG/KG/HR: 4 INJECTION, SOLUTION INTRAVENOUS at 06:12

## 2021-01-01 RX ADMIN — CHLORDIAZEPOXIDE HYDROCHLORIDE 25 MG: 25 CAPSULE ORAL at 07:11

## 2021-01-01 RX ADMIN — PROPOFOL 40 MCG/KG/MIN: 10 INJECTION, EMULSION INTRAVENOUS at 11:12

## 2021-01-01 RX ADMIN — PROPOFOL 40 MCG/KG/MIN: 10 INJECTION, EMULSION INTRAVENOUS at 02:12

## 2021-01-01 RX ADMIN — FOLIC ACID 1 MG: 5 INJECTION, SOLUTION INTRAMUSCULAR; INTRAVENOUS; SUBCUTANEOUS at 10:11

## 2021-01-01 RX ADMIN — PROPOFOL 40 MCG/KG/MIN: 10 INJECTION, EMULSION INTRAVENOUS at 05:12

## 2021-01-01 RX ADMIN — LEVOFLOXACIN 500 MG: 500 INJECTION, SOLUTION INTRAVENOUS at 09:12

## 2021-01-01 RX ADMIN — SODIUM BICARBONATE: 84 INJECTION, SOLUTION INTRAVENOUS at 06:12

## 2021-01-01 RX ADMIN — ASPIRIN 81 MG: 81 TABLET, COATED ORAL at 08:11

## 2021-01-01 RX ADMIN — PROPOFOL 20 MG: 10 INJECTION, EMULSION INTRAVENOUS at 02:11

## 2021-01-01 RX ADMIN — DILTIAZEM HYDROCHLORIDE 240 MG: 120 CAPSULE, COATED, EXTENDED RELEASE ORAL at 08:12

## 2021-01-01 RX ADMIN — PROPOFOL 50 MCG/KG/MIN: 10 INJECTION, EMULSION INTRAVENOUS at 04:12

## 2021-01-01 RX ADMIN — MUPIROCIN: 20 OINTMENT TOPICAL at 10:11

## 2021-01-01 RX ADMIN — METOPROLOL TARTRATE 2.5 MG: 5 INJECTION, SOLUTION INTRAVENOUS at 10:11

## 2021-01-01 RX ADMIN — DEXMEDETOMIDINE HYDROCHLORIDE 0.6 MCG/KG/HR: 4 INJECTION, SOLUTION INTRAVENOUS at 06:12

## 2021-01-01 RX ADMIN — LORAZEPAM 2 MG: 2 INJECTION INTRAMUSCULAR; INTRAVENOUS at 07:11

## 2021-01-01 RX ADMIN — ACETAMINOPHEN 650 MG: 325 TABLET ORAL at 03:12

## 2021-01-01 RX ADMIN — CLOPIDOGREL 75 MG: 75 TABLET, FILM COATED ORAL at 08:11

## 2021-01-01 RX ADMIN — SODIUM BICARBONATE: 84 INJECTION INTRAVENOUS at 12:12

## 2021-01-01 RX ADMIN — LORAZEPAM 2 MG: 2 INJECTION INTRAMUSCULAR; INTRAVENOUS at 01:11

## 2021-01-01 RX ADMIN — EPINEPHRINE: 1 INJECTION INTRAMUSCULAR; INTRAVENOUS; SUBCUTANEOUS at 10:12

## 2021-01-01 RX ADMIN — Medication 1 PATCH: at 08:11

## 2021-01-01 RX ADMIN — PIPERACILLIN AND TAZOBACTAM 4.5 G: 4; .5 INJECTION, POWDER, LYOPHILIZED, FOR SOLUTION INTRAVENOUS; PARENTERAL at 01:12

## 2021-01-01 RX ADMIN — SODIUM CHLORIDE, SODIUM LACTATE, POTASSIUM CHLORIDE, AND CALCIUM CHLORIDE: .6; .31; .03; .02 INJECTION, SOLUTION INTRAVENOUS at 02:12

## 2021-01-01 RX ADMIN — POTASSIUM & SODIUM PHOSPHATES POWDER PACK 280-160-250 MG 1 PACKET: 280-160-250 PACK at 05:11

## 2021-01-01 RX ADMIN — PROPOFOL 30 MCG/KG/MIN: 10 INJECTION, EMULSION INTRAVENOUS at 12:12

## 2021-01-01 RX ADMIN — SIMETHICONE 66 MG: 20 SUSPENSION/ DROPS ORAL at 02:11

## 2021-01-01 RX ADMIN — LINEZOLID 600 MG: 600 INJECTION, SOLUTION INTRAVENOUS at 11:12

## 2021-01-01 RX ADMIN — ROCURONIUM BROMIDE 100 MG: 10 SOLUTION INTRAVENOUS at 11:12

## 2021-01-01 RX ADMIN — SODIUM BICARBONATE: 84 INJECTION, SOLUTION INTRAVENOUS at 02:12

## 2021-01-01 RX ADMIN — LORAZEPAM 2 MG: 2 INJECTION INTRAMUSCULAR; INTRAVENOUS at 11:11

## 2021-01-01 RX ADMIN — HALOPERIDOL LACTATE 10 MG: 5 INJECTION, SOLUTION INTRAMUSCULAR at 12:12

## 2021-01-01 RX ADMIN — DEXMEDETOMIDINE HYDROCHLORIDE 0.8 MCG/KG/HR: 4 INJECTION, SOLUTION INTRAVENOUS at 08:12

## 2021-01-01 RX ADMIN — LORAZEPAM 1 MG: 2 INJECTION INTRAMUSCULAR; INTRAVENOUS at 10:12

## 2021-01-01 RX ADMIN — CEFTRIAXONE 1 G: 1 INJECTION, SOLUTION INTRAVENOUS at 07:12

## 2021-01-01 RX ADMIN — METOPROLOL TARTRATE 2.5 MG: 5 INJECTION, SOLUTION INTRAVENOUS at 07:11

## 2021-01-01 RX ADMIN — FERROUS SULFATE TAB 325 MG (65 MG ELEMENTAL FE) 1 EACH: 325 (65 FE) TAB at 09:11

## 2021-01-01 RX ADMIN — DILTIAZEM HYDROCHLORIDE 10 MG: 5 INJECTION INTRAVENOUS at 05:12

## 2021-01-01 RX ADMIN — LORAZEPAM 1 MG: 2 INJECTION INTRAMUSCULAR; INTRAVENOUS at 06:12

## 2021-01-01 RX ADMIN — LEVALBUTEROL 1.25 MG: 1.25 SOLUTION, CONCENTRATE RESPIRATORY (INHALATION) at 09:12

## 2021-01-01 RX ADMIN — Medication 400 MG: at 09:12

## 2021-01-01 RX ADMIN — LORAZEPAM 2 MG: 2 INJECTION INTRAMUSCULAR; INTRAVENOUS at 09:11

## 2021-01-01 RX ADMIN — DIAZEPAM 10 MG: 5 INJECTION, SOLUTION INTRAMUSCULAR; INTRAVENOUS at 05:11

## 2021-01-01 RX ADMIN — PROPOFOL 30 MCG/KG/MIN: 10 INJECTION, EMULSION INTRAVENOUS at 04:12

## 2021-01-01 RX ADMIN — AMIODARONE HYDROCHLORIDE 0.5 MG/MIN: 1.8 INJECTION, SOLUTION INTRAVENOUS at 08:11

## 2021-01-01 RX ADMIN — PROPOFOL 45 MCG/KG/MIN: 10 INJECTION, EMULSION INTRAVENOUS at 05:12

## 2021-01-01 RX ADMIN — NOREPINEPHRINE BITARTRATE 0.08 MCG/KG/MIN: 1 INJECTION, SOLUTION, CONCENTRATE INTRAVENOUS at 08:12

## 2021-01-01 RX ADMIN — FOLIC ACID 1 MG: 1 TABLET ORAL at 09:11

## 2021-01-01 RX ADMIN — PROPOFOL 10 MCG/KG/MIN: 10 INJECTION, EMULSION INTRAVENOUS at 08:12

## 2021-01-01 RX ADMIN — SODIUM CHLORIDE 1000 ML: 0.9 INJECTION, SOLUTION INTRAVENOUS at 05:06

## 2021-01-01 RX ADMIN — BENZONATATE 100 MG: 100 CAPSULE ORAL at 09:11

## 2021-01-01 RX ADMIN — LEVOFLOXACIN 750 MG: 750 INJECTION, SOLUTION INTRAVENOUS at 08:12

## 2021-01-01 RX ADMIN — HYDROCORTISONE: 25 CREAM TOPICAL at 09:12

## 2021-01-01 RX ADMIN — AZITHROMYCIN MONOHYDRATE 500 MG: 500 INJECTION, POWDER, LYOPHILIZED, FOR SOLUTION INTRAVENOUS at 07:12

## 2021-01-01 RX ADMIN — SODIUM CHLORIDE, SODIUM LACTATE, POTASSIUM CHLORIDE, AND CALCIUM CHLORIDE: .6; .31; .03; .02 INJECTION, SOLUTION INTRAVENOUS at 09:11

## 2021-01-01 RX ADMIN — AMIODARONE HYDROCHLORIDE 0.5 MG/MIN: 1.8 INJECTION, SOLUTION INTRAVENOUS at 09:11

## 2021-01-01 RX ADMIN — Medication 6 MG: at 11:12

## 2021-01-01 RX ADMIN — PROPOFOL 40 MCG/KG/MIN: 10 INJECTION, EMULSION INTRAVENOUS at 04:12

## 2021-01-01 RX ADMIN — NOREPINEPHRINE BITARTRATE 0.18 MCG/KG/MIN: 1 INJECTION, SOLUTION, CONCENTRATE INTRAVENOUS at 11:12

## 2021-01-01 RX ADMIN — ATROPINE SULFATE: 0.1 INJECTION INTRAVENOUS at 10:12

## 2021-01-01 RX ADMIN — DEXMEDETOMIDINE HYDROCHLORIDE 0.08 MCG/KG/HR: 4 INJECTION, SOLUTION INTRAVENOUS at 06:12

## 2021-01-01 RX ADMIN — VANCOMYCIN HYDROCHLORIDE 1500 MG: 1.5 INJECTION, POWDER, LYOPHILIZED, FOR SOLUTION INTRAVENOUS at 11:12

## 2021-01-01 RX ADMIN — APIXABAN 5 MG: 5 TABLET, FILM COATED ORAL at 08:11

## 2021-01-01 RX ADMIN — MAGNESIUM SULFATE IN WATER 4 G: 40 INJECTION, SOLUTION INTRAVENOUS at 08:11

## 2021-01-01 RX ADMIN — LEVALBUTEROL 1.25 MG: 1.25 SOLUTION, CONCENTRATE RESPIRATORY (INHALATION) at 03:12

## 2021-01-01 RX ADMIN — ACETAMINOPHEN 500 MG: 500 TABLET ORAL at 08:12

## 2021-01-01 RX ADMIN — HALOPERIDOL LACTATE 10 MG: 5 INJECTION, SOLUTION INTRAMUSCULAR at 04:12

## 2021-01-01 RX ADMIN — METOROPROLOL TARTRATE 5 MG: 5 INJECTION, SOLUTION INTRAVENOUS at 05:12

## 2021-01-01 RX ADMIN — CALCIUM GLUCONATE 1000 MG: 98 INJECTION, SOLUTION INTRAVENOUS at 11:11

## 2021-01-01 RX ADMIN — PIPERACILLIN AND TAZOBACTAM 4.5 G: 4; .5 INJECTION, POWDER, LYOPHILIZED, FOR SOLUTION INTRAVENOUS; PARENTERAL at 12:12

## 2021-01-01 RX ADMIN — ATORVASTATIN CALCIUM 40 MG: 40 TABLET, FILM COATED ORAL at 09:12

## 2021-01-01 RX ADMIN — SODIUM CHLORIDE 125 ML/HR: 0.9 INJECTION, SOLUTION INTRAVENOUS at 10:12

## 2021-01-01 RX ADMIN — FOLIC ACID 1 MG: 5 INJECTION, SOLUTION INTRAMUSCULAR; INTRAVENOUS; SUBCUTANEOUS at 09:11

## 2021-01-01 RX ADMIN — ESMOLOL HYDROCHLORIDE 50 MCG/KG/MIN: 20 INJECTION INTRAVENOUS at 06:11

## 2021-01-01 RX ADMIN — PROPOFOL 50 MCG/KG/MIN: 10 INJECTION, EMULSION INTRAVENOUS at 05:12

## 2021-01-01 RX ADMIN — PROPOFOL 35 MCG/KG/MIN: 10 INJECTION, EMULSION INTRAVENOUS at 07:12

## 2021-01-01 RX ADMIN — PROPOFOL 45 MCG/KG/MIN: 10 INJECTION, EMULSION INTRAVENOUS at 12:12

## 2021-01-01 RX ADMIN — PROPOFOL 50 MCG/KG/MIN: 10 INJECTION, EMULSION INTRAVENOUS at 09:12

## 2021-01-01 RX ADMIN — AMIODARONE HYDROCHLORIDE 400 MG: 200 TABLET ORAL at 04:12

## 2021-01-01 RX ADMIN — DILTIAZEM HYDROCHLORIDE 10 MG: 5 INJECTION INTRAVENOUS at 01:11

## 2021-01-01 RX ADMIN — SODIUM CHLORIDE: 0.9 INJECTION, SOLUTION INTRAVENOUS at 01:12

## 2021-01-01 RX ADMIN — DEXMEDETOMIDINE HYDROCHLORIDE 0.8 MCG/KG/HR: 4 INJECTION, SOLUTION INTRAVENOUS at 09:12

## 2021-01-01 RX ADMIN — FERROUS SULFATE TAB 325 MG (65 MG ELEMENTAL FE) 1 EACH: 325 (65 FE) TAB at 08:11

## 2021-01-01 RX ADMIN — METOROPROLOL TARTRATE 5 MG: 5 INJECTION, SOLUTION INTRAVENOUS at 03:12

## 2021-01-01 RX ADMIN — PROPOFOL 40 MCG/KG/MIN: 10 INJECTION, EMULSION INTRAVENOUS at 06:12

## 2021-01-01 RX ADMIN — METOROPROLOL TARTRATE 5 MG: 5 INJECTION, SOLUTION INTRAVENOUS at 02:11

## 2021-01-01 RX ADMIN — DEXMEDETOMIDINE HYDROCHLORIDE 0.4 MCG/KG/HR: 4 INJECTION, SOLUTION INTRAVENOUS at 10:12

## 2021-01-01 RX ADMIN — DEXMEDETOMIDINE HYDROCHLORIDE 1.1 MCG/KG/HR: 4 INJECTION, SOLUTION INTRAVENOUS at 03:12

## 2021-01-01 RX ADMIN — ESMOLOL HYDROCHLORIDE 100 MCG/KG/MIN: 20 INJECTION INTRAVENOUS at 03:11

## 2021-01-01 RX ADMIN — POTASSIUM CHLORIDE 10 MEQ: 7.46 INJECTION, SOLUTION INTRAVENOUS at 08:12

## 2021-01-01 RX ADMIN — Medication 6 MG: at 09:12

## 2021-01-01 RX ADMIN — DEXMEDETOMIDINE HYDROCHLORIDE 0.08 MCG/KG/HR: 4 INJECTION, SOLUTION INTRAVENOUS at 02:12

## 2021-01-01 RX ADMIN — LORAZEPAM 4 MG: 2 INJECTION INTRAMUSCULAR; INTRAVENOUS at 02:11

## 2021-01-01 RX ADMIN — PANTOPRAZOLE SODIUM 40 MG: 40 INJECTION, POWDER, FOR SOLUTION INTRAVENOUS at 09:12

## 2021-01-01 RX ADMIN — DEXMEDETOMIDINE HYDROCHLORIDE 0.8 MCG/KG/HR: 4 INJECTION, SOLUTION INTRAVENOUS at 12:12

## 2021-01-01 RX ADMIN — Medication 5 MG/HR: at 02:11

## 2021-01-01 RX ADMIN — MAGNESIUM SULFATE 1 G: 1 INJECTION INTRAVENOUS at 10:11

## 2021-01-01 RX ADMIN — LEVOFLOXACIN 500 MG: 500 INJECTION, SOLUTION INTRAVENOUS at 10:12

## 2021-01-01 RX ADMIN — HALOPERIDOL LACTATE 10 MG: 5 INJECTION, SOLUTION INTRAMUSCULAR at 06:12

## 2021-01-01 RX ADMIN — AMIODARONE HYDROCHLORIDE 0.5 MG/MIN: 1.8 INJECTION, SOLUTION INTRAVENOUS at 04:11

## 2021-01-01 RX ADMIN — DEXMEDETOMIDINE HYDROCHLORIDE 0.3 MCG/KG/HR: 4 INJECTION, SOLUTION INTRAVENOUS at 09:12

## 2021-01-01 RX ADMIN — PIPERACILLIN AND TAZOBACTAM 4.5 G: 4; .5 INJECTION, POWDER, LYOPHILIZED, FOR SOLUTION INTRAVENOUS; PARENTERAL at 08:12

## 2021-01-01 RX ADMIN — DEXMEDETOMIDINE HYDROCHLORIDE 0.6 MCG/KG/HR: 4 INJECTION, SOLUTION INTRAVENOUS at 01:12

## 2021-01-01 RX ADMIN — BENZONATATE 100 MG: 100 CAPSULE ORAL at 10:11

## 2021-01-01 RX ADMIN — DILTIAZEM HYDROCHLORIDE 240 MG: 120 CAPSULE, COATED, EXTENDED RELEASE ORAL at 07:12

## 2021-01-01 RX ADMIN — FLUCONAZOLE 200 MG: 2 INJECTION, SOLUTION INTRAVENOUS at 10:12

## 2021-01-01 RX ADMIN — FUROSEMIDE 40 MG: 10 INJECTION, SOLUTION INTRAVENOUS at 07:12

## 2021-01-01 RX ADMIN — ATORVASTATIN CALCIUM 20 MG: 20 TABLET, FILM COATED ORAL at 09:12

## 2021-01-01 RX ADMIN — LORAZEPAM 2 MG: 2 INJECTION INTRAMUSCULAR; INTRAVENOUS at 12:11

## 2021-01-01 RX ADMIN — POTASSIUM & SODIUM PHOSPHATES POWDER PACK 280-160-250 MG 1 PACKET: 280-160-250 PACK at 09:11

## 2021-01-01 RX ADMIN — LORAZEPAM 1 MG: 2 INJECTION INTRAMUSCULAR; INTRAVENOUS at 11:12

## 2021-01-01 RX ADMIN — IOHEXOL 100 ML: 350 INJECTION, SOLUTION INTRAVENOUS at 02:12

## 2021-01-01 RX ADMIN — LORAZEPAM 1 MG: 2 INJECTION INTRAMUSCULAR; INTRAVENOUS at 01:11

## 2021-01-01 RX ADMIN — SODIUM CHLORIDE 1000 ML: 0.9 INJECTION, SOLUTION INTRAVENOUS at 04:12

## 2021-01-01 RX ADMIN — CARVEDILOL 6.25 MG: 6.25 TABLET, FILM COATED ORAL at 05:11

## 2021-01-01 RX ADMIN — APIXABAN 5 MG: 5 TABLET, FILM COATED ORAL at 09:11

## 2021-01-01 RX ADMIN — FLUCONAZOLE 200 MG: 2 INJECTION, SOLUTION INTRAVENOUS at 09:12

## 2021-01-01 RX ADMIN — DEXMEDETOMIDINE HYDROCHLORIDE 1 MCG/KG/HR: 4 INJECTION, SOLUTION INTRAVENOUS at 04:12

## 2021-01-01 RX ADMIN — SODIUM CHLORIDE: 0.9 INJECTION, SOLUTION INTRAVENOUS at 12:12

## 2021-11-23 PROBLEM — Z72.0 TOBACCO ABUSE: Status: RESOLVED | Noted: 2018-10-01 | Resolved: 2021-01-01

## 2021-11-23 PROBLEM — F10.939 ALCOHOL WITHDRAWAL: Status: ACTIVE | Noted: 2021-01-01

## 2021-11-23 PROBLEM — K92.2 GI BLEED: Status: ACTIVE | Noted: 2021-01-01

## 2021-11-23 PROBLEM — F33.1 MODERATE RECURRENT MAJOR DEPRESSION: Status: ACTIVE | Noted: 2021-01-01

## 2021-11-23 PROBLEM — F10.20 ALCOHOL DEPENDENCE: Status: ACTIVE | Noted: 2019-02-04

## 2021-11-23 PROBLEM — Z72.0 TOBACCO USER: Status: ACTIVE | Noted: 2021-01-01

## 2021-11-23 PROBLEM — I48.91 ATRIAL FIBRILLATION WITH RAPID VENTRICULAR RESPONSE: Status: ACTIVE | Noted: 2021-01-01

## 2021-11-23 PROBLEM — I10 ESSENTIAL HYPERTENSION: Status: ACTIVE | Noted: 2021-01-01

## 2021-12-21 NOTE — Clinical Note
Is this patient a high probability for COVID-19?: No   Diagnosis: Atrial fibrillation with RVR [[id number]]   Future Attending Provider: AMAYA MAIER [[id number]]   Admitting Provider:: AMAYA MAIER. [[id number]]

## 2021-12-21 NOTE — Clinical Note
"Neymar Patel (Samuel) was seen and treated in our emergency department on 12/21/2021.  He may return to work on 12/23/2021.       If you have any questions or concerns, please don't hesitate to call.      Myrtle ELIZABETH RN    "

## 2021-12-22 NOTE — H&P
"[facility] Emergency Department  [facility]  History & Physical    Patient Name: Neymar Patel  MRN: [id number]  Patient Class: IP- Inpatient  Admission Date: 12/21/2021  Attending Physician: Raul Gan III, MD  Primary Care Provider: Landen Brown MD         Patient information was obtained from patient and ER records.     Subjective:     Principal Problem:Atrial fibrillation with rapid ventricular response    Chief Complaint:   Chief Complaint   Patient presents with    Weakness     "I'm dizzy and weak." onset this morning        HPI: Patient is a 66-year-old male with a history of alcohol misuse abuse atrial fibrillation hypertension peripheral artery disease fatty liver disease who recently had an episode of bronchitis and was treated in the outpatient setting.  The patient then began in having increasing weakness and lightheadedness and noticed his heart rate was elevated.  He presented to the emergency department and was found to have elevated alcohol levels and a heart rate in the 130s to 140s.  The patient is currently in AFib with RVR and has been started on a Cardizem drip by the emergency department.  We have made several medication changes reconcile home medicines and are asking his cardiologist to see the patient.      Past Medical History:   Diagnosis Date    A-fib     Alcohol abuse     Atrial fibrillation     Disorder of kidney and ureter     Emphysema lung     Fatty liver     Hypertension     Liver disease     PAD (peripheral artery disease)     Shingles     Stroke     TIA (transient ischemic attack)        Past Surgical History:   Procedure Laterality Date    ANGIOGRAM, CORONARY, WITH LEFT HEART CATHETERIZATION      ESOPHAGOGASTRODUODENOSCOPY N/A 11/24/2021    Procedure: EGD (ESOPHAGOGASTRODUODENOSCOPY);  Surgeon: Ilan De Los Santos MD;  Location: [facility];  Service: Endoscopy;  Laterality: N/A;    TONSILLECTOMY         Review of patient's allergies indicates:  No Known " Allergies    No current facility-administered medications on file prior to encounter.     Current Outpatient Medications on File Prior to Encounter   Medication Sig    apixaban (ELIQUIS) 5 mg Tab Take 5 mg by mouth 2 (two) times daily.    atorvastatin (LIPITOR) 20 MG tablet Take 1 tablet (20 mg total) by mouth once daily.    metoprolol tartrate (LOPRESSOR) 50 MG tablet Take 50 mg by mouth 2 (two) times daily.    nicotine polacrilex 2 MG Lozg Take 1 lozenge by mouth as needed for cravings.  Do not exceed more than 10 lozenges in a 24 hour period    nicotine, polacrilex, (NICORETTE) 2 mg Gum Chew and park 1 gum by mouth as needed for cravings.  Do not exceed more than 20 pieces in a 24 hour period     Family History     Problem Relation (Age of Onset)    Cancer Mother, Father    Clotting disorder Mother    Crohn's disease Mother    Lung cancer Father        Tobacco Use    Smoking status: Current Every Day Smoker     Packs/day: 2.00     Years: 53.00     Pack years: 106.00     Start date: 1968    Smokeless tobacco: Never Used    Tobacco comment: Pt enrolled in the [employer] on 3/21/16 ([employer] Member ID # [id number]).  Ambulatory referral to Smoking Cessation Program.   Substance and Sexual Activity    Alcohol use: Yes     Comment: vodka 3 drinks once weekly     Drug use: No    Sexual activity: Not on file     Review of Systems   Constitutional: Positive for activity change, appetite change and fatigue. Negative for chills, diaphoresis, fever and unexpected weight change.   HENT: Negative for congestion, dental problem, ear discharge, ear pain, facial swelling, mouth sores, nosebleeds, rhinorrhea, sinus pain, sore throat, tinnitus, trouble swallowing and voice change.    Eyes: Negative for photophobia, pain, discharge, redness, itching and visual disturbance.   Respiratory: Positive for cough. Negative for apnea, choking, chest tightness, shortness of breath, wheezing and stridor.    Cardiovascular: Negative  for chest pain, palpitations and leg swelling.   Gastrointestinal: Negative for abdominal distention, abdominal pain, anal bleeding, blood in stool, constipation, diarrhea, nausea, rectal pain and vomiting.   Endocrine: Positive for cold intolerance. Negative for heat intolerance, polydipsia, polyphagia and polyuria.   Genitourinary: Negative for dysuria, flank pain, frequency, genital sores, hematuria and urgency.   Musculoskeletal: Positive for arthralgias. Negative for back pain, joint swelling, myalgias, neck pain and neck stiffness.   Skin: Negative for color change, rash and wound.   Allergic/Immunologic: Negative for environmental allergies, food allergies and immunocompromised state.   Neurological: Positive for tremors and weakness. Negative for dizziness, syncope, facial asymmetry, speech difficulty, light-headedness, numbness and headaches.   Hematological: Negative for adenopathy. Does not bruise/bleed easily.   Psychiatric/Behavioral: Negative for agitation, confusion, decreased concentration, hallucinations, self-injury and suicidal ideas.     Objective:     Vital Signs (Most Recent):  Temp: 97.9 °F (36.6 °C) (12/22/21 0715)  Pulse: 68 (12/22/21 1302)  Resp: 18 (12/22/21 1302)  BP: 113/78 (12/22/21 1302)  SpO2: 97 % (12/22/21 1302) Vital Signs (24h Range):  Temp:  [97.6 °F (36.4 °C)-98.1 °F (36.7 °C)] 97.9 °F (36.6 °C)  Pulse:  [] 68  Resp:  [14-22] 18  SpO2:  [95 %-100 %] 97 %  BP: ()/() 113/78     Weight: 77.1 kg (170 lb)  Body mass index is 24.39 kg/m².    Physical Exam  Vitals and nursing note reviewed.   Constitutional:       General: He is awake. He is not in acute distress.     Appearance: He is ill-appearing. He is not toxic-appearing or diaphoretic.   HENT:      Head: Normocephalic and atraumatic.      Nose: Nose normal. No congestion or rhinorrhea.      Mouth/Throat:      Mouth: Mucous membranes are moist.      Pharynx: Oropharynx is clear. No oropharyngeal exudate or  posterior oropharyngeal erythema.   Eyes:      General: No scleral icterus.        Right eye: No discharge.         Left eye: No discharge.      Extraocular Movements: Extraocular movements intact.      Pupils: Pupils are equal, round, and reactive to light.   Neck:      Thyroid: No thyroid mass or thyromegaly.      Vascular: No carotid bruit.      Meningeal: Brudzinski's sign and Kernig's sign absent.   Cardiovascular:      Rate and Rhythm: Tachycardia present. Rhythm irregular.      Chest Wall: PMI is not displaced. No thrill.      Pulses: Normal pulses.      Heart sounds: Murmur heard.   No friction rub. No gallop.    Pulmonary:      Effort: Pulmonary effort is normal. No tachypnea, accessory muscle usage, prolonged expiration or respiratory distress.      Breath sounds: Normal breath sounds. No stridor or decreased air movement. No wheezing, rhonchi or rales.   Chest:      Chest wall: No tenderness.   Abdominal:      General: Bowel sounds are normal. There is no distension.      Palpations: Abdomen is soft. There is no hepatomegaly, splenomegaly or mass.      Tenderness: There is no abdominal tenderness. There is no right CVA tenderness, left CVA tenderness, guarding or rebound.      Hernia: No hernia is present.   Musculoskeletal:         General: No swelling, tenderness, deformity or signs of injury. Normal range of motion.      Cervical back: Normal range of motion and neck supple. No rigidity. No muscular tenderness.      Right lower leg: No edema.      Left lower leg: No edema.   Lymphadenopathy:      Cervical: No cervical adenopathy.   Skin:     General: Skin is warm and dry.      Coloration: Skin is not cyanotic, jaundiced or pale.      Findings: No bruising, erythema, lesion, petechiae or rash.   Neurological:      General: No focal deficit present.      Mental Status: He is alert and oriented to person, place, and time. Mental status is at baseline.      Cranial Nerves: No cranial nerve deficit,  dysarthria or facial asymmetry.      Motor: No tremor.   Psychiatric:         Mood and Affect: Mood normal. Mood is not anxious or depressed. Affect is not flat.         Speech: Speech is not rapid and pressured or slurred.         Behavior: Behavior normal. Behavior is not agitated, aggressive or combative.         Thought Content: Thought content normal. Thought content is not paranoid or delusional.         Cognition and Memory: Cognition is not impaired. Memory is not impaired.         Judgment: Judgment normal.           CRANIAL NERVES     CN III, IV, VI   Pupils are equal, round, and reactive to light.       Significant Labs: All pertinent labs within the past 24 hours have been reviewed.    Significant Imaging: I have reviewed all pertinent imaging results/findings within the past 24 hours.    Assessment/Plan:     * Atrial fibrillation with rapid ventricular response        Tobacco user        Moderate recurrent major depression        Centrilobular emphysema        Alcohol dependence        Elevated LFTs        Hypertension          VTE Risk Mitigation (From admission, onward)         Ordered     apixaban tablet 5 mg  2 times daily         12/22/21 0855     IP VTE HIGH RISK PATIENT  Once         12/21/21 2233     Reason for No Pharmacological VTE Prophylaxis  Once        Question:  Reasons:  Answer:  Already adequately anticoagulated on oral Anticoagulants    12/21/21 2233                   Raul Gan III, MD  Department of Hospital Medicine   [place] - Emergency Department

## 2021-12-22 NOTE — SUBJECTIVE & OBJECTIVE
Past Medical History:   Diagnosis Date    A-fib     Alcohol abuse     Atrial fibrillation     Disorder of kidney and ureter     Emphysema lung     Fatty liver     Hypertension     Liver disease     PAD (peripheral artery disease)     Shingles     Stroke     TIA (transient ischemic attack)        Past Surgical History:   Procedure Laterality Date    ANGIOGRAM, CORONARY, WITH LEFT HEART CATHETERIZATION      ESOPHAGOGASTRODUODENOSCOPY N/A 11/24/2021    Procedure: EGD (ESOPHAGOGASTRODUODENOSCOPY);  Surgeon: Ilan De Los Santos MD;  Location: [facility];  Service: Endoscopy;  Laterality: N/A;    TONSILLECTOMY         Review of patient's allergies indicates:  No Known Allergies    No current facility-administered medications on file prior to encounter.     Current Outpatient Medications on File Prior to Encounter   Medication Sig    apixaban (ELIQUIS) 5 mg Tab Take 5 mg by mouth 2 (two) times daily.    atorvastatin (LIPITOR) 20 MG tablet Take 1 tablet (20 mg total) by mouth once daily.    metoprolol tartrate (LOPRESSOR) 50 MG tablet Take 50 mg by mouth 2 (two) times daily.    nicotine polacrilex 2 MG Lozg Take 1 lozenge by mouth as needed for cravings.  Do not exceed more than 10 lozenges in a 24 hour period    nicotine, polacrilex, (NICORETTE) 2 mg Gum Chew and park 1 gum by mouth as needed for cravings.  Do not exceed more than 20 pieces in a 24 hour period     Family History     Problem Relation (Age of Onset)    Cancer Mother, Father    Clotting disorder Mother    Crohn's disease Mother    Lung cancer Father        Tobacco Use    Smoking status: Current Every Day Smoker     Packs/day: 2.00     Years: 53.00     Pack years: 106.00     Start date: 1968    Smokeless tobacco: Never Used    Tobacco comment: Pt enrolled in the [employer] on 3/21/16 ([employer] Member ID # [id number]).  Ambulatory referral to Smoking Cessation Program.   Substance and Sexual Activity    Alcohol use: Yes     Comment: jose cruz 3  drinks once weekly     Drug use: No    Sexual activity: Not on file     Review of Systems   Constitutional: Positive for activity change, appetite change and fatigue. Negative for chills, diaphoresis, fever and unexpected weight change.   HENT: Negative for congestion, dental problem, ear discharge, ear pain, facial swelling, mouth sores, nosebleeds, rhinorrhea, sinus pain, sore throat, tinnitus, trouble swallowing and voice change.    Eyes: Negative for photophobia, pain, discharge, redness, itching and visual disturbance.   Respiratory: Positive for cough. Negative for apnea, choking, chest tightness, shortness of breath, wheezing and stridor.    Cardiovascular: Negative for chest pain, palpitations and leg swelling.   Gastrointestinal: Negative for abdominal distention, abdominal pain, anal bleeding, blood in stool, constipation, diarrhea, nausea, rectal pain and vomiting.   Endocrine: Positive for cold intolerance. Negative for heat intolerance, polydipsia, polyphagia and polyuria.   Genitourinary: Negative for dysuria, flank pain, frequency, genital sores, hematuria and urgency.   Musculoskeletal: Positive for arthralgias. Negative for back pain, joint swelling, myalgias, neck pain and neck stiffness.   Skin: Negative for color change, rash and wound.   Allergic/Immunologic: Negative for environmental allergies, food allergies and immunocompromised state.   Neurological: Positive for tremors and weakness. Negative for dizziness, syncope, facial asymmetry, speech difficulty, light-headedness, numbness and headaches.   Hematological: Negative for adenopathy. Does not bruise/bleed easily.   Psychiatric/Behavioral: Negative for agitation, confusion, decreased concentration, hallucinations, self-injury and suicidal ideas.     Objective:     Vital Signs (Most Recent):  Temp: 97.9 °F (36.6 °C) (12/22/21 0715)  Pulse: 68 (12/22/21 1302)  Resp: 18 (12/22/21 1302)  BP: 113/78 (12/22/21 1302)  SpO2: 97 % (12/22/21 1302)  Vital Signs (24h Range):  Temp:  [97.6 °F (36.4 °C)-98.1 °F (36.7 °C)] 97.9 °F (36.6 °C)  Pulse:  [] 68  Resp:  [14-22] 18  SpO2:  [95 %-100 %] 97 %  BP: ()/() 113/78     Weight: 77.1 kg (170 lb)  Body mass index is 24.39 kg/m².    Physical Exam  Vitals and nursing note reviewed.   Constitutional:       General: He is awake. He is not in acute distress.     Appearance: He is ill-appearing. He is not toxic-appearing or diaphoretic.   HENT:      Head: Normocephalic and atraumatic.      Nose: Nose normal. No congestion or rhinorrhea.      Mouth/Throat:      Mouth: Mucous membranes are moist.      Pharynx: Oropharynx is clear. No oropharyngeal exudate or posterior oropharyngeal erythema.   Eyes:      General: No scleral icterus.        Right eye: No discharge.         Left eye: No discharge.      Extraocular Movements: Extraocular movements intact.      Pupils: Pupils are equal, round, and reactive to light.   Neck:      Thyroid: No thyroid mass or thyromegaly.      Vascular: No carotid bruit.      Meningeal: Brudzinski's sign and Kernig's sign absent.   Cardiovascular:      Rate and Rhythm: Tachycardia present. Rhythm irregular.      Chest Wall: PMI is not displaced. No thrill.      Pulses: Normal pulses.      Heart sounds: Murmur heard.   No friction rub. No gallop.    Pulmonary:      Effort: Pulmonary effort is normal. No tachypnea, accessory muscle usage, prolonged expiration or respiratory distress.      Breath sounds: Normal breath sounds. No stridor or decreased air movement. No wheezing, rhonchi or rales.   Chest:      Chest wall: No tenderness.   Abdominal:      General: Bowel sounds are normal. There is no distension.      Palpations: Abdomen is soft. There is no hepatomegaly, splenomegaly or mass.      Tenderness: There is no abdominal tenderness. There is no right CVA tenderness, left CVA tenderness, guarding or rebound.      Hernia: No hernia is present.   Musculoskeletal:         General:  No swelling, tenderness, deformity or signs of injury. Normal range of motion.      Cervical back: Normal range of motion and neck supple. No rigidity. No muscular tenderness.      Right lower leg: No edema.      Left lower leg: No edema.   Lymphadenopathy:      Cervical: No cervical adenopathy.   Skin:     General: Skin is warm and dry.      Coloration: Skin is not cyanotic, jaundiced or pale.      Findings: No bruising, erythema, lesion, petechiae or rash.   Neurological:      General: No focal deficit present.      Mental Status: He is alert and oriented to person, place, and time. Mental status is at baseline.      Cranial Nerves: No cranial nerve deficit, dysarthria or facial asymmetry.      Motor: No tremor.   Psychiatric:         Mood and Affect: Mood normal. Mood is not anxious or depressed. Affect is not flat.         Speech: Speech is not rapid and pressured or slurred.         Behavior: Behavior normal. Behavior is not agitated, aggressive or combative.         Thought Content: Thought content normal. Thought content is not paranoid or delusional.         Cognition and Memory: Cognition is not impaired. Memory is not impaired.         Judgment: Judgment normal.           CRANIAL NERVES     CN III, IV, VI   Pupils are equal, round, and reactive to light.       Significant Labs: All pertinent labs within the past 24 hours have been reviewed.    Significant Imaging: I have reviewed all pertinent imaging results/findings within the past 24 hours.

## 2021-12-22 NOTE — HPI
Perpetual History  Patient is a 66-year-old male with a history of alcohol abuse atrial fibrillation hypertension peripheral artery disease fatty liver disease who recently had an episode of bronchitis and was treated in the outpatient setting.  The patient then began in having increasing weakness and lightheadedness and noticed his heart rate was elevated.  He presented to the emergency department and was found to have elevated alcohol levels and a heart rate in the 130s to 140s.  The patient is currently in AFib with RVR and has been started on a Cardizem drip by the emergency department.

## 2021-12-22 NOTE — PLAN OF CARE
Pt downgraded from ICU to Medsurg. Pt progressing in a timely mannor. Vitals stable, no complaints.    Problem: Adult Inpatient Plan of Care  Goal: Plan of Care Review  Outcome: Ongoing, Progressing     Problem: Adult Inpatient Plan of Care  Goal: Patient-Specific Goal (Individualized)  Outcome: Ongoing, Progressing     Problem: Adult Inpatient Plan of Care  Goal: Absence of Hospital-Acquired Illness or Injury  Outcome: Ongoing, Progressing     Problem: Adult Inpatient Plan of Care  Goal: Readiness for Transition of Care  Outcome: Ongoing, Progressing

## 2021-12-22 NOTE — ED PROVIDER NOTES
"Encounter Date: 12/21/2021       History     Chief Complaint   Patient presents with    Weakness     "I'm dizzy and weak." onset this morning     Patient is a 66-year-old male with past medical history significant for prior TIA/stroke, peripheral artery disease, hypertension, chronic alcohol abuse, 50 pack-year smoker, emphysema COPD and atrial fibrillation who presents to the emergency department with complaints of generalized weakness.  Patient states the last drink of alcohol was yesterday but does admit to some palpitations.  Patient denies syncope, chest pain, shortness of breath and states he is compliant with his medications        Review of patient's allergies indicates:  No Known Allergies  Past Medical History:   Diagnosis Date    A-fib     Alcohol abuse     Atrial fibrillation     Disorder of kidney and ureter     Emphysema lung     Fatty liver     Hypertension     Liver disease     PAD (peripheral artery disease)     Shingles     Stroke     TIA (transient ischemic attack)      Past Surgical History:   Procedure Laterality Date    ANGIOGRAM, CORONARY, WITH LEFT HEART CATHETERIZATION      ESOPHAGOGASTRODUODENOSCOPY N/A 11/24/2021    Procedure: EGD (ESOPHAGOGASTRODUODENOSCOPY);  Surgeon: Ilan De Los Santos MD;  [facility];  Service: Endoscopy;  Laterality: N/A;    TONSILLECTOMY       Family History   Problem Relation Age of Onset    Cancer Mother     Clotting disorder Mother     Crohn's disease Mother     Cancer Father     Lung cancer Father      Social History     Tobacco Use    Smoking status: Current Every Day Smoker     Packs/day: 2.00     Years: 53.00     Pack years: 106.00     Start date: 1968    Smokeless tobacco: Never Used    Tobacco comment: Pt enrolled in the [employer] on 3/21/16 ([employer] Member ID # [id number]).  Ambulatory referral to Smoking Cessation Program.   Substance Use Topics    Alcohol use: Yes     Comment: vodka 3 drinks once weekly     Drug use: No "     Review of Systems   Constitutional: Positive for fatigue. Negative for chills, diaphoresis and fever.   HENT: Negative for congestion.    Respiratory: Negative for cough, chest tightness, shortness of breath, wheezing and stridor.    Cardiovascular: Positive for palpitations. Negative for chest pain and leg swelling.   Gastrointestinal: Negative for abdominal pain, diarrhea, nausea and vomiting.   Genitourinary: Negative for dysuria, flank pain, frequency and hematuria.   Musculoskeletal: Negative for back pain and neck pain.   Neurological: Negative for dizziness and headaches.   All other systems reviewed and are negative.      Physical Exam     Initial Vitals [12/21/21 1612]   BP Pulse Resp Temp SpO2   (!) 168/90 (!) 142 20 98.1 °F (36.7 °C) 97 %      MAP       --         Physical Exam    Nursing note and vitals reviewed.  Constitutional: He appears well-developed and well-nourished. He is not diaphoretic. No distress.   HENT:   Head: Normocephalic and atraumatic.   Eyes: EOM are normal. Pupils are equal, round, and reactive to light.   Neck: Neck supple.   Normal range of motion.  Cardiovascular: Normal heart sounds and intact distal pulses.   Irregularly irregular rhythm, tachycardic   Pulmonary/Chest: Breath sounds normal. No respiratory distress. He has no wheezes. He has no rhonchi. He has no rales.   Abdominal: Abdomen is soft. He exhibits no distension. There is no abdominal tenderness. There is no rebound and no guarding.   Musculoskeletal:         General: Normal range of motion.      Cervical back: Normal range of motion and neck supple.     Neurological: He is alert and oriented to person, place, and time. He has normal strength.   Skin: Skin is warm and dry.         ED Course   Procedures  Labs Reviewed   CBC W/ AUTO DIFFERENTIAL - Abnormal; Notable for the following components:       Result Value    RBC 3.77 (*)     Hemoglobin 10.4 (*)     Hematocrit 34.4 (*)     MCHC 30.2 (*)     RDW 15.5 (*)      Platelets 72 (*)     Immature Granulocytes 0.8 (*)     Lymph # 0.6 (*)     Gran % 74.4 (*)     Lymph % 13.0 (*)     All other components within normal limits   COMPREHENSIVE METABOLIC PANEL - Abnormal; Notable for the following components:    Glucose 117 (*)     Calcium 8.4 (*)     Albumin 3.1 (*)     AST 69 (*)     eGFR if non  56.9 (*)     All other components within normal limits   ALCOHOL,MEDICAL (ETHANOL) - Abnormal; Notable for the following components:    Alcohol, Serum 121 (*)     All other components within normal limits   NT-PRO NATRIURETIC PEPTIDE   MAGNESIUM   TROPONIN I HIGH SENSITIVITY   DRUG SCREEN PANEL, URINE EMERGENCY   URINALYSIS, REFLEX TO URINE CULTURE   SARS-COV-2 RDRP GENE          Imaging Results          CT Chest With Contrast (Final result)  Result time 12/21/21 18:07:07    Final result by Evangelist Mcgarry MD (12/21/21 18:07:07)                 Impression:      1. Small to moderate amount of right pleural fluid, increased when compared to prior CT of 12/01/2021.  The fluid is of low attenuation.  2. Interval resolution of small left pleural effusion.  3. Mild pulmonary emphysema.  4. Bronchial wall thickening in the right lower lobe, may reflect bronchitis.  5. A nondisplaced fracture lateral aspect of right 6th rib, as noted on chest CT of 12/01/2021.      Electronically signed by: Evangelist Mcgarry MD  Date:    12/21/2021  Time:    18:07             Narrative:    EXAMINATION:  CT CHEST WITH CONTRAST    CLINICAL HISTORY:  Pleural effusion, malignancy suspected; history of a fall with a right rib fracture on chest CT of 12/01/2021.    TECHNIQUE:  Axial CT images were obtained. Iterative reconstruction technique was used.  CT/cardiac nuclear exam/s in prior 12 months: 4.    COMPARISON:  Portable chest 12/21/2021, CT chest with IV contrast 12/01/2021..    FINDINGS:  Mild pulmonary emphysema.    Bronchial wall thickening in the right lower lobe bronchi.    Small to  moderate amount of right pleural fluid, increased when compared to prior exam of 12/01/2021.  There is associated compressive atelectasis.    No pneumothorax.    The left lung is clear.    There is no aortic aneurysm.  Atherosclerotic calcification of the thoracic aorta and coronary arteries.    No hilar or mediastinal lymph node enlargement.    No pericardial fluid.    Visualized upper abdomen appears unremarkable.    No axillary lymph node enlargement.    Nondisplaced fracture lateral aspect of right 6th rib, as noted on prior examination.                               X-Ray Chest 1 View (Final result)  Result time 12/21/21 17:01:22    Final result by Enedina Mcnair MD (12/21/21 17:01:22)                 Impression:      Increase in size of the right pleural effusion since 12/01/2021 with increased adjacent atelectasis or pneumonia    Enlarged cardiac silhouette      Electronically signed by: Enedina Mcnair MD  Date:    12/21/2021  Time:    17:01             Narrative:    EXAMINATION:  XR CHEST 1 VIEW    CLINICAL HISTORY:  Weakness    COMPARISON:  12/01/2021    FINDINGS:  Significant increase in size of the right pleural effusion since 12/01/2021.  Adjacent atelectasis versus pneumonia.  Upper right lung and left lung are clear.  Cardiac silhouette is enlarged, as before.                                 Medications   cefTRIAXone (ROCEPHIN) 1 g/50 mL D5W IVPB (has no administration in time range)   azithromycin 500 mg in dextrose 5 % 250 mL IVPB (ready to mix system) (has no administration in time range)   diltiaZEM 125 mg in D5W 125 mL infusion (has no administration in time range)   sodium chloride 0.9% bolus 1,000 mL (0 mLs Intravenous Stopped 12/21/21 1817)   diltiaZEM injection 10 mg (10 mg Intravenous Given 12/21/21 1709)   ondansetron injection 4 mg (4 mg Intravenous Given 12/21/21 1725)   iohexoL (OMNIPAQUE 350) injection 80 mL (80 mLs Intravenous Given 12/21/21 1749)   diltiaZEM injection 10 mg (10 mg  Intravenous Given 12/21/21 1814)   lorazepam injection 1 mg (1 mg Intravenous Given 12/21/21 1814)   metoprolol injection 5 mg (5 mg Intravenous Given 12/21/21 1819)     Medical Decision Making:   ED Management:  Critical care 40 minutes             ED Course as of 12/21/21 1845   Tue Dec 21, 2021   1814 EKG evaluated by me.  Atrial flutter with a 2-1 av conduction, rate of 140.  [RB]   1829 Patient with continued difficulty and rate control.  There are a myriad of reasons as to why pts rate is poorly controlled.  Mild Hypoxia due to increased pleural effusion, ETOH use/withdrawal, afib RVR with med noncompliance.  Will obs pt to attempt improved rate control with PO meds with attempting control with cardizem gtt [RB]      ED Course User Index  [RB] Romero Colunga MD             Clinical Impression:   Final diagnoses:  [R53.1] Weakness  [I48.91] Atrial fibrillation with RVR (Primary)  [J90] Pleural effusion  [F10.10] Alcohol abuse          ED Disposition Condition    Observation               Romero Colunga MD  12/21/21 1845

## 2021-12-22 NOTE — PLAN OF CARE
[facility] - Emergency Department  Initial Discharge Assessment       Primary Care Provider: Landen Brown MD    Admission Diagnosis: Atrial fibrillation with RVR [I48.91]    Admission Date: 12/21/2021  Expected Discharge Date:          Payor: MEDICARE / Plan: MEDICARE PART A & B / Product Type: Government /     Extended Emergency Contact Information  Primary Emergency Contact: briseyda sepulveda  Mobile Phone: [phone]  Relation: Relative  Preferred language: English   needed? No  Secondary Emergency Contact: JESIMARIA  Address: [address]           [address]  Home Phone: [phone]  Mobile Phone: [phone]  Relation: Relative  Preferred language: English   needed? No    Discharge Plan A: Home with family,Home Health  Discharge Plan B: Home with family,Home Health      [facility] - [place], [place] - [address]  [address]  [place] [address]  Phone: [phone] Fax: [phone]      Initial Assessment (most recent)     Adult Discharge Assessment - 12/22/21 1248        Discharge Assessment    Assessment Type Discharge Planning Assessment     Confirmed/corrected address, phone number and insurance Yes     Confirmed Demographics Correct on Facesheet     Source of Information patient     When was your last doctors appointment? --   The patient stated that his last appointment was a couple of weeks ago    Reason For Admission Atrial fibrillation with rapid ventricular response     Lives With child(gisella), adult;friend(s);grandparent(s)     Do you expect to return to your current living situation? Yes     Do you have help at home or someone to help you manage your care at home? Yes     Who are your caregiver(s) and their phone number(s)? Briseyda (daughter-in-law)     Prior to hospitilization cognitive status: Alert/Oriented     Current cognitive status: Alert/Oriented     Walking or Climbing Stairs Difficulty  none     Dressing/Bathing Difficulty none     Home Layout Able to live on 1st floor     Equipment Currently Used at Home shower chair;wheelchair;bedside commode   The patient stated that he has a wheelchair he uses as needed.    Do you currently have service(s) that help you manage your care at home? Yes     Name and Contact number of agency [facility]- [phone]     Is the pt/caregiver preference to resume services with current agency Yes     Do you take prescription medications? Yes     Do you have prescription coverage? Yes     Do you have any problems affording any of your prescribed medications? No     Is the patient taking medications as prescribed? no     If no, which medications is patient not taking? --   The patient stated he is not taking Eliquis, and he was unable to verify the other medications.    Who is going to help you get home at discharge? family     How do you get to doctors appointments? family or friend will provide     Are you on dialysis? No     Discharge Plan A Home with family;Home Health     Discharge Plan B Home with family;Home Health     DME Needed Upon Discharge  --   TBD    Discharge Plan discussed with: Patient               Initial discharge assessment is completed. ASHER went to see the patient in his room He was alert and oriented to the questions being asked. The patient stated that he lives with his daughter, her boyfriend, and her child. He has a wheelchair (he uses as needed), shower chair, and bedside commode. The patient has home health with [facility]. He expects to return home once he is discharged.       ASHER offered the patient resources , and he stated that he is aware of several community resources.

## 2021-12-22 NOTE — CONSULTS
[place] - Emergency Department  Cardiology  Consult Note    Patient Name: Neymar Patel  Patient : 1955  MRN: [id number]  Admission Date: 2021  Hospital Length of Stay: 0 days  Code Status: Full Code   Attending Provider: Raul Gan III, MD   Consulting Provider: GERALD Lewis  Primary Care Physician: Landen Brown MD  Principal Problem:<principal problem not specified>      Patient information was obtained from patient, past medical records and ER records.     Consults  Subjective:     Chief Complaint:     Dizziness     HPI:   Patient is a 66-year-old male with persistent atrial flutter, PAD, history of CVA, hypertension, hyperlipidemia, tobacco use and alcohol abuse.    Presented to hospital for shortness of breath and dizziness.  Continues to have atrial fibrillation with RVR.  Continues with ETOH abuse.  States he has quit smoking.  Reports diagnosis of bronchitis about 2 weeks ago and symptoms have progressed.  Denies chest pain.    Past Medical History:   Diagnosis Date    A-fib     Alcohol abuse     Atrial fibrillation     Disorder of kidney and ureter     Emphysema lung     Fatty liver     Hypertension     Liver disease     PAD (peripheral artery disease)     Shingles     Stroke     TIA (transient ischemic attack)        Past Surgical History:   Procedure Laterality Date    ANGIOGRAM, CORONARY, WITH LEFT HEART CATHETERIZATION      ESOPHAGOGASTRODUODENOSCOPY N/A 2021    Procedure: EGD (ESOPHAGOGASTRODUODENOSCOPY);  Surgeon: Ilan De Los Santos MD;  Location: [facility];  Service: Endoscopy;  Laterality: N/A;    TONSILLECTOMY         Review of patient's allergies indicates:  No Known Allergies    No current facility-administered medications on file prior to encounter.     Current Outpatient Medications on File Prior to Encounter   Medication Sig    apixaban (ELIQUIS) 5 mg Tab Take 5 mg by mouth 2 (two) times daily.    atorvastatin (LIPITOR) 20 MG tablet Take 1  tablet (20 mg total) by mouth once daily.    metoprolol tartrate (LOPRESSOR) 50 MG tablet Take 50 mg by mouth 2 (two) times daily.    nicotine polacrilex 2 MG Lozg Take 1 lozenge by mouth as needed for cravings.  Do not exceed more than 10 lozenges in a 24 hour period    nicotine, polacrilex, (NICORETTE) 2 mg Gum Chew and park 1 gum by mouth as needed for cravings.  Do not exceed more than 20 pieces in a 24 hour period     Family History     Problem Relation (Age of Onset)    Cancer Mother, Father    Clotting disorder Mother    Crohn's disease Mother    Lung cancer Father        Tobacco Use    Smoking status: Current Every Day Smoker     Packs/day: 2.00     Years: 53.00     Pack years: 106.00     Start date: 1968    Smokeless tobacco: Never Used    Tobacco comment: Pt enrolled in the "[employer] on 3/21/16 ([employer] Member ID # [id number]).  Ambulatory referral to Smoking Cessation Program.   Substance and Sexual Activity    Alcohol use: Yes     Comment: vodka 3 drinks once weekly     Drug use: No    Sexual activity: Not on file     Review of Systems   Constitutional: Negative.    HENT: Negative.    Eyes: Negative.    Respiratory: Positive for cough and shortness of breath.    Cardiovascular: Negative.    Gastrointestinal: Negative.    Endocrine: Negative.    Genitourinary: Negative.    Musculoskeletal: Negative.    Skin: Negative.    Allergic/Immunologic: Negative.    Neurological: Positive for dizziness, tremors and weakness.   Hematological: Negative.    Psychiatric/Behavioral: Negative.      Objective:     Vital Signs (Most Recent):  Temp: 97.9 °F (36.6 °C) (12/22/21 0715)  Pulse: 109 (12/22/21 0801)  Resp: 18 (12/22/21 0801)  BP: 127/86 (hr 134) (12/22/21 0906)  SpO2: 96 % (12/22/21 0801) Vital Signs (24h Range):  Temp:  [97.6 °F (36.4 °C)-98.1 °F (36.7 °C)] 97.9 °F (36.6 °C)  Pulse:  [] 109  Resp:  [14-22] 18  SpO2:  [95 %-100 %] 96 %  BP: (127-185)/() 127/86     Weight: 77.1 kg (170  lb)  Body mass index is 24.39 kg/m².    SpO2: 96 %  O2 Device (Oxygen Therapy): room air      Intake/Output Summary (Last 24 hours) at 12/22/2021 0931  Last data filed at 12/22/2021 0524  Gross per 24 hour   Intake 1413 ml   Output 950 ml   Net 463 ml       Lines/Drains/Airways     Peripheral Intravenous Line                 Peripheral IV - Single Lumen 12/21/21 1645 20 G Right;Posterior Forearm <1 day         Peripheral IV - Single Lumen 12/21/21 1908 22 G Anterior;Right Forearm <1 day                Physical Exam  Vitals and nursing note reviewed.   Constitutional:       Appearance: He is ill-appearing.   HENT:      Head: Normocephalic.      Nose: Nose normal.      Mouth/Throat:      Mouth: Mucous membranes are moist.   Cardiovascular:      Rate and Rhythm: Tachycardia present. Rhythm irregular.      Heart sounds: No murmur heard.      Pulmonary:      Effort: Pulmonary effort is normal.      Breath sounds: Wheezing present.      Comments: Coarse breath sounds bilaterally   Abdominal:      General: Abdomen is flat.   Musculoskeletal:      Right lower leg: No edema.      Left lower leg: No edema.   Skin:     General: Skin is warm.      Capillary Refill: Capillary refill takes less than 2 seconds.   Neurological:      General: No focal deficit present.      Mental Status: He is alert.   Psychiatric:         Mood and Affect: Mood normal.         Behavior: Behavior normal.         Significant Labs:  All pertinent lab results from the last 24 hours have been reviewed.   Recent Lab Results  (Last 5 results in the past 72 hours)      12/22/21  0550   12/22/21  0020   12/21/21  1951   12/21/21  1845   12/21/21  1843        Base Deficit         -2.4       Performed By:         Marlo       Correct Temperature (PH)         7.368       Corrected Temperature (pCO2)         39.9       Corrected Temperature (pO2)         63.9       Benzodiazepines     Presumptive Positive           Methadone metabolites     Negative            Phencyclidine     Negative           Specimen source         Arterial       Amphetamine Screen, Ur     Negative           Appearance, UA     Clear           Bacteria, UA     Few           Barbiturate Screen, Ur     Negative           Bilirubin (UA)     Negative           Cocaine (Metab.)     Negative           Color, UA     Yellow           Creatinine, Urine     73.8           FiO2         21.0       Glucose, UA     1+           Hyaline Casts, UA     10           Ketones, UA     1+           Leukocytes, UA     Trace           Microscopic Comment     SEE COMMENT  Comment: Other formed elements not mentioned in the report are not   present in the microscopic examination.              MODIFIED BON'S TEST         NA       NITRITE UA     Negative           O2DEVICE         Room Air       Occult Blood UA     Negative           Opiate Scrn, Ur     Negative           pH, UA     6.0           POC HCO3         22.5       POC PCO2         39.9       POC PH         7.368       POC PO2         63.9  Comment: Value below reference range       POC SATURATED O2         91.0       POC TCO2         21.8       POC Temp         37.0       Protein, UA     1+  Comment: Recommend a 24 hour urine protein or a urine   protein/creatinine ratio if globulin induced proteinuria is  clinically suspected.              Acceptable       Yes         RBC, UA     3           SARS-CoV-2 RNA, Amplification, Qual       Negative         Specific Gravity, UA     >=1.030           Specimen UA     Urine, Clean Catch           Squam Epithel, UA     0           Marijuana (THC) Metabolite     Negative           Toxicology Information     SEE COMMENT  Comment: This screen includes the following classes of drugs at the   listed cut-off:    Benzodiazepines                  200 ng/ml  Methadone                        300 ng/ml  Cocaine metabolite               300 ng/ml  Opiates                         2000 ng/ml  Barbiturates                      200 ng/ml  Amphetamines                    1000 ng/ml  Marijuana metabs (THC)            50 ng/ml  Phencyclidine (PCP)               25 ng/ml    **Intended use : The UDS methods provide only a preliminary result.    A more   specific alternate chemical method must be used in order to obtain a   confirmed analytical result.  Gas chromatography mass spectrometry   (GC/MS)   is the preferred confirmatory method.  Clinical consideration and   professional judgement should be applied to any drug of abuse test   result.    Particularly when preliminary results are used.       ** Results:  Positive results are only preliminary and only suggest   the   sample may contain the analyte being tested.  Negative results   indicate that   the sample does not contain the analyte or the analyte is present in   concentrations below the cut-off level.             Troponin I High Sensitivity 31.1   35.0             UROBILINOGEN UA     Negative           WBC, UA     36                                Significant Imaging:  Imaging Results          CT Chest With Contrast (Final result)  Result time 12/21/21 18:07:07    Final result by Evangelist Mcgarry MD (12/21/21 18:07:07)                 Impression:      1. Small to moderate amount of right pleural fluid, increased when compared to prior CT of 12/01/2021.  The fluid is of low attenuation.  2. Interval resolution of small left pleural effusion.  3. Mild pulmonary emphysema.  4. Bronchial wall thickening in the right lower lobe, may reflect bronchitis.  5. A nondisplaced fracture lateral aspect of right 6th rib, as noted on chest CT of 12/01/2021.      Electronically signed by: Evangelist Mcgarry MD  Date:    12/21/2021  Time:    18:07             Narrative:    EXAMINATION:  CT CHEST WITH CONTRAST    CLINICAL HISTORY:  Pleural effusion, malignancy suspected; history of a fall with a right rib fracture on chest CT of 12/01/2021.    TECHNIQUE:  Axial CT images were obtained. Iterative  reconstruction technique was used.  CT/cardiac nuclear exam/s in prior 12 months: 4.    COMPARISON:  Portable chest 12/21/2021, CT chest with IV contrast 12/01/2021..    FINDINGS:  Mild pulmonary emphysema.    Bronchial wall thickening in the right lower lobe bronchi.    Small to moderate amount of right pleural fluid, increased when compared to prior exam of 12/01/2021.  There is associated compressive atelectasis.    No pneumothorax.    The left lung is clear.    There is no aortic aneurysm.  Atherosclerotic calcification of the thoracic aorta and coronary arteries.    No hilar or mediastinal lymph node enlargement.    No pericardial fluid.    Visualized upper abdomen appears unremarkable.    No axillary lymph node enlargement.    Nondisplaced fracture lateral aspect of right 6th rib, as noted on prior examination.                               X-Ray Chest 1 View (Final result)  Result time 12/21/21 17:01:22    Final result by Enedina Mcnair MD (12/21/21 17:01:22)                 Impression:      Increase in size of the right pleural effusion since 12/01/2021 with increased adjacent atelectasis or pneumonia    Enlarged cardiac silhouette      Electronically signed by: Enedina Mcnair MD  Date:    12/21/2021  Time:    17:01             Narrative:    EXAMINATION:  XR CHEST 1 VIEW    CLINICAL HISTORY:  Weakness    COMPARISON:  12/01/2021    FINDINGS:  Significant increase in size of the right pleural effusion since 12/01/2021.  Adjacent atelectasis versus pneumonia.  Upper right lung and left lung are clear.  Cardiac silhouette is enlarged, as before.                                          ECHO:  Will obtain echo today       TELEMETRY:  Atrial fibrillation with rvr (rate 134 bpm)      LAST LIPIDS:    Lab Results   Component Value Date    CHOL 221 (H) 03/06/2019    CHOL 158 02/07/2018    CHOL 172 08/11/2017     Lab Results   Component Value Date    HDL 61 03/06/2019     (H) 02/07/2018    HDL 85 (H)  08/11/2017     Lab Results   Component Value Date    LDLCALC 133.8 03/06/2019    LDLCALC 46.2 (L) 02/07/2018    LDLCALC 73.6 08/11/2017     Lab Results   Component Value Date    TRIG 131 03/06/2019    TRIG 54 02/07/2018    TRIG 67 08/11/2017     Lab Results   Component Value Date    CHOLHDL 27.6 03/06/2019    CHOLHDL 63.9 (H) 02/07/2018    CHOLHDL 49.4 08/11/2017       MEDICATIONS:     Current Facility-Administered Medications:     0.9%  NaCl infusion, , Intravenous, Continuous, Romero Colunga MD, Last Rate: 125 mL/hr at 12/22/21 0521, New Bag at 12/22/21 0521    apixaban tablet 5 mg, 5 mg, Oral, BID, Francis HAlejandro Gan III, MD, 5 mg at 12/22/21 0925    atorvastatin tablet 20 mg, 20 mg, Oral, Daily, Francis HAlejandro Gan III, MD, 20 mg at 12/22/21 0926    diltiaZEM 125 mg in D5W 125 mL infusion, 5 mg/hr, Intravenous, Continuous, Jamila Tolbert NP, Last Rate: 15 mL/hr at 12/22/21 0524, 15 mg/hr at 12/22/21 0524    diltiaZEM 24 hr capsule 240 mg, 240 mg, Oral, Daily, GERALD Lewis, 240 mg at 12/22/21 0758    lorazepam injection 1 mg, 1 mg, Intravenous, Q3H PRN, Romero Colunga MD, 1 mg at 12/21/21 2305    melatonin tablet 6 mg, 6 mg, Oral, Nightly PRN, Romero Colunga MD, 6 mg at 12/21/21 2306    metoprolol injection 5 mg, 5 mg, Intravenous, Q3H PRN, Jamila Tolbert NP, 5 mg at 12/22/21 0906    metoprolol tartrate (LOPRESSOR) tablet 50 mg, 50 mg, Oral, BID, Francis H. Elvia TAVERA MD, 50 mg at 12/22/21 0926    nicotine 21 mg/24 hr 1 patch, 1 patch, Transdermal, Daily, Francis HAlejandro Gan III, MD, 1 patch at 12/22/21 0925    sodium chloride 0.9% flush 10 mL, 10 mL, Intravenous, PRN, Romero Colunga MD    Current Outpatient Medications:     apixaban (ELIQUIS) 5 mg Tab, Take 5 mg by mouth 2 (two) times daily., Disp: , Rfl:     atorvastatin (LIPITOR) 20 MG tablet, Take 1 tablet (20 mg total) by mouth once daily., Disp: 30 tablet, Rfl: 5    metoprolol tartrate (LOPRESSOR) 50 MG tablet, Take 50 mg by mouth 2 (two) times  daily., Disp: , Rfl:     nicotine polacrilex 2 MG Lozg, Take 1 lozenge by mouth as needed for cravings.  Do not exceed more than 10 lozenges in a 24 hour period, Disp: 168 lozenge, Rfl: 0    nicotine, polacrilex, (NICORETTE) 2 mg Gum, Chew and park 1 gum by mouth as needed for cravings.  Do not exceed more than 20 pieces in a 24 hour period, Disp: 380 each, Rfl: 0      Assessment and Plan:     Active Diagnoses:    Diagnosis Date Noted POA    Moderate recurrent major depression [F33.1] 11/23/2021 Yes    Tobacco user [Z72.0] 11/23/2021 Yes    Atrial fibrillation with rapid ventricular response [I48.91] 11/23/2021 Yes    PAD (peripheral artery disease) [I73.9] 04/10/2019 Yes     Chronic    Centrilobular emphysema [J43.2] 04/10/2019 Yes    Alcohol dependence [F10.20] 02/04/2019 Yes    Elevated LFTs [R79.89] 01/30/2018 Yes    Hypertension [I10] 07/06/2016 Yes      Problems Resolved During this Admission:       VTE Risk Mitigation (From admission, onward)         Ordered     apixaban tablet 5 mg  2 times daily         12/22/21 0855     IP VTE HIGH RISK PATIENT  Once         12/21/21 2233     Reason for No Pharmacological VTE Prophylaxis  Once        Question:  Reasons:  Answer:  Already adequately anticoagulated on oral Anticoagulants    12/21/21 2233                1.  Acute bronchitis vs PNA:  Defer to IM for management.  IV Abx infusing.     2.  Atrial fibrillation with RVR:  Start oral diltiazem and add amiodarone  Continue home eliquis     3.  ETOH abuse:    Must abstain.      4.  History of CVA:  No acute neurological deficits     5.  HTN:  BP stable.     6.  Hyperlipidemia:  Continue statin.       Thank you for your consult.          Deja Mc, GERALD  Cardiology  [place] - Emergency Department  12/22/2021

## 2021-12-23 PROBLEM — Z79.899 DVT PROPHYLAXIS: Status: ACTIVE | Noted: 2021-01-01

## 2021-12-23 NOTE — ED NOTES
Speaking to pharmacy about pt's amiodarone, whether it should be retimed due to the timing it was given today or if it is still safe to give the medication.

## 2021-12-23 NOTE — PROGRESS NOTES
[facility] - Emergency Department  Cardiology  Progress Note    Patient Name: Neymar Patel  Patient :  1955  MRN: [id number]  Admission Date: 2021  Hospital Length of Stay: 1 days  Code Status: Full Code   Attending Physician: Raul Gan III, MD   Primary Care Physician: Landen Brown MD  Expected Discharge Date:   Principal Problem:Atrial fibrillation with rapid ventricular response    Subjective:     Brief HPI:    Chief Complaint:     Dizziness      HPI:   Patient is a 66-year-old male with persistent atrial flutter, PAD, history of CVA, hypertension, hyperlipidemia, tobacco use and alcohol abuse.     Presented to hospital for shortness of breath and dizziness.  Continues to have atrial fibrillation with RVR.  Continues with ETOH abuse.  States he has quit smoking.  Reports diagnosis of bronchitis about 2 weeks ago and symptoms have progressed.  Denies chest pain.        Interval History:   2021:  Shortness of breath improving. Rate improved but still has intermittent tachycardia.     Review of Systems:  Review of Systems   Constitutional: Negative.    HENT: Negative.    Eyes: Negative.    Respiratory: Positive for shortness of breath.    Cardiovascular: Negative.    Gastrointestinal: Negative.    Endocrine: Negative.    Genitourinary: Negative.    Musculoskeletal: Negative.    Skin: Negative.    Allergic/Immunologic: Negative.    Neurological: Negative.    Hematological: Negative.    Psychiatric/Behavioral: Negative.        Social History:  Social History     Tobacco Use    Smoking status: Current Every Day Smoker     Packs/day: 2.00     Years: 53.00     Pack years: 106.00     Start date:     Smokeless tobacco: Never Used    Tobacco comment: Pt enrolled in the [employer] on 3/21/16 (SCT Member ID # [id number]).  Ambulatory referral to Smoking Cessation Program.   Substance Use Topics    Alcohol use: Yes     Comment: vodka 3 drinks once weekly        Objective:     Vital  Signs (Most Recent):  Temp: 98.2 °F (36.8 °C) (12/23/21 0713)  Pulse: (!) 116 (12/23/21 0713)  Resp: 16 (12/23/21 0713)  BP: 138/79 (12/23/21 0713)  SpO2: 95 % (12/23/21 0713) Vital Signs (24h Range):  Temp:  [98.2 °F (36.8 °C)] 98.2 °F (36.8 °C)  Pulse:  [] 116  Resp:  [16-18] 16  SpO2:  [94 %-98 %] 95 %  BP: ()/(54-86) 138/79     Weight: 77.1 kg (170 lb)  Body mass index is 24.39 kg/m².    SpO2: 95 %  O2 Device (Oxygen Therapy): room air      Intake/Output Summary (Last 24 hours) at 12/23/2021 0835  Last data filed at 12/22/2021 1725  Gross per 24 hour   Intake 80.25 ml   Output 200 ml   Net -119.75 ml       Lines/Drains/Airways     Peripheral Intravenous Line                 Peripheral IV - Single Lumen 12/21/21 1645 20 G Right;Posterior Forearm 1 day         Peripheral IV - Single Lumen 12/21/21 1908 22 G Anterior;Right Forearm 1 day                VTE Risk Mitigation (From admission, onward)         Ordered     apixaban tablet 5 mg  2 times daily         12/22/21 0855     IP VTE HIGH RISK PATIENT  Once         12/21/21 2233     Reason for No Pharmacological VTE Prophylaxis  Once        Question:  Reasons:  Answer:  Already adequately anticoagulated on oral Anticoagulants    12/21/21 2233                Significant Labs:   Recent Lab Results  (Last 5 results in the past 72 hours)      12/22/21  1229   12/22/21  0550   12/22/21  0020   12/21/21  1951   12/21/21  1845        Benzodiazepines       Presumptive Positive         Methadone metabolites       Negative         Phencyclidine       Negative         Amphetamine Screen, Ur       Negative         Appearance, UA       Clear         Bacteria, UA       Few         Barbiturate Screen, Ur       Negative         Bilirubin (UA)       Negative         Cocaine (Metab.)       Negative         Color, UA       Yellow         Creatinine, Urine       73.8         Glucose, UA       1+         Hyaline Casts, UA       10         Ketones, UA       1+          Leukocytes, UA       Trace         Microscopic Comment       SEE COMMENT  Comment: Other formed elements not mentioned in the report are not   present in the microscopic examination.            NITRITE UA       Negative         Occult Blood UA       Negative         Opiate Scrn, Ur       Negative         pH, UA       6.0         Protein, UA       1+  Comment: Recommend a 24 hour urine protein or a urine   protein/creatinine ratio if globulin induced proteinuria is  clinically suspected.            Acceptable         Yes       RBC, UA       3         SARS-CoV-2 RNA, Amplification, Qual         Negative       Specific Gravity, UA       >=1.030         Specimen UA       Urine, Clean Catch         Squam Epithel, UA       0         Marijuana (THC) Metabolite       Negative         Toxicology Information       SEE COMMENT  Comment: This screen includes the following classes of drugs at the   listed cut-off:    Benzodiazepines                  200 ng/ml  Methadone                        300 ng/ml  Cocaine metabolite               300 ng/ml  Opiates                         2000 ng/ml  Barbiturates                     200 ng/ml  Amphetamines                    1000 ng/ml  Marijuana metabs (THC)            50 ng/ml  Phencyclidine (PCP)               25 ng/ml    **Intended use : The UDS methods provide only a preliminary result.    A more   specific alternate chemical method must be used in order to obtain a   confirmed analytical result.  Gas chromatography mass spectrometry   (GC/MS)   is the preferred confirmatory method.  Clinical consideration and   professional judgement should be applied to any drug of abuse test   result.    Particularly when preliminary results are used.       ** Results:  Positive results are only preliminary and only suggest   the   sample may contain the analyte being tested.  Negative results   indicate that   the sample does not contain the analyte or the analyte is present in    concentrations below the cut-off level.           Troponin I High Sensitivity 28.2   31.1   35.0           UROBILINOGEN UA       Negative         WBC, UA       36                              Significant Imaging:   Imaging Results          CT Chest With Contrast (Final result)  Result time 12/21/21 18:07:07    Final result by Evangelist Mcgarry MD (12/21/21 18:07:07)                 Impression:      1. Small to moderate amount of right pleural fluid, increased when compared to prior CT of 12/01/2021.  The fluid is of low attenuation.  2. Interval resolution of small left pleural effusion.  3. Mild pulmonary emphysema.  4. Bronchial wall thickening in the right lower lobe, may reflect bronchitis.  5. A nondisplaced fracture lateral aspect of right 6th rib, as noted on chest CT of 12/01/2021.      Electronically signed by: Evangelist Mcgarry MD  Date:    12/21/2021  Time:    18:07             Narrative:    EXAMINATION:  CT CHEST WITH CONTRAST    CLINICAL HISTORY:  Pleural effusion, malignancy suspected; history of a fall with a right rib fracture on chest CT of 12/01/2021.    TECHNIQUE:  Axial CT images were obtained. Iterative reconstruction technique was used.  CT/cardiac nuclear exam/s in prior 12 months: 4.    COMPARISON:  Portable chest 12/21/2021, CT chest with IV contrast 12/01/2021..    FINDINGS:  Mild pulmonary emphysema.    Bronchial wall thickening in the right lower lobe bronchi.    Small to moderate amount of right pleural fluid, increased when compared to prior exam of 12/01/2021.  There is associated compressive atelectasis.    No pneumothorax.    The left lung is clear.    There is no aortic aneurysm.  Atherosclerotic calcification of the thoracic aorta and coronary arteries.    No hilar or mediastinal lymph node enlargement.    No pericardial fluid.    Visualized upper abdomen appears unremarkable.    No axillary lymph node enlargement.    Nondisplaced fracture lateral aspect of right 6th rib, as  noted on prior examination.                               X-Ray Chest 1 View (Final result)  Result time 12/21/21 17:01:22    Final result by Enedina Mcnair MD (12/21/21 17:01:22)                 Impression:      Increase in size of the right pleural effusion since 12/01/2021 with increased adjacent atelectasis or pneumonia    Enlarged cardiac silhouette      Electronically signed by: Enedina Mcnair MD  Date:    12/21/2021  Time:    17:01             Narrative:    EXAMINATION:  XR CHEST 1 VIEW    CLINICAL HISTORY:  Weakness    COMPARISON:  12/01/2021    FINDINGS:  Significant increase in size of the right pleural effusion since 12/01/2021.  Adjacent atelectasis versus pneumonia.  Upper right lung and left lung are clear.  Cardiac silhouette is enlarged, as before.                                Recent Diagnostics:      Last Lipids:   Lab Results   Component Value Date    CHOL 221 (H) 03/06/2019    CHOL 158 02/07/2018    CHOL 172 08/11/2017     Lab Results   Component Value Date    HDL 61 03/06/2019     (H) 02/07/2018    HDL 85 (H) 08/11/2017     Lab Results   Component Value Date    LDLCALC 133.8 03/06/2019    LDLCALC 46.2 (L) 02/07/2018    LDLCALC 73.6 08/11/2017     Lab Results   Component Value Date    TRIG 131 03/06/2019    TRIG 54 02/07/2018    TRIG 67 08/11/2017     Lab Results   Component Value Date    CHOLHDL 27.6 03/06/2019    CHOLHDL 63.9 (H) 02/07/2018    CHOLHDL 49.4 08/11/2017       Telemetry:  Atrial fibrillation with RVR, rate 115 bpm         MEDICATIONS:    Current Facility-Administered Medications:     0.9%  NaCl infusion, , Intravenous, Continuous, Romero Colunga MD, Last Rate: 125 mL/hr at 12/22/21 1326, New Bag at 12/22/21 1326    amiodarone tablet 400 mg, 400 mg, Oral, BID, GERALD Lewis, 400 mg at 12/22/21 2101    apixaban tablet 5 mg, 5 mg, Oral, BID, Raul Gan III, MD, 5 mg at 12/22/21 2101    atorvastatin tablet 40 mg, 40 mg, Oral, Daily, GERALD Lewis     diltiaZEM 24 hr capsule 240 mg, 240 mg, Oral, Daily, Deja Mc, FNP, 240 mg at 12/22/21 0758    haloperidol lactate injection 10 mg, 10 mg, Intramuscular, Q4H PRN, Raul Gan III, MD, 10 mg at 12/23/21 0622    lorazepam injection 1 mg, 1 mg, Intravenous, Q3H PRN, Romero Colunga MD, 1 mg at 12/23/21 0259    melatonin tablet 6 mg, 6 mg, Oral, Nightly PRN, Romero Colunga MD, 6 mg at 12/22/21 2100    metoprolol injection 5 mg, 5 mg, Intravenous, Q3H PRN, Jamila Tlobert NP, 5 mg at 12/22/21 0906    metoprolol tartrate (LOPRESSOR) tablet 50 mg, 50 mg, Oral, BID, Raul Alejandro Gan III, MD, 50 mg at 12/22/21 2101    nicotine 21 mg/24 hr 1 patch, 1 patch, Transdermal, Daily, Raul Alejandro Gan III, MD, 1 patch at 12/22/21 0925    sodium chloride 0.9% flush 10 mL, 10 mL, Intravenous, PRN, Romero Colunga MD    Current Outpatient Medications:     apixaban (ELIQUIS) 5 mg Tab, Take 5 mg by mouth 2 (two) times daily., Disp: , Rfl:     atorvastatin (LIPITOR) 20 MG tablet, Take 1 tablet (20 mg total) by mouth once daily., Disp: 30 tablet, Rfl: 5    metoprolol tartrate (LOPRESSOR) 50 MG tablet, Take 50 mg by mouth 2 (two) times daily., Disp: , Rfl:     nicotine polacrilex 2 MG Lozg, Take 1 lozenge by mouth as needed for cravings.  Do not exceed more than 10 lozenges in a 24 hour period, Disp: 168 lozenge, Rfl: 0    nicotine, polacrilex, (NICORETTE) 2 mg Gum, Chew and park 1 gum by mouth as needed for cravings.  Do not exceed more than 20 pieces in a 24 hour period, Disp: 380 each, Rfl: 0    Physical Exam:  Physical Exam  Vitals and nursing note reviewed.   Constitutional:       Appearance: Normal appearance. He is not ill-appearing.   HENT:      Head: Normocephalic.      Nose: Nose normal.      Mouth/Throat:      Mouth: Mucous membranes are moist.   Eyes:      Pupils: Pupils are equal, round, and reactive to light.   Cardiovascular:      Rate and Rhythm: Tachycardia present. Rhythm irregular.      Pulses: Normal pulses.    Pulmonary:      Effort: Pulmonary effort is normal.      Breath sounds: Wheezing present.   Abdominal:      General: Abdomen is flat.      Palpations: Abdomen is soft.   Musculoskeletal:      Right lower leg: No edema.      Left lower leg: No edema.   Skin:     General: Skin is warm.      Capillary Refill: Capillary refill takes less than 2 seconds.   Neurological:      General: No focal deficit present.      Mental Status: He is alert.   Psychiatric:         Mood and Affect: Mood normal.         Behavior: Behavior normal.         Assessment:     Active Diagnoses:    Diagnosis Date Noted POA    PRINCIPAL PROBLEM:  Atrial fibrillation with rapid ventricular response [I48.91] 11/23/2021 Yes    Moderate recurrent major depression [F33.1] 11/23/2021 Yes    Tobacco user [Z72.0] 11/23/2021 Yes    PAD (peripheral artery disease) [I73.9] 04/10/2019 Yes     Chronic    Centrilobular emphysema [J43.2] 04/10/2019 Yes    Alcohol dependence [F10.20] 02/04/2019 Yes    Elevated LFTs [R79.89] 01/30/2018 Yes    Hypertension [I10] 07/06/2016 Yes      Problems Resolved During this Admission:       VTE Risk Mitigation (From admission, onward)         Ordered     apixaban tablet 5 mg  2 times daily         12/22/21 0855     IP VTE HIGH RISK PATIENT  Once         12/21/21 2233     Reason for No Pharmacological VTE Prophylaxis  Once        Question:  Reasons:  Answer:  Already adequately anticoagulated on oral Anticoagulants    12/21/21 2233                    Plan:            1.  Acute bronchitis vs PNA:  Defer to IM for management.  IV Abx infusing.   12/23/2021:  Defer to IM services      2.  Atrial fibrillation with RVR:  Start oral diltiazem and add amiodarone  Continue home eliquis   12/23/2021:  Rate improved.  Continue therapy for now.  Plan for outpatient echo.       3.  ETOH abuse:    Must abstain.       4.  History of CVA:  No acute neurological deficits      5.  HTN:  BP stable.      6.  Hyperlipidemia:  Continue  statin.           GERALD Lewis  Cardiology  [facility] - Emergency Department  12/23/2021

## 2021-12-23 NOTE — PLAN OF CARE
12/23/21 1122   Medicare Message   Important Message from Medicare regarding Discharge Appeal Rights Given to patient/caregiver;Explained to patient/caregiver;Signed/date by patient/caregiver   Date IMM was signed 12/23/21   Time IMM was signed 1121

## 2021-12-23 NOTE — ED NOTES
Pt reports seeing and hearing things that aren't there.  Answering service called to give update to RAJAN Marino.

## 2021-12-23 NOTE — HOSPITAL COURSE
12/23/21 [facility] patient reports that he is feeling better today, was able to get rest last night. Heart rate still elevated will adjust medications and monitor. Patient with hallucinations last night, patient is appropriate this am  12/24/21:   Rapid Response Call #1: Blood pressure initially low and patient heart rate in the 50s sinus Nakul.  Glucose within normal limits.  Fluid bolus 1 L was given with improvement in blood pressure.  Advised to hold any medication that will lower patient's heart rate.  Patient also complains of shortness of breath but according to respiratory therapist, patient did not take his breathing treatment because he did not think he needed it.  Patient wheezy expiratory and saturating in the high 80s on room air. not on any antibiotics currently so will start.  Labs show that patient has TANIKA possibly due to dehydration.  Will resuscitate with fluid.  Will recheck basic blood work and continue to hydrate.  Treat accordingly will at started if patient is not already on it.  Patient more alert and oriented x4 moving all extremities with improvement of blood pressure into the 90s and heart rate remains in the 50s sinus Nakul saturating 94% on 2 L  Rapid Response Call #2:Rapid response team called again with subsequent intubation and transfer to MICU.   12/25/21: On minimal sedation and pressor support in the MICU.   12/26/21: Able to wean off of all pressor support, pH of 5 while on bicarb on AM labs. Will get rid of bicarb drip and switch to gentle IV hydration with LR. SBT tomorrow?  12/27/21:  Events over the weekend noted.  Patient is now intubated on the ventilator.  Patient was admitted with pneumonia atrial fibrillation and alcohol abuse and now also has a enterococcal urinary tract infection with sensitivities pending.  Patient has required to sedatives for agitation and combativeness.  12/28/21:  Patient's ABG has been reviewed.  Patient has a mild respiratory alkalosis and his  respiratory rate has been changed.  We have encouraged respiratory and nursing to wean his sedation and titrate his tube feeds.  The patient's chest x-ray also seems like he is slightly volume overloaded therefore will discontinue IV fluids and give low dosing of Lasix.  Patient has enterococcal infection was ampicillin sensitive therefore Rocephin should be adequate.  Will change Zithromax to Levaquin.  12/29/21 FM:  Patient had an episode of desaturation yesterday.  Respiratory was able to deep suction and several mucus plugs and thick secretions were removed.  I will broaden patient antibiotics today to Zosyn and otherwise plan on increasing his diuretics today.  Patient has bilateral pulmonary infiltrates consistent with pulmonary edema.  12/30/21 FM:  Patient had a elevated temperature through the night.  Rocephin was changed to Zosyn yesterday and consideration of drug fever should be entertained.  Will add medicine for MRSA infection today as well as antifungals.  Patient's chest x-ray seems to be improved with diuretics.  Continue diuretics cautiously.  12/31/21 CG: Antibiotic course broadened, tachycardic with an increase in PEEP. He is net negative over the past 24 hours.    1/1/22 CG: No acute overnight events. PEEP decreased to 10.   1/2/22 CG: CXR shows worsening fluid burden, heart rates in 110s-120s.   1/3/22 FM:  Patient is on an FiO2 of 55%.  His chest x-ray still shows pulmonary edema which is also consistent with his CT scan.  We will replace electrolytes transfuse and continue to diurese.  Hopefully we can wean his FiO2 down this weekend and wean him off the ventilator by the end of the week.  1/4/22 FM:  Patient's vital signs are stable this morning and he is sedated.  Nursing reports that when sedation is weaned the patient is awake opens eyes and is alert.  We are attempting a CPAP trial this morning as the patient is tolerating and FiO2 of 50% and could probably be weaned further.  Will  reduce dosing of diuretics today.  Continue to replete potassium.  1/5/22 FM:  Yesterday afternoon the patient had a desaturation his oxygen and his chest x-ray showed a worsening right-sided pleural effusion.  A CT scan done 2 days ago showed some layering and there was a discussion about possible loculations.  The patient's chest tube initially had 200 cc of a serous drainage which is now bloody.  Patient's chest x-ray has improved after placement.  Patient's oxygenation today is improved but his condition is precarious.  I have spoken to the family and let them know a high risk and possible poor outcome.  1/6/22 FM:  Patient had a left-sided pleural effusion drained and about 600 cc of a serous fluid was removed.  Patient's right chest tube had 10 cc overnight of a bloody fluid.  Diagnostic testing is all pending.  Today the patient's chest x-ray is noted in his left lung seems to be hypoinflated.  I am increasing the tidal volume slightly today and watching his airway pressures.  1/7/22 FM:  Patient's ABG shows a respiratory alkalosis.  We are reducing his minute ventilation.  Patient's chest x-ray is significantly improved today.  Will have Respiratory and Nursing try to wean sedation and CPAP trial today.  Patient is now on ventilator day 14.  Patient has had intermittent bradycardia and will hold amiodarone for now.  1/8 AA, weekend crosscover, patient on ventilator, repeat chest x-ray pending, amiodarone stopped secondary to bradycardia, history of AFib, patient was on amiodarone, stopped secondary to bradycardia, now patient increased heart rate, will restart amiodarone, will decrease dose, blood pressure appears stable, continue to wean nor epi, on stress dose steroids, surgery following, H&H dropped, possible transfusion  1/9 AA, weekend crosscover, patient started to have afib with RVR, eICU was called, adjustment made to pressors, started on metoprolol, on levophed, drop in H/H, had chest tube removed  on 7th, no melena or BRBPR reported, surgery called to evaluate, suspect possible from previous chest tube site, transfuse prbc, serial H/Hs, follow up with surgery recs.  1/10/22 FM:  Events over the weekend noted.  Patient is now back on amiodarone and his heart rate is slightly improved.  Chest tube is back in place with 100 cc of a bloody thin fluid.  All cultures have been reviewed and there is no significant growth.  Patient has now been on broad-spectrum antibiotics and antifungals for greater than 10 days.  Patient's WBC count is going up.  Spoke with General surgery today about placing tracheostomy.  1/11/22 FM:  Patient's chest x-ray has improved this morning with better ventilation.  Yesterday the patient's ET tube was in his right mainstem and the tube was pulled back.  The patient has a worsening anemia today and has had bleeding related to his chest tube.  General surgery is planning on doing a tracheostomy toward the end of the week.  Patient's white blood cell count is improved.  Patient's atrial fibrillation has worsened and we are titrating his amiodarone.  1/12/22 FM:  We are weaning the patient's oxygen today.  Patient's chest x-ray shows improved aeration.  The plan is to have the patient have a tracheostomy your at the end of this week or this weekend and then to moved to an LTAC for vent weaning.  1/13/2022 FM:  Patient today has not had much change to the night.  For some reason the patient is back on 100% FiO2 and is satting 100% today.  I have turned this down and encouraged weaning of oxygen.  The patient's chest x-ray has been reviewed.  Patient's chest tube output was 33 cc through the night and his creatinine is 0.9.  1/14/2021 FM:  Patient had an acute episode of hypoxia through the night.  His atrial fibrillation with RVR also worsened.  Patient has been loaded on digoxin and has been changed to an amiodarone drip.  Patient today also had tube feeds and his ET tube suctioning.   Patient has coarse bilateral breath sounds and acute worsening on his chest x-ray this morning.  I have spoken with family and informed them that complications continue to arise and despite making some gains and planning to have tracheostomy placed the patient has now declined again.  1/15 WAC:  Patient remains critically ill.  Hemoglobin has dropped this morning requiring blood transfusion.  Continue current care suspect poor outcome despite our best efforts.   WC:  Patient remains critically ill.  Minimal improvement from yesterday.  Status post packed red blood cells.  22 FM:  Over the weekend the patient has continued to decline.  Patient is requiring transfusions of packed red blood cells but no evidence of gross bleeding.  Chest tube output noted.  Question if hemolysis versus splenic sequestration versus other.  At this point the patient has now been on the ventilator for 3 weeks he has COPD chronic heart disease and a multitude of other chronic illnesses.  Feel that continuing to treat may be futile.  Patient's son feels that beginning to withdrawal care would be appropriate but his daughter is not quite at that point.  We will write for transfusion of packed red blood cells today and if care is continued will need to start TPN.  Patient with multiorgan failure and has continued to decline despite maximum treatment.    Death Note:  Unfortunately despite aggressive care the patient continued to decline over the last few days and  peacefully.  The patient had multiorgan dysfunction and it seems the primary diagnoses was sepsis and septic shock likely related to pneumonia and please see above the above discussions and other diagnoses for details.  The patient was pronounced and body released to the  home.

## 2021-12-23 NOTE — PROGRESS NOTES
[facility] Medicine  Progress Note    Patient Name: Neymar Patel  MRN: [id number]  Patient Class: IP- Inpatient   Admission Date: 12/21/2021  Length of Stay: 1 days  Attending Physician: Yarely Buckley MD  Primary Care Provider: Landen Brown MD        Subjective:     Principal Problem:Atrial fibrillation with rapid ventricular response        HPI:  Patient is a 66-year-old male with a history of alcohol misuse abuse atrial fibrillation hypertension peripheral artery disease fatty liver disease who recently had an episode of bronchitis and was treated in the outpatient setting.  The patient then began in having increasing weakness and lightheadedness and noticed his heart rate was elevated.  He presented to the emergency department and was found to have elevated alcohol levels and a heart rate in the 130s to 140s.  The patient is currently in AFib with RVR and has been started on a Cardizem drip by the emergency department.  We have made several medication changes reconcile home medicines and are asking his cardiologist to see the patient.      Overview/Hospital Course:  12/2321 [facility] patient reports that he is feeling better today, was able to get rest last night. Heart rate still elevated will adjust medications and monitor. Patient with hallucinations last night, patient is appropriate this am      Past Medical History:   Diagnosis Date    A-fib     Alcohol abuse     Atrial fibrillation     Disorder of kidney and ureter     Emphysema lung     Fatty liver     Hypertension     Liver disease     PAD (peripheral artery disease)     Shingles     Stroke     TIA (transient ischemic attack)        Past Surgical History:   Procedure Laterality Date    ANGIOGRAM, CORONARY, WITH LEFT HEART CATHETERIZATION      ESOPHAGOGASTRODUODENOSCOPY N/A 11/24/2021    Procedure: EGD (ESOPHAGOGASTRODUODENOSCOPY);  Surgeon: Ilan De Los Santos MD;  Location: [facility];  Service: Endoscopy;  Laterality:  N/A;    TONSILLECTOMY         Review of patient's allergies indicates:  No Known Allergies    No current facility-administered medications on file prior to encounter.     Current Outpatient Medications on File Prior to Encounter   Medication Sig    apixaban (ELIQUIS) 5 mg Tab Take 5 mg by mouth 2 (two) times daily.    atorvastatin (LIPITOR) 20 MG tablet Take 1 tablet (20 mg total) by mouth once daily.    metoprolol tartrate (LOPRESSOR) 50 MG tablet Take 50 mg by mouth 2 (two) times daily.    nicotine polacrilex 2 MG Lozg Take 1 lozenge by mouth as needed for cravings.  Do not exceed more than 10 lozenges in a 24 hour period    nicotine, polacrilex, (NICORETTE) 2 mg Gum Chew and park 1 gum by mouth as needed for cravings.  Do not exceed more than 20 pieces in a 24 hour period     Family History     Problem Relation (Age of Onset)    Cancer Mother, Father    Clotting disorder Mother    Crohn's disease Mother    Lung cancer Father        Tobacco Use    Smoking status: Current Every Day Smoker     Packs/day: 2.00     Years: 53.00     Pack years: 106.00     Start date: 1968    Smokeless tobacco: Never Used    Tobacco comment: Pt enrolled in the [employer] on 3/21/16 (SCT Member ID # [id number]).  Ambulatory referral to Smoking Cessation Program.   Substance and Sexual Activity    Alcohol use: Yes     Comment: vodka 3 drinks once weekly     Drug use: No    Sexual activity: Not on file     Review of Systems   Constitutional: Positive for activity change, appetite change and fatigue. Negative for chills, diaphoresis, fever and unexpected weight change.   HENT: Negative for congestion, dental problem, ear discharge, ear pain, facial swelling, mouth sores, nosebleeds, rhinorrhea, sinus pain, sore throat, tinnitus, trouble swallowing and voice change.    Eyes: Negative for photophobia, pain, discharge, redness, itching and visual disturbance.   Respiratory: Positive for cough. Negative for apnea, choking, chest  tightness, shortness of breath, wheezing and stridor.    Cardiovascular: Negative for chest pain, palpitations and leg swelling.   Gastrointestinal: Negative for abdominal distention, abdominal pain, anal bleeding, blood in stool, constipation, diarrhea, nausea, rectal pain and vomiting.   Endocrine: Positive for cold intolerance. Negative for heat intolerance, polydipsia, polyphagia and polyuria.   Genitourinary: Negative for dysuria, flank pain, frequency, genital sores, hematuria and urgency.   Musculoskeletal: Positive for arthralgias. Negative for back pain, joint swelling, myalgias, neck pain and neck stiffness.   Skin: Negative for color change, rash and wound.   Allergic/Immunologic: Negative for environmental allergies, food allergies and immunocompromised state.   Neurological: Positive for tremors and weakness. Negative for dizziness, syncope, facial asymmetry, speech difficulty, light-headedness, numbness and headaches.   Hematological: Negative for adenopathy. Does not bruise/bleed easily.   Psychiatric/Behavioral: Negative for agitation, confusion, decreased concentration, hallucinations, self-injury and suicidal ideas.     Objective:     Vital Signs (Most Recent):  Temp: 98.2 °F (36.8 °C) (12/23/21 0713)  Pulse: (!) 120 (12/23/21 0847)  Resp: 16 (12/23/21 0847)  BP: 123/82 (12/23/21 0847)  SpO2: 95 % (12/23/21 0847) Vital Signs (24h Range):  Temp:  [98.2 °F (36.8 °C)] 98.2 °F (36.8 °C)  Pulse:  [] 120  Resp:  [16-18] 16  SpO2:  [94 %-98 %] 95 %  BP: ()/(54-82) 123/82     Weight: 77.1 kg (170 lb)  Body mass index is 24.39 kg/m².    Physical Exam  Vitals and nursing note reviewed.   Constitutional:       General: He is awake. He is not in acute distress.     Appearance: He is ill-appearing. He is not toxic-appearing or diaphoretic.   HENT:      Head: Normocephalic and atraumatic.      Nose: Nose normal. No congestion or rhinorrhea.      Mouth/Throat:      Mouth: Mucous membranes are moist.       Pharynx: Oropharynx is clear. No oropharyngeal exudate or posterior oropharyngeal erythema.   Eyes:      General: No scleral icterus.        Right eye: No discharge.         Left eye: No discharge.      Extraocular Movements: Extraocular movements intact.      Pupils: Pupils are equal, round, and reactive to light.   Neck:      Thyroid: No thyroid mass or thyromegaly.      Vascular: No carotid bruit.      Meningeal: Brudzinski's sign and Kernig's sign absent.   Cardiovascular:      Rate and Rhythm: Tachycardia present. Rhythm irregular.      Chest Wall: PMI is not displaced. No thrill.      Pulses: Normal pulses.      Heart sounds: Murmur heard.   No friction rub. No gallop.    Pulmonary:      Effort: Pulmonary effort is normal. No tachypnea, accessory muscle usage, prolonged expiration or respiratory distress.      Breath sounds: Normal breath sounds. No stridor or decreased air movement. No wheezing, rhonchi or rales.   Chest:      Chest wall: No tenderness.   Abdominal:      General: Bowel sounds are normal. There is no distension.      Palpations: Abdomen is soft. There is no hepatomegaly, splenomegaly or mass.      Tenderness: There is no abdominal tenderness. There is no right CVA tenderness, left CVA tenderness, guarding or rebound.      Hernia: No hernia is present.   Musculoskeletal:         General: No swelling, tenderness, deformity or signs of injury. Normal range of motion.      Cervical back: Normal range of motion and neck supple. No rigidity. No muscular tenderness.      Right lower leg: No edema.      Left lower leg: No edema.   Lymphadenopathy:      Cervical: No cervical adenopathy.   Skin:     General: Skin is warm and dry.      Coloration: Skin is not cyanotic, jaundiced or pale.      Findings: No bruising, erythema, lesion, petechiae or rash.   Neurological:      General: No focal deficit present.      Mental Status: He is alert and oriented to person, place, and time. Mental status is at  baseline.      Cranial Nerves: No cranial nerve deficit, dysarthria or facial asymmetry.      Motor: No tremor.   Psychiatric:         Mood and Affect: Mood normal. Mood is not anxious or depressed. Affect is not flat.         Speech: Speech is not rapid and pressured or slurred.         Behavior: Behavior normal. Behavior is not agitated, aggressive or combative.         Thought Content: Thought content normal. Thought content is not paranoid or delusional.         Cognition and Memory: Cognition is not impaired. Memory is not impaired.         Judgment: Judgment normal.           CRANIAL NERVES     CN III, IV, VI   Pupils are equal, round, and reactive to light.       Significant Labs:   All pertinent labs within the past 24 hours have been reviewed.  ABGs:   Recent Labs   Lab 12/21/21  1843   PH 7.368   PCO2 39.9   HCO3 22.5   POCSATURATED 91.0   PO2 63.9*     CBC:   Recent Labs   Lab 12/21/21  1631   WBC 4.91   HGB 10.4*   HCT 34.4*   PLT 72*     CMP:   Recent Labs   Lab 12/21/21  1630      K 4.0      CO2 24   *   BUN 21   CREATININE 1.3   CALCIUM 8.4*   PROT 7.4   ALBUMIN 3.1*   BILITOT 0.7   ALKPHOS 134   AST 69*   ALT 20   ANIONGAP 15   EGFRNONAA 56.9*     Troponin: No results for input(s): TROPONINI in the last 48 hours.  Urine Culture: No results for input(s): LABURIN in the last 48 hours.  Urine Studies:   Recent Labs   Lab 12/21/21 1951   COLORU Yellow   APPEARANCEUA Clear   PHUR 6.0   SPECGRAV >=1.030*   PROTEINUA 1+*   GLUCUA 1+*   KETONESU 1+*   BILIRUBINUA Negative   OCCULTUA Negative   NITRITE Negative   UROBILINOGEN Negative   LEUKOCYTESUR Trace*   RBCUA 3   WBCUA 36*   BACTERIA Few*   SQUAMEPITHEL 0   HYALINECASTS 10*       Significant Imaging: CT of chest: 1. Small to moderate amount of right pleural fluid, increased when compared to prior CT of 12/01/2021.  The fluid is of low attenuation.   2. Interval resolution of small left pleural effusion.   3. Mild pulmonary emphysema.    4. Bronchial wall thickening in the right lower lobe, may reflect bronchitis.   5. A nondisplaced fracture lateral aspect of right 6th rib, as noted on chest CT of 12/01/2021.       Assessment/Plan:      * Atrial fibrillation with rapid ventricular response  Cards following, adjust medication and monitor, continue tele      DVT prophylaxis  eliquis bid      Tobacco user  Educated to avoid smoking      Moderate recurrent major depression        PAD (peripheral artery disease)  On eliquis      Centrilobular emphysema  Add resp treatments and monitor.       Alcohol dependence  Patient encouraged to avoid alcohol, monitor for DT's      Elevated LFTs  Repeat labs pending      Hypertension  bp good continue medications and monitor        VTE Risk Mitigation (From admission, onward)         Ordered     apixaban tablet 5 mg  2 times daily         12/22/21 0855     IP VTE HIGH RISK PATIENT  Once         12/21/21 2233     Reason for No Pharmacological VTE Prophylaxis  Once        Question:  Reasons:  Answer:  Already adequately anticoagulated on oral Anticoagulants    12/21/21 2233                Discharge Planning   DAXA:      Code Status: Full Code   Is the patient medically ready for discharge?:     Reason for patient still in hospital (select all that apply): Patient trending condition, Laboratory test, Treatment and PT / OT recommendations  Discharge Plan A: Home with family,Home Health                  Lashonda Syed NP  Department of Hospital Medicine   [facility] Surg

## 2021-12-23 NOTE — SUBJECTIVE & OBJECTIVE
Past Medical History:   Diagnosis Date    A-fib     Alcohol abuse     Atrial fibrillation     Disorder of kidney and ureter     Emphysema lung     Fatty liver     Hypertension     Liver disease     PAD (peripheral artery disease)     Shingles     Stroke     TIA (transient ischemic attack)        Past Surgical History:   Procedure Laterality Date    ANGIOGRAM, CORONARY, WITH LEFT HEART CATHETERIZATION      ESOPHAGOGASTRODUODENOSCOPY N/A 11/24/2021    Procedure: EGD (ESOPHAGOGASTRODUODENOSCOPY);  Surgeon: Ilan De Los Santos MD;  Location: [facility];  Service: Endoscopy;  Laterality: N/A;    TONSILLECTOMY         Review of patient's allergies indicates:  No Known Allergies    No current facility-administered medications on file prior to encounter.     Current Outpatient Medications on File Prior to Encounter   Medication Sig    apixaban (ELIQUIS) 5 mg Tab Take 5 mg by mouth 2 (two) times daily.    atorvastatin (LIPITOR) 20 MG tablet Take 1 tablet (20 mg total) by mouth once daily.    metoprolol tartrate (LOPRESSOR) 50 MG tablet Take 50 mg by mouth 2 (two) times daily.    nicotine polacrilex 2 MG Lozg Take 1 lozenge by mouth as needed for cravings.  Do not exceed more than 10 lozenges in a 24 hour period    nicotine, polacrilex, (NICORETTE) 2 mg Gum Chew and park 1 gum by mouth as needed for cravings.  Do not exceed more than 20 pieces in a 24 hour period     Family History     Problem Relation (Age of Onset)    Cancer Mother, Father    Clotting disorder Mother    Crohn's disease Mother    Lung cancer Father        Tobacco Use    Smoking status: Current Every Day Smoker     Packs/day: 2.00     Years: 53.00     Pack years: 106.00     Start date: 1968    Smokeless tobacco: Never Used    Tobacco comment: Pt enrolled in the [employer] on 3/21/16 (SCT Member ID # [id number]).  Ambulatory referral to Smoking Cessation Program.   Substance and Sexual Activity    Alcohol use: Yes     Comment: jose cruz 3  drinks once weekly     Drug use: No    Sexual activity: Not on file     Review of Systems   Constitutional: Positive for activity change, appetite change and fatigue. Negative for chills, diaphoresis, fever and unexpected weight change.   HENT: Negative for congestion, dental problem, ear discharge, ear pain, facial swelling, mouth sores, nosebleeds, rhinorrhea, sinus pain, sore throat, tinnitus, trouble swallowing and voice change.    Eyes: Negative for photophobia, pain, discharge, redness, itching and visual disturbance.   Respiratory: Positive for cough. Negative for apnea, choking, chest tightness, shortness of breath, wheezing and stridor.    Cardiovascular: Negative for chest pain, palpitations and leg swelling.   Gastrointestinal: Negative for abdominal distention, abdominal pain, anal bleeding, blood in stool, constipation, diarrhea, nausea, rectal pain and vomiting.   Endocrine: Positive for cold intolerance. Negative for heat intolerance, polydipsia, polyphagia and polyuria.   Genitourinary: Negative for dysuria, flank pain, frequency, genital sores, hematuria and urgency.   Musculoskeletal: Positive for arthralgias. Negative for back pain, joint swelling, myalgias, neck pain and neck stiffness.   Skin: Negative for color change, rash and wound.   Allergic/Immunologic: Negative for environmental allergies, food allergies and immunocompromised state.   Neurological: Positive for tremors and weakness. Negative for dizziness, syncope, facial asymmetry, speech difficulty, light-headedness, numbness and headaches.   Hematological: Negative for adenopathy. Does not bruise/bleed easily.   Psychiatric/Behavioral: Negative for agitation, confusion, decreased concentration, hallucinations, self-injury and suicidal ideas.     Objective:     Vital Signs (Most Recent):  Temp: 98.2 °F (36.8 °C) (12/23/21 0713)  Pulse: (!) 120 (12/23/21 0847)  Resp: 16 (12/23/21 0847)  BP: 123/82 (12/23/21 0847)  SpO2: 95 % (12/23/21  0847) Vital Signs (24h Range):  Temp:  [98.2 °F (36.8 °C)] 98.2 °F (36.8 °C)  Pulse:  [] 120  Resp:  [16-18] 16  SpO2:  [94 %-98 %] 95 %  BP: ()/(54-82) 123/82     Weight: 77.1 kg (170 lb)  Body mass index is 24.39 kg/m².    Physical Exam  Vitals and nursing note reviewed.   Constitutional:       General: He is awake. He is not in acute distress.     Appearance: He is ill-appearing. He is not toxic-appearing or diaphoretic.   HENT:      Head: Normocephalic and atraumatic.      Nose: Nose normal. No congestion or rhinorrhea.      Mouth/Throat:      Mouth: Mucous membranes are moist.      Pharynx: Oropharynx is clear. No oropharyngeal exudate or posterior oropharyngeal erythema.   Eyes:      General: No scleral icterus.        Right eye: No discharge.         Left eye: No discharge.      Extraocular Movements: Extraocular movements intact.      Pupils: Pupils are equal, round, and reactive to light.   Neck:      Thyroid: No thyroid mass or thyromegaly.      Vascular: No carotid bruit.      Meningeal: Brudzinski's sign and Kernig's sign absent.   Cardiovascular:      Rate and Rhythm: Tachycardia present. Rhythm irregular.      Chest Wall: PMI is not displaced. No thrill.      Pulses: Normal pulses.      Heart sounds: Murmur heard.   No friction rub. No gallop.    Pulmonary:      Effort: Pulmonary effort is normal. No tachypnea, accessory muscle usage, prolonged expiration or respiratory distress.      Breath sounds: Normal breath sounds. No stridor or decreased air movement. No wheezing, rhonchi or rales.   Chest:      Chest wall: No tenderness.   Abdominal:      General: Bowel sounds are normal. There is no distension.      Palpations: Abdomen is soft. There is no hepatomegaly, splenomegaly or mass.      Tenderness: There is no abdominal tenderness. There is no right CVA tenderness, left CVA tenderness, guarding or rebound.      Hernia: No hernia is present.   Musculoskeletal:         General: No swelling,  tenderness, deformity or signs of injury. Normal range of motion.      Cervical back: Normal range of motion and neck supple. No rigidity. No muscular tenderness.      Right lower leg: No edema.      Left lower leg: No edema.   Lymphadenopathy:      Cervical: No cervical adenopathy.   Skin:     General: Skin is warm and dry.      Coloration: Skin is not cyanotic, jaundiced or pale.      Findings: No bruising, erythema, lesion, petechiae or rash.   Neurological:      General: No focal deficit present.      Mental Status: He is alert and oriented to person, place, and time. Mental status is at baseline.      Cranial Nerves: No cranial nerve deficit, dysarthria or facial asymmetry.      Motor: No tremor.   Psychiatric:         Mood and Affect: Mood normal. Mood is not anxious or depressed. Affect is not flat.         Speech: Speech is not rapid and pressured or slurred.         Behavior: Behavior normal. Behavior is not agitated, aggressive or combative.         Thought Content: Thought content normal. Thought content is not paranoid or delusional.         Cognition and Memory: Cognition is not impaired. Memory is not impaired.         Judgment: Judgment normal.           CRANIAL NERVES     CN III, IV, VI   Pupils are equal, round, and reactive to light.       Significant Labs:   All pertinent labs within the past 24 hours have been reviewed.  ABGs:   Recent Labs   Lab 12/21/21  1843   PH 7.368   PCO2 39.9   HCO3 22.5   POCSATURATED 91.0   PO2 63.9*     CBC:   Recent Labs   Lab 12/21/21  1631   WBC 4.91   HGB 10.4*   HCT 34.4*   PLT 72*     CMP:   Recent Labs   Lab 12/21/21  1630      K 4.0      CO2 24   *   BUN 21   CREATININE 1.3   CALCIUM 8.4*   PROT 7.4   ALBUMIN 3.1*   BILITOT 0.7   ALKPHOS 134   AST 69*   ALT 20   ANIONGAP 15   EGFRNONAA 56.9*     Troponin: No results for input(s): TROPONINI in the last 48 hours.  Urine Culture: No results for input(s): LABURIN in the last 48 hours.  Urine  Studies:   Recent Labs   Lab 12/21/21 1951   COLORU Yellow   APPEARANCEUA Clear   PHUR 6.0   SPECGRAV >=1.030*   PROTEINUA 1+*   GLUCUA 1+*   KETONESU 1+*   BILIRUBINUA Negative   OCCULTUA Negative   NITRITE Negative   UROBILINOGEN Negative   LEUKOCYTESUR Trace*   RBCUA 3   WBCUA 36*   BACTERIA Few*   SQUAMEPITHEL 0   HYALINECASTS 10*       Significant Imaging: CT of chest: 1. Small to moderate amount of right pleural fluid, increased when compared to prior CT of 12/01/2021.  The fluid is of low attenuation.   2. Interval resolution of small left pleural effusion.   3. Mild pulmonary emphysema.   4. Bronchial wall thickening in the right lower lobe, may reflect bronchitis.   5. A nondisplaced fracture lateral aspect of right 6th rib, as noted on chest CT of 12/01/2021.

## 2021-12-24 NOTE — NURSING
Called rapid response to notify Dr. Bustillo of pt on bsc and vagal response. Also pt had diltiazem and metoprolol po. BP88/46 manual. Pulse was 49. Skin color grey with poor O2 perfusion. O2 sat 85 on room air. New order for NS 1000 ml bolus, stat EKG, Stat cxr, New meds for abx. WN admin per respiratory, O2 infusing at 2LPM O2 sat 98% on O2. Pt color continuing to improve.  Pt becoming more verbally abusive, ie. More aroused and belligerent.

## 2021-12-24 NOTE — NURSING
Noise heard from room, pt found sitting on floor behind the door along with the bedside commode. Pt has IV dislodged to the right forearm and blood noted in the bedside commode. Pt denies pain, v/s 111/66, 96, 24, 95%. Pt amb to bed without pain. Voices hungry, staff assisted. Dr. Buckley and Briseyda Roberts informed of above stated. Pt completed snack and going back to sleep. Orders to continue to monitor for pain.

## 2021-12-24 NOTE — PROGRESS NOTES
[facility] Medicine  Progress Note    Patient Name: Neymar Patel  MRN: [id number]  Patient Class: IP- Inpatient   Admission Date: 12/21/2021  Length of Stay: 3 days  Attending Physician: Migue Bustillo DO  Primary Care Provider: Landen Brown MD        Subjective:     Principal Problem:Atrial fibrillation with rapid ventricular response        HPI:  Patient is a 66-year-old male with a history of alcohol misuse abuse atrial fibrillation hypertension peripheral artery disease fatty liver disease who recently had an episode of bronchitis and was treated in the outpatient setting.  The patient then began in having increasing weakness and lightheadedness and noticed his heart rate was elevated.  He presented to the emergency department and was found to have elevated alcohol levels and a heart rate in the 130s to 140s.  The patient is currently in AFib with RVR and has been started on a Cardizem drip by the emergency department.  We have made several medication changes reconcile home medicines and are asking his cardiologist to see the patient.      Overview/Hospital Course:  12/2321 [facility] patient reports that he is feeling better today, was able to get rest last night. Heart rate still elevated will adjust medications and monitor. Patient with hallucinations last night, patient is appropriate this am  12/24 Was requesting to be discharged, says he wants to go home    Past Medical History:   Diagnosis Date    A-fib      Alcohol abuse      Atrial fibrillation      Disorder of kidney and ureter      Emphysema lung      Fatty liver      Hypertension      Liver disease      PAD (peripheral artery disease)      Shingles      Stroke      TIA (transient ischemic attack)                 Past Surgical History:   Procedure Laterality Date    ANGIOGRAM, CORONARY, WITH LEFT HEART CATHETERIZATION        ESOPHAGOGASTRODUODENOSCOPY N/A 11/24/2021     Procedure: EGD (ESOPHAGOGASTRODUODENOSCOPY);   Surgeon: Ilan De Los Santos MD;  Location: [facility];  Service: Endoscopy;  Laterality: N/A;    TONSILLECTOMY             Review of patient's allergies indicates:  No Known Allergies     No current facility-administered medications on file prior to encounter.           Current Outpatient Medications on File Prior to Encounter   Medication Sig    apixaban (ELIQUIS) 5 mg Tab Take 5 mg by mouth 2 (two) times daily.    atorvastatin (LIPITOR) 20 MG tablet Take 1 tablet (20 mg total) by mouth once daily.    metoprolol tartrate (LOPRESSOR) 50 MG tablet Take 50 mg by mouth 2 (two) times daily.    nicotine polacrilex 2 MG Lozg Take 1 lozenge by mouth as needed for cravings.  Do not exceed more than 10 lozenges in a 24 hour period    nicotine, polacrilex, (NICORETTE) 2 mg Gum Chew and park 1 gum by mouth as needed for cravings.  Do not exceed more than 20 pieces in a 24 hour period           Family History      Problem Relation (Age of Onset)     Cancer Mother, Father     Clotting disorder Mother     Crohn's disease Mother     Lung cancer Father                Tobacco Use    Smoking status: Current Every Day Smoker       Packs/day: 2.00       Years: 53.00       Pack years: 106.00       Start date: 1968    Smokeless tobacco: Never Used    Tobacco comment: Pt enrolled in the [employer] on 3/21/16 ([employer] Member ID # [id number]).  Ambulatory referral to Smoking Cessation Program.   Substance and Sexual Activity    Alcohol use: Yes       Comment: vodka 3 drinks once weekly     Drug use: No    Sexual activity: Not on file      Review of Systems   Constitutional: Positive for activity change, appetite change and fatigue. Negative for chills, diaphoresis, fever and unexpected weight change.   HENT: Negative for congestion, dental problem, ear discharge, ear pain, facial swelling, mouth sores, nosebleeds, rhinorrhea, sinus pain, sore throat, tinnitus, trouble swallowing and voice change.    Eyes: Negative for photophobia,  pain, discharge, redness, itching and visual disturbance.   Respiratory: Positive for cough. Negative for apnea, choking, chest tightness, shortness of breath, wheezing and stridor.    Cardiovascular: Negative for chest pain, palpitations and leg swelling.   Gastrointestinal: Negative for abdominal distention, abdominal pain, anal bleeding, blood in stool, constipation, diarrhea, nausea, rectal pain and vomiting.   Endocrine: Positive for cold intolerance. Negative for heat intolerance, polydipsia, polyphagia and polyuria.   Genitourinary: Negative for dysuria, flank pain, frequency, genital sores, hematuria and urgency.   Musculoskeletal: Positive for arthralgias. Negative for back pain, joint swelling, myalgias, neck pain and neck stiffness.   Skin: Negative for color change, rash and wound.   Allergic/Immunologic: Negative for environmental allergies, food allergies and immunocompromised state.   Neurological: Positive for tremors and weakness. Negative for dizziness, syncope, facial asymmetry, speech difficulty, light-headedness, numbness and headaches.   Hematological: Negative for adenopathy. Does not bruise/bleed easily.   Psychiatric/Behavioral: Negative for agitation, confusion, decreased concentration, hallucinations, self-injury and suicidal ideas.      Objective:      Vitals:    12/24/21 1530   BP:    Pulse: (!) 50   Resp: (!) 24   Temp:             Weight: 77.1 kg (170 lb)  Body mass index is 24.39 kg/m².     Physical Exam  Vitals and nursing note reviewed.   Constitutional:       General: He is awake. He is not in acute distress.     Appearance: He is ill-appearing. He is not toxic-appearing or diaphoretic.   HENT:      Head: Normocephalic and atraumatic.      Nose: Nose normal. No congestion or rhinorrhea.      Mouth/Throat:      Mouth: Mucous membranes are moist.      Pharynx: Oropharynx is clear. No oropharyngeal exudate or posterior oropharyngeal erythema.   Eyes:      General: No scleral icterus.         Right eye: No discharge.         Left eye: No discharge.      Extraocular Movements: Extraocular movements intact.      Pupils: Pupils are equal, round, and reactive to light.   Neck:      Thyroid: No thyroid mass or thyromegaly.      Vascular: No carotid bruit.      Meningeal: Brudzinski's sign and Kernig's sign absent.   Cardiovascular:      Rate and Rhythm: Tachycardia present. Rhythm irregular.      Chest Wall: PMI is not displaced. No thrill.      Pulses: Normal pulses.      Heart sounds: Murmur heard.   No friction rub. No gallop.    Pulmonary:      Effort: Pulmonary effort is normal. No tachypnea, accessory muscle usage, prolonged expiration or respiratory distress.      Breath sounds: Normal breath sounds. No stridor or decreased air movement. No wheezing, rhonchi or rales.   Chest:      Chest wall: No tenderness.   Abdominal:      General: Bowel sounds are normal. There is no distension.      Palpations: Abdomen is soft. There is no hepatomegaly, splenomegaly or mass.      Tenderness: There is no abdominal tenderness. There is no right CVA tenderness, left CVA tenderness, guarding or rebound.      Hernia: No hernia is present.   Musculoskeletal:         General: No swelling, tenderness, deformity or signs of injury. Normal range of motion.      Cervical back: Normal range of motion and neck supple. No rigidity. No muscular tenderness.      Right lower leg: No edema.      Left lower leg: No edema.   Lymphadenopathy:      Cervical: No cervical adenopathy.   Skin:     General: Skin is warm and dry.      Coloration: Skin is not cyanotic, jaundiced or pale.      Findings: No bruising, erythema, lesion, petechiae or rash.   Neurological:      General: No focal deficit present.      Mental Status: He is alert and oriented to person, place, and time. Mental status is at baseline.      Cranial Nerves: No cranial nerve deficit, dysarthria or facial asymmetry.      Motor: No tremor.   Psychiatric:         Mood  and Affect: Mood normal. Mood is not anxious or depressed. Affect is not flat.         Speech: Speech is not rapid and pressured or slurred.         Behavior: Behavior normal. Behavior is not agitated, aggressive or combative.         Thought Content: Thought content normal. Thought content is not paranoid or delusional.         Cognition and Memory: Cognition is not impaired. Memory is not impaired.         Judgment: Judgment normal.               CRANIAL NERVES      CN III, IV, VI   Pupils are equal, round, and reactive to light.        Significant Labs: All pertinent labs within the past 24 hours have been reviewed.     Significant Imaging: I have reviewed all pertinent imaging results/findings within the past 24 hours.      Assessment/Plan:      * Atrial fibrillation with rapid ventricular response  Cards following, adjust medication and monitor, continue tele      DVT prophylaxis  eliquis bid      Tobacco user  Educated to avoid smoking      Moderate recurrent major depression        PAD (peripheral artery disease)  On eliquis      Centrilobular emphysema  Add resp treatments and monitor.       Alcohol dependence  Patient encouraged to avoid alcohol, monitor for DT's      Elevated LFTs  Repeat labs pending      Hypertension  bp good continue medications and monitor      VTE Risk Mitigation (From admission, onward)         Ordered     apixaban tablet 5 mg  2 times daily         12/22/21 0855     IP VTE HIGH RISK PATIENT  Once         12/21/21 2233     Reason for No Pharmacological VTE Prophylaxis  Once        Question:  Reasons:  Answer:  Already adequately anticoagulated on oral Anticoagulants    12/21/21 2233                Discharge Planning   DAXA:      Code Status: Full Code   Is the patient medically ready for discharge?:     Reason for patient still in hospital (select all that apply): Patient trending condition, Laboratory test, Treatment and PT / OT recommendations  Discharge Plan A: Home with  family,Home Health                  Darren Bustillo, DO  Department of Hospital Medicine   [facility]

## 2021-12-24 NOTE — CODE DOCUMENTATION
Rapid response to floor:  SOB, Hypotension and syncope      66-year-old male with history of AFib on blood thinners, COPD currently being admitted to the hospital for pneumonia and failure to thrive call to floor after syncopal episode.  Patient was on bedside commode when he syncopized.  Blood pressure initially low and patient heart rate in the 50s sinus Nakul.  Glucose within normal limits.  Fluid bolus 1 L was given with improvement in blood pressure.  Advised to hold any medication that will lower patient's heart rate.  Patient also complains of shortness of breath but according to respiratory therapist, patient did not take his breathing treatment because he did not think he needed it.  Patient wheezy expiratory and saturating in the high 80s on room air. not on any antibiotics currently so will start.  Labs show that patient has TANIKA possibly due to dehydration.  Will resuscitate with fluid.  Will recheck basic blood work and continue to hydrate.  Treat accordingly will at started if patient is not already on it.  Patient more alert and oriented x4 moving all extremities with improvement of blood pressure into the 90s and heart rate remains in the 50s sinus Nakul saturating 94% on 2 L.

## 2021-12-25 PROBLEM — I10 ESSENTIAL HYPERTENSION: Status: RESOLVED | Noted: 2021-01-01 | Resolved: 2021-01-01

## 2021-12-25 PROBLEM — J96.01 ACUTE HYPOXEMIC RESPIRATORY FAILURE: Status: ACTIVE | Noted: 2021-01-01

## 2021-12-25 PROBLEM — J96.01 ACUTE HYPOXEMIC RESPIRATORY FAILURE: Status: RESOLVED | Noted: 2021-01-01 | Resolved: 2021-01-01

## 2021-12-25 PROBLEM — J43.2 CENTRILOBULAR EMPHYSEMA: Status: RESOLVED | Noted: 2019-04-10 | Resolved: 2021-01-01

## 2021-12-25 PROBLEM — J96.20 ACUTE ON CHRONIC RESPIRATORY FAILURE: Status: ACTIVE | Noted: 2021-01-01

## 2021-12-25 PROBLEM — F10.939 ALCOHOL WITHDRAWAL: Status: RESOLVED | Noted: 2021-01-01 | Resolved: 2021-01-01

## 2021-12-25 PROBLEM — F33.1 MODERATE RECURRENT MAJOR DEPRESSION: Status: RESOLVED | Noted: 2021-01-01 | Resolved: 2021-01-01

## 2021-12-25 NOTE — PROGRESS NOTES
[facility] Medicine  Progress Note    Patient Name: Neymar Patel  MRN: [id number]  Patient Class: IP- Inpatient   Admission Date: 12/21/2021  Length of Stay: 3 days  Attending Physician: Yarely Buckley MD  Primary Care Provider: Landen Brown MD        Subjective:     Principal Problem:Acute on chronic respiratory failure        HPI:  Perpetual History  Patient is a 66-year-old male with a history of alcohol abuse atrial fibrillation hypertension peripheral artery disease fatty liver disease who recently had an episode of bronchitis and was treated in the outpatient setting.  The patient then began in having increasing weakness and lightheadedness and noticed his heart rate was elevated.  He presented to the emergency department and was found to have elevated alcohol levels and a heart rate in the 130s to 140s.  The patient is currently in AFib with RVR and has been started on a Cardizem drip by the emergency department.     Interval History  Rapid response called yesterday around 1700 hrs.   Patient was intubated overnight for increased work of breathing, worsening SOB.         Overview/Hospital Course:  12/2321 [facility] patient reports that he is feeling better today, was able to get rest last night. Heart rate still elevated will adjust medications and monitor. Patient with hallucinations last night, patient is appropriate this am  12/24/21: Requesting to be let home because he is feeling better. Rapid response team called x 2 with subsequent intubation and transfer to MICU.   12/25/21: On minimal sedation and pressor support in the MICU.       Interval History:     Review of Systems   Unable to perform ROS: Severe respiratory distress     Objective:     Vital Signs (Most Recent):  Temp: 97.8 °F (36.6 °C) (12/25/21 0800)  Pulse: (!) 56 (12/25/21 0735)  Resp: (!) 24 (12/25/21 0735)  BP: 116/64 (12/25/21 0600)  SpO2: 100 % (12/25/21 0735) Vital Signs (24h Range):  Temp:  [96.1 °F (35.6  °C)-97.9 °F (36.6 °C)] 97.8 °F (36.6 °C)  Pulse:  [] 56  Resp:  [14-31] 24  SpO2:  [69 %-100 %] 100 %  BP: ()/(44-87) 116/64     Weight: 77.1 kg (170 lb)  Body mass index is 24.39 kg/m².    Intake/Output Summary (Last 24 hours) at 12/25/2021 0902  Last data filed at 12/25/2021 0700  Gross per 24 hour   Intake 677 ml   Output --   Net 677 ml      Physical Exam  Constitutional:       General: He is in acute distress.      Appearance: He is ill-appearing.   HENT:      Head: Normocephalic and atraumatic.      Nose: Nose normal.      Mouth/Throat:      Comments: ET tube in place, free of secretions,   Eyes:      Pupils: Pupils are equal, round, and reactive to light.   Cardiovascular:      Rate and Rhythm: Normal rate and regular rhythm.      Heart sounds: Normal heart sounds.   Pulmonary:      Breath sounds: Decreased air movement present. Examination of the right-upper field reveals decreased breath sounds and wheezing. Examination of the left-upper field reveals decreased breath sounds and wheezing. Examination of the right-middle field reveals decreased breath sounds. Examination of the left-middle field reveals decreased breath sounds. Examination of the right-lower field reveals decreased breath sounds. Examination of the left-lower field reveals decreased breath sounds. Decreased breath sounds and wheezing present.      Comments: Crackles scattered  Musculoskeletal:      Cervical back: Neck supple.   Skin:     General: Skin is cool.      Coloration: Skin is pale.   Neurological:      Deep Tendon Reflexes: Babinski sign present on the left side.      Comments: Sedated and intubated   Psychiatric:      Comments: Sedated and intubated         Significant Labs:   All pertinent labs within the past 24 hours have been reviewed.  CBC:   Recent Labs   Lab 12/24/21  0518 12/24/21  1716 12/25/21  0459   WBC 7.49 8.89 14.04*   HGB 8.1* 7.7* 8.4*   HCT 28.1* 27.3* 28.4*   PLT 57* 54* 65*     CMP:   Recent Labs    Lab 12/24/21  0518 12/24/21  1716 12/25/21  0459    141 141   K 4.2 4.9 4.0    107 109   CO2 19* 18* 21*   * 128* 131*   BUN 17 18 23   CREATININE 2.2* 2.9* 2.9*   CALCIUM 7.8* 7.7* 7.7*   PROT 6.4 5.9* 5.8*   ALBUMIN 2.6* 2.6* 2.4*   BILITOT 0.8 0.8 0.9   ALKPHOS 126 135 128   * 139* 899*   ALT 35 42 240*   ANIONGAP 13 16 11   EGFRNONAA 30.1* 21.6* 21.6*     Lactic Acid:   Recent Labs   Lab 12/24/21  1920   LACTATE 8.4*       Significant Imaging: I have reviewed all pertinent imaging results/findings within the past 24 hours.      Assessment/Plan:      * Acute on chronic respiratory failure  Intubated overnight for critical illness, worsening resp distress. Will let rest on vent today, hold on SBT  Mechanical Ventilation day #0.   ABG:   PH: 7.110/7.341  PCO2: 48.7/35.5  PO2: 140/257    TANIKA (acute kidney injury)  Meets the KDIGO criteria for Stage 2 TANIKA likely in the setting of hypotension. This will be our working diagnosis for now.   -Urine Electrolytes for  FENa calculation to delineate prerenal from intrinsic renal etiologies of the patient's TANIKA.  -Renal ultrasonography   -Urine Protein/Cr Ratio    Elevated LFTs  Elevated, likely in the setting of hypoperfusion.         Alcohol dependence  [facility] Protocol      DVT prophylaxis  eliquis bid      Atrial fibrillation with rapid ventricular response  Cards following  NSR on tele  Continue current medical regimen      Tobacco user  PRN Nicotine patch    PAD (peripheral artery disease)  On eliquis        VTE Risk Mitigation (From admission, onward)         Ordered     apixaban tablet 5 mg  2 times daily         12/22/21 0855     IP VTE HIGH RISK PATIENT  Once         12/21/21 2233     Reason for No Pharmacological VTE Prophylaxis  Once        Question:  Reasons:  Answer:  Already adequately anticoagulated on oral Anticoagulants    12/21/21 2233                Discharge Planning   DAXA:      Code Status: Full Code   Is the patient medically  ready for discharge?:     Reason for patient still in hospital (select all that apply): Patient unstable  Discharge Plan A: Home with family,Home Health        Critical Care time 45 mins    Darren Bustillo DO  Department of Hospital Medicine   [place] [facility]

## 2021-12-25 NOTE — EICU
Vidyo rounding complete  Patient mechanically ventilated, Levophed and propfol infusing  Respiratory therapy at bedside  Patient does not seem to be in any distress

## 2021-12-25 NOTE — PROCEDURES - EMERGENCY DEPT.
ED Procedure Notes:   Intubation  Location procedure was performed: [facility] MEDICAL SURGICAL UNIT 5TH FLOOR-REMI  Performed by: Marcial Bah MD  Authorized by: Yarely Buckley MD   Consent Done: Emergent Situation  Indications: respiratory failure  Intubation method: video-assisted  Patient status: paralyzed (RSI)  Preoxygenation: BVM and nonrebreather mask  Sedatives: etomidate  Paralytic: rocuronium  Laryngoscope size: Glide 4  Tube size: 7.5 mm  Tube type: cuffed  Number of attempts: 2  Ventilation between attempts: BVM  Cricoid pressure: yes  Cords visualized: yes  Post-procedure assessment: chest rise,  ETCO2 monitor,  CO2 detector and esophageal detector  Cuff inflated: yes  ETT to lip: 24 cm  Tube secured with: ETT barrow  Chest x-ray interpreted by me.  Chest x-ray findings: endotracheal tube in appropriate position  Patient tolerance: Patient tolerated the procedure well with no immediate complications  Complications: No  Specimens: No  Implants: No

## 2021-12-25 NOTE — SUBJECTIVE & OBJECTIVE
Interval History:     Review of Systems   Unable to perform ROS: Severe respiratory distress     Objective:     Vital Signs (Most Recent):  Temp: 97.8 °F (36.6 °C) (12/25/21 0800)  Pulse: (!) 56 (12/25/21 0735)  Resp: (!) 24 (12/25/21 0735)  BP: 116/64 (12/25/21 0600)  SpO2: 100 % (12/25/21 0735) Vital Signs (24h Range):  Temp:  [96.1 °F (35.6 °C)-97.9 °F (36.6 °C)] 97.8 °F (36.6 °C)  Pulse:  [] 56  Resp:  [14-31] 24  SpO2:  [69 %-100 %] 100 %  BP: ()/(44-87) 116/64     Weight: 77.1 kg (170 lb)  Body mass index is 24.39 kg/m².    Intake/Output Summary (Last 24 hours) at 12/25/2021 0902  Last data filed at 12/25/2021 0700  Gross per 24 hour   Intake 677 ml   Output --   Net 677 ml      Physical Exam  Constitutional:       General: He is in acute distress.      Appearance: He is ill-appearing.   HENT:      Head: Normocephalic and atraumatic.      Nose: Nose normal.      Mouth/Throat:      Comments: ET tube in place, free of secretions,   Eyes:      Pupils: Pupils are equal, round, and reactive to light.   Cardiovascular:      Rate and Rhythm: Normal rate and regular rhythm.      Heart sounds: Normal heart sounds.   Pulmonary:      Breath sounds: Decreased air movement present. Examination of the right-upper field reveals decreased breath sounds and wheezing. Examination of the left-upper field reveals decreased breath sounds and wheezing. Examination of the right-middle field reveals decreased breath sounds. Examination of the left-middle field reveals decreased breath sounds. Examination of the right-lower field reveals decreased breath sounds. Examination of the left-lower field reveals decreased breath sounds. Decreased breath sounds and wheezing present.      Comments: Crackles scattered  Musculoskeletal:      Cervical back: Neck supple.   Skin:     General: Skin is cool.      Coloration: Skin is pale.   Neurological:      Deep Tendon Reflexes: Babinski sign present on the left side.      Comments:  Sedated and intubated   Psychiatric:      Comments: Sedated and intubated         Significant Labs:   All pertinent labs within the past 24 hours have been reviewed.  CBC:   Recent Labs   Lab 12/24/21 0518 12/24/21 1716 12/25/21  0459   WBC 7.49 8.89 14.04*   HGB 8.1* 7.7* 8.4*   HCT 28.1* 27.3* 28.4*   PLT 57* 54* 65*     CMP:   Recent Labs   Lab 12/24/21 0518 12/24/21 1716 12/25/21  0459    141 141   K 4.2 4.9 4.0    107 109   CO2 19* 18* 21*   * 128* 131*   BUN 17 18 23   CREATININE 2.2* 2.9* 2.9*   CALCIUM 7.8* 7.7* 7.7*   PROT 6.4 5.9* 5.8*   ALBUMIN 2.6* 2.6* 2.4*   BILITOT 0.8 0.8 0.9   ALKPHOS 126 135 128   * 139* 899*   ALT 35 42 240*   ANIONGAP 13 16 11   EGFRNONAA 30.1* 21.6* 21.6*     Lactic Acid:   Recent Labs   Lab 12/24/21  1920   LACTATE 8.4*       Significant Imaging: I have reviewed all pertinent imaging results/findings within the past 24 hours.

## 2021-12-25 NOTE — EICU
Rounding (Video Assessment):  Yes    Intervention Initiated From:  COR / EICU    Comments: Video rounds complete. Patient being admitted at this time. On Vent support. Remote ICU MD notified that patient has arrived to unit and bedside nurse is requesting orders.

## 2021-12-25 NOTE — ASSESSMENT & PLAN NOTE
Meets the KDIGO criteria for Stage 2 TANIKA likely in the setting of hypotension. This will be our working diagnosis for now.   -Urine Electrolytes for  FENa calculation to delineate prerenal from intrinsic renal etiologies of the patient's TANIKA.  -Renal ultrasonography   -Urine Protein/Cr Ratio

## 2021-12-25 NOTE — PROVIDER PROGRESS NOTES - EMERGENCY DEPT.
Encounter Date: 12/21/2021    ED Physician Progress Notes             Charting delayed due to patient care    I was called to patient's bedside due to rapid response as patient was reportedly becoming more short of breath and altered.    Right arrive patient was unresponsive, tachypneic, and hypotensive.  Automatic blood pressure cuff could not  patient's BP and distal pulses were thready.    .  Patient had been admitted to the hospital for weakness.  He has a history of AFib with RVR and alcohol abuse.    Patient was started on epinephrine drip.  Patient was intubated in room by self.  Please see procedure note    After intubation patient was setting adequately on vent.  Chest x-ray confirmed placement.    Patient's blood pressure improved and patient was normotensive on epinephrine drip.    Patient was transfer to ICU

## 2021-12-25 NOTE — EICU
67 yo male active smoker w/ PMHx Alcohol abuse, AF on apixaban, HTN here w/ palpitation noted to be in AF w/ RVR.   Was started on amio gtt, admitted to the tele floors.     Earlier this evening he was noted to have difficulty breathing and in respiratory failure, cause unclear. There was no loss of pulse. BP was able to be recorded according to the bedside RN.   On arrival to the ICU, he looked very mottled but is much better now.   Still not responding to stimulus w/o sedation, not clear what was used for intubation.     Will f/up mental status w/o sedation.   Hold TTM for now.     MAP> 65 requested, on minimal dose of levo gtt now.   HR 90s in AF.     Post arrest LA is 9.   TANIKA w/ Cr 2.9 < 1.1, 3 days earlier noted.   Pt is s/p CT chest w/ contrast, not sure if this is contrast induced.     Adding 100meq HCO3 in 0.45% NS @ 75ml/hr.   Avoid fluid boluses.   Levo gtt for MAP> 65.   On ceftriaxone and azithro. F/up Cx.     Post intubation CXR  - ETT and NGT in good position.   - B/L pulmonary congestion noted.     Post intubation ABG is 7.11/48/140 on AC /400/+5/14 --> 100/450/+8/100%, maintaining higher PO2 for ? Arrest.     No s/s bleeding.   On apixaban.     Rapid COVID negative at the time of admission.  CIWA protocol to continue.   Pantoprazole IV for SUP.     If enchephalopathy continues, would need neuro imaging and also other metabolic causes to be r/o.

## 2021-12-25 NOTE — ASSESSMENT & PLAN NOTE
Intubated overnight for critical illness, worsening resp distress. Will let rest on vent today, hold on SBT  Mechanical Ventilation day #0.   ABG:   PH: 7.110/7.341  PCO2: 48.7/35.5  PO2: 140/257

## 2021-12-25 NOTE — PLAN OF CARE
Remains on vent sedated - - weaning levophed down at tolerated - ua sent today for analysis - max temp 99.8 axillary - scds in place - will continue to monitor

## 2021-12-25 NOTE — CODE/ RAPID DOCUMENTATION
INITIAL BP: 78/48  Pads connected, HR48  2208:1mg atropin  2213:EPI DRIP STARTING @ 5MCG  2217:2 AMPS BICARB  2219:HR 46 PER AED  2219:ETOMIDATE, 100 JACLYN  2221: ATTEMPTING INTUBATION UNSUCCESSFUL  2224: HR 66  2225: 137/94,   2225: SUCCESSFUL INTUBATION; RT NICHOLAS BAGGING  2228: HR 67  2230: 16FR GARCIA INSERTED VIA STERILE TECHNIQUE  2234: IVF STOPPED, APPROX 750ML ABSORBED

## 2021-12-26 PROBLEM — J96.21 ACUTE ON CHRONIC RESPIRATORY FAILURE WITH HYPOXIA: Status: ACTIVE | Noted: 2021-01-01

## 2021-12-26 PROBLEM — I73.9 PAD (PERIPHERAL ARTERY DISEASE): Chronic | Status: RESOLVED | Noted: 2019-04-10 | Resolved: 2021-01-01

## 2021-12-26 NOTE — PLAN OF CARE
Patient's VSS stable, weaned off the Levophed. Patient tolerating the ventilator  Well. U/O is good. O2 sat above 97% tonight.

## 2021-12-26 NOTE — ASSESSMENT & PLAN NOTE
Meets the KDIGO criteria for Stage 2 TANIKA likely in the setting of severe hypoperfusion. This will be our working diagnosis for now. Urine Electrolytes, FENa convincing for a prerenal insult.  Creatinine improved to 2.6 today. Improved from 2.9 yesterday.   -Renal ultrasonography pending.

## 2021-12-26 NOTE — PROGRESS NOTES
[facility] Medicine  Progress Note    Patient Name: Neymar Patel  MRN: [id number]  Patient Class: IP- Inpatient   Admission Date: 12/21/2021  Length of Stay: 4 days  Attending Physician: Yarely Buckley MD  Primary Care Provider: Landen Brown MD        Subjective:     Principal Problem:Acute on chronic respiratory failure with hypoxia        HPI:  Perpetual History  Patient is a 66-year-old male with a history of alcohol abuse atrial fibrillation hypertension peripheral artery disease fatty liver disease who recently had an episode of bronchitis and was treated in the outpatient setting.  The patient then began in having increasing weakness and lightheadedness and noticed his heart rate was elevated.  He presented to the emergency department and was found to have elevated alcohol levels and a heart rate in the 130s to 140s.  The patient is currently in AFib with RVR and has been started on a Cardizem drip by the emergency department.         Overview/Hospital Course:  12/23/21 [facility] patient reports that he is feeling better today, was able to get rest last night. Heart rate still elevated will adjust medications and monitor. Patient with hallucinations last night, patient is appropriate this am  12/24/21:   Rapid Response Call #1: Blood pressure initially low and patient heart rate in the 50s sinus Nakul.  Glucose within normal limits.  Fluid bolus 1 L was given with improvement in blood pressure.  Advised to hold any medication that will lower patient's heart rate.  Patient also complains of shortness of breath but according to respiratory therapist, patient did not take his breathing treatment because he did not think he needed it.  Patient wheezy expiratory and saturating in the high 80s on room air. not on any antibiotics currently so will start.  Labs show that patient has TANIKA possibly due to dehydration.  Will resuscitate with fluid.  Will recheck basic blood work and continue to  hydrate.  Treat accordingly will at started if patient is not already on it.  Patient more alert and oriented x4 moving all extremities with improvement of blood pressure into the 90s and heart rate remains in the 50s sinus Nakul saturating 94% on 2 L  Rapid Response Call #2:Rapid response team called again with subsequent intubation and transfer to MICU.   12/25/21: On minimal sedation and pressor support in the MICU.   12/26/21: Able to wean off of all pressor support, pH of 5 while on bicarb on AM labs. Will get rid of bicarb drip and switch to gentle IV hydration with LR. SBT tomorrow?      No new subjective & objective note has been filed under this hospital service since the last note was generated.      Assessment/Plan:      * Acute on chronic respiratory failure with hypoxia  Intubated for critical illness, worsening resp distress.   Likely in the setting of PNA, COPD.   Mechanical Ventilation Day #2.   Antibiotics Day #2  ABG:   PH: 7.110/7.341  PCO2: 48.7/35.5  PO2: 140/257  - Continue antibiotic therapy with Rocephin and Azithromycin.  - Tailor antibiotic therapy as cultures resolve.  - Nebulizer's  - SBT tomorrow       TANIKA (acute kidney injury)  Meets the KDIGO criteria for Stage 2 TANIKA likely in the setting of severe hypoperfusion. This will be our working diagnosis for now. Urine Electrolytes, FENa convincing for a prerenal insult.  Creatinine improved to 2.6 today. Improved from 2.9 yesterday.   -Renal ultrasonography pending.      Elevated LFTs  Elevated, likely in the setting of hypoperfusion.   - Continue to follow liver enzymes daily. Improving.         Alcohol dependence  [facility] Protocol      DVT prophylaxis  Hold eliquis for now in setting of TANIKA.       Atrial fibrillation with rapid ventricular response  Cards following  NSR on tele  Continue current medical regimen      Tobacco user  PRN Nicotine patch      VTE Risk Mitigation (From admission, onward)         Ordered     IP VTE HIGH RISK PATIENT   Once         12/21/21 2233     Reason for No Pharmacological VTE Prophylaxis  Once        Question:  Reasons:  Answer:  Already adequately anticoagulated on oral Anticoagulants    12/21/21 2233                Discharge Planning   DAXA:      Code Status: Full Code   Is the patient medically ready for discharge?:     Reason for patient still in hospital (select all that apply): Patient unstable  Discharge Plan A: Home with family,Home Health                  Darren Bustillo DO  Department of Hospital Medicine   [place] [facility]

## 2021-12-26 NOTE — ASSESSMENT & PLAN NOTE
Elevated, likely in the setting of hypoperfusion.   - Continue to follow liver enzymes daily. Improving.

## 2021-12-26 NOTE — PLAN OF CARE
Remains on vent with diprivan and precedex - off levophed - maintaining blood pressure on levophed - chg bath given with linen change - extremities elevated - no signs of distress or pain -

## 2021-12-26 NOTE — EICU
Rounding (Video Assessment):  Video rounding completed.  Pt resting quiet on ventilator support w/ FIO2 70%, peep +8.  No distress noted.  Infusing Precedex gtt at 0.6 mcg/kg/hr, Norepinephrine gtt at 0.04 mcg/kg/min, Propofol gtt at 40 mcg/kg/min & Na+ bicarb gtt at 75 cc/hr.  B/P 122/71, HR 60, RR 24, POx 100%.

## 2021-12-26 NOTE — PROGRESS NOTES
Pharmacist Renal Dose Adjustment Note    Neymar Patel is a 66 y.o. male being treated with the medication Lovenox.    Patient Data:    Vital Signs (Most Recent):  Temp: 97.5 °F (36.4 °C) (12/26/21 1532)  Pulse: 97 (12/26/21 1445)  Resp: (!) 24 (12/26/21 1445)  BP: (!) 129/90 (12/26/21 1445)  SpO2: 100 % (12/26/21 1445)   Vital Signs (72h Range):  Temp:  [95.72 °F (35.4 °C)-99.7 °F (37.6 °C)]   Pulse:  []   Resp:  [14-31]   BP: ()/()   SpO2:  [69 %-100 %]      Recent Labs   Lab 12/24/21  1716 12/25/21  0459 12/26/21  0440   CREATININE 2.9* 2.9* 2.6*     Serum creatinine: 2.6 mg/dL (H) 12/26/21 0440  Estimated creatinine clearance: 28.9 mL/min (A)    Lovenox 40 mg Q24H will be changed to Lovenox 30 mg Q24H.    Pharmacist's Name: Adelina Day  Pharmacist's Extension: [phone]

## 2021-12-26 NOTE — EICU
Rounding (Video Assessment):  Yes    Intervention Initiated From:  [facility] / EICU    Comments: Video rounds complete. Patient remains on Vent support w/ FiO2= 70%/ PEEP=8.0. No alarms or distress noted at this time. Remains on pressor support.

## 2021-12-26 NOTE — PROGRESS NOTES
[facility] Medicine  Progress Note    Patient Name: Neymar Patel  MRN: [id number]  Patient Class: IP- Inpatient   Admission Date: 12/21/2021  Length of Stay: 4 days  Attending Physician: Yarely Buckley MD  Primary Care Provider: Landen Brown MD        Subjective:     Principal Problem:Acute on chronic respiratory failure with hypoxia        HPI:  Perpetual History  Patient is a 66-year-old male with a history of alcohol abuse atrial fibrillation hypertension peripheral artery disease fatty liver disease who recently had an episode of bronchitis and was treated in the outpatient setting.  The patient then began in having increasing weakness and lightheadedness and noticed his heart rate was elevated.  He presented to the emergency department and was found to have elevated alcohol levels and a heart rate in the 130s to 140s.  The patient is currently in AFib with RVR and has been started on a Cardizem drip by the emergency department.       Overview/Hospital Course:  12/23/21 [facility] patient reports that he is feeling better today, was able to get rest last night. Heart rate still elevated will adjust medications and monitor. Patient with hallucinations last night, patient is appropriate this am  12/24/21:   Rapid Response Call #1: Blood pressure initially low and patient heart rate in the 50s sinus Nakul.  Glucose within normal limits.  Fluid bolus 1 L was given with improvement in blood pressure.  Advised to hold any medication that will lower patient's heart rate.  Patient also complains of shortness of breath but according to respiratory therapist, patient did not take his breathing treatment because he did not think he needed it.  Patient wheezy expiratory and saturating in the high 80s on room air. not on any antibiotics currently so will start.  Labs show that patient has TANIKA possibly due to dehydration.  Will resuscitate with fluid.  Will recheck basic blood work and continue to  hydrate.  Treat accordingly will at started if patient is not already on it.  Patient more alert and oriented x4 moving all extremities with improvement of blood pressure into the 90s and heart rate remains in the 50s sinus Nakul saturating 94% on 2 L  Rapid Response Call #2:Rapid response team called again with subsequent intubation and transfer to MICU.   12/25/21: On minimal sedation and pressor support in the MICU.   12/26/21: Able to wean off of all pressor support, pH of 5 while on bicarb on AM labs. Will get rid of bicarb drip and switch to gentle IV hydration with LR. SBT tomorrow?      No new subjective & objective note has been filed under this hospital service since the last note was generated.      Assessment/Plan:      * Acute on chronic respiratory failure with hypoxia  Intubated for critical illness, worsening resp distress.   Likely in the setting of PNA, COPD.   Mechanical Ventilation Day #2.   Antibiotics Day #2  ABG:   PH: 7.110/7.341  PCO2: 48.7/35.5  PO2: 140/257  - Continue antibiotic therapy with Rocephin and Azithromycin.  - Tailor antibiotic therapy as cultures resolve.  - Nebulizer's  - SBT tomorrow       TANIKA (acute kidney injury)  Meets the KDIGO criteria for Stage 2 TANIKA likely in the setting of severe hypoperfusion. This will be our working diagnosis for now. Urine Electrolytes, FENa convincing for a prerenal insult.  Creatinine improved to 2.6 today. Improved from 2.9 yesterday.   -Renal ultrasonography pending.      Elevated LFTs  Elevated, likely in the setting of hypoperfusion.   - Continue to follow liver enzymes daily. Improving.         Alcohol dependence  [facility] Protocol      DVT prophylaxis  eliquis bid      Atrial fibrillation with rapid ventricular response  Cards following  NSR on tele  Continue current medical regimen      Tobacco user  PRN Nicotine patch      VTE Risk Mitigation (From admission, onward)         Ordered     apixaban tablet 5 mg  2 times daily         12/22/21  0855      VTE HIGH RISK PATIENT  Once         12/21/21 2233     Reason for No Pharmacological VTE Prophylaxis  Once        Question:  Reasons:  Answer:  Already adequately anticoagulated on oral Anticoagulants    12/21/21 2233                Discharge Planning   DAXA:      Code Status: Full Code   Is the patient medically ready for discharge?:     Reason for patient still in hospital (select all that apply): Treatment  Discharge Plan A: Home with family,Home Health                  Darren Bustillo DO  Department of Hospital Medicine   [place] [facility]

## 2021-12-26 NOTE — ASSESSMENT & PLAN NOTE
Intubated for critical illness, worsening resp distress.   Likely in the setting of PNA, COPD.   Mechanical Ventilation Day #3.   Antibiotics Day #3  ABG:   PH: 7.110/7.341  PCO2: 48.7/35.5  PO2: 140/257  - Continue antibiotic therapy with Rocephin and Azithromycin.  - Tailor antibiotic therapy as cultures resolve.  - Nebulizer's  - SBT tomorrow

## 2021-12-27 PROBLEM — A41.9 SEVERE SEPSIS: Status: ACTIVE | Noted: 2021-01-01

## 2021-12-27 PROBLEM — R65.20 SEVERE SEPSIS: Status: ACTIVE | Noted: 2021-01-01

## 2021-12-27 PROBLEM — N39.0 UTI (URINARY TRACT INFECTION): Status: ACTIVE | Noted: 2021-01-01

## 2021-12-27 NOTE — PROGRESS NOTES
Pharmacokinetic Initial Assessment: IV Vancomycin    Assessment/Plan:    Initiate intravenous vancomycin with loading dose of 1500 mg once (20mg/kg)followed by a maintenance dose of vancomycin 1000mg IV every 24 hours  Desired empiric serum trough concentration is 15 to 20 mcg/mL  Draw vancomycin trough level 60 min prior to third dose on 12/29/21 at approximately 1000  Pharmacy will continue to follow and monitor vancomycin.      Please contact pharmacy at extension [phone] with any questions regarding this assessment.     Thank you for the consult,   HANNAH LEMA       Patient brief summary:  Neymar Patel is a 66 y.o. male initiated on antimicrobial therapy with IV Vancomycin for treatment of suspected urinary tract infection    Drug Allergies:   Review of patient's allergies indicates:  No Known Allergies    Actual Body Weight:   77.1kg     Renal Function:   Estimated Creatinine Clearance: 39.5 mL/min (A) (based on SCr of 1.9 mg/dL (H)).,     Dialysis Method (if applicable):  N/A    CBC (last 72 hours):  Recent Labs   Lab Result Units 12/24/21  1716 12/25/21  0459 12/26/21  0440 12/27/21  0345   WBC K/uL 8.89 14.04* 9.26 5.81   Hemoglobin g/dL 7.7* 8.4* 8.1* 8.1*   Hematocrit % 27.3* 28.4* 26.1* 26.1*   Platelets K/uL 54* 65* 84* 97*   Gran % % 78.1* 73.0 69.5 69.5   Lymph % % 9.0* 11.0* 15.8* 15.0*   Mono % % 11.5 6.0 13.6 13.4   Eosinophil % % 0.1 0.0 0.6 1.5   Basophil % % 0.2 0.0 0.2 0.3   Differential Method  Automated Manual Automated Automated       Metabolic Panel (last 72 hours):  Recent Labs   Lab Result Units 12/24/21  1716 12/25/21  0459 12/25/21  1025 12/26/21  0440 12/27/21  0345   Sodium mmol/L 141 141  --  142 142   Potassium mmol/L 4.9 4.0  --  3.0* 3.0*   Chloride mmol/L 107 109  --  108 108   CO2 mmol/L 18* 21*  --  24 27   Glucose mg/dL 128* 131*  --  109 102   Glucose, UA   --   --  Negative  --   --    BUN mg/dL 18 23  --  25* 21   Creatinine mg/dL 2.9* 2.9*  --  2.6* 1.9*   Creatinine,  Urine mg/dL  --   --  231.0  --   --    Albumin g/dL 2.6* 2.4*  --  2.0* 1.8*   Total Bilirubin mg/dL 0.8 0.9  --  0.6 0.7   Alkaline Phosphatase U/L 135 128  --  110 108   AST U/L 139* 899*  --  468* 235*   ALT U/L 42 240*  --  190* 135*   Magnesium mg/dL 2.2 2.2  --  2.1 1.9       Drug levels (last 3 results):  No results for input(s): VANCOMYCINRA, VANCOMYCINPE, VANCOMYCINTR in the last 72 hours.    Microbiologic Results:  Microbiology Results (last 7 days)       Procedure Component Value Units Date/Time    Urine culture [[id number]] Collected: 12/25/21 1025    Order Status: Completed Specimen: Urine Updated: 12/26/21 2350     Urine Culture, Routine No growth    Narrative:      Preferred Collection Type->Urine, Catheterized  Specimen Source->Urine    Blood culture [[id number]] Collected: 12/25/21 0459    Order Status: Completed Specimen: Blood Updated: 12/26/21 1622     Blood Culture, Routine No Growth to date      No Growth to date    Blood culture [[id number]] Collected: 12/25/21 0505    Order Status: Completed Specimen: Blood Updated: 12/26/21 1622     Blood Culture, Routine No Growth to date      No Growth to date    Culture, Respiratory with Gram Stain [[id number]] Collected: 12/24/21 2257    Order Status: Sent Specimen: Respiratory from Sputum Updated: 12/24/21 2351    Urine culture [[id number]]  (Abnormal) Collected: 12/21/21 1951    Order Status: Completed Specimen: Urine Updated: 12/24/21 1347     Urine Culture, Routine ENTEROCOCCUS FAECALIS  >100,000 cfu/ml  susceptibility pending      Narrative:      Preferred Collection Type->Urine, Clean Catch  Specimen Source->Urine             Statement Selected

## 2021-12-27 NOTE — NURSING
Pt is on 70% fio2 on ventilator. Pt still on propofol and precedex. Pt in afib 's HR. MAP >60. Started TF. Pt drops sats with turns to 70's. Quickly recovers. SCD's on.

## 2021-12-27 NOTE — PLAN OF CARE
Patient on the ventilator, VSS temp runs low warmer on pt. B/P has been stable enough not to require pressors. U/O is fair. Pt has been sedated while intubated

## 2021-12-27 NOTE — EICU
Rounding (Video Assessment):  Video rounding completed.  Pt resting quiet on ventilator support w/ FIO2 70%, peep +7.  Infusing Precedex gtt at 0.6 mcg/kg/hr, LR at 75 cc/hr & Propofol gtt at 40 mcg/kg/min.  B/P 142/82, HR 60, RR 24, POx 100%.

## 2021-12-27 NOTE — PLAN OF CARE
Recommendations     Recommendation/Intervention:   1. EN Recs: Intiate Vital HP @ 10 ml/hr increasing q4-6h as tolerated to goal rate of 55 ml/hr.               -With propofol infusing at 13.9 ml/hr,this will provide a total of 1686 kcal (97% EEN), 115 gm protein (100% EPN), and 1,103 ml water.      2. Rec'd FWF of 30 ml q4-6h to prevent clogging.      3. RD to monitor and make recs accordingly.     Goals: Tube feeding goal rate acheived within 48 hours of start.  Nutrition Goal Status: new  Communication of RD Recs: reviewed with physician        Eleazar Jones, MEENAN, LDN

## 2021-12-27 NOTE — SUBJECTIVE & OBJECTIVE
Interval History:     Review of Systems   Unable to perform ROS: Severe respiratory distress     Objective:     Vital Signs (Most Recent):  Temp: 98 °F (36.7 °C) (12/27/21 0400)  Pulse: 60 (12/27/21 0755)  Resp: (!) 24 (12/27/21 0755)  BP: 139/77 (12/27/21 0600)  SpO2: 100 % (12/27/21 0755) Vital Signs (24h Range):  Temp:  [93.56 °F (34.2 °C)-98.24 °F (36.8 °C)] 98 °F (36.7 °C)  Pulse:  [41-99] 60  Resp:  [24] 24  SpO2:  [81 %-100 %] 100 %  BP: (110-160)/() 139/77     Weight: 77.1 kg (170 lb)  Body mass index is 24.39 kg/m².    Intake/Output Summary (Last 24 hours) at 12/27/2021 0829  Last data filed at 12/27/2021 0610  Gross per 24 hour   Intake 2926.77 ml   Output 2000 ml   Net 926.77 ml      Physical Exam  Constitutional:       General: He is in acute distress.      Appearance: He is ill-appearing.   HENT:      Head: Normocephalic and atraumatic.      Nose: Nose normal.      Mouth/Throat:      Comments: ET tube in place, free of secretions,   Eyes:      Pupils: Pupils are equal, round, and reactive to light.   Cardiovascular:      Rate and Rhythm: Tachycardia present. Rhythm irregular.      Heart sounds: Normal heart sounds.   Pulmonary:      Breath sounds: Decreased air movement present. Examination of the right-upper field reveals decreased breath sounds and wheezing. Examination of the left-upper field reveals decreased breath sounds and wheezing. Examination of the right-middle field reveals decreased breath sounds. Examination of the left-middle field reveals decreased breath sounds. Examination of the right-lower field reveals decreased breath sounds. Examination of the left-lower field reveals decreased breath sounds. Decreased breath sounds and wheezing present.      Comments: Crackles scattered  Abdominal:      General: There is distension.      Palpations: There is no mass.      Tenderness: There is no abdominal tenderness. There is no guarding.   Musculoskeletal:         General: Swelling  present.      Cervical back: Neck supple.   Skin:     General: Skin is cool.      Coloration: Skin is pale.   Neurological:      Deep Tendon Reflexes: Babinski sign present on the left side.      Comments: Sedated and intubated   Psychiatric:      Comments: Sedated and intubated         Significant Labs:   All pertinent labs within the past 24 hours have been reviewed.  CBC:   Recent Labs   Lab 12/26/21  0440 12/27/21  0345   WBC 9.26 5.81   HGB 8.1* 8.1*   HCT 26.1* 26.1*   PLT 84* 97*     CMP:   Recent Labs   Lab 12/26/21  0440 12/27/21  0345    142   K 3.0* 3.0*    108   CO2 24 27    102   BUN 25* 21   CREATININE 2.6* 1.9*   CALCIUM 7.8* 7.6*   PROT 5.4* 5.0*   ALBUMIN 2.0* 1.8*   BILITOT 0.6 0.7   ALKPHOS 110 108   * 235*   * 135*   ANIONGAP 10 7*   EGFRNONAA 24.6* 35.9*     Lactic Acid:   No results for input(s): LACTATE in the last 48 hours.    Significant Imaging: I have reviewed all pertinent imaging results/findings within the past 24 hours.

## 2021-12-27 NOTE — PROGRESS NOTES
[facility] Medicine  Progress Note    Patient Name: Neymar Patel  MRN: [id number]  Patient Class: IP- Inpatient   Admission Date: 12/21/2021  Length of Stay: 5 days  Attending Physician: Raul Gan III, MD  Primary Care Provider: Landen Brown MD        Subjective:     Principal Problem:Acute on chronic respiratory failure with hypoxia        HPI:  Perpetual History  Patient is a 66-year-old male with a history of alcohol abuse atrial fibrillation hypertension peripheral artery disease fatty liver disease who recently had an episode of bronchitis and was treated in the outpatient setting.  The patient then began in having increasing weakness and lightheadedness and noticed his heart rate was elevated.  He presented to the emergency department and was found to have elevated alcohol levels and a heart rate in the 130s to 140s.  The patient is currently in AFib with RVR and has been started on a Cardizem drip by the emergency department.         Overview/Hospital Course:  12/23/21 [facility] patient reports that he is feeling better today, was able to get rest last night. Heart rate still elevated will adjust medications and monitor. Patient with hallucinations last night, patient is appropriate this am  12/24/21:   Rapid Response Call #1: Blood pressure initially low and patient heart rate in the 50s sinus Nakul.  Glucose within normal limits.  Fluid bolus 1 L was given with improvement in blood pressure.  Advised to hold any medication that will lower patient's heart rate.  Patient also complains of shortness of breath but according to respiratory therapist, patient did not take his breathing treatment because he did not think he needed it.  Patient wheezy expiratory and saturating in the high 80s on room air. not on any antibiotics currently so will start.  Labs show that patient has TANIKA possibly due to dehydration.  Will resuscitate with fluid.  Will recheck basic blood work and continue  to hydrate.  Treat accordingly will at started if patient is not already on it.  Patient more alert and oriented x4 moving all extremities with improvement of blood pressure into the 90s and heart rate remains in the 50s sinus Nakul saturating 94% on 2 L  Rapid Response Call #2:Rapid response team called again with subsequent intubation and transfer to MICU.   12/25/21: On minimal sedation and pressor support in the MICU.   12/26/21: Able to wean off of all pressor support, pH of 5 while on bicarb on AM labs. Will get rid of bicarb drip and switch to gentle IV hydration with LR. SBT tomorrow?  12/27/21:  Events over the weekend noted.  Patient is now intubated on the ventilator.  Patient was admitted with pneumonia atrial fibrillation and alcohol abuse and now also has a enterococcal urinary tract infection with sensitivities pending.  Patient has required to sedatives for agitation and combativeness.      Interval History:     Review of Systems   Unable to perform ROS: Severe respiratory distress     Objective:     Vital Signs (Most Recent):  Temp: 98 °F (36.7 °C) (12/27/21 0400)  Pulse: 60 (12/27/21 0755)  Resp: (!) 24 (12/27/21 0755)  BP: 139/77 (12/27/21 0600)  SpO2: 100 % (12/27/21 0755) Vital Signs (24h Range):  Temp:  [93.56 °F (34.2 °C)-98.24 °F (36.8 °C)] 98 °F (36.7 °C)  Pulse:  [41-99] 60  Resp:  [24] 24  SpO2:  [81 %-100 %] 100 %  BP: (110-160)/() 139/77     Weight: 77.1 kg (170 lb)  Body mass index is 24.39 kg/m².    Intake/Output Summary (Last 24 hours) at 12/27/2021 0829  Last data filed at 12/27/2021 0610  Gross per 24 hour   Intake 2926.77 ml   Output 2000 ml   Net 926.77 ml      Physical Exam  Constitutional:       General: He is in acute distress.      Appearance: He is ill-appearing.   HENT:      Head: Normocephalic and atraumatic.      Nose: Nose normal.      Mouth/Throat:      Comments: ET tube in place, free of secretions,   Eyes:      Pupils: Pupils are equal, round, and reactive to  light.   Cardiovascular:      Rate and Rhythm: Tachycardia present. Rhythm irregular.      Heart sounds: Normal heart sounds.   Pulmonary:      Breath sounds: Decreased air movement present. Examination of the right-upper field reveals decreased breath sounds and wheezing. Examination of the left-upper field reveals decreased breath sounds and wheezing. Examination of the right-middle field reveals decreased breath sounds. Examination of the left-middle field reveals decreased breath sounds. Examination of the right-lower field reveals decreased breath sounds. Examination of the left-lower field reveals decreased breath sounds. Decreased breath sounds and wheezing present.      Comments: Crackles scattered  Abdominal:      General: There is distension.      Palpations: There is no mass.      Tenderness: There is no abdominal tenderness. There is no guarding.   Musculoskeletal:         General: Swelling present.      Cervical back: Neck supple.   Skin:     General: Skin is cool.      Coloration: Skin is pale.   Neurological:      Deep Tendon Reflexes: Babinski sign present on the left side.      Comments: Sedated and intubated   Psychiatric:      Comments: Sedated and intubated         Significant Labs:   All pertinent labs within the past 24 hours have been reviewed.  CBC:   Recent Labs   Lab 12/26/21  0440 12/27/21  0345   WBC 9.26 5.81   HGB 8.1* 8.1*   HCT 26.1* 26.1*   PLT 84* 97*     CMP:   Recent Labs   Lab 12/26/21  0440 12/27/21  0345    142   K 3.0* 3.0*    108   CO2 24 27    102   BUN 25* 21   CREATININE 2.6* 1.9*   CALCIUM 7.8* 7.6*   PROT 5.4* 5.0*   ALBUMIN 2.0* 1.8*   BILITOT 0.6 0.7   ALKPHOS 110 108   * 235*   * 135*   ANIONGAP 10 7*   EGFRNONAA 24.6* 35.9*     Lactic Acid:   No results for input(s): LACTATE in the last 48 hours.    Significant Imaging: I have reviewed all pertinent imaging results/findings within the past 24 hours.      Assessment/Plan:      * Acute  on chronic respiratory failure with hypoxia  Intubated for critical illness, worsening resp distress.   Likely in the setting of PNA, COPD.   Mechanical Ventilation Day #3.   Antibiotics Day #3  ABG:   PH: 7.110/7.341  PCO2: 48.7/35.5  PO2: 140/257  - Continue antibiotic therapy with Rocephin and Azithromycin.  - Tailor antibiotic therapy as cultures resolve.  - Nebulizer's  - SBT tomorrow       Severe sepsis        UTI (urinary tract infection)  Add vanc, + enterococcus      DVT prophylaxis  Hold eliquis for now in setting of TANIKA.       Atrial fibrillation with rapid ventricular response  Cards following  NSR on tele  Continue current medical regimen      Tobacco user  PRN Nicotine patch    Alcohol dependence  CIWA Protocol      Elevated LFTs  Elevated, likely in the setting of hypoperfusion.   - Continue to follow liver enzymes daily. Improving.         TANIKA (acute kidney injury)  Meets the KDIGO criteria for Stage 2 TANIKA likely in the setting of severe hypoperfusion. This will be our working diagnosis for now. Urine Electrolytes, FENa convincing for a prerenal insult.  Creatinine improved to 2.6 today. Improved from 2.9 yesterday.   -Renal ultrasonography pending.        VTE Risk Mitigation (From admission, onward)         Ordered     IP VTE HIGH RISK PATIENT  Once         12/21/21 2233     Reason for No Pharmacological VTE Prophylaxis  Once        Question:  Reasons:  Answer:  Already adequately anticoagulated on oral Anticoagulants    12/21/21 2233                Discharge Planning   DAXA:      Code Status: Full Code   Is the patient medically ready for discharge?:     Reason for patient still in hospital (select all that apply): Patient unstable  Discharge Plan A: Home with family,Home Health                  Raul Gan III, MD  Department of Hospital Medicine   [facility] - Intensive Care

## 2021-12-27 NOTE — EICU
Rounding (Video Assessment):  Yes    Intervention Initiated From:  [facility] / EICU    Comments: Video rounds complete. Patient remains on Vent support w/ FiO2= 100%/ PEEP= 7.0. No alarms, or distress noted at this time.

## 2021-12-27 NOTE — PROGRESS NOTES
"[facility] - Intensive Care  Adult Nutrition  Progress Note    SUMMARY       Recommendations    Recommendation/Intervention:   1. EN Recs: Intiate Vital HP @ 10 ml/hr increasing q4-6h as tolerated to goal rate of 55 ml/hr.    -With propofol infusing at 13.9 ml/hr,this will provide a total of 1686 kcal (97% EEN), 115 gm protein (100% EPN), and 1,103 ml water.     2. Rec'd FWF of 30 ml q4-6h to prevent clogging.     3. RD to monitor and make recs accordingly.    Goals: Tube feeding goal rate acheived within 48 hours of start.  Nutrition Goal Status: new  Communication of RD Recs: reviewed with physician    Assessment and Plan  Nutrition Problem  Inadequate protein-energy intake    Related to (etiology):   NPO/vent status    Signs and Symptoms (as evidenced by):   EEN < 25%     Interventions/Recommendations (treatment strategy):  1. EN Recs: Intiate Vital HP @ 10 ml/hr increasing q4-6h as tolerated to goal rate of 55 ml/hr.    -With propofol infusing at 13.9 ml/hr,this will provide a total of 1686 kcal (97% EEN), 115 gm protein (100% EPN), and 1,103 ml water.     2. Rec'd FWF of 30 ml q4-6h to prevent clogging.     3. RD to monitor and make recs accordingly.    Nutrition Diagnosis Status:   New      Reason for Assessment    Reason For Assessment: identified at risk by screening criteria  Diagnosis: pulmonary disease  Relevant Medical History: HTN, a-FIB, PAD, Stroke, liver disease  General Information Comments: RD triggered for intubation. Intubated on 12/24. Reached out to MD for tube feeding initiation- MD approved. Will start trickle feedings today. No longer requiring pressors support. Propofol @ 13.9 ml/hr (366 kcal).  Nutrition Discharge Planning: TBD    Nutrition Risk Screen    Nutrition Risk Screen: tube feeding or parenteral nutrition    Nutrition/Diet History    Food Allergies: NKFA  Factors Affecting Nutritional Intake: NPO,on mechanical ventilation    Anthropometrics    Temp: 98 °F (36.7 °C)  Height: 5' 10" " (177.8 cm)  Height (inches): 70 in  Weight Method: Standard Scale  Weight: 77.1 kg (170 lb)  Weight (lb): 170 lb  Ideal Body Weight (IBW), Male: 166 lb  % Ideal Body Weight, Male (lb): 102.41 %  BMI (Calculated): 24.4  BMI Grade: 18.5-24.9 - normal       Lab/Procedures/Meds    Pertinent Labs Reviewed: reviewed  Pertinent Medications Reviewed: reviewed    Physical Findings/Assessment         Estimated/Assessed Needs    Weight Used For Calorie Calculations: 77.1 kg (170 lb)        Protein Requirements:  (1.2-1.5 gm/kg CBW)  Weight Used For Protein Calculations: 77.1 kg (170 lb)     Estimated Fluid Requirement Method: RDA Method            Nutrition Prescription Ordered    Current Diet Order: NPO    Evaluation of Received Nutrient/Fluid Intake    Energy Calories Required: not meeting needs  Protein Required: not meeting needs  Fluid Required: not meeting needs  % Intake of Estimated Energy Needs: 0 - 25 %  % Meal Intake: NPO    Nutrition Risk    Level of Risk/Frequency of Follow-up: moderate - high     Monitor and Evaluation    Food and Nutrient Intake: enteral nutrition intake  Food and Nutrient Adminstration: enteral and parenteral nutrition administration  Anthropometric Measurements: weight change,weight  Biochemical Data, Medical Tests and Procedures: electrolyte and renal panel,glucose/endocrine profile,lipid profile,inflammatory profile  Nutrition-Focused Physical Findings: overall appearance     Nutrition Follow-Up    RD Follow-up?: Yes

## 2021-12-27 NOTE — PLAN OF CARE
Problem: Adult Inpatient Plan of Care  Goal: Plan of Care Review  Outcome: Ongoing, Progressing  Goal: Patient-Specific Goal (Individualized)  Outcome: Ongoing, Progressing  Goal: Absence of Hospital-Acquired Illness or Injury  Outcome: Ongoing, Progressing  Goal: Optimal Comfort and Wellbeing  Outcome: Ongoing, Progressing  Goal: Readiness for Transition of Care  Outcome: Ongoing, Progressing     Problem: Fall Injury Risk  Goal: Absence of Fall and Fall-Related Injury  Outcome: Ongoing, Progressing     Problem: Skin Injury Risk Increased  Goal: Skin Health and Integrity  Outcome: Ongoing, Progressing     Problem: Communication Impairment (Mechanical Ventilation, Invasive)  Goal: Effective Communication  Outcome: Ongoing, Progressing     Problem: Device-Related Complication Risk (Mechanical Ventilation, Invasive)  Goal: Optimal Device Function  Outcome: Ongoing, Progressing     Problem: Inability to Wean (Mechanical Ventilation, Invasive)  Goal: Mechanical Ventilation Liberation  Outcome: Ongoing, Progressing     Problem: Nutrition Impairment (Mechanical Ventilation, Invasive)  Goal: Optimal Nutrition Delivery  Outcome: Ongoing, Progressing     Problem: Skin and Tissue Injury (Mechanical Ventilation, Invasive)  Goal: Absence of Device-Related Skin and Tissue Injury  Outcome: Ongoing, Progressing     Problem: Ventilator-Induced Lung Injury (Mechanical Ventilation, Invasive)  Goal: Absence of Ventilator-Induced Lung Injury  Outcome: Ongoing, Progressing     Problem: Communication Impairment (Artificial Airway)  Goal: Effective Communication  Outcome: Ongoing, Progressing     Problem: Device-Related Complication Risk (Artificial Airway)  Goal: Optimal Device Function  Outcome: Ongoing, Progressing     Problem: Skin and Tissue Injury (Artificial Airway)  Goal: Absence of Device-Related Skin or Tissue Injury  Outcome: Ongoing, Progressing     Problem: Infection  Goal: Absence of Infection Signs and Symptoms  Outcome:  Ongoing, Progressing     Problem: Fluid and Electrolyte Imbalance (Acute Kidney Injury/Impairment)  Goal: Fluid and Electrolyte Balance  Outcome: Ongoing, Progressing     Problem: Oral Intake Inadequate (Acute Kidney Injury/Impairment)  Goal: Optimal Nutrition Intake  Outcome: Ongoing, Progressing     Problem: Renal Function Impairment (Acute Kidney Injury/Impairment)  Goal: Effective Renal Function  Outcome: Ongoing, Progressing     Problem: Restraint, Nonbehavioral (Nonviolent)  Goal: Absence of Harm or Injury  Outcome: Ongoing, Progressing     Problem: Adjustment to Illness (Sepsis/Septic Shock)  Goal: Optimal Coping  Outcome: Ongoing, Progressing     Problem: Bleeding (Sepsis/Septic Shock)  Goal: Absence of Bleeding  Outcome: Ongoing, Progressing     Problem: Glycemic Control Impaired (Sepsis/Septic Shock)  Goal: Blood Glucose Level Within Desired Range  Outcome: Ongoing, Progressing     Problem: Infection Progression (Sepsis/Septic Shock)  Goal: Absence of Infection Signs and Symptoms  Outcome: Ongoing, Progressing     Problem: Nutrition Impaired (Sepsis/Septic Shock)  Goal: Optimal Nutrition Intake  Outcome: Ongoing, Progressing

## 2021-12-28 NOTE — PROGRESS NOTES
[facility] Medicine  Progress Note    Patient Name: Neymar Patel  MRN: [id number]  Patient Class: IP- Inpatient   Admission Date: 12/21/2021  Length of Stay: 6 days  Attending Physician: Raul Gan III, MD  Primary Care Provider: Landen Brown MD        Subjective:     Principal Problem:Acute on chronic respiratory failure with hypoxia        HPI:  Perpetual History  Patient is a 66-year-old male with a history of alcohol abuse atrial fibrillation hypertension peripheral artery disease fatty liver disease who recently had an episode of bronchitis and was treated in the outpatient setting.  The patient then began in having increasing weakness and lightheadedness and noticed his heart rate was elevated.  He presented to the emergency department and was found to have elevated alcohol levels and a heart rate in the 130s to 140s.  The patient is currently in AFib with RVR and has been started on a Cardizem drip by the emergency department.         Overview/Hospital Course:  12/23/21 [facility] patient reports that he is feeling better today, was able to get rest last night. Heart rate still elevated will adjust medications and monitor. Patient with hallucinations last night, patient is appropriate this am  12/24/21:   Rapid Response Call #1: Blood pressure initially low and patient heart rate in the 50s sinus Nakul.  Glucose within normal limits.  Fluid bolus 1 L was given with improvement in blood pressure.  Advised to hold any medication that will lower patient's heart rate.  Patient also complains of shortness of breath but according to respiratory therapist, patient did not take his breathing treatment because he did not think he needed it.  Patient wheezy expiratory and saturating in the high 80s on room air. not on any antibiotics currently so will start.  Labs show that patient has TANIKA possibly due to dehydration.  Will resuscitate with fluid.  Will recheck basic blood work and continue  to hydrate.  Treat accordingly will at started if patient is not already on it.  Patient more alert and oriented x4 moving all extremities with improvement of blood pressure into the 90s and heart rate remains in the 50s sinus Nakul saturating 94% on 2 L  Rapid Response Call #2:Rapid response team called again with subsequent intubation and transfer to MICU.   12/25/21: On minimal sedation and pressor support in the MICU.   12/26/21: Able to wean off of all pressor support, pH of 5 while on bicarb on AM labs. Will get rid of bicarb drip and switch to gentle IV hydration with LR. SBT tomorrow?  12/27/21:  Events over the weekend noted.  Patient is now intubated on the ventilator.  Patient was admitted with pneumonia atrial fibrillation and alcohol abuse and now also has a enterococcal urinary tract infection with sensitivities pending.  Patient has required to sedatives for agitation and combativeness.  12/28/21:  Patient's ABG has been reviewed.  Patient has a mild respiratory alkalosis and his respiratory rate has been changed.  We have encouraged respiratory and nursing to wean his sedation and titrate his tube feeds.  The patient's chest x-ray also seems like he is slightly volume overloaded therefore will discontinue IV fluids and give low dosing of Lasix.  Patient has enterococcal infection was ampicillin sensitive therefore Rocephin should be adequate.  Will change Zithromax to Levaquin.      Interval History:     Review of Systems   Unable to perform ROS: Severe respiratory distress     Objective:     Vital Signs (Most Recent):  Temp: 96.5 °F (35.8 °C) (12/28/21 0500)  Pulse: 75 (12/28/21 0600)  Resp: (!) 24 (12/28/21 0600)  BP: 109/62 (12/28/21 0600)  SpO2: 95 % (12/28/21 0600) Vital Signs (24h Range):  Temp:  [95.6 °F (35.3 °C)-97.8 °F (36.6 °C)] 96.5 °F (35.8 °C)  Pulse:  [] 75  Resp:  [24-32] 24  SpO2:  [88 %-100 %] 95 %  BP: ()/() 109/62     Weight: 77.1 kg (170 lb)  Body mass index is  24.39 kg/m².    Intake/Output Summary (Last 24 hours) at 12/28/2021 0811  Last data filed at 12/28/2021 0500  Gross per 24 hour   Intake 3446 ml   Output 1900 ml   Net 1546 ml      Physical Exam  Constitutional:       General: He is in acute distress.      Appearance: He is ill-appearing.   HENT:      Head: Normocephalic and atraumatic.      Nose: Nose normal.      Mouth/Throat:      Comments: ET tube in place, free of secretions,   Eyes:      Pupils: Pupils are equal, round, and reactive to light.   Cardiovascular:      Rate and Rhythm: Tachycardia present. Rhythm irregular.      Heart sounds: Normal heart sounds.   Pulmonary:      Breath sounds: Decreased air movement present. Examination of the right-upper field reveals decreased breath sounds and wheezing. Examination of the left-upper field reveals decreased breath sounds and wheezing. Examination of the right-middle field reveals decreased breath sounds. Examination of the left-middle field reveals decreased breath sounds. Examination of the right-lower field reveals decreased breath sounds. Examination of the left-lower field reveals decreased breath sounds. Decreased breath sounds, wheezing and rhonchi present.      Comments: Crackles scattered  Abdominal:      General: There is distension.      Palpations: There is no mass.      Tenderness: There is no abdominal tenderness. There is no guarding.   Musculoskeletal:         General: Swelling present.      Cervical back: Neck supple.   Skin:     General: Skin is cool.      Coloration: Skin is pale.   Neurological:      Deep Tendon Reflexes: Babinski sign present on the left side.      Comments: Sedated and intubated   Psychiatric:      Comments: Sedated and intubated         Significant Labs:   All pertinent labs within the past 24 hours have been reviewed.  CBC:   Recent Labs   Lab 12/27/21  0345   WBC 5.81   HGB 8.1*   HCT 26.1*   PLT 97*     CMP:   Recent Labs   Lab 12/27/21  0345 12/28/21  0719     143   K 3.0* 4.0    110   CO2 27 25    110   BUN 21 18   CREATININE 1.9* 1.4   CALCIUM 7.6* 7.8*   PROT 5.0* 5.0*   ALBUMIN 1.8* 1.6*   BILITOT 0.7 0.5   ALKPHOS 108 111   * 122*   * 98*   ANIONGAP 7* 8   EGFRNONAA 35.9* 52.0*     Lactic Acid:   No results for input(s): LACTATE in the last 48 hours.    Significant Imaging: I have reviewed all pertinent imaging results/findings within the past 24 hours.      Assessment/Plan:      * Acute on chronic respiratory failure with hypoxia  Abx modified slightly, stop vanc, vent changes for low Pco2      Severe sepsis  Cont. Abx, improving slowly.      UTI (urinary tract infection)  See abx changes.      DVT prophylaxis  Hold eliquis for now in setting of TANIKA.       Atrial fibrillation with rapid ventricular response  Controlled with meds.    Tobacco user  PRN Nicotine patch    Alcohol dependence  CIWA Protocol      Elevated LFTs  Elevated, likely in the setting of hypoperfusion.   - Continue to follow liver enzymes daily. Improving.         TANIKA (acute kidney injury)  All 2nd acute illness/volume/hypotension        VTE Risk Mitigation (From admission, onward)         Ordered     IP VTE HIGH RISK PATIENT  Once         12/21/21 2233     Reason for No Pharmacological VTE Prophylaxis  Once        Question:  Reasons:  Answer:  Already adequately anticoagulated on oral Anticoagulants    12/21/21 2233                Discharge Planning   DAXA:      Code Status: Full Code   Is the patient medically ready for discharge?:     Reason for patient still in hospital (select all that apply): Patient unstable  Discharge Plan A: Home with family,Home Health                  Raul Gan III, MD  Department of Hospital Medicine   [facility]

## 2021-12-28 NOTE — PLAN OF CARE
Pt vitals stable, weaned off levophed. Pt tolerating feedings, remains on precedex and propofol for sedation, unable to wean Fio2 still on 70%.

## 2021-12-28 NOTE — EICU
Rounding (Video Assessment):  Yes    Intervention Initiated From:  [facility] / EICU    Alessandra Communicated with Bedside Nurse regarding:  Other    Comments:   Video assessment complete.

## 2021-12-28 NOTE — NURSING
Pt on 70% fio2 on vent. BP labile in AM. started Levo. On propofol, levo and precedex. Pt in restraints. Pt bucking vent, increased sedation. Pt now synchronous with ventilator. Tube feeds increased to 50/h. No residuals. Bard[place] in place. 1800 pt desat to 70's. Now 100% on vent. RT at bedside. Suctioned several times. Bagged patient. MD aware. Chest Xray performed. Pending results. Pt now 100% sat on 100% fio2 on vent.

## 2021-12-28 NOTE — PLAN OF CARE
Problem: Adult Inpatient Plan of Care  Goal: Plan of Care Review  Outcome: Ongoing, Progressing  Goal: Patient-Specific Goal (Individualized)  Outcome: Ongoing, Progressing  Goal: Absence of Hospital-Acquired Illness or Injury  Outcome: Ongoing, Progressing  Goal: Optimal Comfort and Wellbeing  Outcome: Ongoing, Progressing  Goal: Readiness for Transition of Care  Outcome: Ongoing, Progressing     Problem: Fall Injury Risk  Goal: Absence of Fall and Fall-Related Injury  Outcome: Ongoing, Progressing     Problem: Skin Injury Risk Increased  Goal: Skin Health and Integrity  Outcome: Ongoing, Progressing     Problem: Communication Impairment (Mechanical Ventilation, Invasive)  Goal: Effective Communication  Outcome: Ongoing, Progressing     Problem: Device-Related Complication Risk (Mechanical Ventilation, Invasive)  Goal: Optimal Device Function  Outcome: Ongoing, Progressing     Problem: Inability to Wean (Mechanical Ventilation, Invasive)  Goal: Mechanical Ventilation Liberation  Outcome: Ongoing, Progressing     Problem: Nutrition Impairment (Mechanical Ventilation, Invasive)  Goal: Optimal Nutrition Delivery  Outcome: Ongoing, Progressing     Problem: Skin and Tissue Injury (Mechanical Ventilation, Invasive)  Goal: Absence of Device-Related Skin and Tissue Injury  Outcome: Ongoing, Progressing     Problem: Ventilator-Induced Lung Injury (Mechanical Ventilation, Invasive)  Goal: Absence of Ventilator-Induced Lung Injury  Outcome: Ongoing, Progressing     Problem: Communication Impairment (Artificial Airway)  Goal: Effective Communication  Outcome: Ongoing, Progressing     Problem: Device-Related Complication Risk (Artificial Airway)  Goal: Optimal Device Function  Outcome: Ongoing, Progressing     Problem: Skin and Tissue Injury (Artificial Airway)  Goal: Absence of Device-Related Skin or Tissue Injury  Outcome: Ongoing, Progressing

## 2021-12-28 NOTE — EICU
Rounding (Video Assessment):  Yes    Intervention Initiated From:  [facility] / EICU      Comments: pt remains on fio2 of 70% on vent. Vss. NAD observed. Pt has Propofol, Precedex, Levo, and LR infusing.

## 2021-12-28 NOTE — SUBJECTIVE & OBJECTIVE
Interval History:     Review of Systems   Unable to perform ROS: Severe respiratory distress     Objective:     Vital Signs (Most Recent):  Temp: 96.5 °F (35.8 °C) (12/28/21 0500)  Pulse: 75 (12/28/21 0600)  Resp: (!) 24 (12/28/21 0600)  BP: 109/62 (12/28/21 0600)  SpO2: 95 % (12/28/21 0600) Vital Signs (24h Range):  Temp:  [95.6 °F (35.3 °C)-97.8 °F (36.6 °C)] 96.5 °F (35.8 °C)  Pulse:  [] 75  Resp:  [24-32] 24  SpO2:  [88 %-100 %] 95 %  BP: ()/() 109/62     Weight: 77.1 kg (170 lb)  Body mass index is 24.39 kg/m².    Intake/Output Summary (Last 24 hours) at 12/28/2021 0811  Last data filed at 12/28/2021 0500  Gross per 24 hour   Intake 3446 ml   Output 1900 ml   Net 1546 ml      Physical Exam  Constitutional:       General: He is in acute distress.      Appearance: He is ill-appearing.   HENT:      Head: Normocephalic and atraumatic.      Nose: Nose normal.      Mouth/Throat:      Comments: ET tube in place, free of secretions,   Eyes:      Pupils: Pupils are equal, round, and reactive to light.   Cardiovascular:      Rate and Rhythm: Tachycardia present. Rhythm irregular.      Heart sounds: Normal heart sounds.   Pulmonary:      Breath sounds: Decreased air movement present. Examination of the right-upper field reveals decreased breath sounds and wheezing. Examination of the left-upper field reveals decreased breath sounds and wheezing. Examination of the right-middle field reveals decreased breath sounds. Examination of the left-middle field reveals decreased breath sounds. Examination of the right-lower field reveals decreased breath sounds. Examination of the left-lower field reveals decreased breath sounds. Decreased breath sounds, wheezing and rhonchi present.      Comments: Crackles scattered  Abdominal:      General: There is distension.      Palpations: There is no mass.      Tenderness: There is no abdominal tenderness. There is no guarding.   Musculoskeletal:         General: Swelling  present.      Cervical back: Neck supple.   Skin:     General: Skin is cool.      Coloration: Skin is pale.   Neurological:      Deep Tendon Reflexes: Babinski sign present on the left side.      Comments: Sedated and intubated   Psychiatric:      Comments: Sedated and intubated         Significant Labs:   All pertinent labs within the past 24 hours have been reviewed.  CBC:   Recent Labs   Lab 12/27/21  0345   WBC 5.81   HGB 8.1*   HCT 26.1*   PLT 97*     CMP:   Recent Labs   Lab 12/27/21  0345 12/28/21  0719    143   K 3.0* 4.0    110   CO2 27 25    110   BUN 21 18   CREATININE 1.9* 1.4   CALCIUM 7.6* 7.8*   PROT 5.0* 5.0*   ALBUMIN 1.8* 1.6*   BILITOT 0.7 0.5   ALKPHOS 108 111   * 122*   * 98*   ANIONGAP 7* 8   EGFRNONAA 35.9* 52.0*     Lactic Acid:   No results for input(s): LACTATE in the last 48 hours.    Significant Imaging: I have reviewed all pertinent imaging results/findings within the past 24 hours.

## 2021-12-28 NOTE — CARE UPDATE
12/28/21 1757   PRE-TX-O2   O2 Device (Oxygen Therapy) ventilator   Oxygen Concentration (%) 100   SpO2 95 %   Pulse (!) 118   Resp (!) 26   Vent Select   Charged w/in last 24h YES   Preset Conventional Ventilator Settings   Ventilation Type VC   Vent Mode A/C   Set Rate 18 BPM   Vt Set 450 mL   PEEP/CPAP 12 cmH20   Peak Flow 60 L/min   Plateau Set/Insp. Hold (sec) 0   I-Trigger Type  V-TRIG   Trigger Sensitivity Flow/I-Trigger 3 L/min   Patient Ventilator Parameters   Resp Rate Total 25 br/min   Peak Airway Pressure 37 cmH2O   Mean Airway Pressure 21 cmH20   Plateau Pressure 21 cmH20   Exhaled Vt 391 mL   Total Ve 11.6 mL   I:E Ratio Measured 1:1.60   Auto PEEP 0 cmH20   Conventional Ventilator Alarms   Ve High Alarm 24 L/min   Ve Low Alarm 4 L/min   Resp Rate High Alarm 40 br/min   Press High Alarm 60 cmH2O   Apnea Rate 26   Apnea Volume (mL) 450 mL   Apnea Oxygen Concentration  100   Apnea Flow Rate (L/min) 60   T Apnea 10 sec(s)   1745-(notified by Nusrat ELIZABETH that Ett was at 22 cm) Ett found at 22 cm. Ett advanced to 24 cm as previously secured at.    1758-RT at bedside. Spo2 was in low 80's. Pt ambu bagged and lavaged and suctioned three times. Retrieved large amounts white sputum. Pt placed back on vent with Fio2 to 100% and peep +12. Spo2 improved to 91-95%

## 2021-12-29 NOTE — PLAN OF CARE
Patient remains intubated. Levo is at 0.03 and propofol is at 50, Precedex is at .8. Uneventful night

## 2021-12-29 NOTE — PLAN OF CARE
Remains on vent with sedation - fio2 decreased to 80 percent today and is maintaining sat - blood pressure labile - increased levophed to 0.07 to maintain map of 65 - no signs of distress or pain - two new antiobiotics started today - tolerating feedings - will continue to monitor

## 2021-12-29 NOTE — PROGRESS NOTES
[facility] Medicine  Progress Note    Patient Name: Neymar Patel  MRN: [id number]  Patient Class: IP- Inpatient   Admission Date: 12/21/2021  Length of Stay: 7 days  Attending Physician: Raul Gan III, MD  Primary Care Provider: Landen Brown MD        Subjective:     Principal Problem:Acute on chronic respiratory failure with hypoxia        HPI:  Perpetual History  Patient is a 66-year-old male with a history of alcohol abuse atrial fibrillation hypertension peripheral artery disease fatty liver disease who recently had an episode of bronchitis and was treated in the outpatient setting.  The patient then began in having increasing weakness and lightheadedness and noticed his heart rate was elevated.  He presented to the emergency department and was found to have elevated alcohol levels and a heart rate in the 130s to 140s.  The patient is currently in AFib with RVR and has been started on a Cardizem drip by the emergency department.         Overview/Hospital Course:  12/23/21 [facility] patient reports that he is feeling better today, was able to get rest last night. Heart rate still elevated will adjust medications and monitor. Patient with hallucinations last night, patient is appropriate this am  12/24/21:   Rapid Response Call #1: Blood pressure initially low and patient heart rate in the 50s sinus Nakul.  Glucose within normal limits.  Fluid bolus 1 L was given with improvement in blood pressure.  Advised to hold any medication that will lower patient's heart rate.  Patient also complains of shortness of breath but according to respiratory therapist, patient did not take his breathing treatment because he did not think he needed it.  Patient wheezy expiratory and saturating in the high 80s on room air. not on any antibiotics currently so will start.  Labs show that patient has TANIKA possibly due to dehydration.  Will resuscitate with fluid.  Will recheck basic blood work and continue  to hydrate.  Treat accordingly will at started if patient is not already on it.  Patient more alert and oriented x4 moving all extremities with improvement of blood pressure into the 90s and heart rate remains in the 50s sinus Nakul saturating 94% on 2 L  Rapid Response Call #2:Rapid response team called again with subsequent intubation and transfer to MICU.   12/25/21: On minimal sedation and pressor support in the MICU.   12/26/21: Able to wean off of all pressor support, pH of 5 while on bicarb on AM labs. Will get rid of bicarb drip and switch to gentle IV hydration with LR. SBT tomorrow?  12/27/21:  Events over the weekend noted.  Patient is now intubated on the ventilator.  Patient was admitted with pneumonia atrial fibrillation and alcohol abuse and now also has a enterococcal urinary tract infection with sensitivities pending.  Patient has required to sedatives for agitation and combativeness.  12/28/21:  Patient's ABG has been reviewed.  Patient has a mild respiratory alkalosis and his respiratory rate has been changed.  We have encouraged respiratory and nursing to wean his sedation and titrate his tube feeds.  The patient's chest x-ray also seems like he is slightly volume overloaded therefore will discontinue IV fluids and give low dosing of Lasix.  Patient has enterococcal infection was ampicillin sensitive therefore Rocephin should be adequate.  Will change Zithromax to Levaquin.  12/29/21 FM:  Patient had an episode of desaturation yesterday.  Respiratory was able to deep suction and several mucus plugs and thick secretions were removed.  I will broaden patient antibiotics today to Zosyn and otherwise plan on increasing his diuretics today.  Patient has bilateral pulmonary infiltrates consistent with pulmonary edema.      Interval History:     Review of Systems   Unable to perform ROS: Severe respiratory distress     Objective:     Vital Signs (Most Recent):  Temp: 97.5 °F (36.4 °C) (12/29/21  0730)  Pulse: 105 (12/29/21 0845)  Resp: 19 (12/29/21 0845)  BP: 99/62 (12/29/21 0830)  SpO2: 97 % (12/29/21 0845) Vital Signs (24h Range):  Temp:  [97.4 °F (36.3 °C)-98.6 °F (37 °C)] 97.5 °F (36.4 °C)  Pulse:  [] 105  Resp:  [18-30] 19  SpO2:  [80 %-100 %] 97 %  BP: ()/(50-87) 99/62     Weight: 77.1 kg (170 lb)  Body mass index is 24.39 kg/m².    Intake/Output Summary (Last 24 hours) at 12/29/2021 0849  Last data filed at 12/29/2021 0600  Gross per 24 hour   Intake 1406 ml   Output 2325 ml   Net -919 ml      Physical Exam  Constitutional:       General: He is in acute distress.      Appearance: He is ill-appearing.   HENT:      Head: Normocephalic and atraumatic.      Nose: Nose normal.      Mouth/Throat:      Comments: ET tube in place, free of secretions,   Eyes:      Pupils: Pupils are equal, round, and reactive to light.   Cardiovascular:      Rate and Rhythm: Tachycardia present. Rhythm irregular.      Heart sounds: Normal heart sounds.   Pulmonary:      Breath sounds: Decreased air movement present. Examination of the right-upper field reveals decreased breath sounds and wheezing. Examination of the left-upper field reveals decreased breath sounds and wheezing. Examination of the right-middle field reveals decreased breath sounds. Examination of the left-middle field reveals decreased breath sounds. Examination of the right-lower field reveals decreased breath sounds. Examination of the left-lower field reveals decreased breath sounds. Decreased breath sounds, wheezing and rhonchi present.      Comments: Crackles scattered  Abdominal:      General: There is distension.      Palpations: There is no mass.      Tenderness: There is no abdominal tenderness. There is no guarding.   Musculoskeletal:         General: Swelling present.      Cervical back: Neck supple.   Skin:     General: Skin is cool.      Coloration: Skin is pale.   Neurological:      Deep Tendon Reflexes: Babinski sign present on the  left side.      Comments: Sedated and intubated   Psychiatric:      Comments: Sedated and intubated         Significant Labs:   All pertinent labs within the past 24 hours have been reviewed.  CBC:   Recent Labs   Lab 12/28/21 0719 12/29/21  0520   WBC 4.38 6.56   HGB 8.1* 8.1*   HCT 26.2* 26.3*   PLT 66* 124*     CMP:   Recent Labs   Lab 12/28/21 0719 12/29/21  0520    143   K 4.0 4.0    109   CO2 25 28    104   BUN 18 25*   CREATININE 1.4 1.7*   CALCIUM 7.8* 7.9*   PROT 5.0* 5.3*   ALBUMIN 1.6* 1.7*   BILITOT 0.5 0.4   ALKPHOS 111 117   * 77*   ALT 98* 74*   ANIONGAP 8 6*   EGFRNONAA 52.0* 41.1*     Lactic Acid:   No results for input(s): LACTATE in the last 48 hours.    Significant Imaging: I have reviewed all pertinent imaging results/findings within the past 24 hours.      Assessment/Plan:      * Acute on chronic respiratory failure with hypoxia  Increase diuretics today.      Severe sepsis  Cont. Abx, improving slowly.  Will change rocephin to zosyn today.      UTI (urinary tract infection)  See abx changes.      DVT prophylaxis  Hold eliquis for now in setting of TANIKA.       Atrial fibrillation with rapid ventricular response  Controlled with meds.    Tobacco user  PRN Nicotine patch    Alcohol dependence  [facility] Protocol      Elevated LFTs  Elevated, likely in the setting of hypoperfusion.   - Continue to follow liver enzymes daily. Improving.         TANIKA (acute kidney injury)  All 2nd acute illness/volume/hypotension        VTE Risk Mitigation (From admission, onward)         Ordered     enoxaparin injection 40 mg  Daily         12/28/21 0816     IP VTE HIGH RISK PATIENT  Once         12/21/21 2233     Reason for No Pharmacological VTE Prophylaxis  Once        Question:  Reasons:  Answer:  Already adequately anticoagulated on oral Anticoagulants    12/21/21 2233                Discharge Planning   DAXA:      Code Status: Full Code   Is the patient medically ready for discharge?:      Reason for patient still in hospital (select all that apply): Patient unstable  Discharge Plan A: Home with family,Home Health                  Raul Gan III, MD  Department of Hospital Medicine   [place] - Intensive [facility] Department

## 2021-12-29 NOTE — PHYSICIAN QUERY
PT Name: Neymar Patel  MR #: [id number]    DOCUMENTATION CLARIFICATION     CDS/: HONEY Tse, RN, CDS               Contact information:[email or web address]      This form is a permanent document in the medical record.     Query Date: December 29, 2021    By submitting this query, we are merely seeking further clarification of documentation. Please utilize your independent clinical judgment when addressing the question(s) below.    The Medical Record contains the following:   Indicators Supporting Clinical Findings Location in Medical Record   X Atrial Fibrillation  Afib with RVR with med non compliance     Past medical history of atrial fibrillation     Atrial fibrillation with RVR     Presented to hospital for shortness of breath and dizziness.  Continues to have atrial fibrillation with RVR    ED, Dr. Colunga, 12/21     H&P, Dr. Gan, III, 12/22         Cards, GERALD Rowley/Dr. Kinney, 12/23          X EKG Results  EKG evaluated by me.  Atrial flutter with a 2-1 av conduction, rate of 140     Telemetry:  Atrial fibrillation with rvr (rate 134 bpm)     Telemetry:  Atrial fibrillation with RVR, rate 115 bpm      EKG interpretation:  Baseline Artifact   Atrial flutter with slow ventricular response   Low voltage QRS   Prolonged QT   Abnormal ECG   When compared with ECG of 21-DEC-2021 16:25,   Ventricular rate slower by 92 bpm   Decrease in QRS voltage      NSR on tele     Rhythm: NSR in and out of atrial fibrillation      Rhythm: Sinus tachycardia     Rhythm: Atrial fibrillation     ED, Dr. Colunga, 12/21       [place], GERALD Rowley/Dr. Kinney, 12/22     GOMEZ Núñez FNP/Dr. Kinney, 12/24     EKG, 12/24                     , Dr. Bustillo, 12/25     Flow sheet, 12/28 at 1623     Flow sheet, 12/28 at 2000     Flow sheet, 12/29 at 0718    Medication     X Treatment  Improved rate with PO meds with attempting control with cardizem gtt     HR and rhythm much better controlled with amiodarone. Switch to oral  diltiazem      Start oral diltiazem and add amiodarone  Continue home eliquis     ED, Dr. Colunga, 12/21       GOMEZ Núñez FNP/Dr. Kinney, 12/23   X Other  Irregularly irregular rhythm, tachycardic     ED, Dr. Colunga, 12/21       The clinical guidelines noted are only a system guideline. It does not replace the provider's clinical judgment.    Provider, please specify the type of Atrial Fibrillation (AF):    [  ] Paroxysmal - defined as AF that terminates spontaneously or with intervention within < 7 days of onset, episodes may recur with variable frequency   [  ] Long-standing persistent - AF that has lasted for > 12 months    [ x ] Other Persistent - defined as AF that sustained for ?7 days; Episodes often require pharmacologic or electrical cardioversion to restore sinus rhythm   [  ] Chronic, not otherwise specified      [  ] Unspecified Atrial Fibrillation   [  ] Other cardiac diagnosis (please specify): ____________   [  ] Clinically Undetermined             Please document in your progress notes daily for the duration of treatment until resolved, and include in your discharge summary.    Reference:  SEYMOUR Alejandra MD. (2020, September 14). Overview of atrial fibrillation (RADAMES Hussein MD & DARREN Allen MD, Eds.). Retrieved October 22, 2020, from [email or web address]&source=search_result&selectedTitle=1~150&usage_type=default&display_rank=1    Form No. 08041

## 2021-12-29 NOTE — SUBJECTIVE & OBJECTIVE
Interval History:     Review of Systems   Unable to perform ROS: Severe respiratory distress     Objective:     Vital Signs (Most Recent):  Temp: 97.5 °F (36.4 °C) (12/29/21 0730)  Pulse: 105 (12/29/21 0845)  Resp: 19 (12/29/21 0845)  BP: 99/62 (12/29/21 0830)  SpO2: 97 % (12/29/21 0845) Vital Signs (24h Range):  Temp:  [97.4 °F (36.3 °C)-98.6 °F (37 °C)] 97.5 °F (36.4 °C)  Pulse:  [] 105  Resp:  [18-30] 19  SpO2:  [80 %-100 %] 97 %  BP: ()/(50-87) 99/62     Weight: 77.1 kg (170 lb)  Body mass index is 24.39 kg/m².    Intake/Output Summary (Last 24 hours) at 12/29/2021 0849  Last data filed at 12/29/2021 0600  Gross per 24 hour   Intake 1406 ml   Output 2325 ml   Net -919 ml      Physical Exam  Constitutional:       General: He is in acute distress.      Appearance: He is ill-appearing.   HENT:      Head: Normocephalic and atraumatic.      Nose: Nose normal.      Mouth/Throat:      Comments: ET tube in place, free of secretions,   Eyes:      Pupils: Pupils are equal, round, and reactive to light.   Cardiovascular:      Rate and Rhythm: Tachycardia present. Rhythm irregular.      Heart sounds: Normal heart sounds.   Pulmonary:      Breath sounds: Decreased air movement present. Examination of the right-upper field reveals decreased breath sounds and wheezing. Examination of the left-upper field reveals decreased breath sounds and wheezing. Examination of the right-middle field reveals decreased breath sounds. Examination of the left-middle field reveals decreased breath sounds. Examination of the right-lower field reveals decreased breath sounds. Examination of the left-lower field reveals decreased breath sounds. Decreased breath sounds, wheezing and rhonchi present.      Comments: Crackles scattered  Abdominal:      General: There is distension.      Palpations: There is no mass.      Tenderness: There is no abdominal tenderness. There is no guarding.   Musculoskeletal:         General: Swelling  present.      Cervical back: Neck supple.   Skin:     General: Skin is cool.      Coloration: Skin is pale.   Neurological:      Deep Tendon Reflexes: Babinski sign present on the left side.      Comments: Sedated and intubated   Psychiatric:      Comments: Sedated and intubated         Significant Labs:   All pertinent labs within the past 24 hours have been reviewed.  CBC:   Recent Labs   Lab 12/28/21  0719 12/29/21  0520   WBC 4.38 6.56   HGB 8.1* 8.1*   HCT 26.2* 26.3*   PLT 66* 124*     CMP:   Recent Labs   Lab 12/28/21  0719 12/29/21  0520    143   K 4.0 4.0    109   CO2 25 28    104   BUN 18 25*   CREATININE 1.4 1.7*   CALCIUM 7.8* 7.9*   PROT 5.0* 5.3*   ALBUMIN 1.6* 1.7*   BILITOT 0.5 0.4   ALKPHOS 111 117   * 77*   ALT 98* 74*   ANIONGAP 8 6*   EGFRNONAA 52.0* 41.1*     Lactic Acid:   No results for input(s): LACTATE in the last 48 hours.    Significant Imaging: I have reviewed all pertinent imaging results/findings within the past 24 hours.

## 2021-12-29 NOTE — PROGRESS NOTES
Pharmacist Renal Dose Adjustment Note    Neymar Patel is a 66 y.o. male being treated with the medication Levofloxacin.     Patient Data:    Vital Signs (Most Recent):  Temp: 97.5 °F (36.4 °C) (12/29/21 0730)  Pulse: 103 (12/29/21 0930)  Resp: 19 (12/29/21 0930)  BP: (!) 75/57 (12/29/21 0930)  SpO2: 98 % (12/29/21 0930)   Vital Signs (72h Range):  Temp:  [93.56 °F (34.2 °C)-98.6 °F (37 °C)]   Pulse:  []   Resp:  [18-32]   BP: ()/()   SpO2:  [80 %-100 %]      Recent Labs   Lab 12/27/21  0345 12/28/21  0719 12/29/21  0520   CREATININE 1.9* 1.4 1.7*     Serum creatinine: 1.7 mg/dL (H) 12/29/21 0520  Estimated creatinine clearance: 44.1 mL/min (A)    Medication:Levofloxacin dose: 500mg frequency daily will be changed to medication:Levofloxacin dose:750mg frequency:every 48 hours.     Pharmacist's Name: HANNAH LEMA  Pharmacist's Extension: [phone]

## 2021-12-29 NOTE — EICU
Rounding (Video Assessment):  Yes    Intervention Initiated From:  [facility] / EICU      Comments: pt remains on fio2 of 100% on vent. Tolerating well. Precedex, Propofol, and Levo drips infusing.  VSS. NAD observed.

## 2021-12-29 NOTE — CARE UPDATE
Dr lj estes in assessing pt, labs, and medications - fio2 decreased to 80 percent by dr lj estes - will continue to monitor

## 2021-12-29 NOTE — PROGRESS NOTES
[place] - Intensive Care  Adult Nutrition  Progress Note    SUMMARY       Recommendations    Recommendation/Intervention:   1. EN Recs: Intiate Vital HP @ 10 ml/hr increasing q4-6h as tolerated to goal rate of 55 ml/hr.               -With propofol infusing at 23.1 ml/hr,this will provide a total of 1929 kcal (108% EEN), 115 gm protein (100% EPN), and 1,103 ml water.          2. Rec'd FWF of 30 ml q4-6h to prevent clogging.          3. RD to monitor and make recs accordingly.    Goals: Goal rate by RD f/u.  Nutrition Goal Status: new  Communication of RD Recs: reviewed with physician    Assessment and Plan  Nutrition Problem  Inadequate protein-energy intake     Related to (etiology):   NPO/vent status     Signs and Symptoms (as evidenced by):   EEN < 25%      Interventions/Recommendations (treatment strategy):  1. EN Recs: Intiate Vital HP @ 10 ml/hr increasing q4-6h as tolerated to goal rate of 55 ml/hr.               -With propofol infusing at 23.1 ml/hr,this will provide a total of 1929 kcal (108% EEN), 115 gm protein (100% EPN), and 1,103 ml water.     2. Rec'd FWF of 30 ml q4-6h to prevent clogging.      3. RD to monitor and make recs accordingly.     Nutrition Diagnosis Status:   New  Reason for Assessment    Reason For Assessment: RD follow-up  Diagnosis: pulmonary disease  Relevant Medical History: Tolerating TF at 40 ml/hr. Continue to advance to goal of 55. Propofol @ 23.1 ml/hr  General Information Comments: RD triggered for intubation. Intubated on 12/24. Reached out to MD for tube feeding initiation- MD approved. Will start trickle feedings today. No longer requiring pressors support. Propofol @ 13.9 ml/hr (366 kcal).  Nutrition Discharge Planning: TBD    Nutrition Risk Screen    Nutrition Risk Screen: tube feeding or parenteral nutrition    Nutrition/Diet History    Food Allergies: NKFA  Factors Affecting Nutritional Intake: NPO,on mechanical ventilation    Anthropometrics    Temp: 98.6 °F (37  "°C)  Height: 5' 10" (177.8 cm)  Height (inches): 70 in  Weight Method: Standard Scale  Weight: 77.1 kg (170 lb)  Weight (lb): 170 lb  Ideal Body Weight (IBW), Male: 166 lb  % Ideal Body Weight, Male (lb): 102.41 %  BMI (Calculated): 24.4  BMI Grade: 18.5-24.9 - normal       Lab/Procedures/Meds    Pertinent Labs Reviewed: reviewed  Pertinent Medications Reviewed: reviewed    Physical Findings/Assessment         Estimated/Assessed Needs    Weight Used For Calorie Calculations: 77.1 kg (170 lb)     Energy Need Method: [facility]  Protein Requirements:  (1.2-1.5 gm/kg CBW)  Weight Used For Protein Calculations: 77.1 kg (170 lb)     Estimated Fluid Requirement Method: RDA Method            Nutrition Prescription Ordered    Current Diet Order: NPO  Current Nutrition Support Formula Ordered: Other (Comment) (Vital HP)  Current Nutrition Support Rate Ordered: 40 (ml)    Evaluation of Received Nutrient/Fluid Intake    Enteral Calories (kcal): 960  Enteral Protein (gm): 84  Enteral (Free Water) Fluid (mL): 802  % Kcal Needs: 88% with propofol @ 23.1 ml/hr  % Protein Needs: 91%  Energy Calories Required: not meeting needs  Protein Required: not meeting needs  Fluid Required: not meeting needs  % Intake of Estimated Energy Needs: 75 - 100 %  % Meal Intake: NPO    Nutrition Risk    Level of Risk/Frequency of Follow-up: moderate - high     Monitor and Evaluation    Food and Nutrient Intake: enteral nutrition intake  Food and Nutrient Adminstration: enteral and parenteral nutrition administration  Anthropometric Measurements: weight change,weight  Biochemical Data, Medical Tests and Procedures: electrolyte and renal panel,glucose/endocrine profile,lipid profile,inflammatory profile  Nutrition-Focused Physical Findings: overall appearance     Nutrition Follow-Up    RD Follow-up?: Yes    "

## 2021-12-29 NOTE — RESPIRATORY THERAPY
12/29/21 0819   PRE-TX-O2   Oxygen Concentration (%) (S)  80  (Changed at this time per Dr. Gan.)

## 2021-12-29 NOTE — EICU
Rounding (Video Assessment):  Yes      Comments: EICU rounding done. Pt intubated on vent, appears to be resting. Chart and meds reviewed.  VS stable.

## 2021-12-30 PROBLEM — J18.9 PNA (PNEUMONIA): Status: ACTIVE | Noted: 2021-01-01

## 2021-12-30 NOTE — CARE UPDATE
Dr calhoun iii in assessing pt, labs, and medications - is aware of elevated temp last night and today - new orders noted

## 2021-12-30 NOTE — EICU
Rounding (Video Assessment):  Yes      Comments: EICU rounding done. Pt on vent, appears to be resting and comfortable.  Chart and meds reviewed.  VS stable.

## 2021-12-30 NOTE — NURSING
Patient noted to be tachycardiac on the monitor. Respiratory rate also up. Axillary temp was found to be 101.5. spoke with Jamila Mendez NP thru the answering service. Advised to give a dose of tylenol and she would discuss with Dr. Gan in the morning.

## 2021-12-30 NOTE — PLAN OF CARE
Pt remains on vent with sedation - max temp today 102.4 - new antibiotics started this am - pt now on 4 antibiotics - temp decreased this pm - fio2 decreased today now at 65 percent and tolerating changes - remains on small dose of levophed for blood pressure control - resp status monitored - will continue to monitor pt - no signs of pain or distress noted

## 2021-12-30 NOTE — EICU
Rounding (Video Assessment):  Yes  Comments:  Video assessment completed.   Continues on vent support (AC 18, , PEEP 12, FiO2 80%), sedated on Precedex & Propofol:  Norepinephrine IV drip also in progress, 's, /76, RR 22, POx 100%.

## 2021-12-30 NOTE — SUBJECTIVE & OBJECTIVE
Interval History:     Review of Systems   Unable to perform ROS: Intubated     Objective:     Vital Signs (Most Recent):  Temp: (!) 102.4 °F (39.1 °C) (12/30/21 0702)  Pulse: (!) 119 (12/30/21 0745)  Resp: 19 (12/30/21 0745)  BP: (!) 105/59 (12/30/21 0702)  SpO2: 100 % (12/30/21 0745) Vital Signs (24h Range):  Temp:  [97.1 °F (36.2 °C)-102.4 °F (39.1 °C)] 102.4 °F (39.1 °C)  Pulse:  [103-133] 119  Resp:  [18-26] 19  SpO2:  [86 %-100 %] 100 %  BP: ()/(42-89) 105/59     Weight: 77.1 kg (170 lb)  Body mass index is 24.39 kg/m².    Intake/Output Summary (Last 24 hours) at 12/30/2021 0816  Last data filed at 12/30/2021 0600  Gross per 24 hour   Intake 3683.37 ml   Output 4900 ml   Net -1216.63 ml      Physical Exam  Constitutional:       General: He is in acute distress.      Appearance: He is ill-appearing.   HENT:      Head: Normocephalic and atraumatic.      Nose: Nose normal.      Mouth/Throat:      Comments: ET tube in place  Eyes:      Pupils: Pupils are equal, round, and reactive to light.   Cardiovascular:      Rate and Rhythm: Tachycardia present. Rhythm irregular.      Heart sounds: Normal heart sounds.   Pulmonary:      Breath sounds: No stridor. Rhonchi and rales present. No wheezing.      Comments: Crackles scattered  Abdominal:      General: There is distension.      Palpations: There is no mass.      Tenderness: There is no abdominal tenderness. There is no guarding.   Musculoskeletal:         General: Swelling present.      Cervical back: Neck supple.   Skin:     General: Skin is cool.      Coloration: Skin is pale.   Neurological:      Deep Tendon Reflexes: Babinski sign present on the left side.      Comments: Sedated and intubated   Psychiatric:      Comments: Sedated and intubated         Significant Labs:   All pertinent labs within the past 24 hours have been reviewed.  CBC:   Recent Labs   Lab 12/29/21  0520 12/30/21  0355   WBC 6.56 9.27   HGB 8.1* 8.7*   HCT 26.3* 29.3*   * 159      CMP:   Recent Labs   Lab 12/29/21  0520 12/30/21  0355    140   K 4.0 4.0    103   CO2 28 30*    119*   BUN 25* 28*   CREATININE 1.7* 1.7*   CALCIUM 7.9* 8.3*   PROT 5.3* 6.1   ALBUMIN 1.7* 1.8*   BILITOT 0.4 0.5   ALKPHOS 117 124   AST 77* 51*   ALT 74* 56*   ANIONGAP 6* 7*   EGFRNONAA 41.1* 41.1*     Lactic Acid:   No results for input(s): LACTATE in the last 48 hours.    Significant Imaging: I have reviewed all pertinent imaging results/findings within the past 24 hours.

## 2021-12-30 NOTE — PLAN OF CARE
Patient remains intubated and sedated. He became febrile during the night, provider notified.Temp 99.5

## 2021-12-30 NOTE — PROGRESS NOTES
[facility] Medicine  Progress Note    Patient Name: Neymar Patel  MRN: [id number]  Patient Class: IP- Inpatient   Admission Date: 12/21/2021  Length of Stay: 8 days  Attending Physician: Raul Gan III, MD  Primary Care Provider: Landen Brown MD        Subjective:     Principal Problem:Acute on chronic respiratory failure with hypoxia        HPI:  Perpetual History  Patient is a 66-year-old male with a history of alcohol abuse atrial fibrillation hypertension peripheral artery disease fatty liver disease who recently had an episode of bronchitis and was treated in the outpatient setting.  The patient then began in having increasing weakness and lightheadedness and noticed his heart rate was elevated.  He presented to the emergency department and was found to have elevated alcohol levels and a heart rate in the 130s to 140s.  The patient is currently in AFib with RVR and has been started on a Cardizem drip by the emergency department.         Overview/Hospital Course:  12/23/21 [facility] patient reports that he is feeling better today, was able to get rest last night. Heart rate still elevated will adjust medications and monitor. Patient with hallucinations last night, patient is appropriate this am  12/24/21:   Rapid Response Call #1: Blood pressure initially low and patient heart rate in the 50s sinus Nakul.  Glucose within normal limits.  Fluid bolus 1 L was given with improvement in blood pressure.  Advised to hold any medication that will lower patient's heart rate.  Patient also complains of shortness of breath but according to respiratory therapist, patient did not take his breathing treatment because he did not think he needed it.  Patient wheezy expiratory and saturating in the high 80s on room air. not on any antibiotics currently so will start.  Labs show that patient has TANIKA possibly due to dehydration.  Will resuscitate with fluid.  Will recheck basic blood work and continue  to hydrate.  Treat accordingly will at started if patient is not already on it.  Patient more alert and oriented x4 moving all extremities with improvement of blood pressure into the 90s and heart rate remains in the 50s sinus Nakul saturating 94% on 2 L  Rapid Response Call #2:Rapid response team called again with subsequent intubation and transfer to MICU.   12/25/21: On minimal sedation and pressor support in the MICU.   12/26/21: Able to wean off of all pressor support, pH of 5 while on bicarb on AM labs. Will get rid of bicarb drip and switch to gentle IV hydration with LR. SBT tomorrow?  12/27/21:  Events over the weekend noted.  Patient is now intubated on the ventilator.  Patient was admitted with pneumonia atrial fibrillation and alcohol abuse and now also has a enterococcal urinary tract infection with sensitivities pending.  Patient has required to sedatives for agitation and combativeness.  12/28/21:  Patient's ABG has been reviewed.  Patient has a mild respiratory alkalosis and his respiratory rate has been changed.  We have encouraged respiratory and nursing to wean his sedation and titrate his tube feeds.  The patient's chest x-ray also seems like he is slightly volume overloaded therefore will discontinue IV fluids and give low dosing of Lasix.  Patient has enterococcal infection was ampicillin sensitive therefore Rocephin should be adequate.  Will change Zithromax to Levaquin.  12/29/21 FM:  Patient had an episode of desaturation yesterday.  Respiratory was able to deep suction and several mucus plugs and thick secretions were removed.  I will broaden patient antibiotics today to Zosyn and otherwise plan on increasing his diuretics today.  Patient has bilateral pulmonary infiltrates consistent with pulmonary edema.  12/20/21 FM:  Patient had a elevated temperature through the night.  Rocephin was changed to Zosyn yesterday and consideration of drug fever should be entertained.  Will add medicine for  MRSA infection today as well as antifungals.  Patient's chest x-ray seems to be improved with diuretics.  Continue diuretics cautiously.      Interval History:     Review of Systems   Unable to perform ROS: Intubated     Objective:     Vital Signs (Most Recent):  Temp: (!) 102.4 °F (39.1 °C) (12/30/21 0702)  Pulse: (!) 119 (12/30/21 0745)  Resp: 19 (12/30/21 0745)  BP: (!) 105/59 (12/30/21 0702)  SpO2: 100 % (12/30/21 0745) Vital Signs (24h Range):  Temp:  [97.1 °F (36.2 °C)-102.4 °F (39.1 °C)] 102.4 °F (39.1 °C)  Pulse:  [103-133] 119  Resp:  [18-26] 19  SpO2:  [86 %-100 %] 100 %  BP: ()/(42-89) 105/59     Weight: 77.1 kg (170 lb)  Body mass index is 24.39 kg/m².    Intake/Output Summary (Last 24 hours) at 12/30/2021 0816  Last data filed at 12/30/2021 0600  Gross per 24 hour   Intake 3683.37 ml   Output 4900 ml   Net -1216.63 ml      Physical Exam  Constitutional:       General: He is in acute distress.      Appearance: He is ill-appearing.   HENT:      Head: Normocephalic and atraumatic.      Nose: Nose normal.      Mouth/Throat:      Comments: ET tube in place  Eyes:      Pupils: Pupils are equal, round, and reactive to light.   Cardiovascular:      Rate and Rhythm: Tachycardia present. Rhythm irregular.      Heart sounds: Normal heart sounds.   Pulmonary:      Breath sounds: No stridor. Rhonchi and rales present. No wheezing.      Comments: Crackles scattered  Abdominal:      General: There is distension.      Palpations: There is no mass.      Tenderness: There is no abdominal tenderness. There is no guarding.   Musculoskeletal:         General: Swelling present.      Cervical back: Neck supple.   Skin:     General: Skin is cool.      Coloration: Skin is pale.   Neurological:      Deep Tendon Reflexes: Babinski sign present on the left side.      Comments: Sedated and intubated   Psychiatric:      Comments: Sedated and intubated         Significant Labs:   All pertinent labs within the past 24 hours have  been reviewed.  CBC:   Recent Labs   Lab 12/29/21  0520 12/30/21  0355   WBC 6.56 9.27   HGB 8.1* 8.7*   HCT 26.3* 29.3*   * 159     CMP:   Recent Labs   Lab 12/29/21  0520 12/30/21  0355    140   K 4.0 4.0    103   CO2 28 30*    119*   BUN 25* 28*   CREATININE 1.7* 1.7*   CALCIUM 7.9* 8.3*   PROT 5.3* 6.1   ALBUMIN 1.7* 1.8*   BILITOT 0.4 0.5   ALKPHOS 117 124   AST 77* 51*   ALT 74* 56*   ANIONGAP 6* 7*   EGFRNONAA 41.1* 41.1*     Lactic Acid:   No results for input(s): LACTATE in the last 48 hours.    Significant Imaging: I have reviewed all pertinent imaging results/findings within the past 24 hours.      Assessment/Plan:      * Acute on chronic respiratory failure with hypoxia  Wean FIO2!    PNA (pneumonia)        Severe sepsis  Abx modified today, considering ? Drug fever with zosyn if persisting.      UTI (urinary tract infection)  See abx      DVT prophylaxis  Hold eliquis for now in setting of TANIKA.       Atrial fibrillation with rapid ventricular response  Controlled with meds.    Tobacco user  PRN Nicotine patch    Alcohol dependence  CIWA Protocol      Elevated LFTs  Elevated, likely in the setting of hypoperfusion.   - Continue to follow liver enzymes. Improving.         TANIKA (acute kidney injury)  Improved.        VTE Risk Mitigation (From admission, onward)         Ordered     enoxaparin injection 40 mg  Daily         12/28/21 0816     IP VTE HIGH RISK PATIENT  Once         12/21/21 2233     Reason for No Pharmacological VTE Prophylaxis  Once        Question:  Reasons:  Answer:  Already adequately anticoagulated on oral Anticoagulants    12/21/21 2233                Discharge Planning   DAXA:      Code Status: Full Code   Is the patient medically ready for discharge?:     Reason for patient still in hospital (select all that apply): Patient unstable  Discharge Plan A: Home with family,Home Health                  Raul Gan III, MD  Department of Hospital Medicine   [place]  - Intensive Care

## 2021-12-30 NOTE — ASSESSMENT & PLAN NOTE
<<-----Click on this checkbox to enter Procedure Hold eliquis for now in setting of TANIKA.      Angioplasty of both iliac arteries with stent placement  05/31/2018  Angioplasty and stent placement in B/L External Iliac arteries  Active  LORNE  Aortogram  05/31/2018    Active  LORNE

## 2021-12-30 NOTE — ASSESSMENT & PLAN NOTE
Elevated, likely in the setting of hypoperfusion.   - Continue to follow liver enzymes. Improving.

## 2021-12-31 NOTE — PLAN OF CARE
Recommendations     Recommendation/Intervention:   1. EN Recs: Vital HP @ 55 ml/hr. With propofol infusing at 23.1 ml/hr,this will provide a total of 1929 kcal (108% EEN), 115 gm protein (100% EPN), and 1,103 ml water.           2. Rec'd FWF of 30 ml q4-6h to prevent clogging.          3. RD to monitor and make recs accordingly.     Goals: Goal rate by RD f/u.  Nutrition Goal Status: new  Communication of RD Recs: reviewed with physician          Eleazar Jones, DELBERT, LDN

## 2021-12-31 NOTE — ASSESSMENT & PLAN NOTE
Cr is 1.5 today, improving.   Working diagnosis is still pre-renal for now.   - Continue gentle diuresis

## 2021-12-31 NOTE — PROGRESS NOTES
[facility] Medicine  Progress Note    Patient Name: Neymar Patel  MRN: [id number]  Patient Class: IP- Inpatient   Admission Date: 12/21/2021  Length of Stay: 9 days  Attending Physician: Raul Gan III, MD  Primary Care Provider: Landen Brown MD        Subjective:     Principal Problem:Acute on chronic respiratory failure with hypoxia        HPI:  Perpetual History  Patient is a 66-year-old male with a history of alcohol abuse atrial fibrillation hypertension peripheral artery disease fatty liver disease who recently had an episode of bronchitis and was treated in the outpatient setting.  The patient then began in having increasing weakness and lightheadedness and noticed his heart rate was elevated.  He presented to the emergency department and was found to have elevated alcohol levels and a heart rate in the 130s to 140s.  The patient is currently in AFib with RVR and has been started on a Cardizem drip by the emergency department.         Overview/Hospital Course:  12/23/21 [facility] patient reports that he is feeling better today, was able to get rest last night. Heart rate still elevated will adjust medications and monitor. Patient with hallucinations last night, patient is appropriate this am  12/24/21:   Rapid Response Call #1: Blood pressure initially low and patient heart rate in the 50s sinus Nakul.  Glucose within normal limits.  Fluid bolus 1 L was given with improvement in blood pressure.  Advised to hold any medication that will lower patient's heart rate.  Patient also complains of shortness of breath but according to respiratory therapist, patient did not take his breathing treatment because he did not think he needed it.  Patient wheezy expiratory and saturating in the high 80s on room air. not on any antibiotics currently so will start.  Labs show that patient has TANIKA possibly due to dehydration.  Will resuscitate with fluid.  Will recheck basic blood work and continue  to hydrate.  Treat accordingly will at started if patient is not already on it.  Patient more alert and oriented x4 moving all extremities with improvement of blood pressure into the 90s and heart rate remains in the 50s sinus Nakul saturating 94% on 2 L  Rapid Response Call #2:Rapid response team called again with subsequent intubation and transfer to MICU.   12/25/21: On minimal sedation and pressor support in the MICU.   12/26/21: Able to wean off of all pressor support, pH of 5 while on bicarb on AM labs. Will get rid of bicarb drip and switch to gentle IV hydration with LR. SBT tomorrow?  12/27/21:  Events over the weekend noted.  Patient is now intubated on the ventilator.  Patient was admitted with pneumonia atrial fibrillation and alcohol abuse and now also has a enterococcal urinary tract infection with sensitivities pending.  Patient has required to sedatives for agitation and combativeness.  12/28/21:  Patient's ABG has been reviewed.  Patient has a mild respiratory alkalosis and his respiratory rate has been changed.  We have encouraged respiratory and nursing to wean his sedation and titrate his tube feeds.  The patient's chest x-ray also seems like he is slightly volume overloaded therefore will discontinue IV fluids and give low dosing of Lasix.  Patient has enterococcal infection was ampicillin sensitive therefore Rocephin should be adequate.  Will change Zithromax to Levaquin.  12/29/21 FM:  Patient had an episode of desaturation yesterday.  Respiratory was able to deep suction and several mucus plugs and thick secretions were removed.  I will broaden patient antibiotics today to Zosyn and otherwise plan on increasing his diuretics today.  Patient has bilateral pulmonary infiltrates consistent with pulmonary edema.  12/30/21 FM:  Patient had a elevated temperature through the night.  Rocephin was changed to Zosyn yesterday and consideration of drug fever should be entertained.  Will add medicine for  MRSA infection today as well as antifungals.  Patient's chest x-ray seems to be improved with diuretics.  Continue diuretics cautiously.  12/31/21 CG: Antibiotic course broadened, tachycardic with an increase in PEEP. He is net negative over the past 24 hours.        Interval History:     Review of Systems  Objective:     Vital Signs (Most Recent):  Temp: 98.6 °F (37 °C) (12/31/21 1129)  Pulse: (!) 111 (12/31/21 1129)  Resp: 19 (12/31/21 1129)  BP: (!) 98/55 (12/31/21 1129)  SpO2: 100 % (12/31/21 1129) Vital Signs (24h Range):  Temp:  [97.34 °F (36.3 °C)-98.6 °F (37 °C)] 98.6 °F (37 °C)  Pulse:  [108-115] 111  Resp:  [18-25] 19  SpO2:  [100 %] 100 %  BP: ()/(49-98) 98/55     Weight: 77.1 kg (170 lb)  Body mass index is 24.39 kg/m².    Intake/Output Summary (Last 24 hours) at 12/31/2021 1212  Last data filed at 12/31/2021 1025  Gross per 24 hour   Intake 3203.34 ml   Output 3550 ml   Net -346.66 ml      Physical Exam    Significant Labs:   All pertinent labs within the past 24 hours have been reviewed.  Blood Culture: No results for input(s): LABBLOO in the last 48 hours.  BMP:   Recent Labs   Lab 12/31/21 0413   *      K 3.6   CL 98   CO2 32*   BUN 27*   CREATININE 1.5*   CALCIUM 8.5*   MG 2.0     CBC:   Recent Labs   Lab 12/30/21  0355 12/31/21 0413   WBC 9.27 11.67   HGB 8.7* 8.8*   HCT 29.3* 29.0*    159     CMP:   Recent Labs   Lab 12/30/21  0355 12/31/21 0413    138   K 4.0 3.6    98   CO2 30* 32*   * 125*   BUN 28* 27*   CREATININE 1.7* 1.5*   CALCIUM 8.3* 8.5*   PROT 6.1 6.3   ALBUMIN 1.8* 1.7*   BILITOT 0.5 0.6   ALKPHOS 124 111   AST 51* 36   ALT 56* 41   ANIONGAP 7* 8   EGFRNONAA 41.1* 47.8*     Lactic Acid: No results for input(s): LACTATE in the last 48 hours.  Respiratory Culture: No results for input(s): GSRESP, RESPIRATORYC in the last 48 hours.    Significant Imaging: I have reviewed all pertinent imaging results/findings within the past 24  hours.      Assessment/Plan:      * Acute on chronic respiratory failure with hypoxia  Mechanical ventilation day # 7  PEEP: 12  O2: 65%  - Continue to wean down FIO2, PEEP as he can tolerate.       TANIKA (acute kidney injury)  Cr is 1.5 today, improving.   Working diagnosis is still pre-renal for now.   - Continue gentle diuresis      Elevated LFTs  Elevated, likely in the setting of hypoperfusion.   - Continue to follow liver enzymes. Improving.         Alcohol dependence  CIWA Protocol      PNA (pneumonia)        Severe sepsis  Abx modified yesterday.   - Continue to follow fever curve.      UTI (urinary tract infection)  See abx      DVT prophylaxis  Hold eliquis for now in setting of TANIKA.       Atrial fibrillation with rapid ventricular response  Controlled with meds.    Tobacco user  PRN Nicotine patch      VTE Risk Mitigation (From admission, onward)         Ordered     enoxaparin injection 40 mg  Daily         12/28/21 0816     IP VTE HIGH RISK PATIENT  Once         12/21/21 2233     Reason for No Pharmacological VTE Prophylaxis  Once        Question:  Reasons:  Answer:  Already adequately anticoagulated on oral Anticoagulants    12/21/21 2233                Discharge Planning   DAXA:      Code Status: Full Code   Is the patient medically ready for discharge?:     Reason for patient still in hospital (select all that apply): Patient unstable  Discharge Plan A: Home with family,Home Health                  Darren Bustillo DO  Department of Hospital Medicine   [place] - Intensive [facility], [place]

## 2021-12-31 NOTE — PLAN OF CARE
Pt remains on ventilator 65% Fio2, still not following commands. Unsucessful attempt to wean levophed off. Remained afebrile throughout the night. Pt diuresed well, 2500 mL in dior after lasix. No documented BM since 12/24. Received zosyn and zyvox.

## 2021-12-31 NOTE — ASSESSMENT & PLAN NOTE
Mechanical ventilation day # 7  PEEP: 12  O2: 65%  - Continue to wean down FIO2, PEEP as he can tolerate.

## 2021-12-31 NOTE — PROGRESS NOTES
"[facility] - Intensive Care  Adult Nutrition  Progress Note    SUMMARY       Recommendations    Recommendation/Intervention:   1. EN Recs: Intiate Vital HP @ 10 ml/hr increasing q4-6h as tolerated to goal rate of 55 ml/hr.                  -With propofol infusing at 23.1 ml/hr,this will provide a total of 1929 kcal (108% EEN), 115 gm protein (100% EPN), and 1,103 ml water.          2. Rec'd FWF of 30 ml q4-6h to prevent clogging.          3. RD to monitor and make recs accordingly.    Goals: Goal rate by RD f/u.  Nutrition Goal Status: new  Communication of RD Recs: reviewed with physician    Assessment and Plan  Nutrition Problem  Inadequate protein-energy intake     Related to (etiology):   NPO/vent status     Signs and Symptoms (as evidenced by):   EEN < 25%      Interventions/Recommendations (treatment strategy):  1. EN Recs: Intiate Vital HP @ 10 ml/hr increasing q4-6h as tolerated to goal rate of 55 ml/hr.               -With propofol infusing at 23.1 ml/hr,this will provide a total of 1929 kcal (108% EEN), 115 gm protein (100% EPN), and 1,103 ml water.     2. Rec'd FWF of 30 ml q4-6h to prevent clogging.      3. RD to monitor and make recs accordingly.     Nutrition Diagnosis Status:   New  Reason for Assessment    Reason For Assessment: RD follow-up  Diagnosis: pulmonary disease  Relevant Medical History: Tolerating TF at 40 ml/hr. Continue to advance to goal of 55. Propofol @ 23.1 ml/hr  General Information Comments: Tolerating at 40 ml/hr- continue to increase to goal of 55.  Nutrition Discharge Planning: TBD    Nutrition Risk Screen    Nutrition Risk Screen: tube feeding or parenteral nutrition    Nutrition/Diet History    Food Allergies: NKFA  Factors Affecting Nutritional Intake: NPO,on mechanical ventilation    Anthropometrics    Temp: 97.88 °F (36.6 °C)  Height: 5' 10" (177.8 cm)  Height (inches): 70 in  Weight Method: Standard Scale  Weight: 77.1 kg (170 lb)  Weight (lb): 170 lb  Ideal Body Weight " (IBW), Male: 166 lb  % Ideal Body Weight, Male (lb): 102.41 %  BMI (Calculated): 24.4  BMI Grade: 18.5-24.9 - normal       Lab/Procedures/Meds    Pertinent Labs Reviewed: reviewed  Pertinent Medications Reviewed: reviewed    Physical Findings/Assessment         Estimated/Assessed Needs    Weight Used For Calorie Calculations: 77.1 kg (170 lb)     Energy Need Method: [facility]  Protein Requirements:  (1.2-1.5 gm/kg CBW)  Weight Used For Protein Calculations: 77.1 kg (170 lb)     Estimated Fluid Requirement Method: RDA Method            Nutrition Prescription Ordered    Current Diet Order: NPO  Current Nutrition Support Formula Ordered: Other (Comment) (Vital HP)  Current Nutrition Support Rate Ordered: 40 (ml)    Evaluation of Received Nutrient/Fluid Intake    Enteral Calories (kcal): 960  Enteral Protein (gm): 84  Enteral (Free Water) Fluid (mL): 802  % Kcal Needs: 88% with propofol @ 23.1 ml/hr  % Protein Needs: 91%  Energy Calories Required: not meeting needs  Protein Required: not meeting needs  Fluid Required: not meeting needs  % Intake of Estimated Energy Needs: 50 - 75 %  % Meal Intake: NPO    Nutrition Risk    Level of Risk/Frequency of Follow-up: moderate - high     Monitor and Evaluation    Food and Nutrient Intake: enteral nutrition intake  Food and Nutrient Adminstration: enteral and parenteral nutrition administration  Anthropometric Measurements: weight change,weight  Biochemical Data, Medical Tests and Procedures: electrolyte and renal panel,glucose/endocrine profile,lipid profile,inflammatory profile  Nutrition-Focused Physical Findings: overall appearance     Nutrition Follow-Up    RD Follow-up?: Yes

## 2021-12-31 NOTE — PLAN OF CARE
Patient remains sedated and intubated. Unable to wean from levophed. Weaned sedation slightly. Weaned fio2 on vent to 55% and peep down to 10, tolerating. Increased tube feeds to goal rate of 60ml/hr, tolerating but no BM. Urine output good. No fever. Will cont to monitor.

## 2021-12-31 NOTE — SUBJECTIVE & OBJECTIVE
Interval History:     Review of Systems  Objective:     Vital Signs (Most Recent):  Temp: 98.6 °F (37 °C) (12/31/21 1129)  Pulse: (!) 111 (12/31/21 1129)  Resp: 19 (12/31/21 1129)  BP: (!) 98/55 (12/31/21 1129)  SpO2: 100 % (12/31/21 1129) Vital Signs (24h Range):  Temp:  [97.34 °F (36.3 °C)-98.6 °F (37 °C)] 98.6 °F (37 °C)  Pulse:  [108-115] 111  Resp:  [18-25] 19  SpO2:  [100 %] 100 %  BP: ()/(49-98) 98/55     Weight: 77.1 kg (170 lb)  Body mass index is 24.39 kg/m².    Intake/Output Summary (Last 24 hours) at 12/31/2021 1212  Last data filed at 12/31/2021 1025  Gross per 24 hour   Intake 3203.34 ml   Output 3550 ml   Net -346.66 ml      Physical Exam    Significant Labs:   All pertinent labs within the past 24 hours have been reviewed.  Blood Culture: No results for input(s): LABBLOO in the last 48 hours.  BMP:   Recent Labs   Lab 12/31/21 0413   *      K 3.6   CL 98   CO2 32*   BUN 27*   CREATININE 1.5*   CALCIUM 8.5*   MG 2.0     CBC:   Recent Labs   Lab 12/30/21  0355 12/31/21 0413   WBC 9.27 11.67   HGB 8.7* 8.8*   HCT 29.3* 29.0*    159     CMP:   Recent Labs   Lab 12/30/21  0355 12/31/21  0413    138   K 4.0 3.6    98   CO2 30* 32*   * 125*   BUN 28* 27*   CREATININE 1.7* 1.5*   CALCIUM 8.3* 8.5*   PROT 6.1 6.3   ALBUMIN 1.8* 1.7*   BILITOT 0.5 0.6   ALKPHOS 124 111   AST 51* 36   ALT 56* 41   ANIONGAP 7* 8   EGFRNONAA 41.1* 47.8*     Lactic Acid: No results for input(s): LACTATE in the last 48 hours.  Respiratory Culture: No results for input(s): GSRESP, RESPIRATORYC in the last 48 hours.    Significant Imaging: I have reviewed all pertinent imaging results/findings within the past 24 hours.

## 2021-12-31 NOTE — EICU
Rounding (Video Assessment):  Yes  Comments:  Video assessment completed. Continues on vent support & sedated on precedex & propofol also on norepinephrine (see MAR).  Bedside RN in room w/ pt.

## 2022-01-01 ENCOUNTER — ANESTHESIA (OUTPATIENT)
Dept: INTENSIVE CARE | Facility: HOSPITAL | Age: 67
DRG: 853 | End: 2022-01-01
Payer: MEDICARE

## 2022-01-01 ENCOUNTER — ANESTHESIA EVENT (OUTPATIENT)
Dept: INTENSIVE CARE | Facility: HOSPITAL | Age: 67
DRG: 853 | End: 2022-01-01
Payer: MEDICARE

## 2022-01-01 VITALS
DIASTOLIC BLOOD PRESSURE: 51 MMHG | OXYGEN SATURATION: 68 % | HEIGHT: 70 IN | WEIGHT: 170 LBS | BODY MASS INDEX: 24.34 KG/M2 | SYSTOLIC BLOOD PRESSURE: 90 MMHG | TEMPERATURE: 98 F

## 2022-01-01 LAB
ABO + RH BLD: NORMAL
AC: 12
AC: 18
ACID FAST MOD KINY STN SPEC: NORMAL
ADENOVIRUS: NOT DETECTED
ALBUMIN SERPL BCP-MCNC: 0.8 G/DL (ref 3.5–5.2)
ALBUMIN SERPL BCP-MCNC: 0.9 G/DL (ref 3.5–5.2)
ALBUMIN SERPL BCP-MCNC: 0.9 G/DL (ref 3.5–5.2)
ALBUMIN SERPL BCP-MCNC: 1.1 G/DL (ref 3.5–5.2)
ALBUMIN SERPL BCP-MCNC: 1.2 G/DL (ref 3.5–5.2)
ALBUMIN SERPL BCP-MCNC: 1.3 G/DL (ref 3.5–5.2)
ALBUMIN SERPL BCP-MCNC: 1.5 G/DL (ref 3.5–5.2)
ALBUMIN SERPL BCP-MCNC: 1.6 G/DL (ref 3.5–5.2)
ALBUMIN SERPL BCP-MCNC: 1.7 G/DL (ref 3.5–5.2)
ALBUMIN SERPL BCP-MCNC: 1.8 G/DL (ref 3.5–5.2)
ALBUMIN SERPL BCP-MCNC: 1.8 G/DL (ref 3.5–5.2)
ALBUMIN SERPL BCP-MCNC: 1.9 G/DL (ref 3.5–5.2)
ALBUMIN SERPL BCP-MCNC: 1.9 G/DL (ref 3.5–5.2)
ALP SERPL-CCNC: 101 U/L (ref 55–135)
ALP SERPL-CCNC: 103 U/L (ref 55–135)
ALP SERPL-CCNC: 105 U/L (ref 55–135)
ALP SERPL-CCNC: 119 U/L (ref 55–135)
ALP SERPL-CCNC: 119 U/L (ref 55–135)
ALP SERPL-CCNC: 121 U/L (ref 55–135)
ALP SERPL-CCNC: 128 U/L (ref 55–135)
ALP SERPL-CCNC: 141 U/L (ref 55–135)
ALP SERPL-CCNC: 144 U/L (ref 55–135)
ALP SERPL-CCNC: 148 U/L (ref 55–135)
ALP SERPL-CCNC: 71 U/L (ref 55–135)
ALP SERPL-CCNC: 76 U/L (ref 55–135)
ALP SERPL-CCNC: 76 U/L (ref 55–135)
ALP SERPL-CCNC: 79 U/L (ref 55–135)
ALP SERPL-CCNC: 85 U/L (ref 55–135)
ALP SERPL-CCNC: 93 U/L (ref 55–135)
ALP SERPL-CCNC: 93 U/L (ref 55–135)
ALP SERPL-CCNC: 98 U/L (ref 55–135)
ALT SERPL W/O P-5'-P-CCNC: 115 U/L (ref 10–44)
ALT SERPL W/O P-5'-P-CCNC: 28 U/L (ref 10–44)
ALT SERPL W/O P-5'-P-CCNC: 28 U/L (ref 10–44)
ALT SERPL W/O P-5'-P-CCNC: 29 U/L (ref 10–44)
ALT SERPL W/O P-5'-P-CCNC: 30 U/L (ref 10–44)
ALT SERPL W/O P-5'-P-CCNC: 32 U/L (ref 10–44)
ALT SERPL W/O P-5'-P-CCNC: 32 U/L (ref 10–44)
ALT SERPL W/O P-5'-P-CCNC: 33 U/L (ref 10–44)
ALT SERPL W/O P-5'-P-CCNC: 34 U/L (ref 10–44)
ALT SERPL W/O P-5'-P-CCNC: 34 U/L (ref 10–44)
ALT SERPL W/O P-5'-P-CCNC: 37 U/L (ref 10–44)
ALT SERPL W/O P-5'-P-CCNC: 38 U/L (ref 10–44)
ALT SERPL W/O P-5'-P-CCNC: 44 U/L (ref 10–44)
ALT SERPL W/O P-5'-P-CCNC: 49 U/L (ref 10–44)
ALT SERPL W/O P-5'-P-CCNC: 50 U/L (ref 10–44)
ALT SERPL W/O P-5'-P-CCNC: 63 U/L (ref 10–44)
ALT SERPL W/O P-5'-P-CCNC: 87 U/L (ref 10–44)
ALT SERPL W/O P-5'-P-CCNC: 91 U/L (ref 10–44)
ANION GAP SERPL CALC-SCNC: 10 MMOL/L (ref 8–16)
ANION GAP SERPL CALC-SCNC: 11 MMOL/L (ref 8–16)
ANION GAP SERPL CALC-SCNC: 11 MMOL/L (ref 8–16)
ANION GAP SERPL CALC-SCNC: 12 MMOL/L (ref 8–16)
ANION GAP SERPL CALC-SCNC: 13 MMOL/L (ref 8–16)
ANION GAP SERPL CALC-SCNC: 15 MMOL/L (ref 8–16)
ANION GAP SERPL CALC-SCNC: 6 MMOL/L (ref 8–16)
ANION GAP SERPL CALC-SCNC: 7 MMOL/L (ref 8–16)
ANION GAP SERPL CALC-SCNC: 8 MMOL/L (ref 8–16)
ANION GAP SERPL CALC-SCNC: 9 MMOL/L (ref 8–16)
APTT BLDCRRT: 23.5 SEC (ref 21–32)
APTT BLDCRRT: 25.7 SEC (ref 21–32)
AST SERPL-CCNC: 112 U/L (ref 10–40)
AST SERPL-CCNC: 186 U/L (ref 10–40)
AST SERPL-CCNC: 32 U/L (ref 10–40)
AST SERPL-CCNC: 32 U/L (ref 10–40)
AST SERPL-CCNC: 37 U/L (ref 10–40)
AST SERPL-CCNC: 40 U/L (ref 10–40)
AST SERPL-CCNC: 46 U/L (ref 10–40)
AST SERPL-CCNC: 51 U/L (ref 10–40)
AST SERPL-CCNC: 53 U/L (ref 10–40)
AST SERPL-CCNC: 53 U/L (ref 10–40)
AST SERPL-CCNC: 55 U/L (ref 10–40)
AST SERPL-CCNC: 59 U/L (ref 10–40)
AST SERPL-CCNC: 66 U/L (ref 10–40)
AST SERPL-CCNC: 72 U/L (ref 10–40)
AST SERPL-CCNC: 78 U/L (ref 10–40)
AST SERPL-CCNC: 93 U/L (ref 10–40)
AST SERPL-CCNC: 98 U/L (ref 10–40)
AST SERPL-CCNC: 98 U/L (ref 10–40)
BACTERIA FLD CULT: NORMAL
BACTERIA FLD CULT: NORMAL
BASOPHILS # BLD AUTO: 0 K/UL (ref 0–0.2)
BASOPHILS # BLD AUTO: 0.01 K/UL (ref 0–0.2)
BASOPHILS # BLD AUTO: 0.02 K/UL (ref 0–0.2)
BASOPHILS # BLD AUTO: 0.04 K/UL (ref 0–0.2)
BASOPHILS # BLD AUTO: 0.05 K/UL (ref 0–0.2)
BASOPHILS # BLD AUTO: ABNORMAL K/UL (ref 0–0.2)
BASOPHILS NFR BLD: 0 % (ref 0–1.9)
BASOPHILS NFR BLD: 0.1 % (ref 0–1.9)
BASOPHILS NFR BLD: 0.2 % (ref 0–1.9)
BASOPHILS NFR BLD: 0.3 % (ref 0–1.9)
BASOPHILS NFR BLD: 0.6 % (ref 0–1.9)
BASOPHILS NFR BLD: 0.7 % (ref 0–1.9)
BILIRUB SERPL-MCNC: 0.4 MG/DL (ref 0.1–1)
BILIRUB SERPL-MCNC: 0.5 MG/DL (ref 0.1–1)
BILIRUB SERPL-MCNC: 0.6 MG/DL (ref 0.1–1)
BILIRUB SERPL-MCNC: 0.7 MG/DL (ref 0.1–1)
BILIRUB SERPL-MCNC: 0.7 MG/DL (ref 0.1–1)
BILIRUB SERPL-MCNC: 0.8 MG/DL (ref 0.1–1)
BILIRUB SERPL-MCNC: 0.9 MG/DL (ref 0.1–1)
BILIRUB SERPL-MCNC: 1.2 MG/DL (ref 0.1–1)
BLD GP AB SCN CELLS X3 SERPL QL: NORMAL
BLD PROD TYP BPU: NORMAL
BLOOD UNIT EXPIRATION DATE: NORMAL
BLOOD UNIT TYPE CODE: 5100
BLOOD UNIT TYPE: NORMAL
BORDETELLA PARAPERTUSSIS (IS1001): NOT DETECTED
BORDETELLA PERTUSSIS (PTXP): NOT DETECTED
BUN SERPL-MCNC: 106 MG/DL (ref 8–23)
BUN SERPL-MCNC: 29 MG/DL (ref 8–23)
BUN SERPL-MCNC: 32 MG/DL (ref 8–23)
BUN SERPL-MCNC: 43 MG/DL (ref 8–23)
BUN SERPL-MCNC: 53 MG/DL (ref 8–23)
BUN SERPL-MCNC: 59 MG/DL (ref 8–23)
BUN SERPL-MCNC: 63 MG/DL (ref 8–23)
BUN SERPL-MCNC: 64 MG/DL (ref 8–23)
BUN SERPL-MCNC: 64 MG/DL (ref 8–23)
BUN SERPL-MCNC: 65 MG/DL (ref 8–23)
BUN SERPL-MCNC: 68 MG/DL (ref 8–23)
BUN SERPL-MCNC: 69 MG/DL (ref 8–23)
BUN SERPL-MCNC: 69 MG/DL (ref 8–23)
BUN SERPL-MCNC: 73 MG/DL (ref 8–23)
BUN SERPL-MCNC: 75 MG/DL (ref 8–23)
BUN SERPL-MCNC: 77 MG/DL (ref 8–23)
BUN SERPL-MCNC: 77 MG/DL (ref 8–23)
BUN SERPL-MCNC: 80 MG/DL (ref 8–23)
BUN SERPL-MCNC: 92 MG/DL (ref 8–23)
CALCIUM SERPL-MCNC: 7.1 MG/DL (ref 8.7–10.5)
CALCIUM SERPL-MCNC: 7.8 MG/DL (ref 8.7–10.5)
CALCIUM SERPL-MCNC: 7.9 MG/DL (ref 8.7–10.5)
CALCIUM SERPL-MCNC: 7.9 MG/DL (ref 8.7–10.5)
CALCIUM SERPL-MCNC: 8.1 MG/DL (ref 8.7–10.5)
CALCIUM SERPL-MCNC: 8.2 MG/DL (ref 8.7–10.5)
CALCIUM SERPL-MCNC: 8.2 MG/DL (ref 8.7–10.5)
CALCIUM SERPL-MCNC: 8.3 MG/DL (ref 8.7–10.5)
CALCIUM SERPL-MCNC: 8.4 MG/DL (ref 8.7–10.5)
CALCIUM SERPL-MCNC: 8.5 MG/DL (ref 8.7–10.5)
CALCIUM SERPL-MCNC: 8.6 MG/DL (ref 8.7–10.5)
CALCIUM SERPL-MCNC: 8.6 MG/DL (ref 8.7–10.5)
CALCIUM SERPL-MCNC: 8.7 MG/DL (ref 8.7–10.5)
CALCIUM SERPL-MCNC: 8.8 MG/DL (ref 8.7–10.5)
CHLAMYDIA PNEUMONIAE: NOT DETECTED
CHLORIDE SERPL-SCNC: 100 MMOL/L (ref 95–110)
CHLORIDE SERPL-SCNC: 101 MMOL/L (ref 95–110)
CHLORIDE SERPL-SCNC: 102 MMOL/L (ref 95–110)
CHLORIDE SERPL-SCNC: 103 MMOL/L (ref 95–110)
CHLORIDE SERPL-SCNC: 103 MMOL/L (ref 95–110)
CHLORIDE SERPL-SCNC: 91 MMOL/L (ref 95–110)
CHLORIDE SERPL-SCNC: 93 MMOL/L (ref 95–110)
CHLORIDE SERPL-SCNC: 94 MMOL/L (ref 95–110)
CHLORIDE SERPL-SCNC: 96 MMOL/L (ref 95–110)
CHLORIDE SERPL-SCNC: 96 MMOL/L (ref 95–110)
CHLORIDE SERPL-SCNC: 97 MMOL/L (ref 95–110)
CHLORIDE SERPL-SCNC: 99 MMOL/L (ref 95–110)
CO2 SERPL-SCNC: 21 MMOL/L (ref 23–29)
CO2 SERPL-SCNC: 25 MMOL/L (ref 23–29)
CO2 SERPL-SCNC: 28 MMOL/L (ref 23–29)
CO2 SERPL-SCNC: 31 MMOL/L (ref 23–29)
CO2 SERPL-SCNC: 32 MMOL/L (ref 23–29)
CO2 SERPL-SCNC: 33 MMOL/L (ref 23–29)
CO2 SERPL-SCNC: 34 MMOL/L (ref 23–29)
CO2 SERPL-SCNC: 35 MMOL/L (ref 23–29)
CO2 SERPL-SCNC: 36 MMOL/L (ref 23–29)
CO2 SERPL-SCNC: 37 MMOL/L (ref 23–29)
CO2 SERPL-SCNC: 39 MMOL/L (ref 23–29)
CODING SYSTEM: NORMAL
CORONAVIRUS 229E, COMMON COLD VIRUS: NOT DETECTED
CORONAVIRUS HKU1, COMMON COLD VIRUS: NOT DETECTED
CORONAVIRUS NL63, COMMON COLD VIRUS: NOT DETECTED
CORONAVIRUS OC43, COMMON COLD VIRUS: NOT DETECTED
CORRECTED TEMPERATURE (PCO2): 39.1 MMHG
CORRECTED TEMPERATURE (PCO2): 45.7 MMHG
CORRECTED TEMPERATURE (PCO2): 45.9 MMHG
CORRECTED TEMPERATURE (PCO2): 46.3 MMHG
CORRECTED TEMPERATURE (PCO2): 48 MMHG
CORRECTED TEMPERATURE (PCO2): 48.1 MMHG
CORRECTED TEMPERATURE (PCO2): 48.9 MMHG
CORRECTED TEMPERATURE (PCO2): 49.4 MMHG
CORRECTED TEMPERATURE (PCO2): 49.6 MMHG
CORRECTED TEMPERATURE (PCO2): 50.5 MMHG
CORRECTED TEMPERATURE (PCO2): 50.7 MMHG
CORRECTED TEMPERATURE (PCO2): 52.1 MMHG
CORRECTED TEMPERATURE (PCO2): 53.9 MMHG
CORRECTED TEMPERATURE (PCO2): 55.6 MMHG
CORRECTED TEMPERATURE (PCO2): 58.9 MMHG
CORRECTED TEMPERATURE (PCO2): 59.1 MMHG
CORRECTED TEMPERATURE (PCO2): 59.2 MMHG
CORRECTED TEMPERATURE (PH): 7.22
CORRECTED TEMPERATURE (PH): 7.29
CORRECTED TEMPERATURE (PH): 7.41
CORRECTED TEMPERATURE (PH): 7.42
CORRECTED TEMPERATURE (PH): 7.44
CORRECTED TEMPERATURE (PH): 7.44
CORRECTED TEMPERATURE (PH): 7.45
CORRECTED TEMPERATURE (PH): 7.46
CORRECTED TEMPERATURE (PH): 7.47
CORRECTED TEMPERATURE (PH): 7.48
CORRECTED TEMPERATURE (PH): 7.49
CORRECTED TEMPERATURE (PH): 7.5
CORRECTED TEMPERATURE (PH): 7.5
CORRECTED TEMPERATURE (PH): 7.52
CORRECTED TEMPERATURE (PH): 7.52
CORRECTED TEMPERATURE (PH): 7.54
CORRECTED TEMPERATURE (PH): 7.54
CORRECTED TEMPERATURE (PH): 7.56
CORRECTED TEMPERATURE (PH): 7.61
CORRECTED TEMPERATURE (PO2): 107 MMHG
CORRECTED TEMPERATURE (PO2): 112 MMHG
CORRECTED TEMPERATURE (PO2): 216 MMHG
CORRECTED TEMPERATURE (PO2): 244 MMHG
CORRECTED TEMPERATURE (PO2): 308 MMHG
CORRECTED TEMPERATURE (PO2): 37.2 MMHG
CORRECTED TEMPERATURE (PO2): 413 MMHG
CORRECTED TEMPERATURE (PO2): 48.6 MMHG
CORRECTED TEMPERATURE (PO2): 53.6 MMHG
CORRECTED TEMPERATURE (PO2): 59.3 MMHG
CORRECTED TEMPERATURE (PO2): 61.8 MMHG
CORRECTED TEMPERATURE (PO2): 67 MMHG
CORRECTED TEMPERATURE (PO2): 67.2 MMHG
CORRECTED TEMPERATURE (PO2): 71 MMHG
CORRECTED TEMPERATURE (PO2): 82.6 MMHG
CORRECTED TEMPERATURE (PO2): 83.8 MMHG
CORRECTED TEMPERATURE (PO2): 84.3 MMHG
CORRECTED TEMPERATURE (PO2): 84.5 MMHG
CORRECTED TEMPERATURE (PO2): 84.6 MMHG
CREAT SERPL-MCNC: 0.9 MG/DL (ref 0.5–1.4)
CREAT SERPL-MCNC: 1 MG/DL (ref 0.5–1.4)
CREAT SERPL-MCNC: 1.1 MG/DL (ref 0.5–1.4)
CREAT SERPL-MCNC: 1.2 MG/DL (ref 0.5–1.4)
CREAT SERPL-MCNC: 1.3 MG/DL (ref 0.5–1.4)
CREAT SERPL-MCNC: 1.4 MG/DL (ref 0.5–1.4)
CREAT SERPL-MCNC: 1.5 MG/DL (ref 0.5–1.4)
CREAT SERPL-MCNC: 1.8 MG/DL (ref 0.5–1.4)
CREAT SERPL-MCNC: 2.3 MG/DL (ref 0.5–1.4)
D DIMER PPP IA.FEU-MCNC: 4.14 MG/L FEU
DIFFERENTIAL METHOD: ABNORMAL
DIGOXIN SERPL-MCNC: 1.9 NG/ML (ref 0.8–2)
DIGOXIN SERPL-MCNC: 2 NG/ML (ref 0.8–2)
DIGOXIN SERPL-MCNC: 2.2 NG/ML (ref 0.8–2)
DIGOXIN SERPL-MCNC: 2.3 NG/ML (ref 0.8–2)
DIGOXIN SERPL-MCNC: 2.5 NG/ML (ref 0.8–2)
DIGOXIN SERPL-MCNC: 2.5 NG/ML (ref 0.8–2)
DIGOXIN SERPL-MCNC: 3.1 NG/ML (ref 0.8–2)
DISPENSE STATUS: NORMAL
EOSINOPHIL # BLD AUTO: 0 K/UL (ref 0–0.5)
EOSINOPHIL # BLD AUTO: 0.2 K/UL (ref 0–0.5)
EOSINOPHIL # BLD AUTO: ABNORMAL K/UL (ref 0–0.5)
EOSINOPHIL NFR BLD: 0 % (ref 0–8)
EOSINOPHIL NFR BLD: 0.1 % (ref 0–8)
EOSINOPHIL NFR BLD: 0.2 % (ref 0–8)
EOSINOPHIL NFR BLD: 1 % (ref 0–8)
EOSINOPHIL NFR BLD: 2.1 % (ref 0–8)
ERYTHROCYTE [DISTWIDTH] IN BLOOD BY AUTOMATED COUNT: 14.8 % (ref 11.5–14.5)
ERYTHROCYTE [DISTWIDTH] IN BLOOD BY AUTOMATED COUNT: 14.8 % (ref 11.5–14.5)
ERYTHROCYTE [DISTWIDTH] IN BLOOD BY AUTOMATED COUNT: 14.9 % (ref 11.5–14.5)
ERYTHROCYTE [DISTWIDTH] IN BLOOD BY AUTOMATED COUNT: 15.1 % (ref 11.5–14.5)
ERYTHROCYTE [DISTWIDTH] IN BLOOD BY AUTOMATED COUNT: 15.2 % (ref 11.5–14.5)
ERYTHROCYTE [DISTWIDTH] IN BLOOD BY AUTOMATED COUNT: 15.4 % (ref 11.5–14.5)
ERYTHROCYTE [DISTWIDTH] IN BLOOD BY AUTOMATED COUNT: 15.5 % (ref 11.5–14.5)
ERYTHROCYTE [DISTWIDTH] IN BLOOD BY AUTOMATED COUNT: 15.5 % (ref 11.5–14.5)
ERYTHROCYTE [DISTWIDTH] IN BLOOD BY AUTOMATED COUNT: 15.9 % (ref 11.5–14.5)
ERYTHROCYTE [DISTWIDTH] IN BLOOD BY AUTOMATED COUNT: 16.4 % (ref 11.5–14.5)
ERYTHROCYTE [DISTWIDTH] IN BLOOD BY AUTOMATED COUNT: 16.5 % (ref 11.5–14.5)
ERYTHROCYTE [DISTWIDTH] IN BLOOD BY AUTOMATED COUNT: 16.9 % (ref 11.5–14.5)
ERYTHROCYTE [DISTWIDTH] IN BLOOD BY AUTOMATED COUNT: 17 % (ref 11.5–14.5)
ERYTHROCYTE [DISTWIDTH] IN BLOOD BY AUTOMATED COUNT: 17.2 % (ref 11.5–14.5)
ERYTHROCYTE [DISTWIDTH] IN BLOOD BY AUTOMATED COUNT: 17.9 % (ref 11.5–14.5)
ERYTHROCYTE [DISTWIDTH] IN BLOOD BY AUTOMATED COUNT: 17.9 % (ref 11.5–14.5)
ERYTHROCYTE [DISTWIDTH] IN BLOOD BY AUTOMATED COUNT: 18 % (ref 11.5–14.5)
ERYTHROCYTE [DISTWIDTH] IN BLOOD BY AUTOMATED COUNT: 18.3 % (ref 11.5–14.5)
EST. GFR  (AFRICAN AMERICAN): 33 ML/MIN/1.73 M^2
EST. GFR  (AFRICAN AMERICAN): 44.3 ML/MIN/1.73 M^2
EST. GFR  (AFRICAN AMERICAN): 55.3 ML/MIN/1.73 M^2
EST. GFR  (AFRICAN AMERICAN): >60 ML/MIN/1.73 M^2
EST. GFR  (NON AFRICAN AMERICAN): 28.5 ML/MIN/1.73 M^2
EST. GFR  (NON AFRICAN AMERICAN): 38.4 ML/MIN/1.73 M^2
EST. GFR  (NON AFRICAN AMERICAN): 47.8 ML/MIN/1.73 M^2
EST. GFR  (NON AFRICAN AMERICAN): 52 ML/MIN/1.73 M^2
EST. GFR  (NON AFRICAN AMERICAN): 56.9 ML/MIN/1.73 M^2
EST. GFR  (NON AFRICAN AMERICAN): >60 ML/MIN/1.73 M^2
FIO2: 100 %
FIO2: 50 %
FIO2: 55 %
FIO2: 60 %
FIO2: 70 %
FIO2: 75 %
FIO2: 75 %
FIO2: 80 %
FIO2: 85 %
FLUBV RNA NPH QL NAA+NON-PROBE: NOT DETECTED
GLUCOSE SERPL-MCNC: 114 MG/DL (ref 70–110)
GLUCOSE SERPL-MCNC: 114 MG/DL (ref 70–110)
GLUCOSE SERPL-MCNC: 121 MG/DL (ref 70–110)
GLUCOSE SERPL-MCNC: 128 MG/DL (ref 70–110)
GLUCOSE SERPL-MCNC: 129 MG/DL (ref 70–110)
GLUCOSE SERPL-MCNC: 130 MG/DL (ref 70–110)
GLUCOSE SERPL-MCNC: 136 MG/DL (ref 70–110)
GLUCOSE SERPL-MCNC: 138 MG/DL (ref 70–110)
GLUCOSE SERPL-MCNC: 142 MG/DL (ref 70–110)
GLUCOSE SERPL-MCNC: 142 MG/DL (ref 70–110)
GLUCOSE SERPL-MCNC: 147 MG/DL (ref 70–110)
GLUCOSE SERPL-MCNC: 149 MG/DL (ref 70–110)
GLUCOSE SERPL-MCNC: 154 MG/DL (ref 70–110)
GLUCOSE SERPL-MCNC: 156 MG/DL (ref 70–110)
GLUCOSE SERPL-MCNC: 183 MG/DL (ref 70–110)
GLUCOSE SERPL-MCNC: 206 MG/DL (ref 70–110)
GLUCOSE SERPL-MCNC: 255 MG/DL (ref 70–110)
GRAM STN SPEC: NORMAL
GRAM STN SPEC: NORMAL
HCO3 UR-SCNC: 18.6 MMOL/L
HCO3 UR-SCNC: 23.7 MMOL/L
HCO3 UR-SCNC: 28.8 MMOL/L
HCO3 UR-SCNC: 32.6 MMOL/L
HCO3 UR-SCNC: 34.6 MMOL/L
HCO3 UR-SCNC: 34.7 MMOL/L
HCO3 UR-SCNC: 35.5 MMOL/L
HCO3 UR-SCNC: 35.5 MMOL/L
HCO3 UR-SCNC: 37.2 MMOL/L
HCO3 UR-SCNC: 37.5 MMOL/L
HCO3 UR-SCNC: 38.2 MMOL/L
HCO3 UR-SCNC: 39 MMOL/L
HCO3 UR-SCNC: 40 MMOL/L
HCO3 UR-SCNC: 40.4 MMOL/L
HCO3 UR-SCNC: 40.7 MMOL/L
HCO3 UR-SCNC: 41.1 MMOL/L
HCO3 UR-SCNC: 41.8 MMOL/L
HCT VFR BLD AUTO: 17.8 % (ref 40–54)
HCT VFR BLD AUTO: 19.7 % (ref 40–54)
HCT VFR BLD AUTO: 20.8 % (ref 40–54)
HCT VFR BLD AUTO: 22 % (ref 40–54)
HCT VFR BLD AUTO: 22.1 % (ref 40–54)
HCT VFR BLD AUTO: 23.4 % (ref 40–54)
HCT VFR BLD AUTO: 23.5 % (ref 40–54)
HCT VFR BLD AUTO: 23.7 % (ref 40–54)
HCT VFR BLD AUTO: 24.3 % (ref 40–54)
HCT VFR BLD AUTO: 25 % (ref 40–54)
HCT VFR BLD AUTO: 25.6 % (ref 40–54)
HCT VFR BLD AUTO: 26.9 % (ref 40–54)
HCT VFR BLD AUTO: 27.2 % (ref 40–54)
HCT VFR BLD AUTO: 28.3 % (ref 40–54)
HCT VFR BLD AUTO: 28.4 % (ref 40–54)
HCT VFR BLD AUTO: 28.4 % (ref 40–54)
HCT VFR BLD AUTO: 29.3 % (ref 40–54)
HCT VFR BLD AUTO: 29.3 % (ref 40–54)
HCT VFR BLD AUTO: 30.9 % (ref 40–54)
HCT VFR BLD AUTO: 31.3 % (ref 40–54)
HCT VFR BLD AUTO: 32 % (ref 40–54)
HCT VFR BLD AUTO: 34.3 % (ref 40–54)
HGB BLD-MCNC: 10.6 G/DL (ref 14–18)
HGB BLD-MCNC: 10.7 G/DL (ref 14–18)
HGB BLD-MCNC: 11.6 G/DL (ref 14–18)
HGB BLD-MCNC: 6.7 G/DL (ref 14–18)
HGB BLD-MCNC: 6.7 G/DL (ref 14–18)
HGB BLD-MCNC: 6.8 G/DL (ref 14–18)
HGB BLD-MCNC: 7.1 G/DL (ref 14–18)
HGB BLD-MCNC: 7.3 G/DL (ref 14–18)
HGB BLD-MCNC: 7.5 G/DL (ref 14–18)
HGB BLD-MCNC: 7.6 G/DL (ref 14–18)
HGB BLD-MCNC: 7.9 G/DL (ref 14–18)
HGB BLD-MCNC: 8 G/DL (ref 14–18)
HGB BLD-MCNC: 8 G/DL (ref 14–18)
HGB BLD-MCNC: 8.3 G/DL (ref 14–18)
HGB BLD-MCNC: 8.3 G/DL (ref 14–18)
HGB BLD-MCNC: 8.6 G/DL (ref 14–18)
HGB BLD-MCNC: 9 G/DL (ref 14–18)
HGB BLD-MCNC: 9.1 G/DL (ref 14–18)
HGB BLD-MCNC: 9.5 G/DL (ref 14–18)
HGB BLD-MCNC: 9.6 G/DL (ref 14–18)
HGB BLD-MCNC: 9.8 G/DL (ref 14–18)
HGB BLD-MCNC: 9.8 G/DL (ref 14–18)
HPIV1 RNA NPH QL NAA+NON-PROBE: NOT DETECTED
HPIV2 RNA NPH QL NAA+NON-PROBE: NOT DETECTED
HPIV3 RNA NPH QL NAA+NON-PROBE: NOT DETECTED
HPIV4 RNA NPH QL NAA+NON-PROBE: NOT DETECTED
HUMAN METAPNEUMOVIRUS: NOT DETECTED
IMM GRANULOCYTES # BLD AUTO: 0.05 K/UL (ref 0–0.04)
IMM GRANULOCYTES # BLD AUTO: 0.06 K/UL (ref 0–0.04)
IMM GRANULOCYTES # BLD AUTO: 0.11 K/UL (ref 0–0.04)
IMM GRANULOCYTES # BLD AUTO: 0.15 K/UL (ref 0–0.04)
IMM GRANULOCYTES # BLD AUTO: 0.17 K/UL (ref 0–0.04)
IMM GRANULOCYTES # BLD AUTO: 0.24 K/UL (ref 0–0.04)
IMM GRANULOCYTES # BLD AUTO: 0.6 K/UL (ref 0–0.04)
IMM GRANULOCYTES # BLD AUTO: ABNORMAL K/UL (ref 0–0.04)
IMM GRANULOCYTES NFR BLD AUTO: 0.7 % (ref 0–0.5)
IMM GRANULOCYTES NFR BLD AUTO: 0.7 % (ref 0–0.5)
IMM GRANULOCYTES NFR BLD AUTO: 0.8 % (ref 0–0.5)
IMM GRANULOCYTES NFR BLD AUTO: 1.1 % (ref 0–0.5)
IMM GRANULOCYTES NFR BLD AUTO: 1.2 % (ref 0–0.5)
IMM GRANULOCYTES NFR BLD AUTO: 1.4 % (ref 0–0.5)
IMM GRANULOCYTES NFR BLD AUTO: 2.6 % (ref 0–0.5)
IMM GRANULOCYTES NFR BLD AUTO: ABNORMAL % (ref 0–0.5)
INFLUENZA A (SUBTYPES H1,H1-2009,H3): NOT DETECTED
INR PPP: 0.9 (ref 0.8–1.2)
INR PPP: 1 (ref 0.8–1.2)
KOH PREP SPEC: NORMAL
LACTATE SERPL-SCNC: 1.1 MMOL/L (ref 0.5–2.2)
LACTATE SERPL-SCNC: 1.7 MMOL/L (ref 0.5–2.2)
LYMPHOCYTES # BLD AUTO: 0.3 K/UL (ref 1–4.8)
LYMPHOCYTES # BLD AUTO: 0.5 K/UL (ref 1–4.8)
LYMPHOCYTES # BLD AUTO: 0.5 K/UL (ref 1–4.8)
LYMPHOCYTES # BLD AUTO: 0.6 K/UL (ref 1–4.8)
LYMPHOCYTES # BLD AUTO: 0.7 K/UL (ref 1–4.8)
LYMPHOCYTES # BLD AUTO: 1 K/UL (ref 1–4.8)
LYMPHOCYTES # BLD AUTO: 1 K/UL (ref 1–4.8)
LYMPHOCYTES # BLD AUTO: 1.1 K/UL (ref 1–4.8)
LYMPHOCYTES # BLD AUTO: 1.9 K/UL (ref 1–4.8)
LYMPHOCYTES # BLD AUTO: ABNORMAL K/UL (ref 1–4.8)
LYMPHOCYTES NFR BLD: 1 % (ref 18–48)
LYMPHOCYTES NFR BLD: 11.5 % (ref 18–48)
LYMPHOCYTES NFR BLD: 2 % (ref 18–48)
LYMPHOCYTES NFR BLD: 3 % (ref 18–48)
LYMPHOCYTES NFR BLD: 3 % (ref 18–48)
LYMPHOCYTES NFR BLD: 4 % (ref 18–48)
LYMPHOCYTES NFR BLD: 4 % (ref 18–48)
LYMPHOCYTES NFR BLD: 4.7 % (ref 18–48)
LYMPHOCYTES NFR BLD: 4.8 % (ref 18–48)
LYMPHOCYTES NFR BLD: 5 % (ref 18–48)
LYMPHOCYTES NFR BLD: 5.7 % (ref 18–48)
LYMPHOCYTES NFR BLD: 6 % (ref 18–48)
LYMPHOCYTES NFR BLD: 6.3 % (ref 18–48)
LYMPHOCYTES NFR BLD: 6.9 % (ref 18–48)
LYMPHOCYTES NFR BLD: 7.5 % (ref 18–48)
LYMPHOCYTES NFR BLD: 8 % (ref 18–48)
LYMPHOCYTES NFR BLD: 8.3 % (ref 18–48)
LYMPHOCYTES NFR BLD: 8.6 % (ref 18–48)
Lab: ABNORMAL
MAGNESIUM SERPL-MCNC: 1.8 MG/DL (ref 1.6–2.6)
MAGNESIUM SERPL-MCNC: 1.9 MG/DL (ref 1.6–2.6)
MAGNESIUM SERPL-MCNC: 2.2 MG/DL (ref 1.6–2.6)
MAGNESIUM SERPL-MCNC: 2.3 MG/DL (ref 1.6–2.6)
MAGNESIUM SERPL-MCNC: 2.4 MG/DL (ref 1.6–2.6)
MAGNESIUM SERPL-MCNC: 2.4 MG/DL (ref 1.6–2.6)
MAGNESIUM SERPL-MCNC: 2.5 MG/DL (ref 1.6–2.6)
MAGNESIUM SERPL-MCNC: 2.6 MG/DL (ref 1.6–2.6)
MAGNESIUM SERPL-MCNC: 2.6 MG/DL (ref 1.6–2.6)
MAGNESIUM SERPL-MCNC: 2.7 MG/DL (ref 1.6–2.6)
MAGNESIUM SERPL-MCNC: 2.8 MG/DL (ref 1.6–2.6)
MCH RBC QN AUTO: 26.9 PG (ref 27–31)
MCH RBC QN AUTO: 27 PG (ref 27–31)
MCH RBC QN AUTO: 27.3 PG (ref 27–31)
MCH RBC QN AUTO: 27.7 PG (ref 27–31)
MCH RBC QN AUTO: 27.8 PG (ref 27–31)
MCH RBC QN AUTO: 27.9 PG (ref 27–31)
MCH RBC QN AUTO: 28.3 PG (ref 27–31)
MCH RBC QN AUTO: 28.6 PG (ref 27–31)
MCH RBC QN AUTO: 29.1 PG (ref 27–31)
MCH RBC QN AUTO: 29.3 PG (ref 27–31)
MCH RBC QN AUTO: 29.4 PG (ref 27–31)
MCH RBC QN AUTO: 29.4 PG (ref 27–31)
MCH RBC QN AUTO: 29.6 PG (ref 27–31)
MCH RBC QN AUTO: 30.5 PG (ref 27–31)
MCH RBC QN AUTO: 30.8 PG (ref 27–31)
MCH RBC QN AUTO: 31.2 PG (ref 27–31)
MCH RBC QN AUTO: 34.5 PG (ref 27–31)
MCHC RBC AUTO-ENTMCNC: 30.4 G/DL (ref 32–36)
MCHC RBC AUTO-ENTMCNC: 30.5 G/DL (ref 32–36)
MCHC RBC AUTO-ENTMCNC: 31.3 G/DL (ref 32–36)
MCHC RBC AUTO-ENTMCNC: 31.6 G/DL (ref 32–36)
MCHC RBC AUTO-ENTMCNC: 31.6 G/DL (ref 32–36)
MCHC RBC AUTO-ENTMCNC: 31.7 G/DL (ref 32–36)
MCHC RBC AUTO-ENTMCNC: 32 G/DL (ref 32–36)
MCHC RBC AUTO-ENTMCNC: 32.2 G/DL (ref 32–36)
MCHC RBC AUTO-ENTMCNC: 32.4 G/DL (ref 32–36)
MCHC RBC AUTO-ENTMCNC: 32.8 G/DL (ref 32–36)
MCHC RBC AUTO-ENTMCNC: 33.2 G/DL (ref 32–36)
MCHC RBC AUTO-ENTMCNC: 33.4 G/DL (ref 32–36)
MCHC RBC AUTO-ENTMCNC: 33.5 G/DL (ref 32–36)
MCHC RBC AUTO-ENTMCNC: 33.5 G/DL (ref 32–36)
MCHC RBC AUTO-ENTMCNC: 33.8 G/DL (ref 32–36)
MCHC RBC AUTO-ENTMCNC: 34 G/DL (ref 32–36)
MCHC RBC AUTO-ENTMCNC: 34 G/DL (ref 32–36)
MCHC RBC AUTO-ENTMCNC: 34.2 G/DL (ref 32–36)
MCHC RBC AUTO-ENTMCNC: 34.2 G/DL (ref 32–36)
MCHC RBC AUTO-ENTMCNC: 38.2 G/DL (ref 32–36)
MCV RBC AUTO: 85 FL (ref 82–98)
MCV RBC AUTO: 86 FL (ref 82–98)
MCV RBC AUTO: 87 FL (ref 82–98)
MCV RBC AUTO: 88 FL (ref 82–98)
MCV RBC AUTO: 89 FL (ref 82–98)
MCV RBC AUTO: 90 FL (ref 82–98)
MCV RBC AUTO: 91 FL (ref 82–98)
MCV RBC AUTO: 92 FL (ref 82–98)
METAMYELOCYTES NFR BLD MANUAL: 1 %
METAMYELOCYTES NFR BLD MANUAL: 1 %
METAMYELOCYTES NFR BLD MANUAL: 2 %
METAMYELOCYTES NFR BLD MANUAL: 4 %
MODIFIED ALLEN'S TEST: ABNORMAL
MONOCYTES # BLD AUTO: 0.6 K/UL (ref 0.3–1)
MONOCYTES # BLD AUTO: 0.8 K/UL (ref 0.3–1)
MONOCYTES # BLD AUTO: 0.8 K/UL (ref 0.3–1)
MONOCYTES # BLD AUTO: 1 K/UL (ref 0.3–1)
MONOCYTES # BLD AUTO: 1.3 K/UL (ref 0.3–1)
MONOCYTES # BLD AUTO: 1.5 K/UL (ref 0.3–1)
MONOCYTES # BLD AUTO: 1.7 K/UL (ref 0.3–1)
MONOCYTES # BLD AUTO: 2.1 K/UL (ref 0.3–1)
MONOCYTES # BLD AUTO: 3 K/UL (ref 0.3–1)
MONOCYTES # BLD AUTO: ABNORMAL K/UL (ref 0.3–1)
MONOCYTES NFR BLD: 10 % (ref 4–15)
MONOCYTES NFR BLD: 10.1 % (ref 4–15)
MONOCYTES NFR BLD: 11.2 % (ref 4–15)
MONOCYTES NFR BLD: 11.5 % (ref 4–15)
MONOCYTES NFR BLD: 12 % (ref 4–15)
MONOCYTES NFR BLD: 13 % (ref 4–15)
MONOCYTES NFR BLD: 13.8 % (ref 4–15)
MONOCYTES NFR BLD: 23.1 % (ref 4–15)
MONOCYTES NFR BLD: 3 % (ref 4–15)
MONOCYTES NFR BLD: 4 % (ref 4–15)
MONOCYTES NFR BLD: 6 % (ref 4–15)
MONOCYTES NFR BLD: 7 % (ref 4–15)
MONOCYTES NFR BLD: 7 % (ref 4–15)
MONOCYTES NFR BLD: 7.3 % (ref 4–15)
MONOCYTES NFR BLD: 9.6 % (ref 4–15)
MONOCYTES NFR BLD: 9.6 % (ref 4–15)
MYCOPLASMA PNEUMONIAE: NOT DETECTED
MYELOCYTES NFR BLD MANUAL: 1 %
MYELOCYTES NFR BLD MANUAL: 2 %
MYELOCYTES NFR BLD MANUAL: 3 %
MYELOCYTES NFR BLD MANUAL: 4 %
MYELOCYTES NFR BLD MANUAL: 5 %
NEUTROPHILS # BLD AUTO: 11.2 K/UL (ref 1.8–7.7)
NEUTROPHILS # BLD AUTO: 11.3 K/UL (ref 1.8–7.7)
NEUTROPHILS # BLD AUTO: 12.1 K/UL (ref 1.8–7.7)
NEUTROPHILS # BLD AUTO: 13.9 K/UL (ref 1.8–7.7)
NEUTROPHILS # BLD AUTO: 17.6 K/UL (ref 1.8–7.7)
NEUTROPHILS # BLD AUTO: 4.7 K/UL (ref 1.8–7.7)
NEUTROPHILS # BLD AUTO: 5 K/UL (ref 1.8–7.7)
NEUTROPHILS # BLD AUTO: 5.5 K/UL (ref 1.8–7.7)
NEUTROPHILS # BLD AUTO: 5.7 K/UL (ref 1.8–7.7)
NEUTROPHILS # BLD AUTO: ABNORMAL K/UL (ref 1.8–7.7)
NEUTROPHILS # BLD AUTO: ABNORMAL K/UL (ref 1.8–7.7)
NEUTROPHILS NFR BLD: 53 % (ref 38–73)
NEUTROPHILS NFR BLD: 54 % (ref 38–73)
NEUTROPHILS NFR BLD: 62 % (ref 38–73)
NEUTROPHILS NFR BLD: 70 % (ref 38–73)
NEUTROPHILS NFR BLD: 72 % (ref 38–73)
NEUTROPHILS NFR BLD: 75 % (ref 38–73)
NEUTROPHILS NFR BLD: 75.8 % (ref 38–73)
NEUTROPHILS NFR BLD: 76 % (ref 38–73)
NEUTROPHILS NFR BLD: 78.8 % (ref 38–73)
NEUTROPHILS NFR BLD: 80 % (ref 38–73)
NEUTROPHILS NFR BLD: 80.8 % (ref 38–73)
NEUTROPHILS NFR BLD: 81 % (ref 38–73)
NEUTROPHILS NFR BLD: 81.9 % (ref 38–73)
NEUTROPHILS NFR BLD: 82.3 % (ref 38–73)
NEUTROPHILS NFR BLD: 83 % (ref 38–73)
NEUTROPHILS NFR BLD: 86.8 % (ref 38–73)
NEUTS BAND NFR BLD MANUAL: 13 %
NEUTS BAND NFR BLD MANUAL: 18 %
NEUTS BAND NFR BLD MANUAL: 20 %
NEUTS BAND NFR BLD MANUAL: 35 %
NEUTS BAND NFR BLD MANUAL: 37 %
NEUTS BAND NFR BLD MANUAL: 5 %
NEUTS BAND NFR BLD MANUAL: 8 %
NOTIFIED BY: ABNORMAL
NRBC BLD-RTO: 0 /100 WBC
NRBC BLD-RTO: 2 /100 WBC
NUM UNITS TRANS PACKED RBC: NORMAL
O2DEVICE: ABNORMAL
PCO2 BLDA: 39.1 MMHG (ref 35–45)
PCO2 BLDA: 45.7 MMHG (ref 35–45)
PCO2 BLDA: 45.9 MMHG (ref 35–45)
PCO2 BLDA: 46.3 MMHG (ref 35–45)
PCO2 BLDA: 48 MMHG (ref 35–45)
PCO2 BLDA: 48.1 MMHG (ref 35–45)
PCO2 BLDA: 48.9 MMHG (ref 35–45)
PCO2 BLDA: 49.4 MMHG (ref 35–45)
PCO2 BLDA: 49.6 MMHG (ref 35–45)
PCO2 BLDA: 50.5 MMHG (ref 35–45)
PCO2 BLDA: 50.7 MMHG (ref 35–45)
PCO2 BLDA: 52.1 MMHG (ref 35–45)
PCO2 BLDA: 53.9 MMHG (ref 35–45)
PCO2 BLDA: 55.6 MMHG (ref 35–45)
PCO2 BLDA: 58.9 MMHG (ref 35–45)
PCO2 BLDA: 59.1 MMHG (ref 35–45)
PCO2 BLDA: 59.2 MMHG (ref 35–45)
PEAK FLOW: 60
PEEP: 10
PEEP: 12
PEEP: 8
PH SMN: 7.22 [PH] (ref 7.34–7.45)
PH SMN: 7.29 [PH] (ref 7.34–7.45)
PH SMN: 7.41 [PH] (ref 7.34–7.45)
PH SMN: 7.42 [PH] (ref 7.34–7.45)
PH SMN: 7.44 [PH] (ref 7.34–7.45)
PH SMN: 7.44 [PH] (ref 7.34–7.45)
PH SMN: 7.45 [PH] (ref 7.34–7.45)
PH SMN: 7.46 [PH] (ref 7.34–7.45)
PH SMN: 7.47 [PH] (ref 7.34–7.45)
PH SMN: 7.48 [PH] (ref 7.34–7.45)
PH SMN: 7.49 [PH] (ref 7.34–7.45)
PH SMN: 7.5 [PH] (ref 7.34–7.45)
PH SMN: 7.5 [PH] (ref 7.34–7.45)
PH SMN: 7.52 [PH] (ref 7.34–7.45)
PH SMN: 7.52 [PH] (ref 7.34–7.45)
PH SMN: 7.54 [PH] (ref 7.34–7.45)
PH SMN: 7.54 [PH] (ref 7.34–7.45)
PH SMN: 7.56 [PH] (ref 7.34–7.45)
PH SMN: 7.61 [PH] (ref 7.34–7.45)
PLATELET # BLD AUTO: 102 K/UL (ref 150–450)
PLATELET # BLD AUTO: 118 K/UL (ref 150–450)
PLATELET # BLD AUTO: 120 K/UL (ref 150–450)
PLATELET # BLD AUTO: 127 K/UL (ref 150–450)
PLATELET # BLD AUTO: 129 K/UL (ref 150–450)
PLATELET # BLD AUTO: 134 K/UL (ref 150–450)
PLATELET # BLD AUTO: 149 K/UL (ref 150–450)
PLATELET # BLD AUTO: 165 K/UL (ref 150–450)
PLATELET # BLD AUTO: 174 K/UL (ref 150–450)
PLATELET # BLD AUTO: 175 K/UL (ref 150–450)
PLATELET # BLD AUTO: 176 K/UL (ref 150–450)
PLATELET # BLD AUTO: 182 K/UL (ref 150–450)
PLATELET # BLD AUTO: 183 K/UL (ref 150–450)
PLATELET # BLD AUTO: 187 K/UL (ref 150–450)
PLATELET # BLD AUTO: 189 K/UL (ref 150–450)
PLATELET # BLD AUTO: 200 K/UL (ref 150–450)
PLATELET # BLD AUTO: 207 K/UL (ref 150–450)
PLATELET # BLD AUTO: 211 K/UL (ref 150–450)
PLATELET # BLD AUTO: 220 K/UL (ref 150–450)
PLATELET # BLD AUTO: 248 K/UL (ref 150–450)
PMV BLD AUTO: 11.6 FL (ref 9.2–12.9)
PMV BLD AUTO: 11.7 FL (ref 9.2–12.9)
PMV BLD AUTO: 11.8 FL (ref 9.2–12.9)
PMV BLD AUTO: 11.9 FL (ref 9.2–12.9)
PMV BLD AUTO: 11.9 FL (ref 9.2–12.9)
PMV BLD AUTO: 12 FL (ref 9.2–12.9)
PMV BLD AUTO: 12.1 FL (ref 9.2–12.9)
PMV BLD AUTO: 12.1 FL (ref 9.2–12.9)
PMV BLD AUTO: 12.2 FL (ref 9.2–12.9)
PMV BLD AUTO: 12.7 FL (ref 9.2–12.9)
PMV BLD AUTO: 12.8 FL (ref 9.2–12.9)
PMV BLD AUTO: 12.8 FL (ref 9.2–12.9)
PMV BLD AUTO: 12.9 FL (ref 9.2–12.9)
PMV BLD AUTO: 13 FL (ref 9.2–12.9)
PMV BLD AUTO: 13 FL (ref 9.2–12.9)
PMV BLD AUTO: 13.2 FL (ref 9.2–12.9)
PO2 BLDA: 107 MMHG (ref 80–100)
PO2 BLDA: 112 MMHG (ref 80–100)
PO2 BLDA: 216 MMHG (ref 80–100)
PO2 BLDA: 244 MMHG (ref 80–100)
PO2 BLDA: 308 MMHG (ref 80–100)
PO2 BLDA: 37.2 MMHG (ref 80–100)
PO2 BLDA: 413 MMHG (ref 80–100)
PO2 BLDA: 48.6 MMHG (ref 80–100)
PO2 BLDA: 53.6 MMHG (ref 80–100)
PO2 BLDA: 59.3 MMHG (ref 80–100)
PO2 BLDA: 61.8 MMHG (ref 80–100)
PO2 BLDA: 67 MMHG (ref 80–100)
PO2 BLDA: 67.2 MMHG (ref 80–100)
PO2 BLDA: 71 MMHG (ref 80–100)
PO2 BLDA: 82.6 MMHG (ref 80–100)
PO2 BLDA: 83.8 MMHG (ref 80–100)
PO2 BLDA: 84.3 MMHG (ref 80–100)
PO2 BLDA: 84.5 MMHG (ref 80–100)
PO2 BLDA: 84.6 MMHG (ref 80–100)
POC BASE DEFICIT: -0.4 MMOL/L
POC BASE DEFICIT: -7.5 MMOL/L
POC BASE DEFICIT: 12.3 MMOL/L
POC BASE DEFICIT: 12.4 MMOL/L
POC BASE DEFICIT: 13 MMOL/L
POC BASE DEFICIT: 13.4 MMOL/L
POC BASE DEFICIT: 15 MMOL/L
POC BASE DEFICIT: 15.6 MMOL/L
POC BASE DEFICIT: 15.6 MMOL/L
POC BASE DEFICIT: 16.6 MMOL/L
POC BASE DEFICIT: 16.9 MMOL/L
POC BASE DEFICIT: 17.2 MMOL/L
POC BASE DEFICIT: 17.5 MMOL/L
POC BASE DEFICIT: 18.5 MMOL/L
POC BASE DEFICIT: 18.6 MMOL/L
POC BASE DEFICIT: 18.9 MMOL/L
POC BASE DEFICIT: 19.7 MMOL/L
POC BASE DEFICIT: 5.4 MMOL/L
POC BASE DEFICIT: 9.9 MMOL/L
POC NOTIFIED NOTE: ABNORMAL
POC PERFORMED BY: ABNORMAL
POC SATURATED O2: 100 %
POC SATURATED O2: 71.2 %
POC SATURATED O2: 85.7 %
POC SATURATED O2: 86.2 %
POC SATURATED O2: 88.8 %
POC SATURATED O2: 91.5 %
POC SATURATED O2: 94.1 %
POC SATURATED O2: 94.7 %
POC SATURATED O2: 95.2 %
POC SATURATED O2: 97.3 %
POC SATURATED O2: 97.6 %
POC SATURATED O2: 97.6 %
POC SATURATED O2: 97.7 %
POC SATURATED O2: 98 %
POC SATURATED O2: 98.8 %
POC SATURATED O2: 99 %
POC SATURATED O2: 99.8 %
POC TCO2: 20.5 MMOL/L
POC TCO2: 25.3 MMOL/L
POC TCO2: 28.9 MMOL/L
POC TCO2: 31.8 MMOL/L
POC TCO2: 33.3 MMOL/L
POC TCO2: 34.7 MMOL/L
POC TCO2: 35 MMOL/L
POC TCO2: 35.1 MMOL/L
POC TCO2: 35.8 MMOL/L
POC TCO2: 36 MMOL/L
POC TCO2: 36 MMOL/L
POC TCO2: 37.2 MMOL/L
POC TCO2: 37.2 MMOL/L
POC TCO2: 38.1 MMOL/L
POC TCO2: 38.8 MMOL/L
POC TCO2: 38.9 MMOL/L
POC TCO2: 39.7 MMOL/L
POC TEMPERATURE: 37 C
POTASSIUM SERPL-SCNC: 2.6 MMOL/L (ref 3.5–5.1)
POTASSIUM SERPL-SCNC: 2.6 MMOL/L (ref 3.5–5.1)
POTASSIUM SERPL-SCNC: 2.7 MMOL/L (ref 3.5–5.1)
POTASSIUM SERPL-SCNC: 2.7 MMOL/L (ref 3.5–5.1)
POTASSIUM SERPL-SCNC: 3.1 MMOL/L (ref 3.5–5.1)
POTASSIUM SERPL-SCNC: 3.3 MMOL/L (ref 3.5–5.1)
POTASSIUM SERPL-SCNC: 3.3 MMOL/L (ref 3.5–5.1)
POTASSIUM SERPL-SCNC: 3.4 MMOL/L (ref 3.5–5.1)
POTASSIUM SERPL-SCNC: 3.4 MMOL/L (ref 3.5–5.1)
POTASSIUM SERPL-SCNC: 3.5 MMOL/L (ref 3.5–5.1)
POTASSIUM SERPL-SCNC: 3.6 MMOL/L (ref 3.5–5.1)
POTASSIUM SERPL-SCNC: 3.6 MMOL/L (ref 3.5–5.1)
POTASSIUM SERPL-SCNC: 3.7 MMOL/L (ref 3.5–5.1)
POTASSIUM SERPL-SCNC: 3.8 MMOL/L (ref 3.5–5.1)
POTASSIUM SERPL-SCNC: 3.8 MMOL/L (ref 3.5–5.1)
POTASSIUM SERPL-SCNC: 4 MMOL/L (ref 3.5–5.1)
POTASSIUM SERPL-SCNC: 4.1 MMOL/L (ref 3.5–5.1)
POTASSIUM SERPL-SCNC: 4.8 MMOL/L (ref 3.5–5.1)
PROCALCITONIN SERPL IA-MCNC: 0.51 NG/ML
PROT SERPL-MCNC: 4.4 G/DL (ref 6–8.4)
PROT SERPL-MCNC: 4.8 G/DL (ref 6–8.4)
PROT SERPL-MCNC: 4.9 G/DL (ref 6–8.4)
PROT SERPL-MCNC: 5 G/DL (ref 6–8.4)
PROT SERPL-MCNC: 5.1 G/DL (ref 6–8.4)
PROT SERPL-MCNC: 5.1 G/DL (ref 6–8.4)
PROT SERPL-MCNC: 5.2 G/DL (ref 6–8.4)
PROT SERPL-MCNC: 5.3 G/DL (ref 6–8.4)
PROT SERPL-MCNC: 5.3 G/DL (ref 6–8.4)
PROT SERPL-MCNC: 5.4 G/DL (ref 6–8.4)
PROT SERPL-MCNC: 5.4 G/DL (ref 6–8.4)
PROT SERPL-MCNC: 5.5 G/DL (ref 6–8.4)
PROT SERPL-MCNC: 5.9 G/DL (ref 6–8.4)
PROT SERPL-MCNC: 6 G/DL (ref 6–8.4)
PROT SERPL-MCNC: 6.4 G/DL (ref 6–8.4)
PROT SERPL-MCNC: 6.5 G/DL (ref 6–8.4)
PROT SERPL-MCNC: 6.6 G/DL (ref 6–8.4)
PROT SERPL-MCNC: 6.6 G/DL (ref 6–8.4)
PROTHROMBIN TIME: 10.1 SEC (ref 9–12.5)
PROTHROMBIN TIME: 10.6 SEC (ref 9–12.5)
PROVIDER NOTIFIED: ABNORMAL
RBC # BLD AUTO: 1.97 M/UL (ref 4.6–6.2)
RBC # BLD AUTO: 2.15 M/UL (ref 4.6–6.2)
RBC # BLD AUTO: 2.28 M/UL (ref 4.6–6.2)
RBC # BLD AUTO: 2.47 M/UL (ref 4.6–6.2)
RBC # BLD AUTO: 2.6 M/UL (ref 4.6–6.2)
RBC # BLD AUTO: 2.62 M/UL (ref 4.6–6.2)
RBC # BLD AUTO: 2.69 M/UL (ref 4.6–6.2)
RBC # BLD AUTO: 2.73 M/UL (ref 4.6–6.2)
RBC # BLD AUTO: 2.8 M/UL (ref 4.6–6.2)
RBC # BLD AUTO: 2.89 M/UL (ref 4.6–6.2)
RBC # BLD AUTO: 3.08 M/UL (ref 4.6–6.2)
RBC # BLD AUTO: 3.13 M/UL (ref 4.6–6.2)
RBC # BLD AUTO: 3.15 M/UL (ref 4.6–6.2)
RBC # BLD AUTO: 3.18 M/UL (ref 4.6–6.2)
RBC # BLD AUTO: 3.23 M/UL (ref 4.6–6.2)
RBC # BLD AUTO: 3.34 M/UL (ref 4.6–6.2)
RBC # BLD AUTO: 3.39 M/UL (ref 4.6–6.2)
RBC # BLD AUTO: 3.54 M/UL (ref 4.6–6.2)
RBC # BLD AUTO: 3.61 M/UL (ref 4.6–6.2)
RBC # BLD AUTO: 3.92 M/UL (ref 4.6–6.2)
RESPIRATORY INFECTION PANEL SOURCE: NORMAL
RSV RNA NPH QL NAA+NON-PROBE: NOT DETECTED
RV+EV RNA NPH QL NAA+NON-PROBE: NOT DETECTED
SARS-COV-2 RDRP RESP QL NAA+PROBE: NEGATIVE
SARS-COV-2 RNA RESP QL NAA+PROBE: NOT DETECTED
SODIUM SERPL-SCNC: 133 MMOL/L (ref 136–145)
SODIUM SERPL-SCNC: 137 MMOL/L (ref 136–145)
SODIUM SERPL-SCNC: 138 MMOL/L (ref 136–145)
SODIUM SERPL-SCNC: 138 MMOL/L (ref 136–145)
SODIUM SERPL-SCNC: 139 MMOL/L (ref 136–145)
SODIUM SERPL-SCNC: 139 MMOL/L (ref 136–145)
SODIUM SERPL-SCNC: 140 MMOL/L (ref 136–145)
SODIUM SERPL-SCNC: 140 MMOL/L (ref 136–145)
SODIUM SERPL-SCNC: 141 MMOL/L (ref 136–145)
SODIUM SERPL-SCNC: 142 MMOL/L (ref 136–145)
SODIUM SERPL-SCNC: 142 MMOL/L (ref 136–145)
SODIUM SERPL-SCNC: 143 MMOL/L (ref 136–145)
SODIUM SERPL-SCNC: 146 MMOL/L (ref 136–145)
SPECIMEN SOURCE: ABNORMAL
TROPONIN I SERPL DL<=0.01 NG/ML-MCNC: 17.9 PG/ML (ref 0–60)
VANCOMYCIN SERPL-MCNC: 18.2 UG/ML
VANCOMYCIN SERPL-MCNC: 22.1 UG/ML
VANCOMYCIN SERPL-MCNC: 24.8 UG/ML
VANCOMYCIN SERPL-MCNC: 29.1 UG/ML
VANCOMYCIN TROUGH SERPL-MCNC: 19.8 UG/ML (ref 10–22)
VANCOMYCIN TROUGH SERPL-MCNC: 22.8 UG/ML (ref 10–22)
VT: 450
VT: 500
VT: 550
WBC # BLD AUTO: 13.44 K/UL (ref 3.9–12.7)
WBC # BLD AUTO: 13.76 K/UL (ref 3.9–12.7)
WBC # BLD AUTO: 13.91 K/UL (ref 3.9–12.7)
WBC # BLD AUTO: 17 K/UL (ref 3.9–12.7)
WBC # BLD AUTO: 18.14 K/UL (ref 3.9–12.7)
WBC # BLD AUTO: 23.14 K/UL (ref 3.9–12.7)
WBC # BLD AUTO: 23.74 K/UL (ref 3.9–12.7)
WBC # BLD AUTO: 27.68 K/UL (ref 3.9–12.7)
WBC # BLD AUTO: 28.51 K/UL (ref 3.9–12.7)
WBC # BLD AUTO: 28.8 K/UL (ref 3.9–12.7)
WBC # BLD AUTO: 28.91 K/UL (ref 3.9–12.7)
WBC # BLD AUTO: 32.54 K/UL (ref 3.9–12.7)
WBC # BLD AUTO: 33.78 K/UL (ref 3.9–12.7)
WBC # BLD AUTO: 37.61 K/UL (ref 3.9–12.7)
WBC # BLD AUTO: 39.74 K/UL (ref 3.9–12.7)
WBC # BLD AUTO: 40.26 K/UL (ref 3.9–12.7)
WBC # BLD AUTO: 6 K/UL (ref 3.9–12.7)
WBC # BLD AUTO: 6.15 K/UL (ref 3.9–12.7)
WBC # BLD AUTO: 7.07 K/UL (ref 3.9–12.7)
WBC # BLD AUTO: 8.9 K/UL (ref 3.9–12.7)

## 2022-01-01 PROCEDURE — 63600175 PHARM REV CODE 636 W HCPCS: Performed by: INTERNAL MEDICINE

## 2022-01-01 PROCEDURE — 25000003 PHARM REV CODE 250: Performed by: NURSE PRACTITIONER

## 2022-01-01 PROCEDURE — 80202 ASSAY OF VANCOMYCIN: CPT | Performed by: INTERNAL MEDICINE

## 2022-01-01 PROCEDURE — 25000003 PHARM REV CODE 250: Performed by: INTERNAL MEDICINE

## 2022-01-01 PROCEDURE — 25000242 PHARM REV CODE 250 ALT 637 W/ HCPCS: Performed by: INTERNAL MEDICINE

## 2022-01-01 PROCEDURE — 25000003 PHARM REV CODE 250: Performed by: EMERGENCY MEDICINE

## 2022-01-01 PROCEDURE — 36415 COLL VENOUS BLD VENIPUNCTURE: CPT | Performed by: NURSE PRACTITIONER

## 2022-01-01 PROCEDURE — 94761 N-INVAS EAR/PLS OXIMETRY MLT: CPT

## 2022-01-01 PROCEDURE — C9113 INJ PANTOPRAZOLE SODIUM, VIA: HCPCS | Performed by: INTERNAL MEDICINE

## 2022-01-01 PROCEDURE — 83735 ASSAY OF MAGNESIUM: CPT | Performed by: NURSE PRACTITIONER

## 2022-01-01 PROCEDURE — P9016 RBC LEUKOCYTES REDUCED: HCPCS | Performed by: INTERNAL MEDICINE

## 2022-01-01 PROCEDURE — 94640 AIRWAY INHALATION TREATMENT: CPT

## 2022-01-01 PROCEDURE — 99900031 HC PATIENT EDUCATION (STAT)

## 2022-01-01 PROCEDURE — 85027 COMPLETE CBC AUTOMATED: CPT | Performed by: NURSE PRACTITIONER

## 2022-01-01 PROCEDURE — 99900035 HC TECH TIME PER 15 MIN (STAT)

## 2022-01-01 PROCEDURE — 80053 COMPREHEN METABOLIC PANEL: CPT | Performed by: NURSE PRACTITIONER

## 2022-01-01 PROCEDURE — 36600 WITHDRAWAL OF ARTERIAL BLOOD: CPT

## 2022-01-01 PROCEDURE — 82803 BLOOD GASES ANY COMBINATION: CPT

## 2022-01-01 PROCEDURE — 99900026 HC AIRWAY MAINTENANCE (STAT)

## 2022-01-01 PROCEDURE — 32551 INSERTION OF CHEST TUBE: CPT | Mod: LT,,, | Performed by: STUDENT IN AN ORGANIZED HEALTH CARE EDUCATION/TRAINING PROGRAM

## 2022-01-01 PROCEDURE — 36620 INSERTION CATHETER ARTERY: CPT

## 2022-01-01 PROCEDURE — 25000003 PHARM REV CODE 250: Performed by: STUDENT IN AN ORGANIZED HEALTH CARE EDUCATION/TRAINING PROGRAM

## 2022-01-01 PROCEDURE — 32551 INSERTION OF CHEST TUBE: CPT | Mod: RT,,, | Performed by: STUDENT IN AN ORGANIZED HEALTH CARE EDUCATION/TRAINING PROGRAM

## 2022-01-01 PROCEDURE — 27000221 HC OXYGEN, UP TO 24 HOURS

## 2022-01-01 PROCEDURE — 86901 BLOOD TYPING SEROLOGIC RH(D): CPT | Performed by: INTERNAL MEDICINE

## 2022-01-01 PROCEDURE — 80162 ASSAY OF DIGOXIN TOTAL: CPT | Mod: 91 | Performed by: INTERNAL MEDICINE

## 2022-01-01 PROCEDURE — 85014 HEMATOCRIT: CPT | Performed by: INTERNAL MEDICINE

## 2022-01-01 PROCEDURE — 99223 1ST HOSP IP/OBS HIGH 75: CPT | Mod: ,,, | Performed by: STUDENT IN AN ORGANIZED HEALTH CARE EDUCATION/TRAINING PROGRAM

## 2022-01-01 PROCEDURE — 20000000 HC ICU ROOM

## 2022-01-01 PROCEDURE — 80162 ASSAY OF DIGOXIN TOTAL: CPT | Performed by: NURSE PRACTITIONER

## 2022-01-01 PROCEDURE — 86920 COMPATIBILITY TEST SPIN: CPT | Performed by: INTERNAL MEDICINE

## 2022-01-01 PROCEDURE — 83605 ASSAY OF LACTIC ACID: CPT | Performed by: INTERNAL MEDICINE

## 2022-01-01 PROCEDURE — 85007 BL SMEAR W/DIFF WBC COUNT: CPT | Performed by: NURSE PRACTITIONER

## 2022-01-01 PROCEDURE — 87116 MYCOBACTERIA CULTURE: CPT | Performed by: INTERNAL MEDICINE

## 2022-01-01 PROCEDURE — 87070 CULTURE OTHR SPECIMN AEROBIC: CPT | Performed by: STUDENT IN AN ORGANIZED HEALTH CARE EDUCATION/TRAINING PROGRAM

## 2022-01-01 PROCEDURE — 85025 COMPLETE CBC W/AUTO DIFF WBC: CPT | Performed by: NURSE PRACTITIONER

## 2022-01-01 PROCEDURE — 87040 BLOOD CULTURE FOR BACTERIA: CPT | Performed by: INTERNAL MEDICINE

## 2022-01-01 PROCEDURE — 87210 SMEAR WET MOUNT SALINE/INK: CPT | Performed by: INTERNAL MEDICINE

## 2022-01-01 PROCEDURE — 37799 UNLISTED PX VASCULAR SURGERY: CPT

## 2022-01-01 PROCEDURE — 84132 ASSAY OF SERUM POTASSIUM: CPT | Performed by: INTERNAL MEDICINE

## 2022-01-01 PROCEDURE — 85730 THROMBOPLASTIN TIME PARTIAL: CPT | Performed by: INTERNAL MEDICINE

## 2022-01-01 PROCEDURE — 36415 COLL VENOUS BLD VENIPUNCTURE: CPT | Performed by: INTERNAL MEDICINE

## 2022-01-01 PROCEDURE — 94002 VENT MGMT INPAT INIT DAY: CPT

## 2022-01-01 PROCEDURE — 36430 TRANSFUSION BLD/BLD COMPNT: CPT

## 2022-01-01 PROCEDURE — 83735 ASSAY OF MAGNESIUM: CPT | Mod: 91 | Performed by: INTERNAL MEDICINE

## 2022-01-01 PROCEDURE — 25000003 PHARM REV CODE 250

## 2022-01-01 PROCEDURE — 84484 ASSAY OF TROPONIN QUANT: CPT | Performed by: INTERNAL MEDICINE

## 2022-01-01 PROCEDURE — 80053 COMPREHEN METABOLIC PANEL: CPT | Mod: 91 | Performed by: STUDENT IN AN ORGANIZED HEALTH CARE EDUCATION/TRAINING PROGRAM

## 2022-01-01 PROCEDURE — 94003 VENT MGMT INPAT SUBQ DAY: CPT

## 2022-01-01 PROCEDURE — 25000003 PHARM REV CODE 250: Performed by: ANESTHESIOLOGY

## 2022-01-01 PROCEDURE — 85379 FIBRIN DEGRADATION QUANT: CPT | Performed by: INTERNAL MEDICINE

## 2022-01-01 PROCEDURE — 87206 SMEAR FLUORESCENT/ACID STAI: CPT | Mod: 91 | Performed by: INTERNAL MEDICINE

## 2022-01-01 PROCEDURE — 85018 HEMOGLOBIN: CPT | Performed by: INTERNAL MEDICINE

## 2022-01-01 PROCEDURE — 85027 COMPLETE CBC AUTOMATED: CPT | Mod: 91 | Performed by: STUDENT IN AN ORGANIZED HEALTH CARE EDUCATION/TRAINING PROGRAM

## 2022-01-01 PROCEDURE — 93010 EKG 12-LEAD: ICD-10-PCS | Mod: ,,, | Performed by: INTERNAL MEDICINE

## 2022-01-01 PROCEDURE — 36415 COLL VENOUS BLD VENIPUNCTURE: CPT | Performed by: STUDENT IN AN ORGANIZED HEALTH CARE EDUCATION/TRAINING PROGRAM

## 2022-01-01 PROCEDURE — 85610 PROTHROMBIN TIME: CPT | Performed by: INTERNAL MEDICINE

## 2022-01-01 PROCEDURE — 32551 CHEST TUBE INSERTION: ICD-10-PCS | Mod: RT,,, | Performed by: STUDENT IN AN ORGANIZED HEALTH CARE EDUCATION/TRAINING PROGRAM

## 2022-01-01 PROCEDURE — U0002 COVID-19 LAB TEST NON-CDC: HCPCS | Performed by: INTERNAL MEDICINE

## 2022-01-01 PROCEDURE — 93010 ELECTROCARDIOGRAM REPORT: CPT | Mod: ,,, | Performed by: INTERNAL MEDICINE

## 2022-01-01 PROCEDURE — 83605 ASSAY OF LACTIC ACID: CPT | Performed by: STUDENT IN AN ORGANIZED HEALTH CARE EDUCATION/TRAINING PROGRAM

## 2022-01-01 PROCEDURE — 85025 COMPLETE CBC W/AUTO DIFF WBC: CPT | Mod: 91 | Performed by: INTERNAL MEDICINE

## 2022-01-01 PROCEDURE — 84145 PROCALCITONIN (PCT): CPT | Performed by: INTERNAL MEDICINE

## 2022-01-01 PROCEDURE — 93005 ELECTROCARDIOGRAM TRACING: CPT

## 2022-01-01 PROCEDURE — 32551 PR TUBE THORACOSTOMY INCLUDES WATER SEAL: ICD-10-PCS | Mod: LT,,, | Performed by: STUDENT IN AN ORGANIZED HEALTH CARE EDUCATION/TRAINING PROGRAM

## 2022-01-01 PROCEDURE — 25500020 PHARM REV CODE 255: Performed by: INTERNAL MEDICINE

## 2022-01-01 PROCEDURE — 99223 PR INITIAL HOSPITAL CARE,LEVL III: ICD-10-PCS | Mod: ,,, | Performed by: STUDENT IN AN ORGANIZED HEALTH CARE EDUCATION/TRAINING PROGRAM

## 2022-01-01 PROCEDURE — 87206 SMEAR FLUORESCENT/ACID STAI: CPT | Performed by: INTERNAL MEDICINE

## 2022-01-01 PROCEDURE — 80048 BASIC METABOLIC PNL TOTAL CA: CPT | Performed by: INTERNAL MEDICINE

## 2022-01-01 PROCEDURE — 85730 THROMBOPLASTIN TIME PARTIAL: CPT | Performed by: STUDENT IN AN ORGANIZED HEALTH CARE EDUCATION/TRAINING PROGRAM

## 2022-01-01 PROCEDURE — 87798 DETECT AGENT NOS DNA AMP: CPT | Performed by: INTERNAL MEDICINE

## 2022-01-01 PROCEDURE — 87102 FUNGUS ISOLATION CULTURE: CPT | Performed by: INTERNAL MEDICINE

## 2022-01-01 PROCEDURE — 85610 PROTHROMBIN TIME: CPT | Performed by: STUDENT IN AN ORGANIZED HEALTH CARE EDUCATION/TRAINING PROGRAM

## 2022-01-01 PROCEDURE — 87205 SMEAR GRAM STAIN: CPT | Performed by: INTERNAL MEDICINE

## 2022-01-01 PROCEDURE — 87070 CULTURE OTHR SPECIMN AEROBIC: CPT | Performed by: INTERNAL MEDICINE

## 2022-01-01 RX ORDER — FUROSEMIDE 10 MG/ML
40 INJECTION INTRAMUSCULAR; INTRAVENOUS DAILY
Status: DISCONTINUED | OUTPATIENT
Start: 2022-01-01 | End: 2022-01-01

## 2022-01-01 RX ORDER — AMIODARONE HYDROCHLORIDE 200 MG/1
200 TABLET ORAL 2 TIMES DAILY
Status: DISCONTINUED | OUTPATIENT
Start: 2022-01-01 | End: 2022-01-01

## 2022-01-01 RX ORDER — MORPHINE SULFATE 2 MG/ML
2 INJECTION, SOLUTION INTRAMUSCULAR; INTRAVENOUS EVERY 4 HOURS PRN
Status: DISCONTINUED | OUTPATIENT
Start: 2022-01-01 | End: 2022-01-01 | Stop reason: HOSPADM

## 2022-01-01 RX ORDER — IPRATROPIUM BROMIDE 0.5 MG/2.5ML
0.5 SOLUTION RESPIRATORY (INHALATION) EVERY 8 HOURS
Status: DISCONTINUED | OUTPATIENT
Start: 2022-01-01 | End: 2022-01-01 | Stop reason: HOSPADM

## 2022-01-01 RX ORDER — ROCURONIUM BROMIDE 10 MG/ML
INJECTION, SOLUTION INTRAVENOUS
Status: DISCONTINUED | OUTPATIENT
Start: 2022-01-01 | End: 2022-01-01 | Stop reason: HOSPADM

## 2022-01-01 RX ORDER — DEXTROSE MONOHYDRATE AND SODIUM CHLORIDE 5; .9 G/100ML; G/100ML
INJECTION, SOLUTION INTRAVENOUS CONTINUOUS
Status: DISCONTINUED | OUTPATIENT
Start: 2022-01-01 | End: 2022-01-01

## 2022-01-01 RX ORDER — DIGOXIN 0.25 MG/ML
250 INJECTION INTRAMUSCULAR; INTRAVENOUS DAILY
Status: DISCONTINUED | OUTPATIENT
Start: 2022-01-01 | End: 2022-01-01

## 2022-01-01 RX ORDER — VANCOMYCIN HCL IN 5 % DEXTROSE 1G/250ML
1000 PLASTIC BAG, INJECTION (ML) INTRAVENOUS
Status: DISCONTINUED | OUTPATIENT
Start: 2022-01-01 | End: 2022-01-01

## 2022-01-01 RX ORDER — HYDROCODONE BITARTRATE AND ACETAMINOPHEN 500; 5 MG/1; MG/1
TABLET ORAL
Status: DISCONTINUED | OUTPATIENT
Start: 2022-01-01 | End: 2022-01-01

## 2022-01-01 RX ORDER — FUROSEMIDE 10 MG/ML
20 INJECTION INTRAMUSCULAR; INTRAVENOUS ONCE
Status: COMPLETED | OUTPATIENT
Start: 2022-01-01 | End: 2022-01-01

## 2022-01-01 RX ORDER — POTASSIUM CHLORIDE 7.45 MG/ML
10 INJECTION INTRAVENOUS
Status: COMPLETED | OUTPATIENT
Start: 2022-01-01 | End: 2022-01-01

## 2022-01-01 RX ORDER — AMIODARONE HYDROCHLORIDE 200 MG/1
400 TABLET ORAL DAILY
Status: DISCONTINUED | OUTPATIENT
Start: 2022-01-01 | End: 2022-01-01

## 2022-01-01 RX ORDER — ROCURONIUM BROMIDE 10 MG/ML
40 INJECTION, SOLUTION INTRAVENOUS ONCE
Status: COMPLETED | OUTPATIENT
Start: 2022-01-01 | End: 2022-01-01

## 2022-01-01 RX ORDER — POTASSIUM CHLORIDE 14.9 MG/ML
40 INJECTION INTRAVENOUS ONCE
Status: COMPLETED | OUTPATIENT
Start: 2022-01-01 | End: 2022-01-01

## 2022-01-01 RX ORDER — LEVALBUTEROL 1.25 MG/.5ML
1.25 SOLUTION, CONCENTRATE RESPIRATORY (INHALATION) EVERY 8 HOURS
Status: DISCONTINUED | OUTPATIENT
Start: 2022-01-01 | End: 2022-01-01 | Stop reason: HOSPADM

## 2022-01-01 RX ORDER — FUROSEMIDE 10 MG/ML
20 INJECTION INTRAMUSCULAR; INTRAVENOUS
Status: DISCONTINUED | OUTPATIENT
Start: 2022-01-01 | End: 2022-01-01

## 2022-01-01 RX ORDER — DIGOXIN 0.25 MG/ML
250 INJECTION INTRAMUSCULAR; INTRAVENOUS ONCE
Status: COMPLETED | OUTPATIENT
Start: 2022-01-01 | End: 2022-01-01

## 2022-01-01 RX ORDER — LEVOFLOXACIN 5 MG/ML
750 INJECTION, SOLUTION INTRAVENOUS
Status: COMPLETED | OUTPATIENT
Start: 2022-01-01 | End: 2022-01-01

## 2022-01-01 RX ORDER — FUROSEMIDE 20 MG/1
20 TABLET ORAL DAILY
Status: DISCONTINUED | OUTPATIENT
Start: 2022-01-01 | End: 2022-01-01

## 2022-01-01 RX ORDER — FUROSEMIDE 10 MG/ML
20 INJECTION INTRAMUSCULAR; INTRAVENOUS DAILY
Status: DISCONTINUED | OUTPATIENT
Start: 2022-01-01 | End: 2022-01-01

## 2022-01-01 RX ORDER — POTASSIUM CHLORIDE 7.45 MG/ML
40 INJECTION INTRAVENOUS ONCE
Status: COMPLETED | OUTPATIENT
Start: 2022-01-01 | End: 2022-01-01

## 2022-01-01 RX ORDER — POTASSIUM CHLORIDE 20 MEQ/1
20 TABLET, EXTENDED RELEASE ORAL 2 TIMES DAILY
Status: DISCONTINUED | OUTPATIENT
Start: 2022-01-01 | End: 2022-01-01

## 2022-01-01 RX ORDER — ROCURONIUM BROMIDE 10 MG/ML
INJECTION, SOLUTION INTRAVENOUS
Status: COMPLETED
Start: 2022-01-01 | End: 2022-01-01

## 2022-01-01 RX ORDER — HYDROCODONE BITARTRATE AND ACETAMINOPHEN 500; 5 MG/1; MG/1
TABLET ORAL
Status: DISCONTINUED | OUTPATIENT
Start: 2022-01-01 | End: 2022-01-01 | Stop reason: HOSPADM

## 2022-01-01 RX ORDER — ENOXAPARIN SODIUM 100 MG/ML
40 INJECTION SUBCUTANEOUS EVERY 24 HOURS
Status: DISCONTINUED | OUTPATIENT
Start: 2022-01-01 | End: 2022-01-01

## 2022-01-01 RX ORDER — FUROSEMIDE 10 MG/ML
80 INJECTION INTRAMUSCULAR; INTRAVENOUS
Status: DISCONTINUED | OUTPATIENT
Start: 2022-01-01 | End: 2022-01-01 | Stop reason: HOSPADM

## 2022-01-01 RX ORDER — POTASSIUM CHLORIDE 14.9 MG/ML
40 INJECTION INTRAVENOUS ONCE
Status: DISCONTINUED | OUTPATIENT
Start: 2022-01-01 | End: 2022-01-01

## 2022-01-01 RX ORDER — METOPROLOL TARTRATE 1 MG/ML
2.5 INJECTION, SOLUTION INTRAVENOUS ONCE
Status: COMPLETED | OUTPATIENT
Start: 2022-01-01 | End: 2022-01-01

## 2022-01-01 RX ORDER — AMIODARONE HYDROCHLORIDE 200 MG/1
200 TABLET ORAL DAILY
Status: DISCONTINUED | OUTPATIENT
Start: 2022-01-01 | End: 2022-01-01

## 2022-01-01 RX ORDER — FUROSEMIDE 10 MG/ML
40 INJECTION INTRAMUSCULAR; INTRAVENOUS ONCE
Status: COMPLETED | OUTPATIENT
Start: 2022-01-01 | End: 2022-01-01

## 2022-01-01 RX ORDER — DIGOXIN 0.25 MG/ML
125 INJECTION INTRAMUSCULAR; INTRAVENOUS DAILY
Status: DISCONTINUED | OUTPATIENT
Start: 2022-01-01 | End: 2022-01-01

## 2022-01-01 RX ORDER — POTASSIUM CHLORIDE 750 MG/1
10 TABLET, EXTENDED RELEASE ORAL ONCE
Status: COMPLETED | OUTPATIENT
Start: 2022-01-01 | End: 2022-01-01

## 2022-01-01 RX ORDER — MORPHINE SULFATE 2 MG/ML
3 INJECTION, SOLUTION INTRAMUSCULAR; INTRAVENOUS EVERY 4 HOURS PRN
Status: DISCONTINUED | OUTPATIENT
Start: 2022-01-01 | End: 2022-01-01 | Stop reason: HOSPADM

## 2022-01-01 RX ORDER — POTASSIUM CHLORIDE 7.45 MG/ML
10 INJECTION INTRAVENOUS
Status: DISCONTINUED | OUTPATIENT
Start: 2022-01-01 | End: 2022-01-01

## 2022-01-01 RX ADMIN — LEVALBUTEROL HYDROCHLORIDE 1.25 MG: 1.25 SOLUTION, CONCENTRATE RESPIRATORY (INHALATION) at 12:01

## 2022-01-01 RX ADMIN — POTASSIUM CHLORIDE 10 MEQ: 7.46 INJECTION, SOLUTION INTRAVENOUS at 11:01

## 2022-01-01 RX ADMIN — PROPOFOL 50 MCG/KG/MIN: 10 INJECTION, EMULSION INTRAVENOUS at 01:01

## 2022-01-01 RX ADMIN — PROPOFOL 50 MCG/KG/MIN: 10 INJECTION, EMULSION INTRAVENOUS at 06:01

## 2022-01-01 RX ADMIN — HYDROCORTISONE SODIUM SUCCINATE 50 MG: 100 INJECTION, POWDER, FOR SOLUTION INTRAMUSCULAR; INTRAVENOUS at 01:01

## 2022-01-01 RX ADMIN — PIPERACILLIN AND TAZOBACTAM 4.5 G: 4; .5 INJECTION, POWDER, LYOPHILIZED, FOR SOLUTION INTRAVENOUS; PARENTERAL at 12:01

## 2022-01-01 RX ADMIN — FLUCONAZOLE 200 MG: 2 INJECTION, SOLUTION INTRAVENOUS at 10:01

## 2022-01-01 RX ADMIN — HYDROCORTISONE SODIUM SUCCINATE 50 MG: 100 INJECTION, POWDER, FOR SOLUTION INTRAMUSCULAR; INTRAVENOUS at 09:01

## 2022-01-01 RX ADMIN — HYDROCORTISONE SODIUM SUCCINATE 25 MG: 100 INJECTION, POWDER, FOR SOLUTION INTRAMUSCULAR; INTRAVENOUS at 06:01

## 2022-01-01 RX ADMIN — ATORVASTATIN CALCIUM 40 MG: 40 TABLET, FILM COATED ORAL at 08:01

## 2022-01-01 RX ADMIN — POTASSIUM BICARBONATE 20 MEQ: 391 TABLET, EFFERVESCENT ORAL at 08:01

## 2022-01-01 RX ADMIN — POTASSIUM CHLORIDE 10 MEQ: 7.46 INJECTION, SOLUTION INTRAVENOUS at 08:01

## 2022-01-01 RX ADMIN — PIPERACILLIN AND TAZOBACTAM 4.5 G: 4; .5 INJECTION, POWDER, LYOPHILIZED, FOR SOLUTION INTRAVENOUS; PARENTERAL at 05:01

## 2022-01-01 RX ADMIN — VANCOMYCIN HYDROCHLORIDE 1000 MG: 1 INJECTION, POWDER, LYOPHILIZED, FOR SOLUTION INTRAVENOUS at 08:01

## 2022-01-01 RX ADMIN — HYDROCORTISONE SODIUM SUCCINATE 100 MG: 100 INJECTION, POWDER, FOR SOLUTION INTRAMUSCULAR; INTRAVENOUS at 09:01

## 2022-01-01 RX ADMIN — LEVOFLOXACIN 750 MG: 750 INJECTION, SOLUTION INTRAVENOUS at 08:01

## 2022-01-01 RX ADMIN — AMIODARONE HYDROCHLORIDE 400 MG: 200 TABLET ORAL at 08:01

## 2022-01-01 RX ADMIN — FUROSEMIDE 20 MG: 10 INJECTION, SOLUTION INTRAMUSCULAR; INTRAVENOUS at 11:01

## 2022-01-01 RX ADMIN — DEXMEDETOMIDINE HYDROCHLORIDE 1 MCG/KG/HR: 4 INJECTION, SOLUTION INTRAVENOUS at 03:01

## 2022-01-01 RX ADMIN — Medication 400 MG: at 08:01

## 2022-01-01 RX ADMIN — LEVALBUTEROL HYDROCHLORIDE 1.25 MG: 1.25 SOLUTION, CONCENTRATE RESPIRATORY (INHALATION) at 04:01

## 2022-01-01 RX ADMIN — DEXMEDETOMIDINE HYDROCHLORIDE 1.3 MCG/KG/HR: 4 INJECTION, SOLUTION INTRAVENOUS at 09:01

## 2022-01-01 RX ADMIN — PROPOFOL 50 MCG/KG/MIN: 10 INJECTION, EMULSION INTRAVENOUS at 02:01

## 2022-01-01 RX ADMIN — FLUCONAZOLE 200 MG: 2 INJECTION, SOLUTION INTRAVENOUS at 09:01

## 2022-01-01 RX ADMIN — PROPOFOL 50 MCG/KG/MIN: 10 INJECTION, EMULSION INTRAVENOUS at 04:01

## 2022-01-01 RX ADMIN — LINEZOLID 600 MG: 600 INJECTION, SOLUTION INTRAVENOUS at 12:01

## 2022-01-01 RX ADMIN — SODIUM CHLORIDE: 0.9 INJECTION, SOLUTION INTRAVENOUS at 10:01

## 2022-01-01 RX ADMIN — IPRATROPIUM BROMIDE 0.5 MG: 0.5 SOLUTION RESPIRATORY (INHALATION) at 11:01

## 2022-01-01 RX ADMIN — DEXMEDETOMIDINE HYDROCHLORIDE 1.4 MCG/KG/HR: 4 INJECTION, SOLUTION INTRAVENOUS at 04:01

## 2022-01-01 RX ADMIN — DEXMEDETOMIDINE HYDROCHLORIDE 1.4 MCG/KG/HR: 4 INJECTION, SOLUTION INTRAVENOUS at 11:01

## 2022-01-01 RX ADMIN — NOREPINEPHRINE BITARTRATE 0.01 MCG/KG/MIN: 1 INJECTION, SOLUTION, CONCENTRATE INTRAVENOUS at 03:01

## 2022-01-01 RX ADMIN — NOREPINEPHRINE BITARTRATE 0.13 MCG/KG/MIN: 1 INJECTION, SOLUTION, CONCENTRATE INTRAVENOUS at 08:01

## 2022-01-01 RX ADMIN — Medication 400 MG: at 09:01

## 2022-01-01 RX ADMIN — HYDROCORTISONE: 25 CREAM TOPICAL at 08:01

## 2022-01-01 RX ADMIN — DEXMEDETOMIDINE HYDROCHLORIDE 1.4 MCG/KG/HR: 4 INJECTION, SOLUTION INTRAVENOUS at 01:01

## 2022-01-01 RX ADMIN — PROPOFOL 50 MCG/KG/MIN: 10 INJECTION, EMULSION INTRAVENOUS at 08:01

## 2022-01-01 RX ADMIN — LEVALBUTEROL HYDROCHLORIDE 1.25 MG: 1.25 SOLUTION, CONCENTRATE RESPIRATORY (INHALATION) at 08:01

## 2022-01-01 RX ADMIN — PIPERACILLIN AND TAZOBACTAM 4.5 G: 4; .5 INJECTION, POWDER, LYOPHILIZED, FOR SOLUTION INTRAVENOUS; PARENTERAL at 08:01

## 2022-01-01 RX ADMIN — POTASSIUM CHLORIDE 10 MEQ: 7.46 INJECTION, SOLUTION INTRAVENOUS at 03:01

## 2022-01-01 RX ADMIN — POTASSIUM CHLORIDE 40 MEQ: 7.46 INJECTION, SOLUTION INTRAVENOUS at 06:01

## 2022-01-01 RX ADMIN — DEXMEDETOMIDINE HYDROCHLORIDE 1.4 MCG/KG/HR: 4 INJECTION, SOLUTION INTRAVENOUS at 08:01

## 2022-01-01 RX ADMIN — AMIODARONE HYDROCHLORIDE 0.5 MG/MIN: 1.8 INJECTION, SOLUTION INTRAVENOUS at 03:01

## 2022-01-01 RX ADMIN — IPRATROPIUM BROMIDE 0.5 MG: 0.5 SOLUTION RESPIRATORY (INHALATION) at 08:01

## 2022-01-01 RX ADMIN — DEXMEDETOMIDINE HYDROCHLORIDE 1.1 MCG/KG/HR: 4 INJECTION, SOLUTION INTRAVENOUS at 11:01

## 2022-01-01 RX ADMIN — FUROSEMIDE 40 MG: 10 INJECTION, SOLUTION INTRAVENOUS at 07:01

## 2022-01-01 RX ADMIN — LEVALBUTEROL 1.25 MG: 1.25 SOLUTION, CONCENTRATE RESPIRATORY (INHALATION) at 01:01

## 2022-01-01 RX ADMIN — PANTOPRAZOLE SODIUM 40 MG: 40 INJECTION, POWDER, FOR SOLUTION INTRAVENOUS at 08:01

## 2022-01-01 RX ADMIN — PROPOFOL 40 MCG/KG/MIN: 10 INJECTION, EMULSION INTRAVENOUS at 05:01

## 2022-01-01 RX ADMIN — ROCURONIUM BROMIDE 40 MG: 10 INJECTION, SOLUTION INTRAVENOUS at 10:01

## 2022-01-01 RX ADMIN — PROPOFOL 45 MCG/KG/MIN: 10 INJECTION, EMULSION INTRAVENOUS at 08:01

## 2022-01-01 RX ADMIN — DEXTROSE AND SODIUM CHLORIDE: 5; .9 INJECTION, SOLUTION INTRAVENOUS at 11:01

## 2022-01-01 RX ADMIN — PIPERACILLIN AND TAZOBACTAM 4.5 G: 4; .5 INJECTION, POWDER, LYOPHILIZED, FOR SOLUTION INTRAVENOUS; PARENTERAL at 09:01

## 2022-01-01 RX ADMIN — POTASSIUM CHLORIDE 10 MEQ: 7.46 INJECTION, SOLUTION INTRAVENOUS at 01:01

## 2022-01-01 RX ADMIN — VANCOMYCIN HYDROCHLORIDE 1000 MG: 1 INJECTION, POWDER, LYOPHILIZED, FOR SOLUTION INTRAVENOUS at 10:01

## 2022-01-01 RX ADMIN — LEVALBUTEROL 1.25 MG: 1.25 SOLUTION, CONCENTRATE RESPIRATORY (INHALATION) at 08:01

## 2022-01-01 RX ADMIN — DEXMEDETOMIDINE HYDROCHLORIDE 1.1 MCG/KG/HR: 4 INJECTION, SOLUTION INTRAVENOUS at 03:01

## 2022-01-01 RX ADMIN — DEXMEDETOMIDINE HYDROCHLORIDE 1.4 MCG/KG/HR: 4 INJECTION, SOLUTION INTRAVENOUS at 02:01

## 2022-01-01 RX ADMIN — DEXMEDETOMIDINE HYDROCHLORIDE 1.4 MCG/KG/HR: 4 INJECTION, SOLUTION INTRAVENOUS at 05:01

## 2022-01-01 RX ADMIN — AMIODARONE HYDROCHLORIDE 200 MG: 200 TABLET ORAL at 08:01

## 2022-01-01 RX ADMIN — PROPOFOL 50 MCG/KG/MIN: 10 INJECTION, EMULSION INTRAVENOUS at 03:01

## 2022-01-01 RX ADMIN — HYDROCORTISONE SODIUM SUCCINATE 100 MG: 100 INJECTION, POWDER, FOR SOLUTION INTRAMUSCULAR; INTRAVENOUS at 01:01

## 2022-01-01 RX ADMIN — HYDROCORTISONE: 25 CREAM TOPICAL at 10:01

## 2022-01-01 RX ADMIN — PIPERACILLIN AND TAZOBACTAM 4.5 G: 4; .5 INJECTION, POWDER, LYOPHILIZED, FOR SOLUTION INTRAVENOUS; PARENTERAL at 04:01

## 2022-01-01 RX ADMIN — PROPOFOL 40 MCG/KG/MIN: 10 INJECTION, EMULSION INTRAVENOUS at 08:01

## 2022-01-01 RX ADMIN — PROPOFOL 35 MCG/KG/MIN: 10 INJECTION, EMULSION INTRAVENOUS at 07:01

## 2022-01-01 RX ADMIN — PIPERACILLIN AND TAZOBACTAM 4.5 G: 4; .5 INJECTION, POWDER, LYOPHILIZED, FOR SOLUTION INTRAVENOUS; PARENTERAL at 01:01

## 2022-01-01 RX ADMIN — HYDROCORTISONE SODIUM SUCCINATE 25 MG: 100 INJECTION, POWDER, FOR SOLUTION INTRAMUSCULAR; INTRAVENOUS at 09:01

## 2022-01-01 RX ADMIN — VANCOMYCIN HYDROCHLORIDE 1000 MG: 1 INJECTION, POWDER, LYOPHILIZED, FOR SOLUTION INTRAVENOUS at 12:01

## 2022-01-01 RX ADMIN — ENOXAPARIN SODIUM 40 MG: 40 INJECTION SUBCUTANEOUS at 04:01

## 2022-01-01 RX ADMIN — NOREPINEPHRINE BITARTRATE 0.31 MCG/KG/MIN: 1 INJECTION, SOLUTION, CONCENTRATE INTRAVENOUS at 04:01

## 2022-01-01 RX ADMIN — PROPOFOL 30 MCG/KG/MIN: 10 INJECTION, EMULSION INTRAVENOUS at 03:01

## 2022-01-01 RX ADMIN — ATORVASTATIN CALCIUM 40 MG: 40 TABLET, FILM COATED ORAL at 09:01

## 2022-01-01 RX ADMIN — AMIODARONE HYDROCHLORIDE 1 MG/MIN: 1.8 INJECTION, SOLUTION INTRAVENOUS at 09:01

## 2022-01-01 RX ADMIN — PROPOFOL 35 MCG/KG/MIN: 10 INJECTION, EMULSION INTRAVENOUS at 02:01

## 2022-01-01 RX ADMIN — IPRATROPIUM BROMIDE 0.5 MG: 0.5 SOLUTION RESPIRATORY (INHALATION) at 04:01

## 2022-01-01 RX ADMIN — POTASSIUM CHLORIDE 10 MEQ: 7.46 INJECTION, SOLUTION INTRAVENOUS at 10:01

## 2022-01-01 RX ADMIN — PROPOFOL 50 MCG/KG/MIN: 10 INJECTION, EMULSION INTRAVENOUS at 10:01

## 2022-01-01 RX ADMIN — FLUCONAZOLE 200 MG: 2 INJECTION, SOLUTION INTRAVENOUS at 11:01

## 2022-01-01 RX ADMIN — HYDROCORTISONE SODIUM SUCCINATE 100 MG: 100 INJECTION, POWDER, FOR SOLUTION INTRAMUSCULAR; INTRAVENOUS at 05:01

## 2022-01-01 RX ADMIN — ATORVASTATIN CALCIUM 40 MG: 40 TABLET, FILM COATED ORAL at 10:01

## 2022-01-01 RX ADMIN — DEXMEDETOMIDINE HYDROCHLORIDE 1.4 MCG/KG/HR: 4 INJECTION, SOLUTION INTRAVENOUS at 06:01

## 2022-01-01 RX ADMIN — PROPOFOL 35 MCG/KG/MIN: 10 INJECTION, EMULSION INTRAVENOUS at 04:01

## 2022-01-01 RX ADMIN — IPRATROPIUM BROMIDE 0.5 MG: 0.5 SOLUTION RESPIRATORY (INHALATION) at 07:01

## 2022-01-01 RX ADMIN — PROPOFOL 30 MCG/KG/MIN: 10 INJECTION, EMULSION INTRAVENOUS at 12:01

## 2022-01-01 RX ADMIN — DEXMEDETOMIDINE HYDROCHLORIDE 0.9 MCG/KG/HR: 4 INJECTION, SOLUTION INTRAVENOUS at 06:01

## 2022-01-01 RX ADMIN — POTASSIUM BICARBONATE 20 MEQ: 391 TABLET, EFFERVESCENT ORAL at 09:01

## 2022-01-01 RX ADMIN — HYDROCORTISONE SODIUM SUCCINATE 100 MG: 100 INJECTION, POWDER, FOR SOLUTION INTRAMUSCULAR; INTRAVENOUS at 02:01

## 2022-01-01 RX ADMIN — HYDROCORTISONE SODIUM SUCCINATE 50 MG: 100 INJECTION, POWDER, FOR SOLUTION INTRAMUSCULAR; INTRAVENOUS at 05:01

## 2022-01-01 RX ADMIN — DEXMEDETOMIDINE HYDROCHLORIDE 0.8 MCG/KG/HR: 4 INJECTION, SOLUTION INTRAVENOUS at 09:01

## 2022-01-01 RX ADMIN — POTASSIUM CHLORIDE 10 MEQ: 7.46 INJECTION, SOLUTION INTRAVENOUS at 09:01

## 2022-01-01 RX ADMIN — DEXTROSE AND SODIUM CHLORIDE: 5; .9 INJECTION, SOLUTION INTRAVENOUS at 01:01

## 2022-01-01 RX ADMIN — AMIODARONE HYDROCHLORIDE 0.5 MG/MIN: 1.8 INJECTION, SOLUTION INTRAVENOUS at 04:01

## 2022-01-01 RX ADMIN — MORPHINE SULFATE 2 MG: 2 INJECTION, SOLUTION INTRAMUSCULAR; INTRAVENOUS at 09:01

## 2022-01-01 RX ADMIN — POTASSIUM CHLORIDE 10 MEQ: 7.46 INJECTION, SOLUTION INTRAVENOUS at 04:01

## 2022-01-01 RX ADMIN — FUROSEMIDE 40 MG: 10 INJECTION, SOLUTION INTRAVENOUS at 08:01

## 2022-01-01 RX ADMIN — DEXMEDETOMIDINE HYDROCHLORIDE 1.2 MCG/KG/HR: 4 INJECTION, SOLUTION INTRAVENOUS at 12:01

## 2022-01-01 RX ADMIN — LEVOFLOXACIN 750 MG: 750 INJECTION, SOLUTION INTRAVENOUS at 09:01

## 2022-01-01 RX ADMIN — POTASSIUM BICARBONATE 20 MEQ: 391 TABLET, EFFERVESCENT ORAL at 10:01

## 2022-01-01 RX ADMIN — LEVALBUTEROL HYDROCHLORIDE 1.25 MG: 1.25 SOLUTION, CONCENTRATE RESPIRATORY (INHALATION) at 03:01

## 2022-01-01 RX ADMIN — NOREPINEPHRINE BITARTRATE 0.32 MCG/KG/MIN: 1 INJECTION, SOLUTION, CONCENTRATE INTRAVENOUS at 06:01

## 2022-01-01 RX ADMIN — DEXMEDETOMIDINE HYDROCHLORIDE 1 MCG/KG/HR: 4 INJECTION, SOLUTION INTRAVENOUS at 08:01

## 2022-01-01 RX ADMIN — DEXMEDETOMIDINE HYDROCHLORIDE 1.4 MCG/KG/HR: 4 INJECTION, SOLUTION INTRAVENOUS at 12:01

## 2022-01-01 RX ADMIN — HYDROCORTISONE SODIUM SUCCINATE 50 MG: 100 INJECTION, POWDER, FOR SOLUTION INTRAMUSCULAR; INTRAVENOUS at 10:01

## 2022-01-01 RX ADMIN — PROPOFOL 50 MCG/KG/MIN: 10 INJECTION, EMULSION INTRAVENOUS at 05:01

## 2022-01-01 RX ADMIN — LEVALBUTEROL 1.25 MG: 1.25 SOLUTION, CONCENTRATE RESPIRATORY (INHALATION) at 07:01

## 2022-01-01 RX ADMIN — LEVALBUTEROL 1.25 MG: 1.25 SOLUTION, CONCENTRATE RESPIRATORY (INHALATION) at 02:01

## 2022-01-01 RX ADMIN — PROPOFOL 45 MCG/KG/MIN: 10 INJECTION, EMULSION INTRAVENOUS at 02:01

## 2022-01-01 RX ADMIN — AMIODARONE HYDROCHLORIDE 400 MG: 200 TABLET ORAL at 09:01

## 2022-01-01 RX ADMIN — FUROSEMIDE 40 MG: 10 INJECTION, SOLUTION INTRAMUSCULAR; INTRAVENOUS at 08:01

## 2022-01-01 RX ADMIN — VANCOMYCIN HYDROCHLORIDE 1000 MG: 1 INJECTION, POWDER, LYOPHILIZED, FOR SOLUTION INTRAVENOUS at 07:01

## 2022-01-01 RX ADMIN — DEXMEDETOMIDINE HYDROCHLORIDE 1.1 MCG/KG/HR: 4 INJECTION, SOLUTION INTRAVENOUS at 10:01

## 2022-01-01 RX ADMIN — HYDROCORTISONE SODIUM SUCCINATE 100 MG: 100 INJECTION, POWDER, FOR SOLUTION INTRAMUSCULAR; INTRAVENOUS at 11:01

## 2022-01-01 RX ADMIN — PROPOFOL 40 MCG/KG/MIN: 10 INJECTION, EMULSION INTRAVENOUS at 12:01

## 2022-01-01 RX ADMIN — PANTOPRAZOLE SODIUM 40 MG: 40 INJECTION, POWDER, FOR SOLUTION INTRAVENOUS at 09:01

## 2022-01-01 RX ADMIN — DEXMEDETOMIDINE HYDROCHLORIDE 1.2 MCG/KG/HR: 4 INJECTION, SOLUTION INTRAVENOUS at 06:01

## 2022-01-01 RX ADMIN — IPRATROPIUM BROMIDE 0.5 MG: 0.5 SOLUTION RESPIRATORY (INHALATION) at 09:01

## 2022-01-01 RX ADMIN — PROPOFOL 50 MCG/KG/MIN: 10 INJECTION, EMULSION INTRAVENOUS at 12:01

## 2022-01-01 RX ADMIN — DEXMEDETOMIDINE HYDROCHLORIDE 1.2 MCG/KG/HR: 4 INJECTION, SOLUTION INTRAVENOUS at 08:01

## 2022-01-01 RX ADMIN — LEVALBUTEROL HYDROCHLORIDE 1.25 MG: 1.25 SOLUTION, CONCENTRATE RESPIRATORY (INHALATION) at 07:01

## 2022-01-01 RX ADMIN — ACETAMINOPHEN 500 MG: 500 TABLET ORAL at 04:01

## 2022-01-01 RX ADMIN — DEXMEDETOMIDINE HYDROCHLORIDE 1.3 MCG/KG/HR: 4 INJECTION, SOLUTION INTRAVENOUS at 12:01

## 2022-01-01 RX ADMIN — METOROPROLOL TARTRATE 2.5 MG: 5 INJECTION, SOLUTION INTRAVENOUS at 09:01

## 2022-01-01 RX ADMIN — DEXMEDETOMIDINE HYDROCHLORIDE 1.4 MCG/KG/HR: 4 INJECTION, SOLUTION INTRAVENOUS at 07:01

## 2022-01-01 RX ADMIN — PROPOFOL 40 MCG/KG/MIN: 10 INJECTION, EMULSION INTRAVENOUS at 09:01

## 2022-01-01 RX ADMIN — DEXMEDETOMIDINE HYDROCHLORIDE 0.9 MCG/KG/HR: 4 INJECTION, SOLUTION INTRAVENOUS at 12:01

## 2022-01-01 RX ADMIN — PROPOFOL 45 MCG/KG/MIN: 10 INJECTION, EMULSION INTRAVENOUS at 12:01

## 2022-01-01 RX ADMIN — HYDROCORTISONE SODIUM SUCCINATE 25 MG: 100 INJECTION, POWDER, FOR SOLUTION INTRAMUSCULAR; INTRAVENOUS at 01:01

## 2022-01-01 RX ADMIN — DEXMEDETOMIDINE HYDROCHLORIDE 0 MCG/KG/HR: 4 INJECTION, SOLUTION INTRAVENOUS at 02:01

## 2022-01-01 RX ADMIN — FUROSEMIDE 80 MG: 10 INJECTION, SOLUTION INTRAMUSCULAR; INTRAVENOUS at 10:01

## 2022-01-01 RX ADMIN — POTASSIUM CHLORIDE 10 MEQ: 7.46 INJECTION, SOLUTION INTRAVENOUS at 07:01

## 2022-01-01 RX ADMIN — NOREPINEPHRINE BITARTRATE 0.12 MCG/KG/MIN: 1 INJECTION, SOLUTION, CONCENTRATE INTRAVENOUS at 07:01

## 2022-01-01 RX ADMIN — PROPOFOL 35 MCG/KG/MIN: 10 INJECTION, EMULSION INTRAVENOUS at 06:01

## 2022-01-01 RX ADMIN — LEVALBUTEROL HYDROCHLORIDE 1.25 MG: 1.25 SOLUTION, CONCENTRATE RESPIRATORY (INHALATION) at 11:01

## 2022-01-01 RX ADMIN — DEXMEDETOMIDINE HYDROCHLORIDE 0.9 MCG/KG/HR: 4 INJECTION, SOLUTION INTRAVENOUS at 05:01

## 2022-01-01 RX ADMIN — IOHEXOL 100 ML: 350 INJECTION, SOLUTION INTRAVENOUS at 11:01

## 2022-01-01 RX ADMIN — PROPOFOL 25 MCG/KG/MIN: 10 INJECTION, EMULSION INTRAVENOUS at 01:01

## 2022-01-01 RX ADMIN — SODIUM CHLORIDE 1000 ML: 0.9 INJECTION, SOLUTION INTRAVENOUS at 12:01

## 2022-01-01 RX ADMIN — DEXMEDETOMIDINE HYDROCHLORIDE 0.9 MCG/KG/HR: 4 INJECTION, SOLUTION INTRAVENOUS at 02:01

## 2022-01-01 RX ADMIN — PROPOFOL 40 MCG/KG/MIN: 10 INJECTION, EMULSION INTRAVENOUS at 11:01

## 2022-01-01 RX ADMIN — LINEZOLID 600 MG: 600 INJECTION, SOLUTION INTRAVENOUS at 11:01

## 2022-01-01 RX ADMIN — FUROSEMIDE 20 MG: 20 TABLET ORAL at 08:01

## 2022-01-01 RX ADMIN — PROPOFOL 40 MCG/KG/MIN: 10 INJECTION, EMULSION INTRAVENOUS at 10:01

## 2022-01-01 RX ADMIN — IPRATROPIUM BROMIDE 0.5 MG: 0.5 SOLUTION RESPIRATORY (INHALATION) at 12:01

## 2022-01-01 RX ADMIN — FUROSEMIDE 40 MG: 10 INJECTION, SOLUTION INTRAMUSCULAR; INTRAVENOUS at 09:01

## 2022-01-01 RX ADMIN — PROPOFOL 50 MCG/KG/MIN: 10 INJECTION, EMULSION INTRAVENOUS at 09:01

## 2022-01-01 RX ADMIN — MORPHINE SULFATE 2 MG: 2 INJECTION, SOLUTION INTRAMUSCULAR; INTRAVENOUS at 01:01

## 2022-01-01 RX ADMIN — POTASSIUM CHLORIDE 40 MEQ: 14.9 INJECTION, SOLUTION INTRAVENOUS at 06:01

## 2022-01-01 RX ADMIN — HYDROCORTISONE SODIUM SUCCINATE 25 MG: 100 INJECTION, POWDER, FOR SOLUTION INTRAMUSCULAR; INTRAVENOUS at 10:01

## 2022-01-01 RX ADMIN — DEXMEDETOMIDINE HYDROCHLORIDE 0.3 MCG/KG/HR: 4 INJECTION, SOLUTION INTRAVENOUS at 01:01

## 2022-01-01 RX ADMIN — PROPOFOL 30 MCG/KG/MIN: 10 INJECTION, EMULSION INTRAVENOUS at 02:01

## 2022-01-01 RX ADMIN — PANTOPRAZOLE SODIUM 40 MG: 40 INJECTION, POWDER, FOR SOLUTION INTRAVENOUS at 10:01

## 2022-01-01 RX ADMIN — DEXMEDETOMIDINE HYDROCHLORIDE 1.1 MCG/KG/HR: 4 INJECTION, SOLUTION INTRAVENOUS at 06:01

## 2022-01-01 RX ADMIN — ACETAMINOPHEN 500 MG: 500 TABLET ORAL at 08:01

## 2022-01-01 RX ADMIN — DEXMEDETOMIDINE HYDROCHLORIDE 1.3 MCG/KG/HR: 4 INJECTION, SOLUTION INTRAVENOUS at 03:01

## 2022-01-01 RX ADMIN — AMIODARONE HYDROCHLORIDE 0.5 MG/MIN: 1.8 INJECTION, SOLUTION INTRAVENOUS at 08:01

## 2022-01-01 RX ADMIN — LEVALBUTEROL 1.25 MG: 1.25 SOLUTION, CONCENTRATE RESPIRATORY (INHALATION) at 12:01

## 2022-01-01 RX ADMIN — DEXMEDETOMIDINE HYDROCHLORIDE 1.4 MCG/KG/HR: 4 INJECTION, SOLUTION INTRAVENOUS at 09:01

## 2022-01-01 RX ADMIN — PROPOFOL 35 MCG/KG/MIN: 10 INJECTION, EMULSION INTRAVENOUS at 05:01

## 2022-01-01 RX ADMIN — POTASSIUM CHLORIDE 10 MEQ: 7.46 INJECTION, SOLUTION INTRAVENOUS at 12:01

## 2022-01-01 RX ADMIN — HYDROCORTISONE: 25 CREAM TOPICAL at 09:01

## 2022-01-01 RX ADMIN — FUROSEMIDE 80 MG: 10 INJECTION, SOLUTION INTRAMUSCULAR; INTRAVENOUS at 09:01

## 2022-01-01 RX ADMIN — FUROSEMIDE 20 MG: 10 INJECTION, SOLUTION INTRAMUSCULAR; INTRAVENOUS at 08:01

## 2022-01-01 RX ADMIN — POTASSIUM CHLORIDE 10 MEQ: 7.46 INJECTION, SOLUTION INTRAVENOUS at 05:01

## 2022-01-01 RX ADMIN — POTASSIUM CHLORIDE 40 MEQ: 7.46 INJECTION, SOLUTION INTRAVENOUS at 05:01

## 2022-01-01 RX ADMIN — DEXTROSE AND SODIUM CHLORIDE: 5; .9 INJECTION, SOLUTION INTRAVENOUS at 06:01

## 2022-01-01 RX ADMIN — POTASSIUM CHLORIDE 10 MEQ: 750 TABLET, FILM COATED, EXTENDED RELEASE ORAL at 08:01

## 2022-01-01 RX ADMIN — AMIODARONE HYDROCHLORIDE 150 MG: 1.5 INJECTION, SOLUTION INTRAVENOUS at 09:01

## 2022-01-01 RX ADMIN — DEXMEDETOMIDINE HYDROCHLORIDE 0.8 MCG/KG/HR: 4 INJECTION, SOLUTION INTRAVENOUS at 12:01

## 2022-01-01 RX ADMIN — DEXMEDETOMIDINE HYDROCHLORIDE 1.1 MCG/KG/HR: 4 INJECTION, SOLUTION INTRAVENOUS at 08:01

## 2022-01-01 RX ADMIN — DEXMEDETOMIDINE HYDROCHLORIDE 1.4 MCG/KG/HR: 4 INJECTION, SOLUTION INTRAVENOUS at 10:01

## 2022-01-01 RX ADMIN — PROPOFOL 30 MCG/KG/MIN: 10 INJECTION, EMULSION INTRAVENOUS at 07:01

## 2022-01-01 RX ADMIN — NOREPINEPHRINE BITARTRATE 0.42 MCG/KG/MIN: 1 INJECTION, SOLUTION, CONCENTRATE INTRAVENOUS at 06:01

## 2022-01-01 RX ADMIN — NOREPINEPHRINE BITARTRATE 0.7 MCG/KG/MIN: 1 INJECTION, SOLUTION, CONCENTRATE INTRAVENOUS at 08:01

## 2022-01-01 RX ADMIN — PROPOFOL 50 MCG/KG/MIN: 10 INJECTION, EMULSION INTRAVENOUS at 11:01

## 2022-01-01 RX ADMIN — HYDROCORTISONE SODIUM SUCCINATE 25 MG: 100 INJECTION, POWDER, FOR SOLUTION INTRAMUSCULAR; INTRAVENOUS at 05:01

## 2022-01-01 RX ADMIN — NOREPINEPHRINE BITARTRATE 0.5 MCG/KG/MIN: 1 INJECTION, SOLUTION, CONCENTRATE INTRAVENOUS at 12:01

## 2022-01-01 RX ADMIN — PROPOFOL 35 MCG/KG/MIN: 10 INJECTION, EMULSION INTRAVENOUS at 10:01

## 2022-01-01 RX ADMIN — NOREPINEPHRINE BITARTRATE 0.25 MCG/KG/MIN: 1 INJECTION, SOLUTION, CONCENTRATE INTRAVENOUS at 03:01

## 2022-01-01 RX ADMIN — DEXMEDETOMIDINE HYDROCHLORIDE 1.1 MCG/KG/HR: 4 INJECTION, SOLUTION INTRAVENOUS at 04:01

## 2022-01-01 RX ADMIN — DEXMEDETOMIDINE HYDROCHLORIDE 1.2 MCG/KG/HR: 4 INJECTION, SOLUTION INTRAVENOUS at 04:01

## 2022-01-01 RX ADMIN — DIGOXIN 250 MCG: 0.25 INJECTION INTRAMUSCULAR; INTRAVENOUS at 08:01

## 2022-01-01 RX ADMIN — HYDROCORTISONE SODIUM SUCCINATE 100 MG: 100 INJECTION, POWDER, FOR SOLUTION INTRAMUSCULAR; INTRAVENOUS at 10:01

## 2022-01-01 RX ADMIN — DEXMEDETOMIDINE HYDROCHLORIDE 1.3 MCG/KG/HR: 4 INJECTION, SOLUTION INTRAVENOUS at 07:01

## 2022-01-01 RX ADMIN — DEXMEDETOMIDINE HYDROCHLORIDE 1.1 MCG/KG/HR: 4 INJECTION, SOLUTION INTRAVENOUS at 05:01

## 2022-01-01 RX ADMIN — PROPOFOL 40 MCG/KG/MIN: 10 INJECTION, EMULSION INTRAVENOUS at 04:01

## 2022-01-01 RX ADMIN — ENOXAPARIN SODIUM 40 MG: 40 INJECTION SUBCUTANEOUS at 05:01

## 2022-01-01 RX ADMIN — PROPOFOL 35 MCG/KG/MIN: 10 INJECTION, EMULSION INTRAVENOUS at 12:01

## 2022-01-01 RX ADMIN — ACETAMINOPHEN 500 MG: 500 TABLET ORAL at 10:01

## 2022-01-01 RX ADMIN — PROPOFOL 40 MCG/KG/MIN: 10 INJECTION, EMULSION INTRAVENOUS at 02:01

## 2022-01-01 RX ADMIN — LORAZEPAM 2 MG: 2 INJECTION INTRAMUSCULAR; INTRAVENOUS at 02:01

## 2022-01-01 RX ADMIN — POTASSIUM CHLORIDE 10 MEQ: 7.46 INJECTION, SOLUTION INTRAVENOUS at 06:01

## 2022-01-01 RX ADMIN — FUROSEMIDE 40 MG: 10 INJECTION, SOLUTION INTRAMUSCULAR; INTRAVENOUS at 05:01

## 2022-01-01 RX ADMIN — PROPOFOL 30 MCG/KG/MIN: 10 INJECTION, EMULSION INTRAVENOUS at 06:01

## 2022-01-01 RX ADMIN — PIPERACILLIN AND TAZOBACTAM 4.5 G: 4; .5 INJECTION, POWDER, LYOPHILIZED, FOR SOLUTION INTRAVENOUS; PARENTERAL at 10:01

## 2022-01-01 RX ADMIN — DEXMEDETOMIDINE HYDROCHLORIDE 0.9 MCG/KG/HR: 4 INJECTION, SOLUTION INTRAVENOUS at 01:01

## 2022-01-01 RX ADMIN — DEXMEDETOMIDINE HYDROCHLORIDE 0.8 MCG/KG/HR: 4 INJECTION, SOLUTION INTRAVENOUS at 05:01

## 2022-01-01 RX ADMIN — PROPOFOL 25 MCG/KG/MIN: 10 INJECTION, EMULSION INTRAVENOUS at 06:01

## 2022-01-01 RX ADMIN — Medication 400 MG: at 10:01

## 2022-01-01 RX ADMIN — PIPERACILLIN AND TAZOBACTAM 4.5 G: 4; .5 INJECTION, POWDER, LYOPHILIZED, FOR SOLUTION INTRAVENOUS; PARENTERAL at 02:01

## 2022-01-01 RX ADMIN — AMIODARONE HYDROCHLORIDE 200 MG: 200 TABLET ORAL at 09:01

## 2022-01-01 RX ADMIN — VANCOMYCIN HYDROCHLORIDE 2000 MG: 1 INJECTION, POWDER, LYOPHILIZED, FOR SOLUTION INTRAVENOUS at 07:01

## 2022-01-01 RX ADMIN — IPRATROPIUM BROMIDE 0.5 MG: 0.5 SOLUTION RESPIRATORY (INHALATION) at 03:01

## 2022-01-01 RX ADMIN — DEXMEDETOMIDINE HYDROCHLORIDE 0.7 MCG/KG/HR: 4 INJECTION, SOLUTION INTRAVENOUS at 06:01

## 2022-01-01 RX ADMIN — NOREPINEPHRINE BITARTRATE 0.07 MCG/KG/MIN: 1 INJECTION, SOLUTION, CONCENTRATE INTRAVENOUS at 01:01

## 2022-01-01 RX ADMIN — DIGOXIN 250 MCG: 0.25 INJECTION INTRAMUSCULAR; INTRAVENOUS at 10:01

## 2022-01-01 RX ADMIN — HYDROCORTISONE SODIUM SUCCINATE 25 MG: 100 INJECTION, POWDER, FOR SOLUTION INTRAMUSCULAR; INTRAVENOUS at 02:01

## 2022-01-01 RX ADMIN — NOREPINEPHRINE BITARTRATE 0.15 MCG/KG/MIN: 1 INJECTION, SOLUTION, CONCENTRATE INTRAVENOUS at 12:01

## 2022-01-01 RX ADMIN — AMIODARONE HYDROCHLORIDE 1 MG/MIN: 1.8 INJECTION, SOLUTION INTRAVENOUS at 02:01

## 2022-01-01 RX ADMIN — PROPOFOL 45 MCG/KG/MIN: 10 INJECTION, EMULSION INTRAVENOUS at 05:01

## 2022-01-01 RX ADMIN — DEXMEDETOMIDINE HYDROCHLORIDE 0.7 MCG/KG/HR: 4 INJECTION, SOLUTION INTRAVENOUS at 05:01

## 2022-01-01 RX ADMIN — PROPOFOL 45 MCG/KG/MIN: 10 INJECTION, EMULSION INTRAVENOUS at 11:01

## 2022-01-01 RX ADMIN — PROPOFOL 30 MCG/KG/MIN: 10 INJECTION, EMULSION INTRAVENOUS at 08:01

## 2022-01-01 RX ADMIN — NOREPINEPHRINE BITARTRATE 0.35 MCG/KG/MIN: 1 INJECTION, SOLUTION, CONCENTRATE INTRAVENOUS at 03:01

## 2022-01-01 RX ADMIN — HYDROCORTISONE SODIUM SUCCINATE 50 MG: 100 INJECTION, POWDER, FOR SOLUTION INTRAMUSCULAR; INTRAVENOUS at 06:01

## 2022-01-01 RX ADMIN — NOREPINEPHRINE BITARTRATE 0.12 MCG/KG/MIN: 1 INJECTION, SOLUTION, CONCENTRATE INTRAVENOUS at 04:01

## 2022-01-01 RX ADMIN — DEXMEDETOMIDINE HYDROCHLORIDE 0.8 MCG/KG/HR: 4 INJECTION, SOLUTION INTRAVENOUS at 01:01

## 2022-01-01 RX ADMIN — DEXMEDETOMIDINE HYDROCHLORIDE 1.2 MCG/KG/HR: 4 INJECTION, SOLUTION INTRAVENOUS at 11:01

## 2022-01-01 RX ADMIN — POTASSIUM CHLORIDE 10 MEQ: 7.46 INJECTION, SOLUTION INTRAVENOUS at 02:01

## 2022-01-01 RX ADMIN — DEXMEDETOMIDINE HYDROCHLORIDE 1 MCG/KG/HR: 4 INJECTION, SOLUTION INTRAVENOUS at 09:01

## 2022-01-01 RX ADMIN — NOREPINEPHRINE BITARTRATE 0.8 MCG/KG/MIN: 1 INJECTION, SOLUTION, CONCENTRATE INTRAVENOUS at 11:01

## 2022-01-01 RX ADMIN — MORPHINE SULFATE 2 MG: 2 INJECTION, SOLUTION INTRAMUSCULAR; INTRAVENOUS at 02:01

## 2022-01-01 RX ADMIN — DEXMEDETOMIDINE HYDROCHLORIDE 1.1 MCG/KG/HR: 4 INJECTION, SOLUTION INTRAVENOUS at 01:01

## 2022-01-01 RX ADMIN — AMIODARONE HYDROCHLORIDE 200 MG: 200 TABLET ORAL at 10:01

## 2022-01-01 RX ADMIN — AMIODARONE HYDROCHLORIDE 0.5 MG/MIN: 1.8 INJECTION, SOLUTION INTRAVENOUS at 02:01

## 2022-01-01 RX ADMIN — FUROSEMIDE 80 MG: 10 INJECTION, SOLUTION INTRAMUSCULAR; INTRAVENOUS at 08:01

## 2022-01-01 RX ADMIN — AZITHROMYCIN MONOHYDRATE 500 MG: 500 INJECTION, POWDER, LYOPHILIZED, FOR SOLUTION INTRAVENOUS at 04:01

## 2022-01-01 NOTE — ASSESSMENT & PLAN NOTE
Mechanical ventilation day # 8  PEEP: 10  O2: 55%  - Continue to wean down FIO2, PEEP as he can tolerate. Weaned PEEP to 10.   - Continue Antibiotics

## 2022-01-01 NOTE — PROGRESS NOTES
[facility] Medicine  Progress Note    Patient Name: Neymar Patel  MRN: [id number]  Patient Class: IP- Inpatient   Admission Date: 12/21/2021  Length of Stay: 10 days  Attending Physician: Raul Gan III, MD  Primary Care Provider: Landen Brown MD        Subjective:     Principal Problem:Acute on chronic respiratory failure with hypoxia        HPI:  Perpetual History  Patient is a 66-year-old male with a history of alcohol abuse atrial fibrillation hypertension peripheral artery disease fatty liver disease who recently had an episode of bronchitis and was treated in the outpatient setting.  The patient then began in having increasing weakness and lightheadedness and noticed his heart rate was elevated.  He presented to the emergency department and was found to have elevated alcohol levels and a heart rate in the 130s to 140s.  The patient is currently in AFib with RVR and has been started on a Cardizem drip by the emergency department.         Overview/Hospital Course:  12/23/21 [facility] patient reports that he is feeling better today, was able to get rest last night. Heart rate still elevated will adjust medications and monitor. Patient with hallucinations last night, patient is appropriate this am  12/24/21:   Rapid Response Call #1: Blood pressure initially low and patient heart rate in the 50s sinus Nakul.  Glucose within normal limits.  Fluid bolus 1 L was given with improvement in blood pressure.  Advised to hold any medication that will lower patient's heart rate.  Patient also complains of shortness of breath but according to respiratory therapist, patient did not take his breathing treatment because he did not think he needed it.  Patient wheezy expiratory and saturating in the high 80s on room air. not on any antibiotics currently so will start.  Labs show that patient has TANIKA possibly due to dehydration.  Will resuscitate with fluid.  Will recheck basic blood work and continue  to hydrate.  Treat accordingly will at started if patient is not already on it.  Patient more alert and oriented x4 moving all extremities with improvement of blood pressure into the 90s and heart rate remains in the 50s sinus Nakul saturating 94% on 2 L  Rapid Response Call #2:Rapid response team called again with subsequent intubation and transfer to MICU.   12/25/21: On minimal sedation and pressor support in the MICU.   12/26/21: Able to wean off of all pressor support, pH of 5 while on bicarb on AM labs. Will get rid of bicarb drip and switch to gentle IV hydration with LR. SBT tomorrow?  12/27/21:  Events over the weekend noted.  Patient is now intubated on the ventilator.  Patient was admitted with pneumonia atrial fibrillation and alcohol abuse and now also has a enterococcal urinary tract infection with sensitivities pending.  Patient has required to sedatives for agitation and combativeness.  12/28/21:  Patient's ABG has been reviewed.  Patient has a mild respiratory alkalosis and his respiratory rate has been changed.  We have encouraged respiratory and nursing to wean his sedation and titrate his tube feeds.  The patient's chest x-ray also seems like he is slightly volume overloaded therefore will discontinue IV fluids and give low dosing of Lasix.  Patient has enterococcal infection was ampicillin sensitive therefore Rocephin should be adequate.  Will change Zithromax to Levaquin.  12/29/21 FM:  Patient had an episode of desaturation yesterday.  Respiratory was able to deep suction and several mucus plugs and thick secretions were removed.  I will broaden patient antibiotics today to Zosyn and otherwise plan on increasing his diuretics today.  Patient has bilateral pulmonary infiltrates consistent with pulmonary edema.  12/30/21 FM:  Patient had a elevated temperature through the night.  Rocephin was changed to Zosyn yesterday and consideration of drug fever should be entertained.  Will add medicine for  MRSA infection today as well as antifungals.  Patient's chest x-ray seems to be improved with diuretics.  Continue diuretics cautiously.  12/31/21 CG: Antibiotic course broadened, tachycardic with an increase in PEEP. He is net negative over the past 24 hours.    1/1/22 CG: No acute overnight events. PEEP decreased to 10.       Interval History:     Review of Systems   Unable to perform ROS: Severe respiratory distress     Objective:     Vital Signs (Most Recent):  Temp: 97.7 °F (36.5 °C) (01/01/22 1400)  Pulse: (!) 114 (01/01/22 1400)  Resp: (!) 21 (01/01/22 1400)  BP: 100/62 (01/01/22 1400)  SpO2: 97 % (01/01/22 1400) Vital Signs (24h Range):  Temp:  [95.18 °F (35.1 °C)-99.9 °F (37.7 °C)] 97.7 °F (36.5 °C)  Pulse:  [108-116] 114  Resp:  [18-31] 21  SpO2:  [92 %-100 %] 97 %  BP: ()/() 100/62     Weight: 77.1 kg (170 lb)  Body mass index is 24.39 kg/m².    Intake/Output Summary (Last 24 hours) at 1/1/2022 1438  Last data filed at 1/1/2022 1315  Gross per 24 hour   Intake 3228 ml   Output 4475 ml   Net -1247 ml      Physical Exam  Constitutional:       General: He is not in acute distress.     Appearance: He is ill-appearing.      Interventions: He is intubated.   HENT:      Head: Normocephalic and atraumatic.      Mouth/Throat:      Comments: ET Tube free of secretions.   Eyes:      Pupils: Pupils are equal, round, and reactive to light.   Cardiovascular:      Rate and Rhythm: Tachycardia present.      Pulses: Normal pulses.   Pulmonary:      Effort: Prolonged expiration present. He is intubated.      Breath sounds: Wheezing present.   Abdominal:      General: Abdomen is flat.      Palpations: Abdomen is soft.   Skin:     General: Skin is warm.   Psychiatric:      Comments: Intubated and sedated         Significant Labs:   All pertinent labs within the past 24 hours have been reviewed.  CBC:   Recent Labs   Lab 12/31/21  0413 01/01/22  0358   WBC 11.67 8.90   HGB 8.8* 8.6*   HCT 29.0* 28.3*    182      CMP:   Recent Labs   Lab 12/31/21  0413 01/01/22  0358    138   K 3.6 3.1*   CL 98 96   CO2 32* 34*   * 129*   BUN 27* 29*   CREATININE 1.5* 1.4   CALCIUM 8.5* 8.5*   PROT 6.3 6.4   ALBUMIN 1.7* 1.6*   BILITOT 0.6 0.6   ALKPHOS 111 119   AST 36 32   ALT 41 32   ANIONGAP 8 8   EGFRNONAA 47.8* 52.0*       Significant Imaging: I have reviewed all pertinent imaging results/findings within the past 24 hours.      Assessment/Plan:      * Acute on chronic respiratory failure with hypoxia  Mechanical ventilation day # 8  PEEP: 10  O2: 55%  - Continue to wean down FIO2, PEEP as he can tolerate. Weaned PEEP to 10.   - Continue Antibiotics    TANIKA (acute kidney injury)  Cr is 1.4 today, improving.   Working diagnosis is still pre-renal for now.   - Continue gentle diuresis      Elevated LFTs  Elevated, likely in the setting of hypoperfusion.   - Continue to follow liver enzymes. Improving.         Alcohol dependence  CIWA Protocol      PNA (pneumonia)        Severe sepsis  Abx modified yesterday.   - Continue to follow fever curve.      UTI (urinary tract infection)  See abx      DVT prophylaxis  Hold eliquis for now in setting of TANIKA.       Atrial fibrillation with rapid ventricular response  Controlled with meds.    Tobacco user  PRN Nicotine patch      VTE Risk Mitigation (From admission, onward)         Ordered     enoxaparin injection 40 mg  Daily         12/28/21 0816     IP VTE HIGH RISK PATIENT  Once         12/21/21 2233     Reason for No Pharmacological VTE Prophylaxis  Once        Question:  Reasons:  Answer:  Already adequately anticoagulated on oral Anticoagulants    12/21/21 2233                Discharge Planning   DAXA:      Code Status: Full Code   Is the patient medically ready for discharge?:     Reason for patient still in hospital (select all that apply): Patient unstable  Discharge Plan A: Home with family,Home Health                  Darren Bustillo DO  Department of Hospital Medicine    [place] - Intensive Care

## 2022-01-01 NOTE — PROGRESS NOTES
Pharmacist Renal Dose Adjustment Note    Neymar Patel is a 66 y.o. male being treated with the medication Levofloxacin.     Patient Data:    Vital Signs (Most Recent):  Temp: 96.8 °F (36 °C) (01/01/22 0730)  Pulse: 108 (01/01/22 0730)  Resp: 18 (01/01/22 0730)  BP: 92/60 (01/01/22 0730)  SpO2: 95 % (01/01/22 0730) Vital Signs (72h Range):  Temp:  [95.18 °F (35.1 °C)-102.4 °F (39.1 °C)]   Pulse:  [102-133]   Resp:  [18-31]   BP: ()/(42-98)   SpO2:  [86 %-100 %]      Recent Labs   Lab 12/30/21  0355 12/31/21  0413 01/01/22  0358   CREATININE 1.7* 1.5* 1.4     Serum creatinine: 1.4 mg/dL 01/01/22 0358  Estimated creatinine clearance: 53.6 mL/min    Medication:Levofloxacin dose: 750mg frequency every 48 hours will be changed to medication:Levofloxacin dose:750mg frequency:every 24 hours.     Pharmacist's Name: HANNAH LEAM  Pharmacist's Extension: [phone]

## 2022-01-01 NOTE — ASSESSMENT & PLAN NOTE
Cr is 1.4 today, improving.   Working diagnosis is still pre-renal for now.   - Continue gentle diuresis

## 2022-01-01 NOTE — PLAN OF CARE
Patient remains intubated, sedated, and on levophed. B/p liable, levophed currently at 0.08 mcg. Other vitals stable, no fever. Urine output good with lasix. No BM. Tolerating tube feeds at goal, no residuals. This evening patient did open one of his eyes, was looking at me and mouthing words. Oriented patient to icu environment. Potassium replacement given. Will cont to monitor.

## 2022-01-01 NOTE — PLAN OF CARE
Pt remains on 55% FiO2 on ventilator. Vitals stable. Had low grade fever at beginning of shift that resolved. Levophed weaned to 0.04mcg. opened eyes to pain. Produced 2550 mL of urine to dior after lasix.

## 2022-01-01 NOTE — EICU
Rounding (Video Assessment):  Yes    Intervention Initiated From:  [facility] / EICU      Comments: On ventillator with precedex, propofol and levophed infusing.  Video rounding complete.

## 2022-01-02 NOTE — PLAN OF CARE
Patient remains in icu on vent and sedation. Levophed weaned off today, tolerating so far.   About midday, patient had 105 mls of residuals noted from tube feeds. Feedings held and restarted around 1530 when 0 mls of residuals were noted. No BM. Urine output adequate. Will cont to monitor.

## 2022-01-02 NOTE — EICU
Rounding (Video Assessment):  Yes    Intervention Initiated From:  COR / EICU      Comments: Intubated and sedated.  O2 sat 98%.  Video rounding complete.

## 2022-01-02 NOTE — NURSING
Levophed drip stopped since 0829 this morning, tolerating well so far. MAP greater than 60. Will cont to monitor.

## 2022-01-02 NOTE — SUBJECTIVE & OBJECTIVE
Interval History:     Review of Systems   Unable to perform ROS: Severe respiratory distress     Objective:     Vital Signs (Most Recent):  Temp: 98.2 °F (36.8 °C) (01/02/22 1130)  Pulse: 74 (01/02/22 1300)  Resp: 18 (01/02/22 1300)  BP: (!) 145/72 (01/02/22 1300)  SpO2: 100 % (01/02/22 1300) Vital Signs (24h Range):  Temp:  [96.62 °F (35.9 °C)-98.42 °F (36.9 °C)] 98.2 °F (36.8 °C)  Pulse:  [] 74  Resp:  [18-25] 18  SpO2:  [93 %-100 %] 100 %  BP: ()/() 145/72     Weight: 77.1 kg (170 lb)  Body mass index is 24.39 kg/m².    Intake/Output Summary (Last 24 hours) at 1/2/2022 1318  Last data filed at 1/2/2022 1300  Gross per 24 hour   Intake 3983 ml   Output 5125 ml   Net -1142 ml      Physical Exam  Constitutional:       General: He is not in acute distress.     Appearance: He is ill-appearing.      Interventions: He is intubated.   HENT:      Head: Normocephalic and atraumatic.      Mouth/Throat:      Comments: ET Tube free of secretions.   Eyes:      Pupils: Pupils are equal, round, and reactive to light.   Cardiovascular:      Rate and Rhythm: Tachycardia present.      Pulses: Normal pulses.   Pulmonary:      Effort: Prolonged expiration present. He is intubated.      Breath sounds: Wheezing present.   Abdominal:      General: Abdomen is flat.      Palpations: Abdomen is soft.   Skin:     General: Skin is warm.   Psychiatric:      Comments: Intubated and sedated         Significant Labs:   All pertinent labs within the past 24 hours have been reviewed.  CBC:   Recent Labs   Lab 01/01/22  0358 01/02/22  0345   WBC 8.90 6.00   HGB 8.6* 8.3*   HCT 28.3* 27.2*    200     CMP:   Recent Labs   Lab 01/01/22  0358 01/02/22  0345    138   K 3.1* 3.3*   CL 96 93*   CO2 34* 37*   * 147*   BUN 29* 32*   CREATININE 1.4 1.4   CALCIUM 8.5* 8.3*   PROT 6.4 6.6   ALBUMIN 1.6* 1.6*   BILITOT 0.6 0.5   ALKPHOS 119 144*   AST 32 40   ALT 32 30   ANIONGAP 8 8   EGFRNONAA 52.0* 52.0*        Significant Imaging: I have reviewed all pertinent imaging results/findings within the past 24 hours.

## 2022-01-02 NOTE — NURSING
Received CT results via fax from direct radiology. Results reviewed with DR Bustillo, no new orders noted.

## 2022-01-02 NOTE — ASSESSMENT & PLAN NOTE
Mechanical ventilation day # 9  PEEP: 10  O2: 55%  Multifactorial in etiology, fluid overload and infectious process.   Continue triple antibiotic therapy.   Hydrocortisone added, has improved HR, able to wean down a little on pressor support.   Lasix challenge again today with goal of net negative 1L. Careful diuresis given recent TANIKA.   Concern for possible loculated pleural effusion on right. Check CT scan again today.

## 2022-01-02 NOTE — EICU
Rounding (Video Assessment):  Yes    Intervention Initiated From:  COR / EICU    Alessandra Communicated with Bedside Nurse regarding:  Other    Comments: Video assessment complete. ETT to vent with ordered settings. IV infusions noted per orders. VSS. No distress noted.

## 2022-01-02 NOTE — PLAN OF CARE
Pt remains on ventilator Fio2 at 55%. Levophed weaned to 0.04mcg, afibrile this shift. Pt woke up and followed commands, tried pulling at ET tube, soft wrist restraints applied. Received IV steroids. Pt produced 2900 mL of urine to dior.

## 2022-01-02 NOTE — PROGRESS NOTES
[facility] Medicine  Progress Note    Patient Name: Neymar Patel  MRN: [id number]  Patient Class: IP- Inpatient   Admission Date: 12/21/2021  Length of Stay: 11 days  Attending Physician: Raul Gan III, MD  Primary Care Provider: Landen Brown MD        Subjective:     Principal Problem:Acute on chronic respiratory failure with hypoxia        HPI:  Perpetual History  Patient is a 66-year-old male with a history of alcohol abuse atrial fibrillation hypertension peripheral artery disease fatty liver disease who recently had an episode of bronchitis and was treated in the outpatient setting.  The patient then began in having increasing weakness and lightheadedness and noticed his heart rate was elevated.  He presented to the emergency department and was found to have elevated alcohol levels and a heart rate in the 130s to 140s.  The patient is currently in AFib with RVR and has been started on a Cardizem drip by the emergency department.         Overview/Hospital Course:  12/23/21 [facility] patient reports that he is feeling better today, was able to get rest last night. Heart rate still elevated will adjust medications and monitor. Patient with hallucinations last night, patient is appropriate this am  12/24/21:   Rapid Response Call #1: Blood pressure initially low and patient heart rate in the 50s sinus Nakul.  Glucose within normal limits.  Fluid bolus 1 L was given with improvement in blood pressure.  Advised to hold any medication that will lower patient's heart rate.  Patient also complains of shortness of breath but according to respiratory therapist, patient did not take his breathing treatment because he did not think he needed it.  Patient wheezy expiratory and saturating in the high 80s on room air. not on any antibiotics currently so will start.  Labs show that patient has TANIKA possibly due to dehydration.  Will resuscitate with fluid.  Will recheck basic blood work and continue  to hydrate.  Treat accordingly will at started if patient is not already on it.  Patient more alert and oriented x4 moving all extremities with improvement of blood pressure into the 90s and heart rate remains in the 50s sinus Nakul saturating 94% on 2 L  Rapid Response Call #2:Rapid response team called again with subsequent intubation and transfer to MICU.   12/25/21: On minimal sedation and pressor support in the MICU.   12/26/21: Able to wean off of all pressor support, pH of 5 while on bicarb on AM labs. Will get rid of bicarb drip and switch to gentle IV hydration with LR. SBT tomorrow?  12/27/21:  Events over the weekend noted.  Patient is now intubated on the ventilator.  Patient was admitted with pneumonia atrial fibrillation and alcohol abuse and now also has a enterococcal urinary tract infection with sensitivities pending.  Patient has required to sedatives for agitation and combativeness.  12/28/21:  Patient's ABG has been reviewed.  Patient has a mild respiratory alkalosis and his respiratory rate has been changed.  We have encouraged respiratory and nursing to wean his sedation and titrate his tube feeds.  The patient's chest x-ray also seems like he is slightly volume overloaded therefore will discontinue IV fluids and give low dosing of Lasix.  Patient has enterococcal infection was ampicillin sensitive therefore Rocephin should be adequate.  Will change Zithromax to Levaquin.  12/29/21 FM:  Patient had an episode of desaturation yesterday.  Respiratory was able to deep suction and several mucus plugs and thick secretions were removed.  I will broaden patient antibiotics today to Zosyn and otherwise plan on increasing his diuretics today.  Patient has bilateral pulmonary infiltrates consistent with pulmonary edema.  12/30/21 FM:  Patient had a elevated temperature through the night.  Rocephin was changed to Zosyn yesterday and consideration of drug fever should be entertained.  Will add medicine for  MRSA infection today as well as antifungals.  Patient's chest x-ray seems to be improved with diuretics.  Continue diuretics cautiously.  12/31/21 CG: Antibiotic course broadened, tachycardic with an increase in PEEP. He is net negative over the past 24 hours.    1/1/22 CG: No acute overnight events. PEEP decreased to 10.   1/2/22 CG: CXR shows worsening fluid burden, heart rates in 110s-120s.       Interval History:     Review of Systems   Unable to perform ROS: Severe respiratory distress     Objective:     Vital Signs (Most Recent):  Temp: 98.2 °F (36.8 °C) (01/02/22 1130)  Pulse: 74 (01/02/22 1300)  Resp: 18 (01/02/22 1300)  BP: (!) 145/72 (01/02/22 1300)  SpO2: 100 % (01/02/22 1300) Vital Signs (24h Range):  Temp:  [96.62 °F (35.9 °C)-98.42 °F (36.9 °C)] 98.2 °F (36.8 °C)  Pulse:  [] 74  Resp:  [18-25] 18  SpO2:  [93 %-100 %] 100 %  BP: ()/() 145/72     Weight: 77.1 kg (170 lb)  Body mass index is 24.39 kg/m².    Intake/Output Summary (Last 24 hours) at 1/2/2022 1318  Last data filed at 1/2/2022 1300  Gross per 24 hour   Intake 3983 ml   Output 5125 ml   Net -1142 ml      Physical Exam  Constitutional:       General: He is not in acute distress.     Appearance: He is ill-appearing.      Interventions: He is intubated.   HENT:      Head: Normocephalic and atraumatic.      Mouth/Throat:      Comments: ET Tube free of secretions.   Eyes:      Pupils: Pupils are equal, round, and reactive to light.   Cardiovascular:      Rate and Rhythm: Tachycardia present.      Pulses: Normal pulses.   Pulmonary:      Effort: Prolonged expiration present. He is intubated.      Breath sounds: Wheezing present.   Abdominal:      General: Abdomen is flat.      Palpations: Abdomen is soft.   Skin:     General: Skin is warm.   Psychiatric:      Comments: Intubated and sedated         Significant Labs:   All pertinent labs within the past 24 hours have been reviewed.  CBC:   Recent Labs   Lab 01/01/22  4503  01/02/22  0345   WBC 8.90 6.00   HGB 8.6* 8.3*   HCT 28.3* 27.2*    200     CMP:   Recent Labs   Lab 01/01/22  0358 01/02/22  0345    138   K 3.1* 3.3*   CL 96 93*   CO2 34* 37*   * 147*   BUN 29* 32*   CREATININE 1.4 1.4   CALCIUM 8.5* 8.3*   PROT 6.4 6.6   ALBUMIN 1.6* 1.6*   BILITOT 0.6 0.5   ALKPHOS 119 144*   AST 32 40   ALT 32 30   ANIONGAP 8 8   EGFRNONAA 52.0* 52.0*       Significant Imaging: I have reviewed all pertinent imaging results/findings within the past 24 hours.      Assessment/Plan:      * Acute on chronic respiratory failure with hypoxia  Mechanical ventilation day # 9  PEEP: 10  O2: 55%  Multifactorial in etiology, fluid overload and infectious process.   Continue triple antibiotic therapy.   Hydrocortisone added, has improved HR, able to wean down a little on pressor support.   Lasix challenge again today with goal of net negative 1L. Careful diuresis given recent TANIKA.   Concern for possible loculated pleural effusion on right. Check CT scan again today.       TANIKA (acute kidney injury)  Cr is 1.4 today, improving.   Working diagnosis is still pre-renal for now.   - Continue diuresis today      Elevated LFTs  Elevated, likely in the setting of hypoperfusion.   - Continue to follow liver enzymes. Improving.         Alcohol dependence  CIWA Protocol      PNA (pneumonia)  Triple antibiotic therapy.   Plan as above      Severe sepsis  Abx modified yesterday.   - Continue to follow fever curve.      UTI (urinary tract infection)  See abx      DVT prophylaxis  Hold eliquis for now in setting of TANIKA.       Atrial fibrillation with rapid ventricular response  Controlled with meds.    Tobacco user  PRN Nicotine patch      VTE Risk Mitigation (From admission, onward)         Ordered     enoxaparin injection 40 mg  Daily         12/28/21 0816     IP VTE HIGH RISK PATIENT  Once         12/21/21 2233     Reason for No Pharmacological VTE Prophylaxis  Once        Question:  Reasons:   Answer:  Already adequately anticoagulated on oral Anticoagulants    12/21/21 2233                Discharge Planning   DAXA:      Code Status: Full Code   Is the patient medically ready for discharge?:     Reason for patient still in hospital (select all that apply): Treatment  Discharge Plan A: Home with family,Home Health          CC time is 36 mins      Darren Bustillo DO  Department of Hospital Medicine   [place] [facility]

## 2022-01-02 NOTE — ASSESSMENT & PLAN NOTE
Cr is 1.4 today, improving.   Working diagnosis is still pre-renal for now.   - Continue diuresis today

## 2022-01-02 NOTE — NURSING
From [id number] patient in CT scan. Transported by Lulu vallejo, and otis Duke. Returned to room at 1515, transfer and CT performed without incident. Will cont to monitor.

## 2022-01-03 NOTE — PLAN OF CARE
Pt remains on 55% FiO2 on ventilator. Bp dropped had to start levophed at small dose, it has been since weaned off. Pt woke up at 0000 and was mouthing words, communicating yes and no questions. Restraints were applied for patient safety. Morning potassiumm was 2.7 Dr. Bustillo gave orders.

## 2022-01-03 NOTE — ASSESSMENT & PLAN NOTE
PEEP: 10  O2: 55%  Multifactorial in etiology, fluid overload and infectious process.   Continue triple antibiotic therapy.   Hydrocortisone added, has improved HR, able to wean down a little on pressor support.   Lasix challenge again today with goal of net negative 1L. Careful diuresis given recent TANIKA.   Concern for possible loculated pleural effusion on right. (awaiting final read)

## 2022-01-03 NOTE — PROGRESS NOTES
[facility] Medicine  Progress Note    Patient Name: Neymar Patel  MRN: [id number]  Patient Class: IP- Inpatient   Admission Date: 12/21/2021  Length of Stay: 12 days  Attending Physician: Raul Gan III, MD  Primary Care Provider: Landen Brown MD        Subjective:     Principal Problem:Acute on chronic respiratory failure with hypoxia        HPI:  Perpetual History  Patient is a 66-year-old male with a history of alcohol abuse atrial fibrillation hypertension peripheral artery disease fatty liver disease who recently had an episode of bronchitis and was treated in the outpatient setting.  The patient then began in having increasing weakness and lightheadedness and noticed his heart rate was elevated.  He presented to the emergency department and was found to have elevated alcohol levels and a heart rate in the 130s to 140s.  The patient is currently in AFib with RVR and has been started on a Cardizem drip by the emergency department.         Overview/Hospital Course:  12/23/21 [facility] patient reports that he is feeling better today, was able to get rest last night. Heart rate still elevated will adjust medications and monitor. Patient with hallucinations last night, patient is appropriate this am  12/24/21:   Rapid Response Call #1: Blood pressure initially low and patient heart rate in the 50s sinus Nakul.  Glucose within normal limits.  Fluid bolus 1 L was given with improvement in blood pressure.  Advised to hold any medication that will lower patient's heart rate.  Patient also complains of shortness of breath but according to respiratory therapist, patient did not take his breathing treatment because he did not think he needed it.  Patient wheezy expiratory and saturating in the high 80s on room air. not on any antibiotics currently so will start.  Labs show that patient has TANIKA possibly due to dehydration.  Will resuscitate with fluid.  Will recheck basic blood work and continue  to hydrate.  Treat accordingly will at started if patient is not already on it.  Patient more alert and oriented x4 moving all extremities with improvement of blood pressure into the 90s and heart rate remains in the 50s sinus Nakul saturating 94% on 2 L  Rapid Response Call #2:Rapid response team called again with subsequent intubation and transfer to MICU.   12/25/21: On minimal sedation and pressor support in the MICU.   12/26/21: Able to wean off of all pressor support, pH of 5 while on bicarb on AM labs. Will get rid of bicarb drip and switch to gentle IV hydration with LR. SBT tomorrow?  12/27/21:  Events over the weekend noted.  Patient is now intubated on the ventilator.  Patient was admitted with pneumonia atrial fibrillation and alcohol abuse and now also has a enterococcal urinary tract infection with sensitivities pending.  Patient has required to sedatives for agitation and combativeness.  12/28/21:  Patient's ABG has been reviewed.  Patient has a mild respiratory alkalosis and his respiratory rate has been changed.  We have encouraged respiratory and nursing to wean his sedation and titrate his tube feeds.  The patient's chest x-ray also seems like he is slightly volume overloaded therefore will discontinue IV fluids and give low dosing of Lasix.  Patient has enterococcal infection was ampicillin sensitive therefore Rocephin should be adequate.  Will change Zithromax to Levaquin.  12/29/21 FM:  Patient had an episode of desaturation yesterday.  Respiratory was able to deep suction and several mucus plugs and thick secretions were removed.  I will broaden patient antibiotics today to Zosyn and otherwise plan on increasing his diuretics today.  Patient has bilateral pulmonary infiltrates consistent with pulmonary edema.  12/30/21 FM:  Patient had a elevated temperature through the night.  Rocephin was changed to Zosyn yesterday and consideration of drug fever should be entertained.  Will add medicine for  MRSA infection today as well as antifungals.  Patient's chest x-ray seems to be improved with diuretics.  Continue diuretics cautiously.  12/31/21 CG: Antibiotic course broadened, tachycardic with an increase in PEEP. He is net negative over the past 24 hours.    1/1/22 CG: No acute overnight events. PEEP decreased to 10.   1/2/22 CG: CXR shows worsening fluid burden, heart rates in 110s-120s.   1/3/22 FM:  Patient is on an FiO2 of 55%.  His chest x-ray still shows pulmonary edema which is also consistent with his CT scan.  We will replace electrolytes transfuse and continue to diurese.  Hopefully we can wean his FiO2 down this weekend and wean him off the ventilator by the end of the week.      Interval History:     Review of Systems   Unable to perform ROS: Severe respiratory distress     Objective:     Vital Signs (Most Recent):  Temp: 98.5 °F (36.9 °C) (01/03/22 0734)  Pulse: (!) 59 (01/03/22 0800)  Resp: 18 (01/03/22 0800)  BP: (!) 96/53 (01/03/22 0800)  SpO2: 100 % (01/03/22 0800) Vital Signs (24h Range):  Temp:  [96 °F (35.6 °C)-98.9 °F (37.2 °C)] 98.5 °F (36.9 °C)  Pulse:  [57-79] 59  Resp:  [18-23] 18  SpO2:  [94 %-100 %] 100 %  BP: ()/(46-78) 96/53     Weight: 77.1 kg (170 lb)  Body mass index is 24.39 kg/m².    Intake/Output Summary (Last 24 hours) at 1/3/2022 0828  Last data filed at 1/3/2022 0500  Gross per 24 hour   Intake 3217 ml   Output 3500 ml   Net -283 ml      Physical Exam  Constitutional:       General: He is not in acute distress.     Appearance: He is ill-appearing.      Interventions: He is intubated.   HENT:      Head: Normocephalic and atraumatic.      Mouth/Throat:      Comments: ET Tube free of secretions.   Eyes:      Pupils: Pupils are equal, round, and reactive to light.   Cardiovascular:      Rate and Rhythm: Tachycardia present.      Pulses: Normal pulses.   Pulmonary:      Effort: Prolonged expiration present. He is intubated.      Breath sounds: Wheezing present.    Abdominal:      General: Abdomen is flat.      Palpations: Abdomen is soft.   Skin:     General: Skin is warm.   Psychiatric:      Comments: Intubated and sedated         Significant Labs:   All pertinent labs within the past 24 hours have been reviewed.  CBC:   Recent Labs   Lab 01/02/22  0345 01/03/22  0444   WBC 6.00 7.07   HGB 8.3* 7.9*   HCT 27.2* 25.0*    220     CMP:   Recent Labs   Lab 01/02/22  0345 01/03/22  0444    139   K 3.3* 2.7*   CL 93* 91*   CO2 37* 36*   * 142*   BUN 32* 43*   CREATININE 1.4 1.3   CALCIUM 8.3* 8.4*   PROT 6.6 6.6   ALBUMIN 1.6* 1.8*   BILITOT 0.5 0.4   ALKPHOS 144* 141*   AST 40 53*   ALT 30 33   ANIONGAP 8 12   EGFRNONAA 52.0* 56.9*       Significant Imaging: I have reviewed all pertinent imaging results/findings within the past 24 hours.      Assessment/Plan:      * Acute on chronic respiratory failure with hypoxia  PEEP: 10  O2: 55%  Multifactorial in etiology, fluid overload and infectious process.   Continue triple antibiotic therapy.   Hydrocortisone added, has improved HR, able to wean down a little on pressor support.   Lasix challenge again today with goal of net negative 1L. Careful diuresis given recent TANIKA.   Concern for possible loculated pleural effusion on right. (awaiting final read)      PNA (pneumonia)  Triple antibiotic therapy.   Plan as above      Severe sepsis  Abx modified  - Continue to follow fever curve.      UTI (urinary tract infection)  See abx      DVT prophylaxis  Hold eliquis for now in setting of TANIKA.       Atrial fibrillation with rapid ventricular response  Controlled with meds.    Tobacco user  PRN Nicotine patch    Alcohol dependence  CIWA Protocol      Elevated LFTs  Elevated, likely in the setting of hypoperfusion.   - Continue to follow liver enzymes. Improving.         TANIKA (acute kidney injury)  Cr is 1.4 today, improving.   Working diagnosis is still pre-renal for now.   - Continue diuresis today        VTE Risk  Mitigation (From admission, onward)         Ordered     enoxaparin injection 40 mg  Daily         12/28/21 0816     IP VTE HIGH RISK PATIENT  Once         12/21/21 2233     Reason for No Pharmacological VTE Prophylaxis  Once        Question:  Reasons:  Answer:  Already adequately anticoagulated on oral Anticoagulants    12/21/21 2233                Discharge Planning   DAXA:      Code Status: Full Code   Is the patient medically ready for discharge?:     Reason for patient still in hospital (select all that apply): Patient unstable  Discharge Plan A: Home with family,Home Health                  Raul Gan III, MD  Department of Hospital Medicine   [place] [facility]

## 2022-01-03 NOTE — SUBJECTIVE & OBJECTIVE
Interval History:     Review of Systems   Unable to perform ROS: Severe respiratory distress     Objective:     Vital Signs (Most Recent):  Temp: 98.5 °F (36.9 °C) (01/03/22 0734)  Pulse: (!) 59 (01/03/22 0800)  Resp: 18 (01/03/22 0800)  BP: (!) 96/53 (01/03/22 0800)  SpO2: 100 % (01/03/22 0800) Vital Signs (24h Range):  Temp:  [96 °F (35.6 °C)-98.9 °F (37.2 °C)] 98.5 °F (36.9 °C)  Pulse:  [57-79] 59  Resp:  [18-23] 18  SpO2:  [94 %-100 %] 100 %  BP: ()/(46-78) 96/53     Weight: 77.1 kg (170 lb)  Body mass index is 24.39 kg/m².    Intake/Output Summary (Last 24 hours) at 1/3/2022 0828  Last data filed at 1/3/2022 0500  Gross per 24 hour   Intake 3217 ml   Output 3500 ml   Net -283 ml      Physical Exam  Constitutional:       General: He is not in acute distress.     Appearance: He is ill-appearing.      Interventions: He is intubated.   HENT:      Head: Normocephalic and atraumatic.      Mouth/Throat:      Comments: ET Tube free of secretions.   Eyes:      Pupils: Pupils are equal, round, and reactive to light.   Cardiovascular:      Rate and Rhythm: Tachycardia present.      Pulses: Normal pulses.   Pulmonary:      Effort: Prolonged expiration present. He is intubated.      Breath sounds: Wheezing present.   Abdominal:      General: Abdomen is flat.      Palpations: Abdomen is soft.   Skin:     General: Skin is warm.   Psychiatric:      Comments: Intubated and sedated         Significant Labs:   All pertinent labs within the past 24 hours have been reviewed.  CBC:   Recent Labs   Lab 01/02/22  0345 01/03/22  0444   WBC 6.00 7.07   HGB 8.3* 7.9*   HCT 27.2* 25.0*    220     CMP:   Recent Labs   Lab 01/02/22  0345 01/03/22  0444    139   K 3.3* 2.7*   CL 93* 91*   CO2 37* 36*   * 142*   BUN 32* 43*   CREATININE 1.4 1.3   CALCIUM 8.3* 8.4*   PROT 6.6 6.6   ALBUMIN 1.6* 1.8*   BILITOT 0.5 0.4   ALKPHOS 144* 141*   AST 40 53*   ALT 30 33   ANIONGAP 8 12   EGFRNONAA 52.0* 56.9*       Significant  Imaging: I have reviewed all pertinent imaging results/findings within the past 24 hours.

## 2022-01-03 NOTE — PLAN OF CARE
Pt remains on vent - fio2 down to 50 percent per resp today - tolerating - 2 units of prbcs given today for low h an h - vital signs stable - lasix continue for fluid per ct on 1-2-22 - no signs of pain or distress - will continue to monitor

## 2022-01-03 NOTE — EICU
Rounding (Video Assessment):  Yes    Intervention Initiated From:  [facility] / EICU      Comments: pt remains on fio2 of 55% on vent and tolerating well. He has Propofol  and Precedex drips infusing.  VSS. NAD observed.

## 2022-01-03 NOTE — EICU
Rounding (Video Assessment):  Yes        Comments: video rounding complete, intubated/sedated, NAD, VSS

## 2022-01-03 NOTE — PROGRESS NOTES
"[facility] - Intensive Care  Adult Nutrition  Progress Note    SUMMARY       Recommendations  Recommendation/Intervention:   1. EN Recs: Intiate Vital HP @ 10 ml/hr increasing q4-6h as tolerated to goal rate of 55 ml/hr.                         2. Rec'd FWF of 30 ml q4-6h to prevent clogging.          3. RD to monitor and make recs accordingly.     Goals: Goal rate by RD f/u.  Nutrition Goal Status: new  Communication of RD Recs: reviewed with physician    Assessment and Plan     Nutrition Problem  Inadequate protein-energy intake     Related to (etiology):   NPO/vent status     Signs and Symptoms (as evidenced by):   EEN < 25%      Interventions/Recommendations (treatment strategy):  1. EN Recs: Intiate Vital HP @ 10 ml/hr increasing q4-6h as tolerated to goal rate of 55 ml/hr.               -With propofol infusing at 16.2 ml/hr,this will provide a total of 1732 kcals and 114 gms protein (1089 ml water with additional from free water).     2. Rec'd FWF of 30 ml q4-6h to prevent clogging.      3. RD to monitor and make recs accordingly.     Nutrition Diagnosis Status:   Continue      Reason for Assessment    Reason For Assessment: RD follow-up  Diagnosis: pulmonary disease  Relevant Medical History: Tolerating TF at 40 ml/hr. Continue to advance to goal of 55. Propofol @ 23.1 ml/hr  General Information Comments: Tolerating at 40 ml/hr- continue to increase to goal of 55.  Nutrition Discharge Planning: TBD    Nutrition Risk Screen    Nutrition Risk Screen: tube feeding or parenteral nutrition    Nutrition/Diet History    Food Allergies: NKFA  Factors Affecting Nutritional Intake: NPO,on mechanical ventilation    Anthropometrics    Temp: 98.5 °F (36.9 °C)  Height: 5' 10" (177.8 cm)  Height (inches): 70 in  Weight Method: Standard Scale  Weight: 77.1 kg (170 lb)  Weight (lb): 170 lb  Ideal Body Weight (IBW), Male: 166 lb  % Ideal Body Weight, Male (lb): 102.41 %  BMI (Calculated): 24.4  BMI Grade: 18.5-24.9 - normal   "     Lab/Procedures/Meds    Pertinent Labs Reviewed: reviewed  Pertinent Medications Reviewed: reviewed    Estimated/Assessed Needs    Weight Used For Calorie Calculations: 77.1 kg (170 lb)     Energy Need Method: [facility]  Protein Requirements:  (1.2-1.5 gm/kg CBW)  Weight Used For Protein Calculations: 77.1 kg (170 lb)     Estimated Fluid Requirement Method: RDA Method            Nutrition Prescription Ordered    Current Diet Order: NPO  Current Nutrition Support Formula Ordered: Other (Comment) (Vital HP)  Current Nutrition Support Rate Ordered: 40 (ml)    Evaluation of Received Nutrient/Fluid Intake    Enteral Calories (kcal): 960  Enteral Protein (gm): 84  Enteral (Free Water) Fluid (mL): 802  % Kcal Needs: 88% with propofol @ 23.1 ml/hr  % Protein Needs: 91%  Energy Calories Required: not meeting needs  Protein Required: not meeting needs  Fluid Required: not meeting needs        Nutrition Risk    Level of Risk/Frequency of Follow-up: moderate - high     Monitor and Evaluation    Food and Nutrient Intake: enteral nutrition intake  Food and Nutrient Adminstration: enteral and parenteral nutrition administration  Anthropometric Measurements: weight change,weight  Biochemical Data, Medical Tests and Procedures: electrolyte and renal panel,glucose/endocrine profile,lipid profile,inflammatory profile  Nutrition-Focused Physical Findings: overall appearance     Nutrition Follow-Up    1/3/22 - TF was held on 1/2/22 secondary to increase in residual and later resumed when 0 residuals. Will cont to monitor TF tolerance to ensure patient is meeting nutritional needs.     RD Follow-up?: Yes

## 2022-01-04 NOTE — SUBJECTIVE & OBJECTIVE
No current facility-administered medications on file prior to encounter.     Current Outpatient Medications on File Prior to Encounter   Medication Sig    apixaban (ELIQUIS) 5 mg Tab Take 5 mg by mouth 2 (two) times daily.    atorvastatin (LIPITOR) 20 MG tablet Take 1 tablet (20 mg total) by mouth once daily.    metoprolol tartrate (LOPRESSOR) 50 MG tablet Take 50 mg by mouth 2 (two) times daily.    nicotine polacrilex 2 MG Lozg Take 1 lozenge by mouth as needed for cravings.  Do not exceed more than 10 lozenges in a 24 hour period    nicotine, polacrilex, (NICORETTE) 2 mg Gum Chew and park 1 gum by mouth as needed for cravings.  Do not exceed more than 20 pieces in a 24 hour period       Review of patient's allergies indicates:  No Known Allergies    Past Medical History:   Diagnosis Date    A-fib     Alcohol abuse     Atrial fibrillation     Disorder of kidney and ureter     Emphysema lung     Fatty liver     Hypertension     Liver disease     PAD (peripheral artery disease)     Shingles     Stroke     TIA (transient ischemic attack)      Past Surgical History:   Procedure Laterality Date    ANGIOGRAM, CORONARY, WITH LEFT HEART CATHETERIZATION      ESOPHAGOGASTRODUODENOSCOPY N/A 11/24/2021    Procedure: EGD (ESOPHAGOGASTRODUODENOSCOPY);  Surgeon: Ilan De Los Santos MD;  Location: [facility];  Service: Endoscopy;  Laterality: N/A;    TONSILLECTOMY       Family History     Problem Relation (Age of Onset)    Cancer Mother, Father    Clotting disorder Mother    Crohn's disease Mother    Lung cancer Father        Tobacco Use    Smoking status: Current Every Day Smoker     Packs/day: 2.00     Years: 53.00     Pack years: 106.00     Start date: 1968    Smokeless tobacco: Never Used    Tobacco comment: Pt enrolled in the [employer] on 3/21/16 ([employer] Member ID # [id number]).  Ambulatory referral to Smoking Cessation Program.   Substance and Sexual Activity    Alcohol use: Yes     Comment: vodka 3  drinks once weekly     Drug use: No    Sexual activity: Not on file     Review of Systems   Unable to perform ROS: Intubated     Objective:     Vital Signs (Most Recent):  Temp: 97.9 °F (36.6 °C) (01/04/22 1515)  Pulse: 62 (01/04/22 1550)  Resp: (!) 23 (01/04/22 1550)  BP: (!) 158/76 (01/04/22 1550)  SpO2: (!) 91 % (01/04/22 1550) Vital Signs (24h Range):  Temp:  [97.8 °F (36.6 °C)-98.8 °F (37.1 °C)] 97.9 °F (36.6 °C)  Pulse:  [51-63] 62  Resp:  [18-33] 23  SpO2:  [88 %-100 %] 91 %  BP: (111-161)/(60-81) 158/76     Weight: 77.1 kg (170 lb)  Body mass index is 24.39 kg/m².    Physical Exam  Constitutional:       Appearance: He is ill-appearing, toxic-appearing and diaphoretic.   Cardiovascular:      Rate and Rhythm: Tachycardia present. Rhythm irregular.   Pulmonary:      Effort: Respiratory distress present.      Comments: Decreased right breaths sounds  Abdominal:      General: Abdomen is flat. There is no distension.      Palpations: Abdomen is soft.         Significant Labs:  CBC:   Recent Labs   Lab 01/04/22  0346   WBC 6.15   RBC 3.54*   HGB 9.8*   HCT 30.9*      MCV 87   MCH 27.7   MCHC 31.7*     BMP:   Recent Labs   Lab 01/04/22  0346   *      K 2.6*   CL 93*   CO2 35*   BUN 53*   CREATININE 1.3   CALCIUM 8.5*   MG 1.9       Significant Diagnostics:  I have reviewed all pertinent imaging results/findings within the past 24 hours.

## 2022-01-04 NOTE — HPI
67yo male with history of afib and EtOH abuse who presents with progressing respiratory failure.  Patient currently intubated and sedated with acute desaturation this afternoon.  CXR obtained demonstrated unchanged large right pleural effusion with no signs of pneumothorax.  Pleural effusion also seen on CT chest obtained during this admission.  General surgery has been consulted for drainage.

## 2022-01-04 NOTE — ASSESSMENT & PLAN NOTE
32Fr right chest tube placed with return of 200cc serous fluid  Cultures sent  Chest tube to wall suction  Follow up CXR   Rest of care per primary

## 2022-01-04 NOTE — NURSING
Notified by Nancy in the lab that potassium was 2.6. Dr. Gan was notified and ordered 40meq rider.

## 2022-01-04 NOTE — PLAN OF CARE
Patient is progressing slowly with vent weaning. He is responsive to verbal and tactile stimulation. He is   hypokalemic with potassium level at 2.6. Dr Gan notified and 40 meq rider ordered.

## 2022-01-04 NOTE — EICU
Rounding (Video Assessment):  Yes        Comments: Video rounding completed. Vent w/ 50% O2, sats 97%. VSS. Propofol, Levo and Precedex infusing as per MAR. No acute distress noted.

## 2022-01-04 NOTE — SUBJECTIVE & OBJECTIVE
Interval History:     Review of Systems   Unable to perform ROS: Severe respiratory distress     Objective:     Vital Signs (Most Recent):  Temp: 97.9 °F (36.6 °C) (01/04/22 0702)  Pulse: (!) 52 (01/04/22 0815)  Resp: (!) 25 (01/04/22 0815)  BP: 132/71 (01/04/22 0815)  SpO2: 99 % (01/04/22 0815) Vital Signs (24h Range):  Temp:  [97.8 °F (36.6 °C)-98.8 °F (37.1 °C)] 97.9 °F (36.6 °C)  Pulse:  [51-69] 52  Resp:  [18-25] 25  SpO2:  [95 %-100 %] 99 %  BP: ()/(53-81) 132/71     Weight: 77.1 kg (170 lb)  Body mass index is 24.39 kg/m².    Intake/Output Summary (Last 24 hours) at 1/4/2022 0825  Last data filed at 1/4/2022 0542  Gross per 24 hour   Intake 5492.38 ml   Output 3689 ml   Net 1803.38 ml      Physical Exam  Constitutional:       General: He is not in acute distress.     Appearance: He is ill-appearing.      Interventions: He is intubated.   HENT:      Head: Normocephalic and atraumatic.      Nose: Nose normal.      Mouth/Throat:      Mouth: Mucous membranes are moist.      Comments: ET Tube free of secretions.   Eyes:      Pupils: Pupils are equal, round, and reactive to light.   Cardiovascular:      Rate and Rhythm: Normal rate and regular rhythm.      Pulses: Normal pulses.   Pulmonary:      Effort: Prolonged expiration present. He is intubated.      Breath sounds: No stridor. Rhonchi and rales present. No wheezing.   Abdominal:      General: Abdomen is flat.      Palpations: Abdomen is soft.   Musculoskeletal:      Cervical back: Neck supple.      Right lower leg: Edema present.      Left lower leg: Edema present.   Skin:     General: Skin is warm.   Psychiatric:      Comments: Intubated and sedated         Significant Labs:   All pertinent labs within the past 24 hours have been reviewed.  CBC:   Recent Labs   Lab 01/03/22  0444 01/04/22  0346   WBC 7.07 6.15   HGB 7.9* 9.8*   HCT 25.0* 30.9*    211     CMP:   Recent Labs   Lab 01/03/22  0444 01/04/22  0346    141   K 2.7* 2.6*   CL 91*  93*   CO2 36* 35*   * 149*   BUN 43* 53*   CREATININE 1.3 1.3   CALCIUM 8.4* 8.5*   PROT 6.6 6.5   ALBUMIN 1.8* 1.8*   BILITOT 0.4 0.4   ALKPHOS 141* 128   AST 53* 53*   ALT 33 37   ANIONGAP 12 13   EGFRNONAA 56.9* 56.9*       Significant Imaging: I have reviewed all pertinent imaging results/findings within the past 24 hours.

## 2022-01-04 NOTE — CARE UPDATE
resp worked with pt norris 1 hour attempting improve 02 sats - fio2 increased to 100 percent and peep increased to +12 - multiple attempts to clear a possible mucous plug - dr michael and dr calhoun iii notified - dr mayes consulted after xray done for possible drainage of a pleural effusion - abg results given to dr mayes - levophed restarted due to decreased blood pressure - increased sedation and sats then improved - dr mayes did come in and place a 32 Mont[place] chest tube to right side norris 200 cc of pleural fluid drainage - chest tube to suction - fluid sent to lab for culture - chest xray repeated post chest tube insertion and right lung improved but left lung now completely white - dr mayes called by radiology tech - sat 95 percent on present settings on vent - will continue to monitor

## 2022-01-04 NOTE — PROGRESS NOTES
[facility]  Progress Note    Patient Name: Neymar Patel  MRN: [id number]  Patient Class: IP- Inpatient   Admission Date: 12/21/2021  Length of Stay: 13 days  Attending Physician: Raul Gan III, MD  Primary Care Provider: Landen Brown MD        Subjective:     Principal Problem:Acute on chronic respiratory failure with hypoxia        HPI:  Perpetual History  Patient is a 66-year-old male with a history of alcohol abuse atrial fibrillation hypertension peripheral artery disease fatty liver disease who recently had an episode of bronchitis and was treated in the outpatient setting.  The patient then began in having increasing weakness and lightheadedness and noticed his heart rate was elevated.  He presented to the emergency department and was found to have elevated alcohol levels and a heart rate in the 130s to 140s.  The patient is currently in AFib with RVR and has been started on a Cardizem drip by the emergency department.         Overview/Hospital Course:  12/23/21 [facility] patient reports that he is feeling better today, was able to get rest last night. Heart rate still elevated will adjust medications and monitor. Patient with hallucinations last night, patient is appropriate this am  12/24/21:   Rapid Response Call #1: Blood pressure initially low and patient heart rate in the 50s sinus Nakul.  Glucose within normal limits.  Fluid bolus 1 L was given with improvement in blood pressure.  Advised to hold any medication that will lower patient's heart rate.  Patient also complains of shortness of breath but according to respiratory therapist, patient did not take his breathing treatment because he did not think he needed it.  Patient wheezy expiratory and saturating in the high 80s on room air. not on any antibiotics currently so will start.  Labs show that patient has TANIKA possibly due to dehydration.  Will resuscitate with fluid.  Will recheck basic blood work and continue  to hydrate.  Treat accordingly will at started if patient is not already on it.  Patient more alert and oriented x4 moving all extremities with improvement of blood pressure into the 90s and heart rate remains in the 50s sinus Nakul saturating 94% on 2 L  Rapid Response Call #2:Rapid response team called again with subsequent intubation and transfer to MICU.   12/25/21: On minimal sedation and pressor support in the MICU.   12/26/21: Able to wean off of all pressor support, pH of 5 while on bicarb on AM labs. Will get rid of bicarb drip and switch to gentle IV hydration with LR. SBT tomorrow?  12/27/21:  Events over the weekend noted.  Patient is now intubated on the ventilator.  Patient was admitted with pneumonia atrial fibrillation and alcohol abuse and now also has a enterococcal urinary tract infection with sensitivities pending.  Patient has required to sedatives for agitation and combativeness.  12/28/21:  Patient's ABG has been reviewed.  Patient has a mild respiratory alkalosis and his respiratory rate has been changed.  We have encouraged respiratory and nursing to wean his sedation and titrate his tube feeds.  The patient's chest x-ray also seems like he is slightly volume overloaded therefore will discontinue IV fluids and give low dosing of Lasix.  Patient has enterococcal infection was ampicillin sensitive therefore Rocephin should be adequate.  Will change Zithromax to Levaquin.  12/29/21 FM:  Patient had an episode of desaturation yesterday.  Respiratory was able to deep suction and several mucus plugs and thick secretions were removed.  I will broaden patient antibiotics today to Zosyn and otherwise plan on increasing his diuretics today.  Patient has bilateral pulmonary infiltrates consistent with pulmonary edema.  12/30/21 FM:  Patient had a elevated temperature through the night.  Rocephin was changed to Zosyn yesterday and consideration of drug fever should be entertained.  Will add medicine for  MRSA infection today as well as antifungals.  Patient's chest x-ray seems to be improved with diuretics.  Continue diuretics cautiously.  12/31/21 CG: Antibiotic course broadened, tachycardic with an increase in PEEP. He is net negative over the past 24 hours.    1/1/22 CG: No acute overnight events. PEEP decreased to 10.   1/2/22 CG: CXR shows worsening fluid burden, heart rates in 110s-120s.   1/3/22 FM:  Patient is on an FiO2 of 55%.  His chest x-ray still shows pulmonary edema which is also consistent with his CT scan.  We will replace electrolytes transfuse and continue to diurese.  Hopefully we can wean his FiO2 down this weekend and wean him off the ventilator by the end of the week.  1/4/22 FM:  Patient's vital signs are stable this morning and he is sedated.  Nursing reports that when sedation is weaned the patient is awake opens eyes and is alert.  We are attempting a CPAP trial this morning as the patient is tolerating and FiO2 of 50% and could probably be weaned further.  Will reduce dosing of diuretics today.  Continue to replete potassium.      Interval History:     Review of Systems   Unable to perform ROS: Severe respiratory distress     Objective:     Vital Signs (Most Recent):  Temp: 97.9 °F (36.6 °C) (01/04/22 0702)  Pulse: (!) 52 (01/04/22 0815)  Resp: (!) 25 (01/04/22 0815)  BP: 132/71 (01/04/22 0815)  SpO2: 99 % (01/04/22 0815) Vital Signs (24h Range):  Temp:  [97.8 °F (36.6 °C)-98.8 °F (37.1 °C)] 97.9 °F (36.6 °C)  Pulse:  [51-69] 52  Resp:  [18-25] 25  SpO2:  [95 %-100 %] 99 %  BP: ()/(53-81) 132/71     Weight: 77.1 kg (170 lb)  Body mass index is 24.39 kg/m².    Intake/Output Summary (Last 24 hours) at 1/4/2022 0877  Last data filed at 1/4/2022 0542  Gross per 24 hour   Intake 5492.38 ml   Output 3689 ml   Net 1803.38 ml      Physical Exam  Constitutional:       General: He is not in acute distress.     Appearance: He is ill-appearing.      Interventions: He is intubated.   HENT:       Head: Normocephalic and atraumatic.      Nose: Nose normal.      Mouth/Throat:      Mouth: Mucous membranes are moist.      Comments: ET Tube free of secretions.   Eyes:      Pupils: Pupils are equal, round, and reactive to light.   Cardiovascular:      Rate and Rhythm: Normal rate and regular rhythm.      Pulses: Normal pulses.   Pulmonary:      Effort: Prolonged expiration present. He is intubated.      Breath sounds: No stridor. Rhonchi and rales present. No wheezing.   Abdominal:      General: Abdomen is flat.      Palpations: Abdomen is soft.   Musculoskeletal:      Cervical back: Neck supple.      Right lower leg: Edema present.      Left lower leg: Edema present.   Skin:     General: Skin is warm.   Psychiatric:      Comments: Intubated and sedated         Significant Labs:   All pertinent labs within the past 24 hours have been reviewed.  CBC:   Recent Labs   Lab 01/03/22 0444 01/04/22  0346   WBC 7.07 6.15   HGB 7.9* 9.8*   HCT 25.0* 30.9*    211     CMP:   Recent Labs   Lab 01/03/22  0444 01/04/22  0346    141   K 2.7* 2.6*   CL 91* 93*   CO2 36* 35*   * 149*   BUN 43* 53*   CREATININE 1.3 1.3   CALCIUM 8.4* 8.5*   PROT 6.6 6.5   ALBUMIN 1.8* 1.8*   BILITOT 0.4 0.4   ALKPHOS 141* 128   AST 53* 53*   ALT 33 37   ANIONGAP 12 13   EGFRNONAA 56.9* 56.9*       Significant Imaging: I have reviewed all pertinent imaging results/findings within the past 24 hours.      Assessment/Plan:      * Acute on chronic respiratory failure with hypoxia  CPAP trial today.      PNA (pneumonia)  Triple antibiotic therapy.   Plan as above      Severe sepsis  Abx modified  - Continue to follow fever curve.      UTI (urinary tract infection)  See abx      DVT prophylaxis  Hold eliquis for now in setting of TANIKA.       Atrial fibrillation with rapid ventricular response  Controlled with meds.    Tobacco user  PRN Nicotine patch    Alcohol dependence  [facility] Protocol      Elevated LFTs  Elevated, likely in the  setting of hypoperfusion.   - Continue to follow liver enzymes. Improving.         TANIKA (acute kidney injury)  Cr is 1.4 today, improving.   - Continue diuresis today but reduce frequency of lasix.        VTE Risk Mitigation (From admission, onward)         Ordered     enoxaparin injection 40 mg  Daily         12/28/21 0816     IP VTE HIGH RISK PATIENT  Once         12/21/21 2233     Reason for No Pharmacological VTE Prophylaxis  Once        Question:  Reasons:  Answer:  Already adequately anticoagulated on oral Anticoagulants    12/21/21 2233                Discharge Planning   DAXA:      Code Status: Full Code   Is the patient medically ready for discharge?:     Reason for patient still in hospital (select all that apply): Patient unstable  Discharge Plan A: Home with family,Home Health                  Raul Gan III, MD  Department of Hospital Medicine   [place] - Intensive [facility]

## 2022-01-04 NOTE — CARE UPDATE
Dr lj estes and dr michael both notifed unable to keep pt's sat up despite 100 percent fio2 and peep of +12 - stat chest xray ordered -

## 2022-01-04 NOTE — RESPIRATORY THERAPY
cpap trial [id number].  40%, 15ps, 10 peep.  Patient began fine on cpap, but had a decrease in resp rate after being cleaned up.  spo2 was 100% and no distress noted.

## 2022-01-04 NOTE — PROCEDURES
"Neymar Patel is a 66 y.o. male patient with acute hypoxic respiratory failure with large right pleural effusion in need of evacuation.     Temp: 97.9 °F (36.6 °C) (01/04/22 1515)  Pulse: 62 (01/04/22 1550)  Resp: (!) 23 (01/04/22 1550)  BP: (!) 158/76 (01/04/22 1550)  SpO2: (!) 91 % (01/04/22 1550)  Weight: 77.1 kg (170 lb) (12/27/21 1457)  Height: 5' 10" (177.8 cm) (12/27/21 1457)       Chest Tube Insertion    Date/Time: 1/4/2022 4:49 PM  Location procedure was performed: [facility] GENERAL SURGERY  Performed by: Macy Ni MD  Authorized by: Macy Ni MD   Post-operative diagnosis: Same   Pre-operative diagnosis: Right pleural effusion; acute respiratory failure   Consent Done: Emergent Situation  Indications: pleural effusion    Patient sedated: yes  Sedatives: see MAR for details  Analgesia: see MAR for details  Anesthesia: see MAR for details  Preparation: skin prepped with Betadine  Placement location: right lateral  Scalpel size: 15  Tube size: 32 Syrian  Dissection instrument: Suellen clamp and finger  Ultrasound guidance: no  Tension pneumothorax heard: no  Tube connected to: suction  Drainage characteristics: yellow and serosanguinous  Drainage amount: 200 ml  Suture material: 0 silk  Dressing: 4x4 sterile gauze and petrolatum-impregnated gauze  Post-insertion x-ray findings: tube in good position  Patient tolerance: Patient tolerated the procedure well with no immediate complications  Complications: No  Estimated blood loss (mL): 5  Specimens: Yes (Fluid culture )  Implants: No      with     1/4/2022  "

## 2022-01-05 PROBLEM — J90 PLEURAL EFFUSION: Status: ACTIVE | Noted: 2022-01-01

## 2022-01-05 NOTE — PHYSICIAN QUERY
PT Name: Neymar Patel  MR #: [id number]     DOCUMENTATION CLARIFICATION      CDS/: HONEY Tse, RN, CDS               Contact information:[email or web address]  This form is a permanent document in the medical record.    Query Date: January 5, 2022     Query withdrawn, will send sepsis query with POA    By submitting this query, we are merely seeking further clarification of documentation to reflect the severity of illness of your patient. Please utilize your independent clinical judgment when addressing the question(s) below.     The Medical Record contains the following:   Indicators   Supporting Clinical Findings Location in Medical Record   X Documentation of Sepsis, Septic Shock  Severe Sepsis     Dr. Elvia POTTER III, 2/27   X Blood Culture  Blood culture: No growth    Lab 12/25   X Respiratory Culture  Respiratory culture: No growth    Lab 12/28   X Urine Culture  Urine culture: Enterococcus faecalis    Lab 12/21    Other Culture     X Acute/Chronic Illness  Patient was admitted with pneumonia atrial fibrillation and alcohol abuse and now also has a enterococcal urinary tract infection, acute on chronic respiratory failure    Dr. Elvia POTTER III, 2/27   X Medication/Treatment  Continue antibiotic therapy with Rocephin and Azithromycin   Add vanc, + enterococcus    Dr. Elvia POTTER III, 2/27    Other        Provider, please further specify the Source of Sepsis:     [   ] Due to Enterococcal Urinary Tract Infection     [   ] Due to Pneumonia     [   ] Due to Enterococcal Urinary Tract Infection and Pneumonia     [   ] Due to (please specify): __________________________________     [   ] Other specification (please specify): ____________________     [   ] Clinically Undetermined           Please document in your progress notes daily for the duration of treatment until resolved, and include in your discharge summary.  Form No. [id number]

## 2022-01-05 NOTE — SUBJECTIVE & OBJECTIVE
Interval History:   Patient seen and examined.  Bleeding from right intercostal muscles around chest tube overnight.  Hemostatic this afternoon.  CXR with increased L pleural effusion.  Sat 88%.      Medications:  Continuous Infusions:   dexmedetomidine (PRECEDEX) infusion 0.8 mcg/kg/hr (01/05/22 0504)    NORepinephrine bitartrate-D5W 0.03 mcg/kg/min (01/05/22 0100)    propofoL 50 mcg/kg/min (01/05/22 1005)     Scheduled Meds:   amiodarone  400 mg Oral BID    atorvastatin  40 mg Oral Daily    enoxaparin  40 mg Subcutaneous Daily    fluconazole (DIFLUCAN) IVPB  200 mg Intravenous Q24H    furosemide (LASIX) injection  40 mg Intravenous Daily    hydrocortisone   Rectal BID    hydrocortisone sodium succinate  100 mg Intravenous Q8H    levalbuterol  1.25 mg Nebulization TID WAKE    linezolid  600 mg Intravenous Q12H    magnesium oxide  400 mg Oral BID    pantoprazole  40 mg Intravenous Daily    piperacillin-tazobactam (ZOSYN) IVPB  4.5 g Intravenous Q8H    potassium bicarbonate  20 mEq Per NG tube BID     PRN Meds:sodium chloride, acetaminophen, influenza, sodium chloride 0.9%     Review of patient's allergies indicates:  No Known Allergies  Objective:     Vital Signs (Most Recent):  Temp: (!) 94.64 °F (34.8 °C) (01/05/22 1100)  Pulse: 63 (01/05/22 1100)  Resp: 18 (01/05/22 1100)  BP: (!) 98/57 (01/05/22 1100)  SpO2: (!) 86 % (01/05/22 1100) Vital Signs (24h Range):  Temp:  [94.28 °F (34.6 °C)-98.3 °F (36.8 °C)] 94.64 °F (34.8 °C)  Pulse:  [] 63  Resp:  [18-54] 18  SpO2:  [71 %-100 %] 86 %  BP: ()/(42-76) 98/57     Weight: 77.1 kg (170 lb)  Body mass index is 24.39 kg/m².    Intake/Output - Last 3 Shifts       01/03 0700  01/04 0659 01/04 0700 01/05 0659 01/05 0700 01/06 0659    I.V. (mL/kg) 1340.9 (17.4) 967.8 (12.6)     Blood 562.5      Other 682      NG/GT 1807 858     IV Piggyback 1100 1600     Total Intake(mL/kg) 5492.4 (71.2) 3425.8 (44.4)     Urine (mL/kg/hr) 3689 (2) 2100 (1.1)      Stool  0     Chest Tube  770     Total Output 3689 2870     Net +1803.4 +555.8            Stool Occurrence  2 x           Physical Exam  Gen: intubated and sedated.  GCS 3T   CV: RRR  Resp: ventilator assisted breaths equal and nonlabored  R chest tube site with small hematoma.  Moderate clot burden in tube.  No active bleeding encountered.  No air leak in chamber   Abd: Soft, NT, ND;  Ext: no c/c/e    Significant Labs:  CBC:   Recent Labs   Lab 01/05/22  0337   WBC 13.44*   RBC 2.73*   HGB 7.6*   HCT 24.3*      MCV 89   MCH 27.8   MCHC 31.3*     BMP:   Recent Labs   Lab 01/05/22  0527   *      K 3.4*   CL 94*   CO2 36*   BUN 59*   CREATININE 1.3   CALCIUM 8.1*   MG 2.3       Significant Diagnostics:  I have reviewed all pertinent imaging results/findings within the past 24 hours.

## 2022-01-05 NOTE — PROGRESS NOTES
"  [place] - Intensive Care  Adult Nutrition  Consult Note    SUMMARY     Recommendations    Recommendation/Intervention:   1. EN Recs: Intiate Vital HP @ 10 ml/hr increasing q4-6h as tolerated to goal rate of 55 ml/hr.                -With propofol infusing at 23.1 ml/hr,this will provide a total of 1929 kcal (108% EEN), 115 gm protein (100% EPN), and 1,103 ml water.          2. Rec'd FWF of 30 ml q4-6h to prevent clogging.          3. RD to monitor and make recs accordingly.    Goals: Goal rate by RD f/u.  Nutrition Goal Status: new  Communication of RD Recs: reviewed with physician    Assessment and Plan    Nutrition Problem  Inadequate protein-energy intake     Related to (etiology):   NPO/vent status     Signs and Symptoms (as evidenced by):   EEN < 25%      Interventions/Recommendations (treatment strategy):  1. EN Recs: Intiate Vital HP @ 10 ml/hr increasing q4-6h as tolerated to goal rate of 55 ml/hr.               -With propofol infusing at 23.1 ml/hr,this will provide a total of 1929 kcal (108% EEN), 115 gm protein (100% EPN), and 1,103 ml water.     2. Rec'd FWF of 30 ml q4-6h to prevent clogging.      3. RD to monitor and make recs accordingly.     Nutrition Diagnosis Status:   Continues     Reason for Assessment    Reason For Assessment: RD follow-up  Diagnosis: pulmonary disease  Relevant Medical History: Tolerating TF at 40 ml/hr. Continue to advance to goal of 55. Propofol @ 23.1 ml/hr  General Information Comments: Current rate@ 60 ml/hr- tolerating. Decrease rate to 55 ml/hr with current propofol rate @ 23.1  Nutrition Discharge Planning: TBD    Nutrition Risk Screen    Nutrition Risk Screen: tube feeding or parenteral nutrition    Nutrition/Diet History    Food Allergies: NKFA  Factors Affecting Nutritional Intake: NPO,on mechanical ventilation    Anthropometrics    Temp: (!) 94.64 °F (34.8 °C)  Height: 5' 10" (177.8 cm)  Height (inches): 70 in  Weight Method: Standard Scale  Weight: 77.1 kg (170 " lb)  Weight (lb): 170 lb  Ideal Body Weight (IBW), Male: 166 lb  % Ideal Body Weight, Male (lb): 102.41 %  BMI (Calculated): 24.4  BMI Grade: 18.5-24.9 - normal       Lab/Procedures/Meds    Pertinent Labs Reviewed: reviewed  Pertinent Medications Reviewed: reviewed    Physical Findings/Assessment         Estimated/Assessed Needs    Weight Used For Calorie Calculations: 77.1 kg (170 lb)     Energy Need Method: [facility]  Protein Requirements:  (1.2-1.5 gm/kg CBW)  Weight Used For Protein Calculations: 77.1 kg (170 lb)     Estimated Fluid Requirement Method: RDA Method            Nutrition Prescription Ordered    Current Diet Order: NPO  Current Nutrition Support Formula Ordered: Other (Comment) (Vital HP)  Current Nutrition Support Rate Ordered: 40 (ml)    Evaluation of Received Nutrient/Fluid Intake    Enteral Calories (kcal): 960  Enteral Protein (gm): 84  Enteral (Free Water) Fluid (mL): 802  % Kcal Needs: 88% with propofol @ 23.1 ml/hr  % Protein Needs: 91%  Energy Calories Required: not meeting needs  Protein Required: not meeting needs  Fluid Required: not meeting needs  % Intake of Estimated Energy Needs: 75 - 100 %  % Meal Intake: NPO    Nutrition Risk    Level of Risk/Frequency of Follow-up: moderate - high       Monitor and Evaluation    Food and Nutrient Intake: enteral nutrition intake  Food and Nutrient Adminstration: enteral and parenteral nutrition administration  Anthropometric Measurements: weight change,weight  Biochemical Data, Medical Tests and Procedures: electrolyte and renal panel,glucose/endocrine profile,lipid profile,inflammatory profile  Nutrition-Focused Physical Findings: overall appearance       Nutrition Follow-Up    RD Follow-up?: Yes

## 2022-01-05 NOTE — PROGRESS NOTES
[facility]  Progress Note    Patient Name: Neymar Patel  MRN: [id number]  Patient Class: IP- Inpatient   Admission Date: 12/21/2021  Length of Stay: 14 days  Attending Physician: Raul Gan III, MD  Primary Care Provider: Landen Brown MD        Subjective:     Principal Problem:Acute on chronic respiratory failure with hypoxia        HPI:  Perpetual History  Patient is a 66-year-old male with a history of alcohol abuse atrial fibrillation hypertension peripheral artery disease fatty liver disease who recently had an episode of bronchitis and was treated in the outpatient setting.  The patient then began in having increasing weakness and lightheadedness and noticed his heart rate was elevated.  He presented to the emergency department and was found to have elevated alcohol levels and a heart rate in the 130s to 140s.  The patient is currently in AFib with RVR and has been started on a Cardizem drip by the emergency department.         Overview/Hospital Course:  12/23/21 [facility] patient reports that he is feeling better today, was able to get rest last night. Heart rate still elevated will adjust medications and monitor. Patient with hallucinations last night, patient is appropriate this am  12/24/21:   Rapid Response Call #1: Blood pressure initially low and patient heart rate in the 50s sinus Nakul.  Glucose within normal limits.  Fluid bolus 1 L was given with improvement in blood pressure.  Advised to hold any medication that will lower patient's heart rate.  Patient also complains of shortness of breath but according to respiratory therapist, patient did not take his breathing treatment because he did not think he needed it.  Patient wheezy expiratory and saturating in the high 80s on room air. not on any antibiotics currently so will start.  Labs show that patient has TANIKA possibly due to dehydration.  Will resuscitate with fluid.  Will recheck basic blood work and continue  to hydrate.  Treat accordingly will at started if patient is not already on it.  Patient more alert and oriented x4 moving all extremities with improvement of blood pressure into the 90s and heart rate remains in the 50s sinus Nakul saturating 94% on 2 L  Rapid Response Call #2:Rapid response team called again with subsequent intubation and transfer to MICU.   12/25/21: On minimal sedation and pressor support in the MICU.   12/26/21: Able to wean off of all pressor support, pH of 5 while on bicarb on AM labs. Will get rid of bicarb drip and switch to gentle IV hydration with LR. SBT tomorrow?  12/27/21:  Events over the weekend noted.  Patient is now intubated on the ventilator.  Patient was admitted with pneumonia atrial fibrillation and alcohol abuse and now also has a enterococcal urinary tract infection with sensitivities pending.  Patient has required to sedatives for agitation and combativeness.  12/28/21:  Patient's ABG has been reviewed.  Patient has a mild respiratory alkalosis and his respiratory rate has been changed.  We have encouraged respiratory and nursing to wean his sedation and titrate his tube feeds.  The patient's chest x-ray also seems like he is slightly volume overloaded therefore will discontinue IV fluids and give low dosing of Lasix.  Patient has enterococcal infection was ampicillin sensitive therefore Rocephin should be adequate.  Will change Zithromax to Levaquin.  12/29/21 FM:  Patient had an episode of desaturation yesterday.  Respiratory was able to deep suction and several mucus plugs and thick secretions were removed.  I will broaden patient antibiotics today to Zosyn and otherwise plan on increasing his diuretics today.  Patient has bilateral pulmonary infiltrates consistent with pulmonary edema.  12/30/21 FM:  Patient had a elevated temperature through the night.  Rocephin was changed to Zosyn yesterday and consideration of drug fever should be entertained.  Will add medicine for  MRSA infection today as well as antifungals.  Patient's chest x-ray seems to be improved with diuretics.  Continue diuretics cautiously.  12/31/21 CG: Antibiotic course broadened, tachycardic with an increase in PEEP. He is net negative over the past 24 hours.    1/1/22 CG: No acute overnight events. PEEP decreased to 10.   1/2/22 CG: CXR shows worsening fluid burden, heart rates in 110s-120s.   1/3/22 FM:  Patient is on an FiO2 of 55%.  His chest x-ray still shows pulmonary edema which is also consistent with his CT scan.  We will replace electrolytes transfuse and continue to diurese.  Hopefully we can wean his FiO2 down this weekend and wean him off the ventilator by the end of the week.  1/4/22 FM:  Patient's vital signs are stable this morning and he is sedated.  Nursing reports that when sedation is weaned the patient is awake opens eyes and is alert.  We are attempting a CPAP trial this morning as the patient is tolerating and FiO2 of 50% and could probably be weaned further.  Will reduce dosing of diuretics today.  Continue to replete potassium.  1/5/22 FM:  Yesterday afternoon the patient had a desaturation his oxygen and his chest x-ray showed a worsening right-sided pleural effusion.  A CT scan done 2 days ago showed some layering and there was a discussion about possible loculations.  The patient's chest tube initially had 200 cc of a serous drainage which is now bloody.  Patient's chest x-ray has improved after placement.  Patient's oxygenation today is improved but his condition is precarious.  I have spoken to the family and let them know a high risk and possible poor outcome.      Interval History:     Review of Systems   Unable to perform ROS: Severe respiratory distress     Objective:     Vital Signs (Most Recent):  Temp: 98.1 °F (36.7 °C) (01/05/22 0730)  Pulse: 64 (01/05/22 0750)  Resp: 18 (01/05/22 0750)  BP: (!) 82/54 (01/05/22 0745)  SpO2: 98 % (01/05/22 0750) Vital Signs (24h Range):  Temp:   [97.4 °F (36.3 °C)-98.1 °F (36.7 °C)] 98.1 °F (36.7 °C)  Pulse:  [] 64  Resp:  [18-54] 18  SpO2:  [71 %-100 %] 98 %  BP: ()/(42-76) 82/54     Weight: 77.1 kg (170 lb)  Body mass index is 24.39 kg/m².    Intake/Output Summary (Last 24 hours) at 1/5/2022 0822  Last data filed at 1/5/2022 0504  Gross per 24 hour   Intake 3425.84 ml   Output 2870 ml   Net 555.84 ml      Physical Exam  Constitutional:       Appearance: He is ill-appearing.      Interventions: He is intubated.   HENT:      Head: Normocephalic and atraumatic.      Nose: Nose normal.      Mouth/Throat:      Mouth: Mucous membranes are moist.      Comments: ET Tube free of secretions.   Eyes:      Pupils: Pupils are equal, round, and reactive to light.   Cardiovascular:      Rate and Rhythm: Normal rate. Rhythm irregular.      Pulses: Normal pulses.   Pulmonary:      Effort: Prolonged expiration present. He is intubated.      Breath sounds: No stridor. Rhonchi and rales present. No wheezing.   Abdominal:      General: Abdomen is flat.      Palpations: Abdomen is soft.   Musculoskeletal:      Cervical back: Neck supple.      Right lower leg: Edema present.      Left lower leg: Edema present.   Skin:     General: Skin is warm.   Psychiatric:      Comments: Intubated and sedated         Significant Labs:   All pertinent labs within the past 24 hours have been reviewed.  CBC:   Recent Labs   Lab 01/04/22  0346 01/05/22  0337   WBC 6.15 13.44*   HGB 9.8* 7.6*   HCT 30.9* 24.3*    207     CMP:   Recent Labs   Lab 01/04/22  0346 01/05/22  0527    140   K 2.6* 3.4*   CL 93* 94*   CO2 35* 36*   * 156*   BUN 53* 59*   CREATININE 1.3 1.3   CALCIUM 8.5* 8.1*   PROT 6.5 5.5*   ALBUMIN 1.8* 1.6*   BILITOT 0.4 0.4   ALKPHOS 128 105   AST 53* 66*   ALT 37 44   ANIONGAP 13 10   EGFRNONAA 56.9* 56.9*       Significant Imaging: I have reviewed all pertinent imaging results/findings within the past 24 hours.      Assessment/Plan:      * Acute on  chronic respiratory failure with hypoxia  ABG and cxr noted, + effusion now complicates.      Pleural effusion  Bloody this am, ? Cause, will ro DVT and send for studies, ? Malignant?      PNA (pneumonia)  Triple antibiotic therapy.   Plan as above      Severe sepsis  Currently on abx, pressores again.      UTI (urinary tract infection)  See abx      DVT prophylaxis  Hold eliquis for now in setting of TANIKA.       Atrial fibrillation with rapid ventricular response  Controlled with meds.    Tobacco user  PRN Nicotine patch    Alcohol dependence  CIWA Protocol      Elevated LFTs  Elevated, likely in the setting of hypoperfusion.   - Continue to follow liver enzymes. Improving.         TANIKA (acute kidney injury)  Noted, Cr, remaining around 1.5        VTE Risk Mitigation (From admission, onward)         Ordered     Place sequential compression device  Until discontinued         01/05/22 0815     enoxaparin injection 40 mg  Daily         12/28/21 0816     IP VTE HIGH RISK PATIENT  Once         12/21/21 2233     Reason for No Pharmacological VTE Prophylaxis  Once        Question:  Reasons:  Answer:  Already adequately anticoagulated on oral Anticoagulants    12/21/21 2233                Discharge Planning   DAXA:      Code Status: Full Code   Is the patient medically ready for discharge?:     Reason for patient still in hospital (select all that apply): Patient unstable  Discharge Plan A: Home with family,Home Health                  Raul Gan III, MD  Department of Hospital Medicine   [facility] - Intensive [facility] Department

## 2022-01-05 NOTE — CARE UPDATE
At patient's bedside. Patient's O2 sat decreased to 74%. Disconnected from ventilator. Ambu bagged patient with 100 % Fio2.  Lavaged suctioned patient. O2 sat 84-89%. Liv ELIZABETH at bedside. Vent changes ordered per Dr. Gan.

## 2022-01-05 NOTE — PROCEDURES
"Neymar Patel is a 66 y.o. male patient with hypoxic respiratory failure who presents with worsening L pleural effusion.     Temp: (!) 94.64 °F (34.8 °C) (22 1100)  Pulse: 63 (22 1100)  Resp: 18 (22 1100)  BP: (!) 98/57 (22 1100)  SpO2: (!) 86 % (22 1100)  Weight: 77.1 kg (170 lb) (21 1457)  Height: 5' 10" (177.8 cm) (21 1457)       Thoracentesis    Date/Time: 2022 12:42 PM  Location procedure was performed: [facility] GENERAL SURGERY  Performed by: Macy Ni MD  Authorized by: Macy Ni MD   Pre-operative diagnosis: Left pleural effusion  Post-operative diagnosis: Same  Consent Done: Yes  Consent: Verbal consent obtained. Written consent obtained.  Risks and benefits: risks, benefits and alternatives were discussed  Consent given by: power of   Patient consent: the patient's understanding of the procedure matches consent given  Procedure consent: procedure consent matches procedure scheduled  Relevant documents: relevant documents present and verified  Test results: test results available and properly labeled  Site marked: the operative site was marked  Imaging studies: imaging studies available  Required items: required blood products, implants, devices, and special equipment available  Patient identity confirmed:  and MRN  Time out: Immediately prior to procedure a "time out" was called to verify the correct patient, procedure, equipment, support staff and site/side marked as required.  Procedure purpose: therapeutic  Indications: pleural effusion  Preparation: Patient was prepped and draped in the usual sterile fashion.  Local anesthesia used: no    Anesthesia:  Local anesthesia used: no    Patient sedated: yes  Sedation type: deep sedation  (See MAR for exact dosages of medications).  Vitals: Vital signs were monitored during sedation.  Description of findings: 600cc serous fluid evacuated from L thoracic cavity    Preparation: skin prepped " with ChloraPrep  Patient position: supine  Ultrasound guidance: no  Location: left posterior  Intercostal space: 6th  Puncture method: through-the-needle catheter  Needle size: 18  Catheter size: 8 [place]  Number of attempts: 2  Drainage amount: 600 ml  Drainage characteristics: serous  Patient tolerance: Patient tolerated the procedure well with no immediate complications  Chest x-ray performed: yes  Chest x-ray interpreted by me and radiologist.  Chest x-ray findings: pleural effusion  Complications: No  Estimated blood loss (mL): 5  Specimens: Yes (L pleural fluid)  Implants: No  Drainage Tube: removed        1/5/2022

## 2022-01-05 NOTE — PHYSICIAN QUERY
PT Name: Neymar Patel  MR #: [id number]     DOCUMENTATION CLARIFICATION     CDS/: HONEY Tse, RN, CDS               Contact information:[email or web address]   This form is a permanent document in the medical record.     Query Date: January 5, 2022    By submitting this query, we are merely seeking further clarification of documentation.  Please utilize your independent clinical judgment when addressing the question(s) below.  The Medical Record contains the following:  Indicators Supporting Clinical Findings Location in Medical Record   X Acute Illness (e.g. AMI, Sepsis, etc.) Severe Sepsis, Acute on chronic respiratory failure hypoxia, TANIKA, Atrial fibrillation with RVR, UTI, PNA   , Dr. Bustillo, 12/25   X Vital Signs  Vital Signs (24h Range):  Temp:  [96.1 °F (35.6 °C)-97.9 °F (36.6 °C)] 97.8 °F (36.6 °C)  Pulse:  [] 56  Resp:  [14-31] 24  SpO2:  [69 %-100 %] 100 %  BP: ()/(44-87) 116/64    Vital Signs (24h Range):  Temp:  [95.6 °F (35.3 °C)-97.8 °F (36.6 °C)] 96.5 °F (35.8 °C)  Pulse:  [] 75  Resp:  [24-32] 24  SpO2:  [88 %-100 %] 95 %  BP: ()/() 109/62   , Dr. Bustillo, 12/25              , Dr. Gan, III, 12/28    Acidosis documented     X k ABGs/Labs    12/21 12/23 12/24 12/25 12/26 12/27 12/28   WBC 4.91 3.48 (L) 8.89 14.04 (H) 9.26 5.81 4.38        12/29 12/30 12/31 1/1 1/2 1/3 1/4 1/5   WBC 6.56 9.27 11.67 8.90 6.00 7.07 6.15 13.44 (H)      12/24   Lactate, Oz 8.4 (HH)      1/1   Procalcitonin 0.51 (H)        12/24 12/25 12/26 12/27 12/28 12/29 12/30   POC PH 7.110 (LL) 7.341 (L) 7.515 (H) 7.546 (H) 7.556 (H) 7.448 7.462 (H)   POC PCO2 48.7 (H) 35.5 31.8 (L) 33.1 (L) 29.8 (L) 40.7 43.3   POC PO2 140 (H) 257 (H) 121 (H) 79.6 (L) 95.8 252 (H) 191 (H)   POC HCO3 14.0  26.9 29.8 28.2     POC SATURATED O2 98.6  99.8 97.3 98.8     POC TCO2 15.7  24.2 26.7 24.6     FiO2 100.0 100.0 100.0 70.0 70.0 100.0 80.0   O2DEVICE Ventilator Ventilator Ventilator Ventilator  Ventilator Ventilator Ventilator   Vt 400 450 450 450 450 450 450   PEEP 5.0 8.0 8.0 7.0 7.0 12.0 12.0   Specimen source Arterial Arterial Arterial Arterial Arterial Arterial Arterial           Lab 12/21-1/5                  Lab 12/24    Lab 1/1          Lab 12/24-12/30   X Hypotension or Low Blood Pressure documented Rapid response to floor:  SOB, Hypotension and syncope    Right arrive patient was unresponsive, tachypneic, and hypotensive.  Automatic blood pressure cuff could not  patient's BP and distal pulses were thready.    Patient's blood pressure improved and patient was normotensive on epinephrine drip    TANIKA- Meets the KDIGO criteria for Stage 2 TANIKA likely in the setting of severe hypoperfusion    Elevated LFTs-Elevated, likely in the setting of hypoperfusion   Code Documentation, Dr. Clarke, 12/24    ED, Dr. Bah, 12/25      ED, Dr. Bah, 12/25    , Dr. Gan, III, 12/27    Altered Mental Status or Confusion      Diaphoresis, Cold Extremities or Cyanosis      Oliguria     X Medication/Treatment  -Vasopressors  -Inotropic Drugs  -IV Fluids   -IV Antibiotics  -Cardiac Assist Devices  -Hemodynamic Monitoring  -Blood/Blood Products Blood pressure initially low and patient heart rate in the 50s sinus Nakul.  Glucose within normal limits.  Fluid bolus 1 L was given with improvement in blood pressure    Patient was started on epinephrine drip    On minimal sedation and pressor support in the MICU    Able to wean off of all pressor support, pH of 5 while on bicarb on AM labs    Patient has enterococcal infection was ampicillin sensitive therefore Rocephin should be adequate.  Will change Zithromax to Levaquin.    Currently on abx, pressors again    Norepinephrine IV infusion     NaCl 0.9% 500ml/hr      Code Documentation, Dr. Clarke, 12/24      ED, Dr. Bah, 12/25    , Dr. Bustillo, 12/25    , Dr. Bustillo, 12/26    , Dr. Gan, III, 12/28      , Dr. Gan, III, 1/5    MAR 12/25-1/5    MAR  12/24    Other       Provider, please specify diagnosis or diagnoses associated with above clinical findings.    [  x  ] Septic Shock   [    ] Other Shock (please specify): __________   [    ] Shock, Unspecified   [    ] Other Condition (please specify): _________   [  ] Clinically Undetermined                 Please document in your progress notes daily for the duration of treatment until resolved and include in your discharge summary.     Form No. [id number]

## 2022-01-05 NOTE — ASSESSMENT & PLAN NOTE
POD 1 s/p R chest tube placement   Chest tube in good positioning;  Intercostal bleeding stopped   Hgb 7.6 - PRBC transfusion per medicine   Continue continuous wall suction     L chest with increased effusion  L thoracentesis performed with removal of 600cc serous fluid  Repeat CXR  Will continue to monitor  Hold lovenox dose this evening

## 2022-01-05 NOTE — EICU
Rounding (Video Assessment):  Yes        Comments: Video rounding completed. Propofol, Levo and Precedex infusing as per MAR. Vent w/ 100% O2, 95% sats. VSS. No acute distress noted.

## 2022-01-05 NOTE — CONSULTS
[place] - Intensive Care  General Surgery  Consult Note    Patient Name: Neymar Patel  MRN: [id number]  Code Status: Full Code  Admission Date: 12/21/2021  Hospital Length of Stay: 14 days  Attending Physician: Raul Gan III, MD  Primary Care Provider: Landen Brown MD    Patient information was obtained from ER records and primary treating physician.     Inpatient consult to General Surgery  Consult performed by: Macy Ni MD  Consult ordered by: Raul Gan III, MD        Subjective:     Principal Problem: Acute on chronic respiratory failure with hypoxia    History of Present Illness: 67yo male with history of afib and EtOH abuse who presents with progressing respiratory failure.  Patient currently intubated and sedated with acute desaturation this afternoon.  CXR obtained demonstrated unchanged large right pleural effusion with no signs of pneumothorax.  Pleural effusion also seen on CT chest obtained during this admission.  General surgery has been consulted for drainage.       No current facility-administered medications on file prior to encounter.     Current Outpatient Medications on File Prior to Encounter   Medication Sig    apixaban (ELIQUIS) 5 mg Tab Take 5 mg by mouth 2 (two) times daily.    atorvastatin (LIPITOR) 20 MG tablet Take 1 tablet (20 mg total) by mouth once daily.    metoprolol tartrate (LOPRESSOR) 50 MG tablet Take 50 mg by mouth 2 (two) times daily.    nicotine polacrilex 2 MG Lozg Take 1 lozenge by mouth as needed for cravings.  Do not exceed more than 10 lozenges in a 24 hour period    nicotine, polacrilex, (NICORETTE) 2 mg Gum Chew and park 1 gum by mouth as needed for cravings.  Do not exceed more than 20 pieces in a 24 hour period       Review of patient's allergies indicates:  No Known Allergies    Past Medical History:   Diagnosis Date    A-fib     Alcohol abuse     Atrial fibrillation     Disorder of kidney and ureter     Emphysema lung     Fatty  liver     Hypertension     Liver disease     PAD (peripheral artery disease)     Shingles     Stroke     TIA (transient ischemic attack)      Past Surgical History:   Procedure Laterality Date    ANGIOGRAM, CORONARY, WITH LEFT HEART CATHETERIZATION      ESOPHAGOGASTRODUODENOSCOPY N/A 11/24/2021    Procedure: EGD (ESOPHAGOGASTRODUODENOSCOPY);  Surgeon: Ilan De Los Santos MD;  Location: [facility];  Service: Endoscopy;  Laterality: N/A;    TONSILLECTOMY       Family History     Problem Relation (Age of Onset)    Cancer Mother, Father    Clotting disorder Mother    Crohn's disease Mother    Lung cancer Father        Tobacco Use    Smoking status: Current Every Day Smoker     Packs/day: 2.00     Years: 53.00     Pack years: 106.00     Start date: 1968    Smokeless tobacco: Never Used    Tobacco comment: Pt enrolled in the [employer] on 3/21/16 ([employer]CT Member ID # [id number]).  Ambulatory referral to Smoking Cessation Program.   Substance and Sexual Activity    Alcohol use: Yes     Comment: vodka 3 drinks once weekly     Drug use: No    Sexual activity: Not on file     Review of Systems   Unable to perform ROS: Intubated     Objective:     Vital Signs (Most Recent):  Temp: 97.9 °F (36.6 °C) (01/04/22 1515)  Pulse: 62 (01/04/22 1550)  Resp: (!) 23 (01/04/22 1550)  BP: (!) 158/76 (01/04/22 1550)  SpO2: (!) 91 % (01/04/22 1550) Vital Signs (24h Range):  Temp:  [97.8 °F (36.6 °C)-98.8 °F (37.1 °C)] 97.9 °F (36.6 °C)  Pulse:  [51-63] 62  Resp:  [18-33] 23  SpO2:  [88 %-100 %] 91 %  BP: (111-161)/(60-81) 158/76     Weight: 77.1 kg (170 lb)  Body mass index is 24.39 kg/m².    Physical Exam  Constitutional:       Appearance: He is ill-appearing, toxic-appearing and diaphoretic.   Cardiovascular:      Rate and Rhythm: Tachycardia present. Rhythm irregular.   Pulmonary:      Effort: Respiratory distress present.      Comments: Decreased right breaths sounds  Abdominal:      General: Abdomen is flat. There is no  distension.      Palpations: Abdomen is soft.         Significant Labs:  CBC:   Recent Labs   Lab 01/04/22  0346   WBC 6.15   RBC 3.54*   HGB 9.8*   HCT 30.9*      MCV 87   MCH 27.7   MCHC 31.7*     BMP:   Recent Labs   Lab 01/04/22  0346   *      K 2.6*   CL 93*   CO2 35*   BUN 53*   CREATININE 1.3   CALCIUM 8.5*   MG 1.9       Significant Diagnostics:  I have reviewed all pertinent imaging results/findings within the past 24 hours.    Assessment/Plan:     PNA (pneumonia)  32Fr right chest tube placed with return of 200cc serous fluid  Cultures sent  Chest tube to wall suction  Follow up CXR   Rest of care per primary       VTE Risk Mitigation (From admission, onward)         Ordered     Place sequential compression device  Until discontinued         01/05/22 0815     enoxaparin injection 40 mg  Daily         12/28/21 0816     IP VTE HIGH RISK PATIENT  Once         12/21/21 2233     Reason for No Pharmacological VTE Prophylaxis  Once        Question:  Reasons:  Answer:  Already adequately anticoagulated on oral Anticoagulants    12/21/21 2233                Thank you for your consult. I will follow-up with patient. Please contact us if you have any additional questions.    Macy Ni MD  General Surgery  [place] - Intensive [facility] [place]

## 2022-01-05 NOTE — EICU
Rounding (Video Assessment):  Video rounding completed.  Pt resting quiet on ventilator support w/ FIO2 50%, +12 peep.  No distress noted.  Infusing Precedex gtt at 1.4 mcg/kg/hr, Norepinephrine gtt at 0.4 mcg/kg/min & Propofol gtt at 50 mcg/kg/min.  B/P 89/53, HR 69, RR 18, POx 97%.

## 2022-01-05 NOTE — CARE UPDATE
Dr. Ni at bedside for thoracentesis procedure to left chest and to investigate right chest tube site for bleeding.  Chest tube drainage container changed.  260 cc output this shift noted.   Pt placed on 100% O2 for procedures.   SPO2 86 to 87 % on 50% prior to procedure.

## 2022-01-05 NOTE — CARE UPDATE
Spo2 down to 84%.   FIo2 increased to 100% with temp button   SPO2 increased to 93%.   FIO2 increased to 55% post  2 min time.

## 2022-01-05 NOTE — SUBJECTIVE & OBJECTIVE
Interval History:     Review of Systems   Unable to perform ROS: Severe respiratory distress     Objective:     Vital Signs (Most Recent):  Temp: 98.1 °F (36.7 °C) (01/05/22 0730)  Pulse: 64 (01/05/22 0750)  Resp: 18 (01/05/22 0750)  BP: (!) 82/54 (01/05/22 0745)  SpO2: 98 % (01/05/22 0750) Vital Signs (24h Range):  Temp:  [97.4 °F (36.3 °C)-98.1 °F (36.7 °C)] 98.1 °F (36.7 °C)  Pulse:  [] 64  Resp:  [18-54] 18  SpO2:  [71 %-100 %] 98 %  BP: ()/(42-76) 82/54     Weight: 77.1 kg (170 lb)  Body mass index is 24.39 kg/m².    Intake/Output Summary (Last 24 hours) at 1/5/2022 0822  Last data filed at 1/5/2022 0504  Gross per 24 hour   Intake 3425.84 ml   Output 2870 ml   Net 555.84 ml      Physical Exam  Constitutional:       Appearance: He is ill-appearing.      Interventions: He is intubated.   HENT:      Head: Normocephalic and atraumatic.      Nose: Nose normal.      Mouth/Throat:      Mouth: Mucous membranes are moist.      Comments: ET Tube free of secretions.   Eyes:      Pupils: Pupils are equal, round, and reactive to light.   Cardiovascular:      Rate and Rhythm: Normal rate. Rhythm irregular.      Pulses: Normal pulses.   Pulmonary:      Effort: Prolonged expiration present. He is intubated.      Breath sounds: No stridor. Rhonchi and rales present. No wheezing.   Abdominal:      General: Abdomen is flat.      Palpations: Abdomen is soft.   Musculoskeletal:      Cervical back: Neck supple.      Right lower leg: Edema present.      Left lower leg: Edema present.   Skin:     General: Skin is warm.   Psychiatric:      Comments: Intubated and sedated         Significant Labs:   All pertinent labs within the past 24 hours have been reviewed.  CBC:   Recent Labs   Lab 01/04/22  0346 01/05/22  0337   WBC 6.15 13.44*   HGB 9.8* 7.6*   HCT 30.9* 24.3*    207     CMP:   Recent Labs   Lab 01/04/22  0346 01/05/22  0527    140   K 2.6* 3.4*   CL 93* 94*   CO2 35* 36*   * 156*   BUN 53* 59*    CREATININE 1.3 1.3   CALCIUM 8.5* 8.1*   PROT 6.5 5.5*   ALBUMIN 1.8* 1.6*   BILITOT 0.4 0.4   ALKPHOS 128 105   AST 53* 66*   ALT 37 44   ANIONGAP 13 10   EGFRNONAA 56.9* 56.9*       Significant Imaging: I have reviewed all pertinent imaging results/findings within the past 24 hours.

## 2022-01-05 NOTE — PLAN OF CARE
Pt now resting at present - levophed at 0.02 - diprivan and precedex in place - multiple antibiotics given - chest tube to right side - will possibly get a chest tube to left tonight - dior draining clear yellow urine - kcl infusing - will continue to monitor

## 2022-01-05 NOTE — PLAN OF CARE
Weaned pt FiO2 to 50% remains on ventilator. Vitals stable. Levophed at 0.04mcg. CT drainage to atrium changed from serosanguinous to more bloody with clots. Output to Chest tube was 570 ML. Notified RAJAN Cunningham. She stated she would review his chart and put in orders. Hemoglobin noted to drop from 9.8 yesterday, to 7.6 this am. Tolerating Tube feedings at this time.

## 2022-01-05 NOTE — PROGRESS NOTES
[facility] [place] - Intensive Care  General Surgery  Progress Note    Subjective:     History of Present Illness:  65yo male with history of afib and EtOH abuse who presents with progressing respiratory failure.  Patient currently intubated and sedated with acute desaturation this afternoon.  CXR obtained demonstrated unchanged large right pleural effusion with no signs of pneumothorax.  Pleural effusion also seen on CT chest obtained during this admission.  General surgery has been consulted for drainage.       Post-Op Info:  * No surgery found *         Interval History:   Patient seen and examined.  Bleeding from right intercostal muscles around chest tube overnight.  Hemostatic this afternoon.  CXR with increased L pleural effusion.  Sat 88%.      Medications:  Continuous Infusions:   dexmedetomidine (PRECEDEX) infusion 0.8 mcg/kg/hr (01/05/22 0504)    NORepinephrine bitartrate-D5W 0.03 mcg/kg/min (01/05/22 0100)    propofoL 50 mcg/kg/min (01/05/22 1005)     Scheduled Meds:   amiodarone  400 mg Oral BID    atorvastatin  40 mg Oral Daily    enoxaparin  40 mg Subcutaneous Daily    fluconazole (DIFLUCAN) IVPB  200 mg Intravenous Q24H    furosemide (LASIX) injection  40 mg Intravenous Daily    hydrocortisone   Rectal BID    hydrocortisone sodium succinate  100 mg Intravenous Q8H    levalbuterol  1.25 mg Nebulization TID WAKE    linezolid  600 mg Intravenous Q12H    magnesium oxide  400 mg Oral BID    pantoprazole  40 mg Intravenous Daily    piperacillin-tazobactam (ZOSYN) IVPB  4.5 g Intravenous Q8H    potassium bicarbonate  20 mEq Per NG tube BID     PRN Meds:sodium chloride, acetaminophen, influenza, sodium chloride 0.9%     Review of patient's allergies indicates:  No Known Allergies  Objective:     Vital Signs (Most Recent):  Temp: (!) 94.64 °F (34.8 °C) (01/05/22 1100)  Pulse: 63 (01/05/22 1100)  Resp: 18 (01/05/22 1100)  BP: (!) 98/57 (01/05/22 1100)  SpO2: (!) 86 % (01/05/22 1100) Vital Signs (24h  Range):  Temp:  [94.28 °F (34.6 °C)-98.3 °F (36.8 °C)] 94.64 °F (34.8 °C)  Pulse:  [] 63  Resp:  [18-54] 18  SpO2:  [71 %-100 %] 86 %  BP: ()/(42-76) 98/57     Weight: 77.1 kg (170 lb)  Body mass index is 24.39 kg/m².    Intake/Output - Last 3 Shifts       01/03 0700  01/04 0659 01/04 0700 01/05 0659 01/05 0700 01/06 0659    I.V. (mL/kg) 1340.9 (17.4) 967.8 (12.6)     Blood 562.5      Other 682      NG/GT 1807 858     IV Piggyback 1100 1600     Total Intake(mL/kg) 5492.4 (71.2) 3425.8 (44.4)     Urine (mL/kg/hr) 3689 (2) 2100 (1.1)     Stool  0     Chest Tube  770     Total Output 3689 2870     Net +1803.4 +555.8            Stool Occurrence  2 x           Physical Exam  Gen: intubated and sedated.  GCS 3T   CV: RRR  Resp: ventilator assisted breaths equal and nonlabored  R chest tube site with small hematoma.  Moderate clot burden in tube.  No active bleeding encountered.  No air leak in chamber   Abd: Soft, NT, ND;  Ext: no c/c/e    Significant Labs:  CBC:   Recent Labs   Lab 01/05/22  0337   WBC 13.44*   RBC 2.73*   HGB 7.6*   HCT 24.3*      MCV 89   MCH 27.8   MCHC 31.3*     BMP:   Recent Labs   Lab 01/05/22  0527   *      K 3.4*   CL 94*   CO2 36*   BUN 59*   CREATININE 1.3   CALCIUM 8.1*   MG 2.3       Significant Diagnostics:  I have reviewed all pertinent imaging results/findings within the past 24 hours.    Assessment/Plan:     Pleural effusion  POD 1 s/p R chest tube placement   Chest tube in good positioning;  Intercostal bleeding stopped   Hgb 7.6 - PRBC transfusion per medicine   Continue continuous wall suction     L chest with increased effusion  L thoracentesis performed with removal of 600cc serous fluid  Repeat CXR  Will continue to monitor  Hold lovenox dose this evening     PNA (pneumonia)  32Fr right chest tube placed with return of 200cc serous fluid  Cultures sent  Chest tube to wall suction  Follow up CXR   Rest of care per primary         Macy Ni,  MD  General Surgery  [place] - Intensive Care

## 2022-01-06 NOTE — PROGRESS NOTES
[place] - Intensive Care  General Surgery  Progress Note    Subjective:     History of Present Illness:  67yo male with history of afib and EtOH abuse who presents with progressing respiratory failure.  Patient currently intubated and sedated with acute desaturation this afternoon.  CXR obtained demonstrated unchanged large right pleural effusion with no signs of pneumothorax.  Pleural effusion also seen on CT chest obtained during this admission.  General surgery has been consulted for drainage.       Post-Op Info:  * No surgery found *         Interval History:   Patient seen and examined.  NAEON.  Remains intubated and sedated with intermittent desaturations.  No further bleeding from R chest tube observed.  Hgb stable.      Medications:  Continuous Infusions:   dexmedetomidine (PRECEDEX) infusion 0.9 mcg/kg/hr (01/06/22 4424)    NORepinephrine bitartrate-D5W 0.02 mcg/kg/min (01/06/22 0591)    propofoL 45 mcg/kg/min (01/06/22 4816)     Scheduled Meds:   amiodarone  400 mg Oral BID    atorvastatin  40 mg Oral Daily    enoxaparin  40 mg Subcutaneous Daily    fluconazole (DIFLUCAN) IVPB  200 mg Intravenous Q24H    furosemide (LASIX) injection  40 mg Intravenous Daily    hydrocortisone   Rectal BID    hydrocortisone sodium succinate  100 mg Intravenous Q8H    levalbuterol  1.25 mg Nebulization TID WAKE    magnesium oxide  400 mg Oral BID    pantoprazole  40 mg Intravenous Daily    piperacillin-tazobactam (ZOSYN) IVPB  4.5 g Intravenous Q8H    potassium bicarbonate  20 mEq Per NG tube BID     PRN Meds:sodium chloride, acetaminophen, influenza, sodium chloride 0.9%     Review of patient's allergies indicates:  No Known Allergies  Objective:     Vital Signs (Most Recent):  Temp: 97.9 °F (36.6 °C) (01/06/22 1600)  Pulse: (!) 55 (01/06/22 1630)  Resp: (!) 25 (01/06/22 1630)  BP: (!) 99/54 (01/06/22 1630)  SpO2: 100 % (01/06/22 1630) Vital Signs (24h Range):  Temp:  [95.72 °F (35.4 °C)-99.1 °F (37.3 °C)]  97.9 °F (36.6 °C)  Pulse:  [55-71] 55  Resp:  [18-33] 25  SpO2:  [90 %-100 %] 100 %  BP: ()/(51-70) 99/54     Weight: 77.1 kg (170 lb)  Body mass index is 24.39 kg/m².    Intake/Output - Last 3 Shifts       01/04 0700  01/05 0659 01/05 0700 01/06 0659 01/06 0700 01/07 0659    I.V. (mL/kg) 967.8 (12.6) 1132 (14.7)     Blood  1412     Other       NG/ 1383     IV Piggyback 1600 900     Total Intake(mL/kg) 3425.8 (44.4) 4827 (62.6)     Urine (mL/kg/hr) 2100 (1.1) 1850 (1)     Other  600     Stool 0      Chest Tube 770 345     Total Output 2870 2795     Net +555.8 +2032            Stool Occurrence 2 x 1 x           Physical Exam  Gen: intubated and sedated.  GCS 3T  CV: RRR  Resp: ventilator assisted breaths equal and nonlabored  R chest tube in place with 345cc output in past 24hrs.  No air leak  Abd: Soft, NT, ND;  Ext: no c/c/e    Significant Labs:  CBC:   Recent Labs   Lab 01/06/22  0356   WBC 13.91*   RBC 3.39*   HGB 9.6*   HCT 29.3*      MCV 86   MCH 28.3   MCHC 32.8     BMP:   Recent Labs   Lab 01/06/22  0356   *      K 2.6*   CL 93*   CO2 35*   BUN 69*   CREATININE 1.3   CALCIUM 8.4*   MG 2.5       Significant Diagnostics:  I have reviewed all pertinent imaging results/findings within the past 24 hours.    Assessment/Plan:     Pleural effusion  POD 1 s/p R chest tube placement   Chest tube in good positioning;  Intercostal bleeding stopped   Hgb 7.6 - PRBC transfusion per medicine   Continue continuous wall suction     L chest with increased effusion  L thoracentesis performed with removal of 600cc serous fluid  Repeat CXR  Will continue to monitor  Hold lovenox dose this evening     PNA (pneumonia)  Continue R chest tube to wall suction, will remove when output < 100cc/24hrs  POD 1 s/p L thoracentesis - fluid sent off for culture   Small improvement in CXR        Macy Ni MD  General Surgery  [place] - Intensive Care

## 2022-01-06 NOTE — ASSESSMENT & PLAN NOTE
Continue R chest tube to wall suction, will remove when output < 100cc/24hrs  POD 1 s/p L thoracentesis - fluid sent off for culture   Small improvement in CXR

## 2022-01-06 NOTE — PLAN OF CARE
Status unchanged.   Tolerating tube feeds.  Diarrhea present.   Ventilator tx continued.   Sedation and pressors continued.

## 2022-01-06 NOTE — RESPIRATORY THERAPY
01/06/22 0832   PRE-TX-O2   Oxygen Concentration (%) (S)  55  (decreased FIO2 per Dr. Gan's orders)   Preset Conventional Ventilator Settings   Vt Set (S)  550 mL  (increased at this time per Dr. Gan's orders)   Ready to Wean/Extubation Screen   FIO2<=50 (chart decimal) (!) 0.55

## 2022-01-06 NOTE — SUBJECTIVE & OBJECTIVE
Interval History:     Review of Systems   Unable to perform ROS: Severe respiratory distress     Objective:     Vital Signs (Most Recent):  Temp: 96.98 °F (36.1 °C) (01/06/22 0424)  Pulse: (!) 56 (01/06/22 0734)  Resp: 18 (01/06/22 0734)  BP: (!) 97/57 (01/06/22 0600)  SpO2: 100 % (01/06/22 0734) Vital Signs (24h Range):  Temp:  [87.8 °F (31 °C)-99.1 °F (37.3 °C)] 96.98 °F (36.1 °C)  Pulse:  [56-74] 56  Resp:  [18-31] 18  SpO2:  [86 %-100 %] 100 %  BP: ()/() 97/57     Weight: 77.1 kg (170 lb)  Body mass index is 24.39 kg/m².    Intake/Output Summary (Last 24 hours) at 1/6/2022 0816  Last data filed at 1/6/2022 0500  Gross per 24 hour   Intake 4827 ml   Output 2795 ml   Net 2032 ml      Physical Exam  Constitutional:       Appearance: He is ill-appearing.      Interventions: He is intubated.   HENT:      Head: Normocephalic and atraumatic.      Nose: Nose normal.      Mouth/Throat:      Mouth: Mucous membranes are moist.      Comments: ET Tube free of secretions.   Eyes:      Pupils: Pupils are equal, round, and reactive to light.   Cardiovascular:      Rate and Rhythm: Normal rate. Rhythm irregular.      Pulses: Normal pulses.   Pulmonary:      Effort: Prolonged expiration present. He is intubated.      Breath sounds: No stridor. Rhonchi and rales present. No wheezing.   Abdominal:      General: Abdomen is flat.      Palpations: Abdomen is soft.   Musculoskeletal:      Cervical back: Neck supple.      Right lower leg: Edema present.      Left lower leg: Edema present.   Skin:     General: Skin is warm.   Psychiatric:      Comments: Intubated and sedated         Significant Labs:   All pertinent labs within the past 24 hours have been reviewed.  CBC:   Recent Labs   Lab 01/05/22  0337 01/05/22  1918 01/06/22  0356   WBC 13.44*  --  13.91*   HGB 7.6* 10.6* 9.6*   HCT 24.3* 32.0* 29.3*     --  176     CMP:   Recent Labs   Lab 01/05/22  0527 01/06/22  0356    139   K 3.4* 2.6*   CL 94* 93*   CO2  36* 35*   * 183*   BUN 59* 69*   CREATININE 1.3 1.3   CALCIUM 8.1* 8.4*   PROT 5.5* 6.0   ALBUMIN 1.6* 1.9*   BILITOT 0.4 0.4   ALKPHOS 105 98   AST 66* 59*   ALT 44 49*   ANIONGAP 10 11   EGFRNONAA 56.9* 56.9*       Significant Imaging: I have reviewed all pertinent imaging results/findings within the past 24 hours.

## 2022-01-06 NOTE — PROGRESS NOTES
[facility] Medicine  Progress Note    Patient Name: Neymar Patel  MRN: [id number]  Patient Class: IP- Inpatient   Admission Date: 12/21/2021  Length of Stay: 15 days  Attending Physician: Raul Gan III, MD  Primary Care Provider: Landen Brown MD        Subjective:     Principal Problem:Acute on chronic respiratory failure with hypoxia        HPI:  Perpetual History  Patient is a 66-year-old male with a history of alcohol abuse atrial fibrillation hypertension peripheral artery disease fatty liver disease who recently had an episode of bronchitis and was treated in the outpatient setting.  The patient then began in having increasing weakness and lightheadedness and noticed his heart rate was elevated.  He presented to the emergency department and was found to have elevated alcohol levels and a heart rate in the 130s to 140s.  The patient is currently in AFib with RVR and has been started on a Cardizem drip by the emergency department.         Overview/Hospital Course:  12/23/21 [facility] patient reports that he is feeling better today, was able to get rest last night. Heart rate still elevated will adjust medications and monitor. Patient with hallucinations last night, patient is appropriate this am  12/24/21:   Rapid Response Call #1: Blood pressure initially low and patient heart rate in the 50s sinus Nakul.  Glucose within normal limits.  Fluid bolus 1 L was given with improvement in blood pressure.  Advised to hold any medication that will lower patient's heart rate.  Patient also complains of shortness of breath but according to respiratory therapist, patient did not take his breathing treatment because he did not think he needed it.  Patient wheezy expiratory and saturating in the high 80s on room air. not on any antibiotics currently so will start.  Labs show that patient has TANIKA possibly due to dehydration.  Will resuscitate with fluid.  Will recheck basic blood work and continue  to hydrate.  Treat accordingly will at started if patient is not already on it.  Patient more alert and oriented x4 moving all extremities with improvement of blood pressure into the 90s and heart rate remains in the 50s sinus Nakul saturating 94% on 2 L  Rapid Response Call #2:Rapid response team called again with subsequent intubation and transfer to MICU.   12/25/21: On minimal sedation and pressor support in the MICU.   12/26/21: Able to wean off of all pressor support, pH of 5 while on bicarb on AM labs. Will get rid of bicarb drip and switch to gentle IV hydration with LR. SBT tomorrow?  12/27/21:  Events over the weekend noted.  Patient is now intubated on the ventilator.  Patient was admitted with pneumonia atrial fibrillation and alcohol abuse and now also has a enterococcal urinary tract infection with sensitivities pending.  Patient has required to sedatives for agitation and combativeness.  12/28/21:  Patient's ABG has been reviewed.  Patient has a mild respiratory alkalosis and his respiratory rate has been changed.  We have encouraged respiratory and nursing to wean his sedation and titrate his tube feeds.  The patient's chest x-ray also seems like he is slightly volume overloaded therefore will discontinue IV fluids and give low dosing of Lasix.  Patient has enterococcal infection was ampicillin sensitive therefore Rocephin should be adequate.  Will change Zithromax to Levaquin.  12/29/21 FM:  Patient had an episode of desaturation yesterday.  Respiratory was able to deep suction and several mucus plugs and thick secretions were removed.  I will broaden patient antibiotics today to Zosyn and otherwise plan on increasing his diuretics today.  Patient has bilateral pulmonary infiltrates consistent with pulmonary edema.  12/30/21 FM:  Patient had a elevated temperature through the night.  Rocephin was changed to Zosyn yesterday and consideration of drug fever should be entertained.  Will add medicine for  MRSA infection today as well as antifungals.  Patient's chest x-ray seems to be improved with diuretics.  Continue diuretics cautiously.  12/31/21 CG: Antibiotic course broadened, tachycardic with an increase in PEEP. He is net negative over the past 24 hours.    1/1/22 CG: No acute overnight events. PEEP decreased to 10.   1/2/22 CG: CXR shows worsening fluid burden, heart rates in 110s-120s.   1/3/22 FM:  Patient is on an FiO2 of 55%.  His chest x-ray still shows pulmonary edema which is also consistent with his CT scan.  We will replace electrolytes transfuse and continue to diurese.  Hopefully we can wean his FiO2 down this weekend and wean him off the ventilator by the end of the week.  1/4/22 FM:  Patient's vital signs are stable this morning and he is sedated.  Nursing reports that when sedation is weaned the patient is awake opens eyes and is alert.  We are attempting a CPAP trial this morning as the patient is tolerating and FiO2 of 50% and could probably be weaned further.  Will reduce dosing of diuretics today.  Continue to replete potassium.  1/5/22 FM:  Yesterday afternoon the patient had a desaturation his oxygen and his chest x-ray showed a worsening right-sided pleural effusion.  A CT scan done 2 days ago showed some layering and there was a discussion about possible loculations.  The patient's chest tube initially had 200 cc of a serous drainage which is now bloody.  Patient's chest x-ray has improved after placement.  Patient's oxygenation today is improved but his condition is precarious.  I have spoken to the family and let them know a high risk and possible poor outcome.  1/6/22 FM:  Patient had a left-sided pleural effusion drained and about 600 cc of a serous fluid was removed.  Patient's right chest tube had 10 cc overnight of a bloody fluid.  Diagnostic testing is all pending.  Today the patient's chest x-ray is noted in his left lung seems to be hypoinflated.  I am increasing the tidal  volume slightly today and watching his airway pressures.      Interval History:     Review of Systems   Unable to perform ROS: Severe respiratory distress     Objective:     Vital Signs (Most Recent):  Temp: 96.98 °F (36.1 °C) (01/06/22 0424)  Pulse: (!) 56 (01/06/22 0734)  Resp: 18 (01/06/22 0734)  BP: (!) 97/57 (01/06/22 0600)  SpO2: 100 % (01/06/22 0734) Vital Signs (24h Range):  Temp:  [87.8 °F (31 °C)-99.1 °F (37.3 °C)] 96.98 °F (36.1 °C)  Pulse:  [56-74] 56  Resp:  [18-31] 18  SpO2:  [86 %-100 %] 100 %  BP: ()/() 97/57     Weight: 77.1 kg (170 lb)  Body mass index is 24.39 kg/m².    Intake/Output Summary (Last 24 hours) at 1/6/2022 0816  Last data filed at 1/6/2022 0500  Gross per 24 hour   Intake 4827 ml   Output 2795 ml   Net 2032 ml      Physical Exam  Constitutional:       Appearance: He is ill-appearing.      Interventions: He is intubated.   HENT:      Head: Normocephalic and atraumatic.      Nose: Nose normal.      Mouth/Throat:      Mouth: Mucous membranes are moist.      Comments: ET Tube free of secretions.   Eyes:      Pupils: Pupils are equal, round, and reactive to light.   Cardiovascular:      Rate and Rhythm: Normal rate. Rhythm irregular.      Pulses: Normal pulses.   Pulmonary:      Effort: Prolonged expiration present. He is intubated.      Breath sounds: No stridor. Rhonchi and rales present. No wheezing.   Abdominal:      General: Abdomen is flat.      Palpations: Abdomen is soft.   Musculoskeletal:      Cervical back: Neck supple.      Right lower leg: Edema present.      Left lower leg: Edema present.   Skin:     General: Skin is warm.   Psychiatric:      Comments: Intubated and sedated         Significant Labs:   All pertinent labs within the past 24 hours have been reviewed.  CBC:   Recent Labs   Lab 01/05/22  0337 01/05/22  1918 01/06/22  0356   WBC 13.44*  --  13.91*   HGB 7.6* 10.6* 9.6*   HCT 24.3* 32.0* 29.3*     --  176     CMP:   Recent Labs   Lab  01/05/22  0527 01/06/22  0356    139   K 3.4* 2.6*   CL 94* 93*   CO2 36* 35*   * 183*   BUN 59* 69*   CREATININE 1.3 1.3   CALCIUM 8.1* 8.4*   PROT 5.5* 6.0   ALBUMIN 1.6* 1.9*   BILITOT 0.4 0.4   ALKPHOS 105 98   AST 66* 59*   ALT 44 49*   ANIONGAP 10 11   EGFRNONAA 56.9* 56.9*       Significant Imaging: I have reviewed all pertinent imaging results/findings within the past 24 hours.      Assessment/Plan:      * Acute on chronic respiratory failure with hypoxia  ABG and cxr noted, + effusion now complicates.  Increasing TV today, family aware of gravity of case.      Pleural effusion  Bloody this am, ? Cause, will ro DVT and send for studies, ? Malignant?      PNA (pneumonia)  Triple antibiotic therapy.   Plan as above      Severe sepsis  Currently on abx, pressores again at low dose, remains critically ill.      UTI (urinary tract infection)  See abx      DVT prophylaxis  Hold eliquis for now in setting of TANIKA.       Atrial fibrillation with rapid ventricular response  Controlled with meds.    Tobacco user  PRN Nicotine patch    Alcohol dependence  CIWA Protocol      Elevated LFTs  Elevated, likely in the setting of hypoperfusion.   - Continue to follow liver enzymes. Improving.         TANIKA (acute kidney injury)  Noted, Cr, remaining around 1.5        VTE Risk Mitigation (From admission, onward)         Ordered     Place sequential compression device  Until discontinued         01/05/22 0815     enoxaparin injection 40 mg  Daily         12/28/21 0816     IP VTE HIGH RISK PATIENT  Once         12/21/21 2233     Reason for No Pharmacological VTE Prophylaxis  Once        Question:  Reasons:  Answer:  Already adequately anticoagulated on oral Anticoagulants    12/21/21 2233                Discharge Planning   DAXA:      Code Status: Full Code   Is the patient medically ready for discharge?:     Reason for patient still in hospital (select all that apply): Patient unstable  Discharge Plan A: Home with  family,Home Health                  Raul Gan III, MD  Department of Hospital Medicine   [place] - Intensive Care

## 2022-01-06 NOTE — SUBJECTIVE & OBJECTIVE
Interval History:   Patient seen and examined.  ODNYA.  Remains intubated and sedated with intermittent desaturations.  No further bleeding from R chest tube observed.  Hgb stable.      Medications:  Continuous Infusions:   dexmedetomidine (PRECEDEX) infusion 0.9 mcg/kg/hr (01/06/22 1254)    NORepinephrine bitartrate-D5W 0.02 mcg/kg/min (01/06/22 0547)    propofoL 45 mcg/kg/min (01/06/22 1456)     Scheduled Meds:   amiodarone  400 mg Oral BID    atorvastatin  40 mg Oral Daily    enoxaparin  40 mg Subcutaneous Daily    fluconazole (DIFLUCAN) IVPB  200 mg Intravenous Q24H    furosemide (LASIX) injection  40 mg Intravenous Daily    hydrocortisone   Rectal BID    hydrocortisone sodium succinate  100 mg Intravenous Q8H    levalbuterol  1.25 mg Nebulization TID WAKE    magnesium oxide  400 mg Oral BID    pantoprazole  40 mg Intravenous Daily    piperacillin-tazobactam (ZOSYN) IVPB  4.5 g Intravenous Q8H    potassium bicarbonate  20 mEq Per NG tube BID     PRN Meds:sodium chloride, acetaminophen, influenza, sodium chloride 0.9%     Review of patient's allergies indicates:  No Known Allergies  Objective:     Vital Signs (Most Recent):  Temp: 97.9 °F (36.6 °C) (01/06/22 1600)  Pulse: (!) 55 (01/06/22 1630)  Resp: (!) 25 (01/06/22 1630)  BP: (!) 99/54 (01/06/22 1630)  SpO2: 100 % (01/06/22 1630) Vital Signs (24h Range):  Temp:  [95.72 °F (35.4 °C)-99.1 °F (37.3 °C)] 97.9 °F (36.6 °C)  Pulse:  [55-71] 55  Resp:  [18-33] 25  SpO2:  [90 %-100 %] 100 %  BP: ()/(51-70) 99/54     Weight: 77.1 kg (170 lb)  Body mass index is 24.39 kg/m².    Intake/Output - Last 3 Shifts       01/04 0700  01/05 0659 01/05 0700 01/06 0659 01/06 0700 01/07 0659    I.V. (mL/kg) 967.8 (12.6) 1132 (14.7)     Blood  1412     Other       NG/ 1383     IV Piggyback 1600 900     Total Intake(mL/kg) 3425.8 (44.4) 4827 (62.6)     Urine (mL/kg/hr) 2100 (1.1) 1850 (1)     Other  600     Stool 0      Chest Tube 770 345     Total Output  2870 2795     Net +555.8 +2032            Stool Occurrence 2 x 1 x           Physical Exam  Gen: intubated and sedated.  GCS 3T  CV: RRR  Resp: ventilator assisted breaths equal and nonlabored  R chest tube in place with 345cc output in past 24hrs.  No air leak  Abd: Soft, NT, ND;  Ext: no c/c/e    Significant Labs:  CBC:   Recent Labs   Lab 01/06/22  0356   WBC 13.91*   RBC 3.39*   HGB 9.6*   HCT 29.3*      MCV 86   MCH 28.3   MCHC 32.8     BMP:   Recent Labs   Lab 01/06/22  0356   *      K 2.6*   CL 93*   CO2 35*   BUN 69*   CREATININE 1.3   CALCIUM 8.4*   MG 2.5       Significant Diagnostics:  I have reviewed all pertinent imaging results/findings within the past 24 hours.

## 2022-01-06 NOTE — CARE UPDATE
01/06/22 0424   Patient Assessment/Suction   Level of Consciousness (AVPU) responds to pain   Respiratory Effort Unlabored   Expansion/Accessory Muscles/Retractions expansion symmetric   All Lung Fields Breath Sounds diminished   Rhythm/Pattern, Respiratory assisted mechanically   Cough Frequency with stimulation   Cough Type assisted   Suction Method tracheal   Suction Pressure (mmHg) -120 mmHg   $ Suction Charges Inline Suction Procedure Stat Charge   Secretions Amount scant   Secretions Color white   Secretions Characteristics thin   $ Swab or suction? Suction   PRE-TX-O2   O2 Device (Oxygen Therapy) ventilator   $ Is the patient on Low Flow Oxygen? Yes   Oxygen Concentration (%) 100   SpO2 (!) 91 %   Pulse Oximetry Type Continuous   $ Pulse Oximetry - Multiple Charge Pulse Oximetry - Multiple   Pulse 69   Resp (!) 25   Temp 96.98 °F (36.1 °C)        Airway - Non-Surgical 12/24/21 2230 Endotracheal Tube   Placement Date/Time: 12/24/21 2230   Present Prior to Hospital Arrival?: No  Method of Intubation: Glidescope  Inserted by: MD  Airway Device: Endotracheal Tube  Mask Ventilation: Easy  Blade: Glidescope #4  Style: Cuffed  Cuff Inflation: Minimal occl...   Secured at 24 cm   Measured At Lips   Secured Location Right   Secured by Commercial tube barrow   Bite Block none   Site Condition Dry   Status Intact   Site Assessment Clean   Cuff Volume   (MLT)   Airway Safety   Ambu bag with the patient? Yes, Adult Ambu   Is mask with the patient? Yes, Adult Mask   Suction set is at the bedside? Yes   Vent Select   Conventional Vent Y   Ventilator Initiated No   $ Ventilator Subsequent 1   Charged w/in last 24h YES   Preset Conventional Ventilator Settings   Vent ID [id number]   Vent Type    Ventilation Type VC   Vent Mode A/C   Humidity HME   Set Rate 18 BPM   Vt Set 500 mL   PEEP/CPAP 12 cmH20   Waveform RAMP   Peak Flow 60 L/min   Plateau Set/Insp. Hold (sec) 0   I-Trigger Type  V-TRIG   Trigger Sensitivity  Flow/I-Trigger 3 L/min   Patient Ventilator Parameters   Resp Rate Total 21 br/min   Peak Airway Pressure 35 cmH2O   Mean Airway Pressure 19 cmH20   Plateau Pressure 23 cmH20   Exhaled Vt 483 mL   Total Ve 10.6 mL   I:E Ratio Measured 1:1.90   Auto PEEP 1.1 cmH20   Tubing ID (mm) 7 mm   Tube Type ET   Conventional Ventilator Alarms   Alarms On Y   Ve High Alarm 24 L/min   Ve Low Alarm 4 L/min   Resp Rate High Alarm 35 br/min   Press High Alarm 100 cmH2O   Apnea Rate 20   Apnea Volume (mL) 450 mL   Apnea Oxygen Concentration  100   Apnea Flow Rate (L/min) 60   T Apnea 10 sec(s)   Labs   $ Was an ABG obtained? Arterial Puncture;Arterial blood Gas Benchtop   $ Labs Tech Time 15 min   Respiratory Evaluation   $ Care Plan Tech Time 30 min     Gabbi RN notified this RT of patient desaturation to 88%. Assessed patient found BS to be diminished t/o which was a change from previous assessments this shift. ETT secured at 24cm at the lip. Patient bagged with Ambu bag and 100% with SpO2 up to 96%. No resistance appreciated, suctioned ETT with the return of scant white thin secretions. Patient placed back on vent at ordered settings and SpO2 dropped to 88%. Morning ABG drawn early to assess ventilation and oxygenation status, see Epic for full results. FiO2 up to 100% for a PaO2 of 48 on ABG. Will continue to monitor and treat as ordered.

## 2022-01-06 NOTE — PLAN OF CARE
Chest tube bleeding noted today.   PRBC infusion given today for low H & H  Left thoracentesis done today.   Small BM x 1 noted today.   Family members vs this PM.   Status questions.

## 2022-01-06 NOTE — PLAN OF CARE
Pt remains on ventilator, had unexplained desat episode to low 80's. Pt is getting volumes on vent, chest tube remains in place, et tube in correct placement. ABG worse. See ABG. CXR obtained and RAJAN johnson was notified. He is on 100% Fio2. She stated to keep him stable and Dr. Gan will come  see him shortly. She also gave order to replace potassium of 2.7. See wound documentation small bruise noted to sacrum. Pt is currently on 0.02mcg of levophed. CT output was 15 ml of sanguinous fluid.

## 2022-01-06 NOTE — ASSESSMENT & PLAN NOTE
ABG and cxr noted, + effusion now complicates.  Increasing TV today, family aware of gravity of case.

## 2022-01-06 NOTE — EICU
Rounding (Video Assessment):  Video rounding completed.  Pt resting quiet on ventilator support w/ FIO2 100%, peep +12.  No distress noted.  Infusing Precedex gtt at 0.9 mg/kg/hr, Norpinephrine gtt at 0.02 mcg/kg/min & Propofol gtt at 45 mcg/kg/min.  B/P 118/63, HR 59, RR 20, POx 100%.

## 2022-01-07 NOTE — PLAN OF CARE
Pt remains on vent with sedation - cpap trail attempted - last norris 50 minutes - had diffulty time getting patient back settled due to high peak pressures - remains on precedex and diprivan for sedation - while on cpap pt was responsive and shaking head yes and not to questions - did shake head yes to complaints of pain to feet - morphine ordered by dr calhoun iii - turned and repositioned - remains on dose of levophed for blood pressure support - will continue to monitor

## 2022-01-07 NOTE — SUBJECTIVE & OBJECTIVE
Interval History:     Review of Systems   Unable to perform ROS: Severe respiratory distress     Objective:     Vital Signs (Most Recent):  Temp: 97.6 °F (36.4 °C) (01/07/22 0724)  Pulse: (!) 49 (01/07/22 0731)  Resp: 18 (01/07/22 0731)  BP: 118/64 (01/07/22 0400)  SpO2: 99 % (01/07/22 0731) Vital Signs (24h Range):  Temp:  [97.5 °F (36.4 °C)-98.1 °F (36.7 °C)] 97.6 °F (36.4 °C)  Pulse:  [49-62] 49  Resp:  [18-35] 18  SpO2:  [94 %-100 %] 99 %  BP: ()/(51-66) 118/64     Weight: 77.1 kg (170 lb)  Body mass index is 24.39 kg/m².    Intake/Output Summary (Last 24 hours) at 1/7/2022 0752  Last data filed at 1/7/2022 0600  Gross per 24 hour   Intake 2846 ml   Output 2160 ml   Net 686 ml      Physical Exam  Constitutional:       Appearance: He is ill-appearing.      Interventions: He is intubated.   HENT:      Head: Normocephalic and atraumatic.      Nose: Nose normal.      Mouth/Throat:      Mouth: Mucous membranes are moist.      Comments: ET Tube free of secretions.   Eyes:      Pupils: Pupils are equal, round, and reactive to light.   Cardiovascular:      Rate and Rhythm: Normal rate. Rhythm irregular.      Pulses: Normal pulses.   Pulmonary:      Effort: Prolonged expiration present. He is intubated.      Breath sounds: No stridor. Rhonchi and rales present. No wheezing.   Abdominal:      General: Abdomen is flat.      Palpations: Abdomen is soft.   Musculoskeletal:      Cervical back: Neck supple.      Right lower leg: No edema.      Left lower leg: No edema.   Skin:     General: Skin is warm.   Psychiatric:      Comments: Intubated and sedated         Significant Labs:   All pertinent labs within the past 24 hours have been reviewed.  CBC:   Recent Labs   Lab 01/05/22  1918 01/06/22  0356 01/07/22  0332   WBC  --  13.91* 13.76*   HGB 10.6* 9.6* 9.0*   HCT 32.0* 29.3* 26.9*   PLT  --  176 165     CMP:   Recent Labs   Lab 01/06/22  0356 01/07/22  0332    141   K 2.6* 2.7*   CL 93* 96   CO2 35* 36*   GLU  183* 154*   BUN 69* 75*   CREATININE 1.3 1.2   CALCIUM 8.4* 8.6*   PROT 6.0 5.9*   ALBUMIN 1.9* 1.9*   BILITOT 0.4 0.4   ALKPHOS 98 93   AST 59* 55*   ALT 49* 50*   ANIONGAP 11 9   EGFRNONAA 56.9* >60.0       Significant Imaging: I have reviewed all pertinent imaging results/findings within the past 24 hours.

## 2022-01-07 NOTE — EICU
Rounding (Video Assessment):  Yes    Intervention Initiated From:  [facility] / EICU    Comments: Pt ventilated.  Vent FiO2 at 55%.  VSS. Medications infusing are as follows: Levophed, Propofol, and Precedex .  NAD observed.

## 2022-01-07 NOTE — PROGRESS NOTES
[facility] - Intensive Care  General Surgery  Progress Note    Subjective:     History of Present Illness:  65yo male with history of afib and EtOH abuse who presents with progressing respiratory failure.  Patient currently intubated and sedated with acute desaturation this afternoon.  CXR obtained demonstrated unchanged large right pleural effusion with no signs of pneumothorax.  Pleural effusion also seen on CT chest obtained during this admission.  General surgery has been consulted for drainage.       Post-Op Info:  * No surgery found *         Interval History:   Patient seen and examined.  Remains intubated and sedated.  Tolerated CPAP trial for 45min this AM.  Minimal output from R chest tube.     Medications:  Continuous Infusions:   dexmedetomidine (PRECEDEX) infusion 1.4 mcg/kg/hr (01/07/22 1250)    NORepinephrine bitartrate-D5W 0.02 mcg/kg/min (01/06/22 0547)    propofoL 40 mcg/kg/min (01/07/22 1251)     Scheduled Meds:   atorvastatin  40 mg Oral Daily    enoxaparin  40 mg Subcutaneous Daily    fluconazole (DIFLUCAN) IVPB  200 mg Intravenous Q24H    furosemide (LASIX) injection  40 mg Intravenous Daily    hydrocortisone   Rectal BID    hydrocortisone sodium succinate  50 mg Intravenous Q8H    levalbuterol  1.25 mg Nebulization TID WAKE    magnesium oxide  400 mg Oral BID    pantoprazole  40 mg Intravenous Daily    piperacillin-tazobactam (ZOSYN) IVPB  4.5 g Intravenous Q8H    potassium bicarbonate  20 mEq Per NG tube BID     PRN Meds:sodium chloride, acetaminophen, influenza, morphine, morphine, sodium chloride 0.9%     Review of patient's allergies indicates:  No Known Allergies  Objective:     Vital Signs (Most Recent):  Temp: 97.5 °F (36.4 °C) (01/07/22 1130)  Pulse: 69 (01/07/22 1308)  Resp: (!) 24 (01/07/22 1346)  BP: (!) 79/61 (01/07/22 1308)  SpO2: (!) 92 % (01/07/22 1308) Vital Signs (24h Range):  Temp:  [97.5 °F (36.4 °C)-98.1 °F (36.7 °C)] 97.5 °F (36.4 °C)  Pulse:  []  69  Resp:  [11-51] 24  SpO2:  [89 %-100 %] 92 %  BP: ()/(50-73) 79/61     Weight: 77.1 kg (170 lb)  Body mass index is 24.39 kg/m².    Intake/Output - Last 3 Shifts       01/05 0700  01/06 0659 01/06 0700 01/07 0659 01/07 0700  01/08 0659    I.V. (mL/kg) 1132 (14.7) 1402 (18.2)     Blood 1412      NG/GT 1383 732     IV Piggyback 900 712     Total Intake(mL/kg) 4827 (62.6) 2846 (36.9)     Urine (mL/kg/hr) 1850 (1) 1500 (0.8)     Other 600      Stool  650     Chest Tube 345 10     Total Output 2795 2160     Net +2032 +686            Stool Occurrence 1 x            Physical Exam  Vitals reviewed.   Constitutional:       General: He is not in acute distress.     Appearance: He is ill-appearing and toxic-appearing.   Cardiovascular:      Rate and Rhythm: Normal rate and regular rhythm.   Pulmonary:      Effort: Respiratory distress present.      Breath sounds: Rales present.      Comments: R chest tube in place with no air leak in chamber   Abdominal:      General: Abdomen is flat.      Palpations: Abdomen is soft.   Musculoskeletal:      Comments: Abrasion to R knee   Skin:     General: Skin is warm and dry.         Significant Labs:  CBC:   Recent Labs   Lab 01/07/22  0332   WBC 13.76*   RBC 3.15*   HGB 9.0*   HCT 26.9*      MCV 85   MCH 28.6   MCHC 33.5     BMP:   Recent Labs   Lab 01/07/22  0332   *      K 2.7*   CL 96   CO2 36*   BUN 75*   CREATININE 1.2   CALCIUM 8.6*   MG 2.6       Significant Diagnostics:  CXR 1/7/2022:  Impression:     Resolution of pleural fluid collection within the right upper chest and improved aeration of left lower lung     No detrimental interval changes    Assessment/Plan:     Pleural effusion  R chest tube removed   Occlusive dressing in place       PNA (pneumonia)  Continue R chest tube to wall suction, will remove when output < 100cc/24hrs  POD 1 s/p L thoracentesis - fluid sent off for culture   Small improvement in CXR        Macy Ni MD  General  Surgery  [facility] - Intensive Care

## 2022-01-07 NOTE — CARE UPDATE
cpap trail stopped due to high peak pressures - took about 1 and 1/2 hours to get pt comfortable - dr calhoun iii notified

## 2022-01-07 NOTE — RESPIRATORY THERAPY
01/07/22 1233   PRE-TX-O2   O2 Device (Oxygen Therapy) ventilator   $ Is the patient on Low Flow Oxygen? Yes   Flow (L/min) 60   Oxygen Concentration (%) (S)  55   Oxygen Analyzed Concentration (%) 55 %   SpO2 (!) 89 %   Pulse Oximetry Type Continuous   $ Pulse Oximetry - Multiple Charge Pulse Oximetry - Multiple   Pulse (!) 118   Resp (!) 51   Positioning HOB elevated 30 degrees   Negative Inspiratory Force   Negative Inspiratory Force (cm H2O) 0   Vital Capacity   Vital Capacity (mL) 0        Airway - Non-Surgical 12/24/21 2230 Endotracheal Tube   Placement Date/Time: 12/24/21 2230   Present Prior to Hospital Arrival?: No  Method of Intubation: Glidescope  Inserted by: MD  Airway Device: Endotracheal Tube  Mask Ventilation: Easy  Blade: Glidescope #4  Style: Cuffed  Cuff Inflation: Minimal occl...   Secured at 24 cm   Measured At Lips   Secured Location Right   Secured by Commercial tube barrow   Bite Block none   Site Condition Dry   Status Intact;Secured   Site Assessment Clean;Dry   Cuff Pressure 29 cm H2O   General Safety Checklist   Safety Promotion/Fall Prevention side rails raised   Airway Safety   Ambu bag with the patient? Yes, Adult Ambu   Is mask with the patient? Yes, Adult Mask   Suction set is at the bedside? Yes   Vent Select   Conventional Vent Y   Charged w/in last 24h YES   Preset Conventional Ventilator Settings   Vent ID [id number]   Vent Type    Ventilation Type VC   Vent Mode (S)  A/C   Humidity HME   Set Rate (S)  12 BPM   Vt Set (S)  550 mL   PEEP/CPAP (S)  8 cmH20   Waveform RAMP   Peak Flow 60 L/min   Plateau Set/Insp. Hold (sec) 0   I-Trigger Type  V-TRIG   Trigger Sensitivity Flow/I-Trigger 3 L/min   Patient Ventilator Parameters   Resp Rate Total 51 br/min   Peak Airway Pressure 50 cmH2O   Mean Airway Pressure 20 cmH20   Plateau Pressure 25 cmH20   Exhaled Vt 404 mL   Total Ve 9.2 mL   I:E Ratio Measured 1:5.60   Auto PEEP 1.1 cmH20   Tubing ID (mm) 7 mm   Tube Type ET   Conventional  Ventilator Alarms   Alarms On Y   Ve High Alarm 24 L/min   Ve Low Alarm 4 L/min   Resp Rate High Alarm 35 br/min   Press High Alarm 50 cmH2O   Apnea Rate 14   Apnea Volume (mL) 550 mL   Apnea Oxygen Concentration  100   Apnea Flow Rate (L/min) 60   T Apnea 10 sec(s)   Ready to Wean/Extubation Screen   FIO2<=50 (chart decimal) (!) 0.55   MV<16L (chart vol.) 9.2   PEEP <=8 (chart #) 8   Ready to Wean Parameters   F/VT Ratio<105 (RSBI) 126.24       Vent settings changed back to previous AC settings 1/7/2022 at 1220 per Liv ELIZABETH. Patient no longer tolerating CPAP Trial. Patient with high peak pressures and decreasing oxygen saturation.

## 2022-01-07 NOTE — PROGRESS NOTES
[facility] Medicine  Progress Note    Patient Name: Neymar Patel  MRN: [id number]  Patient Class: IP- Inpatient   Admission Date: 12/21/2021  Length of Stay: 16 days  Attending Physician: Raul Gan III, MD  Primary Care Provider: Landen Brown MD        Subjective:     Principal Problem:Acute on chronic respiratory failure with hypoxia        HPI:  Perpetual History  Patient is a 66-year-old male with a history of alcohol abuse atrial fibrillation hypertension peripheral artery disease fatty liver disease who recently had an episode of bronchitis and was treated in the outpatient setting.  The patient then began in having increasing weakness and lightheadedness and noticed his heart rate was elevated.  He presented to the emergency department and was found to have elevated alcohol levels and a heart rate in the 130s to 140s.  The patient is currently in AFib with RVR and has been started on a Cardizem drip by the emergency department.         Overview/Hospital Course:  12/23/21 [facility] patient reports that he is feeling better today, was able to get rest last night. Heart rate still elevated will adjust medications and monitor. Patient with hallucinations last night, patient is appropriate this am  12/24/21:   Rapid Response Call #1: Blood pressure initially low and patient heart rate in the 50s sinus Nakul.  Glucose within normal limits.  Fluid bolus 1 L was given with improvement in blood pressure.  Advised to hold any medication that will lower patient's heart rate.  Patient also complains of shortness of breath but according to respiratory therapist, patient did not take his breathing treatment because he did not think he needed it.  Patient wheezy expiratory and saturating in the high 80s on room air. not on any antibiotics currently so will start.  Labs show that patient has TANIKA possibly due to dehydration.  Will resuscitate with fluid.  Will recheck basic blood work and continue  to hydrate.  Treat accordingly will at started if patient is not already on it.  Patient more alert and oriented x4 moving all extremities with improvement of blood pressure into the 90s and heart rate remains in the 50s sinus Nakul saturating 94% on 2 L  Rapid Response Call #2:Rapid response team called again with subsequent intubation and transfer to MICU.   12/25/21: On minimal sedation and pressor support in the MICU.   12/26/21: Able to wean off of all pressor support, pH of 5 while on bicarb on AM labs. Will get rid of bicarb drip and switch to gentle IV hydration with LR. SBT tomorrow?  12/27/21:  Events over the weekend noted.  Patient is now intubated on the ventilator.  Patient was admitted with pneumonia atrial fibrillation and alcohol abuse and now also has a enterococcal urinary tract infection with sensitivities pending.  Patient has required to sedatives for agitation and combativeness.  12/28/21:  Patient's ABG has been reviewed.  Patient has a mild respiratory alkalosis and his respiratory rate has been changed.  We have encouraged respiratory and nursing to wean his sedation and titrate his tube feeds.  The patient's chest x-ray also seems like he is slightly volume overloaded therefore will discontinue IV fluids and give low dosing of Lasix.  Patient has enterococcal infection was ampicillin sensitive therefore Rocephin should be adequate.  Will change Zithromax to Levaquin.  12/29/21 FM:  Patient had an episode of desaturation yesterday.  Respiratory was able to deep suction and several mucus plugs and thick secretions were removed.  I will broaden patient antibiotics today to Zosyn and otherwise plan on increasing his diuretics today.  Patient has bilateral pulmonary infiltrates consistent with pulmonary edema.  12/30/21 FM:  Patient had a elevated temperature through the night.  Rocephin was changed to Zosyn yesterday and consideration of drug fever should be entertained.  Will add medicine for  MRSA infection today as well as antifungals.  Patient's chest x-ray seems to be improved with diuretics.  Continue diuretics cautiously.  12/31/21 CG: Antibiotic course broadened, tachycardic with an increase in PEEP. He is net negative over the past 24 hours.    1/1/22 CG: No acute overnight events. PEEP decreased to 10.   1/2/22 CG: CXR shows worsening fluid burden, heart rates in 110s-120s.   1/3/22 FM:  Patient is on an FiO2 of 55%.  His chest x-ray still shows pulmonary edema which is also consistent with his CT scan.  We will replace electrolytes transfuse and continue to diurese.  Hopefully we can wean his FiO2 down this weekend and wean him off the ventilator by the end of the week.  1/4/22 FM:  Patient's vital signs are stable this morning and he is sedated.  Nursing reports that when sedation is weaned the patient is awake opens eyes and is alert.  We are attempting a CPAP trial this morning as the patient is tolerating and FiO2 of 50% and could probably be weaned further.  Will reduce dosing of diuretics today.  Continue to replete potassium.  1/5/22 FM:  Yesterday afternoon the patient had a desaturation his oxygen and his chest x-ray showed a worsening right-sided pleural effusion.  A CT scan done 2 days ago showed some layering and there was a discussion about possible loculations.  The patient's chest tube initially had 200 cc of a serous drainage which is now bloody.  Patient's chest x-ray has improved after placement.  Patient's oxygenation today is improved but his condition is precarious.  I have spoken to the family and let them know a high risk and possible poor outcome.  1/6/22 FM:  Patient had a left-sided pleural effusion drained and about 600 cc of a serous fluid was removed.  Patient's right chest tube had 10 cc overnight of a bloody fluid.  Diagnostic testing is all pending.  Today the patient's chest x-ray is noted in his left lung seems to be hypoinflated.  I am increasing the tidal  volume slightly today and watching his airway pressures.  1/7/22 FM:  Patient's ABG shows a respiratory alkalosis.  We are reducing his minute ventilation.  Patient's chest x-ray is significantly improved today.  Will have Respiratory and Nursing try to wean sedation and CPAP trial today.  Patient is now on ventilator day 14.  Patient has had intermittent bradycardia and will hold amiodarone for now.      Interval History:     Review of Systems   Unable to perform ROS: Severe respiratory distress     Objective:     Vital Signs (Most Recent):  Temp: 97.6 °F (36.4 °C) (01/07/22 0724)  Pulse: (!) 49 (01/07/22 0731)  Resp: 18 (01/07/22 0731)  BP: 118/64 (01/07/22 0400)  SpO2: 99 % (01/07/22 0731) Vital Signs (24h Range):  Temp:  [97.5 °F (36.4 °C)-98.1 °F (36.7 °C)] 97.6 °F (36.4 °C)  Pulse:  [49-62] 49  Resp:  [18-35] 18  SpO2:  [94 %-100 %] 99 %  BP: ()/(51-66) 118/64     Weight: 77.1 kg (170 lb)  Body mass index is 24.39 kg/m².    Intake/Output Summary (Last 24 hours) at 1/7/2022 0752  Last data filed at 1/7/2022 0600  Gross per 24 hour   Intake 2846 ml   Output 2160 ml   Net 686 ml      Physical Exam  Constitutional:       Appearance: He is ill-appearing.      Interventions: He is intubated.   HENT:      Head: Normocephalic and atraumatic.      Nose: Nose normal.      Mouth/Throat:      Mouth: Mucous membranes are moist.      Comments: ET Tube free of secretions.   Eyes:      Pupils: Pupils are equal, round, and reactive to light.   Cardiovascular:      Rate and Rhythm: Normal rate. Rhythm irregular.      Pulses: Normal pulses.   Pulmonary:      Effort: Prolonged expiration present. He is intubated.      Breath sounds: No stridor. Rhonchi and rales present. No wheezing.   Abdominal:      General: Abdomen is flat.      Palpations: Abdomen is soft.   Musculoskeletal:      Cervical back: Neck supple.      Right lower leg: No edema.      Left lower leg: No edema.   Skin:     General: Skin is warm.   Psychiatric:       Comments: Intubated and sedated         Significant Labs:   All pertinent labs within the past 24 hours have been reviewed.  CBC:   Recent Labs   Lab 01/05/22  1918 01/06/22  0356 01/07/22  0332   WBC  --  13.91* 13.76*   HGB 10.6* 9.6* 9.0*   HCT 32.0* 29.3* 26.9*   PLT  --  176 165     CMP:   Recent Labs   Lab 01/06/22  0356 01/07/22  0332    141   K 2.6* 2.7*   CL 93* 96   CO2 35* 36*   * 154*   BUN 69* 75*   CREATININE 1.3 1.2   CALCIUM 8.4* 8.6*   PROT 6.0 5.9*   ALBUMIN 1.9* 1.9*   BILITOT 0.4 0.4   ALKPHOS 98 93   AST 59* 55*   ALT 49* 50*   ANIONGAP 11 9   EGFRNONAA 56.9* >60.0       Significant Imaging: I have reviewed all pertinent imaging results/findings within the past 24 hours.      Assessment/Plan:      * Acute on chronic respiratory failure with hypoxia  ABG and cxr noted, + cxr much improved.  Increasing MV today for alkalosis, repeat abg this afternoon, family aware of gravity of case.      Pleural effusion  ? Cause, ? Malignant ? Parapneumonic?      PNA (pneumonia)  Triple antibiotic therapy.   Plan as above      Severe sepsis  Currently on abx, has stablizied, pressores are at low dose, remains critically ill.      UTI (urinary tract infection)  See abx      DVT prophylaxis  Hold eliquis for now in setting of TANIKA.       Atrial fibrillation with rapid ventricular response  + bradycardia, holding amiodarone.    Tobacco user  PRN Nicotine patch    Alcohol dependence  [facility] Protocol      Elevated LFTs  Elevated, likely in the setting of hypoperfusion.   - Continue to follow liver enzymes. Improving.         TANIKA (acute kidney injury)  Noted, Cr, remaining around 1.5        VTE Risk Mitigation (From admission, onward)         Ordered     Place sequential compression device  Until discontinued         01/05/22 0815     enoxaparin injection 40 mg  Daily         12/28/21 0816     IP VTE HIGH RISK PATIENT  Once         12/21/21 1732     Reason for No Pharmacological VTE Prophylaxis  Once         Question:  Reasons:  Answer:  Already adequately anticoagulated on oral Anticoagulants    12/21/21 2233                Discharge Planning   DAXA:      Code Status: Full Code   Is the patient medically ready for discharge?:     Reason for patient still in hospital (select all that apply): Patient unstable  Discharge Plan A: Home with family,Home Health                  Raul Gan III, MD  Department of Hospital Medicine   [facility]

## 2022-01-07 NOTE — PLAN OF CARE
RD consulted for altered skin integrity. Will make adjustments to tube feeding rate to provide Andres BID to aid in wound healing.       Recommendations     Recommendation/Intervention:   1. EN Recs: Vital HP @ 50 ml/hr with Andres BID. With propofol infusing at 18.5 ml/hr,this will provide a total of 1869 kcal (108% EEN), 105 gm protein (100% EPN), and 1,003 ml water.           2. Rec'd FWF of 30 ml q4-6h to prevent clogging.           3. RD to monitor and make recs accordingly     Goals: Goal rate by RD f/u.  Nutrition Goal Status: goal met  Communication of RD Recs: POC

## 2022-01-07 NOTE — PROGRESS NOTES
"[facility] - Intensive Care  Adult Nutrition  Progress Note    SUMMARY       Recommendations    Recommendation/Intervention:   1. EN Recs: Vital HP @ 50 ml/hr with Andres BID. With propofol infusing at 18.5 ml/hr,this will provide a total of 1869 kcal (108% EEN), 105 gm protein (100% EPN), and 1,003 ml water.           2. Rec'd FWF of 30 ml q4-6h to prevent clogging.           3. RD to monitor and make recs accordingly    Goals: Goal rate by RD f/u.  Nutrition Goal Status: goal met  Communication of RD Recs: reviewed with physician    Assessment and Plan  Nutrition Problem  Inadequate protein-energy intake     Related to (etiology):   NPO/vent status     Signs and Symptoms (as evidenced by):   EEN < 25%      Interventions/Recommendations (treatment strategy):  1. EN Recs: Vital HP @ 50 ml/hr with Andres BID. With propofol infusing at 18.5 ml/hr,this will provide a total of 1869 kcal (108% EEN), 105 gm protein (100% EPN), and 1,003 ml water.           2. Rec'd FWF of 30 ml q4-6h to prevent clogging.           3. RD to monitor and make recs accordingly         Nutrition Diagnosis Status:   Continues   Reason for Assessment    Reason For Assessment: RD follow-up  Diagnosis: pulmonary disease  Relevant Medical History: Tolerating TF at 40 ml/hr. Continue to advance to goal of 55. Propofol @ 23.1 ml/hr  General Information Comments: Tolerating tf at goal. Sedation @ 18.5 ml/hr (488)- no chanes to be made to tf just yet. Will monitor  Nutrition Discharge Planning: TBD    Nutrition Risk Screen    Nutrition Risk Screen: tube feeding or parenteral nutrition    Nutrition/Diet History    Food Allergies: NKFA  Factors Affecting Nutritional Intake: NPO,on mechanical ventilation    Anthropometrics    Temp: 97.5 °F (36.4 °C)  Height: 5' 10" (177.8 cm)  Height (inches): 70 in  Weight Method: Standard Scale  Weight: 77.1 kg (170 lb)  Weight (lb): 170 lb  Ideal Body Weight (IBW), Male: 166 lb  % Ideal Body Weight, Male (lb): 102.41 " %  BMI (Calculated): 24.4  BMI Grade: 18.5-24.9 - normal       Lab/Procedures/Meds    Pertinent Labs Reviewed: reviewed  Pertinent Medications Reviewed: reviewed    Physical Findings/Assessment         Estimated/Assessed Needs    Weight Used For Calorie Calculations: 77.1 kg (170 lb)     Energy Need Method: [facility]  Protein Requirements:  (1.2-1.5 gm/kg CBW)  Weight Used For Protein Calculations: 77.1 kg (170 lb)     Estimated Fluid Requirement Method: RDA Method            Nutrition Prescription Ordered    Current Diet Order: NPO  Current Nutrition Support Formula Ordered: Other (Comment) (Vital HP)  Current Nutrition Support Rate Ordered: 40 (ml)    Evaluation of Received Nutrient/Fluid Intake    Enteral Calories (kcal): 960  Enteral Protein (gm): 84  Enteral (Free Water) Fluid (mL): 802  % Kcal Needs: 88% with propofol @ 23.1 ml/hr  % Protein Needs: 91%  Energy Calories Required: not meeting needs  Protein Required: not meeting needs  Fluid Required: not meeting needs  % Intake of Estimated Energy Needs: 75 - 100 %  % Meal Intake: NPO    Nutrition Risk    Level of Risk/Frequency of Follow-up: moderate - high     Monitor and Evaluation    Food and Nutrient Intake: enteral nutrition intake  Food and Nutrient Adminstration: enteral and parenteral nutrition administration  Anthropometric Measurements: weight change,weight  Biochemical Data, Medical Tests and Procedures: electrolyte and renal panel,glucose/endocrine profile,lipid profile,inflammatory profile  Nutrition-Focused Physical Findings: overall appearance     Nutrition Follow-Up    RD Follow-up?: Yes

## 2022-01-07 NOTE — ASSESSMENT & PLAN NOTE
ABG and cxr noted, + cxr much improved.  Increasing MV today for alkalosis, repeat abg this afternoon, family aware of gravity of case.

## 2022-01-07 NOTE — NURSING
Patient with potassium of 2.7. Dr [facility] notified notified. Ordered 40 meq rider. Also notified of PH 7.6

## 2022-01-07 NOTE — SUBJECTIVE & OBJECTIVE
Interval History:   Patient seen and examined.  Remains intubated and sedated.  Tolerated CPAP trial for 45min this AM.  Minimal output from R chest tube.     Medications:  Continuous Infusions:   dexmedetomidine (PRECEDEX) infusion 1.4 mcg/kg/hr (01/07/22 1250)    NORepinephrine bitartrate-D5W 0.02 mcg/kg/min (01/06/22 0547)    propofoL 40 mcg/kg/min (01/07/22 1251)     Scheduled Meds:   atorvastatin  40 mg Oral Daily    enoxaparin  40 mg Subcutaneous Daily    fluconazole (DIFLUCAN) IVPB  200 mg Intravenous Q24H    furosemide (LASIX) injection  40 mg Intravenous Daily    hydrocortisone   Rectal BID    hydrocortisone sodium succinate  50 mg Intravenous Q8H    levalbuterol  1.25 mg Nebulization TID WAKE    magnesium oxide  400 mg Oral BID    pantoprazole  40 mg Intravenous Daily    piperacillin-tazobactam (ZOSYN) IVPB  4.5 g Intravenous Q8H    potassium bicarbonate  20 mEq Per NG tube BID     PRN Meds:sodium chloride, acetaminophen, influenza, morphine, morphine, sodium chloride 0.9%     Review of patient's allergies indicates:  No Known Allergies  Objective:     Vital Signs (Most Recent):  Temp: 97.5 °F (36.4 °C) (01/07/22 1130)  Pulse: 69 (01/07/22 1308)  Resp: (!) 24 (01/07/22 1346)  BP: (!) 79/61 (01/07/22 1308)  SpO2: (!) 92 % (01/07/22 1308) Vital Signs (24h Range):  Temp:  [97.5 °F (36.4 °C)-98.1 °F (36.7 °C)] 97.5 °F (36.4 °C)  Pulse:  [] 69  Resp:  [11-51] 24  SpO2:  [89 %-100 %] 92 %  BP: ()/(50-73) 79/61     Weight: 77.1 kg (170 lb)  Body mass index is 24.39 kg/m².    Intake/Output - Last 3 Shifts       01/05 0700  01/06 0659 01/06 0700 01/07 0659 01/07 0700 01/08 0659    I.V. (mL/kg) 1132 (14.7) 1402 (18.2)     Blood 1412      NG/GT 1383 732     IV Piggyback 900 712     Total Intake(mL/kg) 4827 (62.6) 2846 (36.9)     Urine (mL/kg/hr) 1850 (1) 1500 (0.8)     Other 600      Stool  650     Chest Tube 345 10     Total Output 2795 2160     Net +2032 +686            Stool Occurrence  1 x            Physical Exam  Vitals reviewed.   Constitutional:       General: He is not in acute distress.     Appearance: He is ill-appearing and toxic-appearing.   Cardiovascular:      Rate and Rhythm: Normal rate and regular rhythm.   Pulmonary:      Effort: Respiratory distress present.      Breath sounds: Rales present.      Comments: R chest tube in place with no air leak in chamber   Abdominal:      General: Abdomen is flat.      Palpations: Abdomen is soft.   Musculoskeletal:      Comments: Abrasion to R knee   Skin:     General: Skin is warm and dry.         Significant Labs:  CBC:   Recent Labs   Lab 01/07/22  0332   WBC 13.76*   RBC 3.15*   HGB 9.0*   HCT 26.9*      MCV 85   MCH 28.6   MCHC 33.5     BMP:   Recent Labs   Lab 01/07/22  0332   *      K 2.7*   CL 96   CO2 36*   BUN 75*   CREATININE 1.2   CALCIUM 8.6*   MG 2.6       Significant Diagnostics:  CXR 1/7/2022:  Impression:     Resolution of pleural fluid collection within the right upper chest and improved aeration of left lower lung     No detrimental interval changes

## 2022-01-07 NOTE — EICU
Rounding (Video Assessment):  Yes    Intervention Initiated From:  Bedside    Alessandra Communicated with Bedside Nurse regarding:  Documentation    Nurse Notified:  Yes    Doctor Notified:  No    Comments: Video rounding completed. Patient sedated. HOB elevated. Vent settings AC 12 Vt 550, Fi02 70%, Peep 8+. IV drips:Potassium rider infusing. Last K+ 2.7. Precedex 1.4 mcg/kg/hr; Propofol 40 mcg/kg/mi, Levophed 0.02 mcg/kg/min. WCTM as needed. Cardiac monitor with VS: P 97-Atrial fib, NIBP 76/53 (60) R 15, Sa02 94%. Spoke with bedside nurse regarding B/P titration of IV drips done; WCTM as needed

## 2022-01-08 NOTE — ASSESSMENT & PLAN NOTE
Elevated, likely in the setting of hypoperfusion.   - Continue to follow liver enzymes. Improving.   1/8 slight increase in LFTs, continue to monitor

## 2022-01-08 NOTE — ASSESSMENT & PLAN NOTE
ABG and cxr noted, + cxr much improved.  Increasing MV today for alkalosis, repeat abg this afternoon, family aware of gravity of case.  On ventilator, continue to monitor

## 2022-01-08 NOTE — ASSESSMENT & PLAN NOTE
Triple antibiotic therapy.   Plan as above  1/8 on IV antibiotic, chest tube in place, surgery following, continue to monitor

## 2022-01-08 NOTE — EICU
Rounding (Video Assessment):  Yes  Video assessment completed.  On vent support (AC 12, , PEEP 8, FiO2 60%),  sedated on precedex & propofol w/ norepinephrine also in progress (see MAR for rates). 's, HR 91, POx 91%.

## 2022-01-08 NOTE — PROGRESS NOTES
[facility] Medicine  Progress Note    Patient Name: Neymar Patel  MRN: [id number]  Patient Class: IP- Inpatient   Admission Date: 12/21/2021  Length of Stay: 17 days  Attending Physician: Raul Gan III, MD  Primary Care Provider: Landen Brown MD        Subjective:     Principal Problem:Acute on chronic respiratory failure with hypoxia        HPI:  Perpetual History  Patient is a 66-year-old male with a history of alcohol abuse atrial fibrillation hypertension peripheral artery disease fatty liver disease who recently had an episode of bronchitis and was treated in the outpatient setting.  The patient then began in having increasing weakness and lightheadedness and noticed his heart rate was elevated.  He presented to the emergency department and was found to have elevated alcohol levels and a heart rate in the 130s to 140s.  The patient is currently in AFib with RVR and has been started on a Cardizem drip by the emergency department.         Overview/Hospital Course:  12/23/21 [facility] patient reports that he is feeling better today, was able to get rest last night. Heart rate still elevated will adjust medications and monitor. Patient with hallucinations last night, patient is appropriate this am  12/24/21:   Rapid Response Call #1: Blood pressure initially low and patient heart rate in the 50s sinus Nakul.  Glucose within normal limits.  Fluid bolus 1 L was given with improvement in blood pressure.  Advised to hold any medication that will lower patient's heart rate.  Patient also complains of shortness of breath but according to respiratory therapist, patient did not take his breathing treatment because he did not think he needed it.  Patient wheezy expiratory and saturating in the high 80s on room air. not on any antibiotics currently so will start.  Labs show that patient has TANIKA possibly due to dehydration.  Will resuscitate with fluid.  Will recheck basic blood work and continue  to hydrate.  Treat accordingly will at started if patient is not already on it.  Patient more alert and oriented x4 moving all extremities with improvement of blood pressure into the 90s and heart rate remains in the 50s sinus Nakul saturating 94% on 2 L  Rapid Response Call #2:Rapid response team called again with subsequent intubation and transfer to MICU.   12/25/21: On minimal sedation and pressor support in the MICU.   12/26/21: Able to wean off of all pressor support, pH of 5 while on bicarb on AM labs. Will get rid of bicarb drip and switch to gentle IV hydration with LR. SBT tomorrow?  12/27/21:  Events over the weekend noted.  Patient is now intubated on the ventilator.  Patient was admitted with pneumonia atrial fibrillation and alcohol abuse and now also has a enterococcal urinary tract infection with sensitivities pending.  Patient has required to sedatives for agitation and combativeness.  12/28/21:  Patient's ABG has been reviewed.  Patient has a mild respiratory alkalosis and his respiratory rate has been changed.  We have encouraged respiratory and nursing to wean his sedation and titrate his tube feeds.  The patient's chest x-ray also seems like he is slightly volume overloaded therefore will discontinue IV fluids and give low dosing of Lasix.  Patient has enterococcal infection was ampicillin sensitive therefore Rocephin should be adequate.  Will change Zithromax to Levaquin.  12/29/21 FM:  Patient had an episode of desaturation yesterday.  Respiratory was able to deep suction and several mucus plugs and thick secretions were removed.  I will broaden patient antibiotics today to Zosyn and otherwise plan on increasing his diuretics today.  Patient has bilateral pulmonary infiltrates consistent with pulmonary edema.  12/30/21 FM:  Patient had a elevated temperature through the night.  Rocephin was changed to Zosyn yesterday and consideration of drug fever should be entertained.  Will add medicine for  MRSA infection today as well as antifungals.  Patient's chest x-ray seems to be improved with diuretics.  Continue diuretics cautiously.  12/31/21 CG: Antibiotic course broadened, tachycardic with an increase in PEEP. He is net negative over the past 24 hours.    1/1/22 CG: No acute overnight events. PEEP decreased to 10.   1/2/22 CG: CXR shows worsening fluid burden, heart rates in 110s-120s.   1/3/22 FM:  Patient is on an FiO2 of 55%.  His chest x-ray still shows pulmonary edema which is also consistent with his CT scan.  We will replace electrolytes transfuse and continue to diurese.  Hopefully we can wean his FiO2 down this weekend and wean him off the ventilator by the end of the week.  1/4/22 FM:  Patient's vital signs are stable this morning and he is sedated.  Nursing reports that when sedation is weaned the patient is awake opens eyes and is alert.  We are attempting a CPAP trial this morning as the patient is tolerating and FiO2 of 50% and could probably be weaned further.  Will reduce dosing of diuretics today.  Continue to replete potassium.  1/5/22 FM:  Yesterday afternoon the patient had a desaturation his oxygen and his chest x-ray showed a worsening right-sided pleural effusion.  A CT scan done 2 days ago showed some layering and there was a discussion about possible loculations.  The patient's chest tube initially had 200 cc of a serous drainage which is now bloody.  Patient's chest x-ray has improved after placement.  Patient's oxygenation today is improved but his condition is precarious.  I have spoken to the family and let them know a high risk and possible poor outcome.  1/6/22 FM:  Patient had a left-sided pleural effusion drained and about 600 cc of a serous fluid was removed.  Patient's right chest tube had 10 cc overnight of a bloody fluid.  Diagnostic testing is all pending.  Today the patient's chest x-ray is noted in his left lung seems to be hypoinflated.  I am increasing the tidal  volume slightly today and watching his airway pressures.  1/7/22 FM:  Patient's ABG shows a respiratory alkalosis.  We are reducing his minute ventilation.  Patient's chest x-ray is significantly improved today.  Will have Respiratory and Nursing try to wean sedation and CPAP trial today.  Patient is now on ventilator day 14.  Patient has had intermittent bradycardia and will hold amiodarone for now.  1/8 AA, weekend crosscover, patient on ventilator, repeat chest x-ray pending, amiodarone stopped secondary to bradycardia, history of AFib, patient was on amiodarone, stopped secondary to bradycardia, now patient increased heart rate, will restart amiodarone, will decrease dose, blood pressure appears stable, continue to wean nor epi, on stress dose steroids, surgery following, H&H dropped, possible transfusion      Interval History: Patient seen and examined.     Medications:  Continuous Infusions:   dexmedetomidine (PRECEDEX) infusion 1.4 mcg/kg/hr (01/08/22 0520)    NORepinephrine bitartrate-D5W 0.02 mcg/kg/min (01/06/22 0547)    propofoL 35 mcg/kg/min (01/08/22 0519)     Scheduled Meds:   atorvastatin  40 mg Oral Daily    enoxaparin  40 mg Subcutaneous Daily    fluconazole (DIFLUCAN) IVPB  200 mg Intravenous Q24H    furosemide (LASIX) injection  40 mg Intravenous Daily    hydrocortisone   Rectal BID    hydrocortisone sodium succinate  50 mg Intravenous Q8H    levalbuterol  1.25 mg Nebulization TID WAKE    magnesium oxide  400 mg Oral BID    pantoprazole  40 mg Intravenous Daily    piperacillin-tazobactam (ZOSYN) IVPB  4.5 g Intravenous Q8H    potassium bicarbonate  20 mEq Per NG tube BID     PRN Meds:sodium chloride, acetaminophen, influenza, morphine, morphine, sodium chloride 0.9%     Review of patient's allergies indicates:  No Known Allergies  Objective:     Vital Signs (Most Recent):  Temp: 98.1 °F (36.7 °C) (01/08/22 0700)  Pulse: 104 (01/08/22 0715)  Resp: 14 (01/08/22 0715)  BP: (!) 71/52  (01/08/22 0545)  SpO2: 100 % (01/08/22 0715) Vital Signs (24h Range):  Temp:  [97.3 °F (36.3 °C)-98.1 °F (36.7 °C)] 98.1 °F (36.7 °C)  Pulse:  [] 104  Resp:  [11-51] 14  SpO2:  [86 %-100 %] 100 %  BP: ()/(50-78) 71/52     Weight: 77.1 kg (170 lb)  Body mass index is 24.39 kg/m².    Intake/Output - Last 3 Shifts       01/06 0700 01/07 0659 01/07 0700 01/08 0659 01/08 0700 01/09 0659    I.V. (mL/kg) 1402 (18.2) 1525.7 (19.8)     Blood       NG/ 1535     IV Piggyback 712 802.9     Total Intake(mL/kg) 2846 (36.9) 3863.5 (50.1)     Urine (mL/kg/hr) 1500 (0.8) 2350 (1.3)     Other       Stool 650 525     Chest Tube 10 10     Total Output 2160 2885     Net +686 +978.5            Stool Occurrence  1 x           Physical Exam  Vitals reviewed.   Constitutional:       General: He is not in acute distress.     Appearance: He is ill-appearing and toxic-appearing.   Cardiovascular:      Rate and Rhythm: Normal rate and regular rhythm.   Pulmonary:      Effort: Respiratory distress present.      Breath sounds: Rales present.      Comments: R chest tube in place with no air leak in chamber   Abdominal:      General: Abdomen is flat.      Palpations: Abdomen is soft.   Musculoskeletal:      Comments: Abrasion to R knee   Skin:     General: Skin is warm and dry.         Significant Labs:  CBC:   Recent Labs   Lab 01/08/22  0342   WBC 17.00*   RBC 2.69*   HGB 7.5*   HCT 23.7*      MCV 88   MCH 27.9   MCHC 31.6*     BMP:   Recent Labs   Lab 01/08/22  0343   *   *   K 3.3*      CO2 39*   BUN 69*   CREATININE 1.1   CALCIUM 8.6*   MG 2.5       Significant Diagnostics:  CXR 1/7/2022:  Impression:     Resolution of pleural fluid collection within the right upper chest and improved aeration of left lower lung     No detrimental interval changes    Review of Systems   Unable to perform ROS: Intubated           Assessment/Plan:      * Acute on chronic respiratory failure with hypoxia  ABG and cxr  noted, + cxr much improved.  Increasing MV today for alkalosis, repeat abg this afternoon, family aware of gravity of case.  On ventilator, continue to monitor    Pleural effusion  ? Cause, ? Malignant ? Parapneumonic?      PNA (pneumonia)  Triple antibiotic therapy.   Plan as above  1/8 on IV antibiotic, chest tube in place, surgery following, continue to monitor      Severe sepsis  Currently on abx, has stablizied, pressores are at low dose, remains critically ill.        UTI (urinary tract infection)  See abx      DVT prophylaxis  Hold eliquis for now in setting of TANIKA.       Atrial fibrillation with rapid ventricular response  + bradycardia, holding amiodarone.  1/8 patient now with AFib RVR, will restart amiodarone, decrease dose to 200mg, wean pressors as tolerated    Tobacco user  PRN Nicotine patch    Alcohol dependence  CIWA Protocol      Elevated LFTs  Elevated, likely in the setting of hypoperfusion.   - Continue to follow liver enzymes. Improving.   1/8 slight increase in LFTs, continue to monitor        TANIKA (acute kidney injury)  Noted, Cr, remaining around 1.5  Appears at baseline        VTE Risk Mitigation (From admission, onward)         Ordered     Place sequential compression device  Until discontinued         01/05/22 0815     enoxaparin injection 40 mg  Daily         12/28/21 0816     IP VTE HIGH RISK PATIENT  Once         12/21/21 2233     Reason for No Pharmacological VTE Prophylaxis  Once        Question:  Reasons:  Answer:  Already adequately anticoagulated on oral Anticoagulants    12/21/21 2233                Discharge Planning   DAXA:      Code Status: Full Code   Is the patient medically ready for discharge?:     Reason for patient still in hospital (select all that apply): Patient unstable, Laboratory test and Treatment  Discharge Plan A: Home with family,Home Health                  Gianfranco Hyatt MD  Department of Hospital Medicine   [place] [facility]

## 2022-01-08 NOTE — PLAN OF CARE
Pt remains on vent with sedation - remains on levophed for blood pressure control - blood pressure labile - vent settings unchanged today - sat 100 percent on present settings - repeat h and h this after noon lower than this am - will type and match 2 units and transfuse one as soon as ready and will repeat h and h 3 hours after transfusion complete to recheck to see if unit number is needed - turning bed in place - old chest tube site without bleeding - dressing dry and intact - will continue to monitor

## 2022-01-08 NOTE — PLAN OF CARE
Patient remains on mechanical ventilation. Required to go up on oxygen over night to 60%. Sats were in the high eighties, now consistent in the 90's. FMS still in use with moderate amount of liquid dark brown stool. Atrial Fib on tele. Heart rate 119 after stopping Amiodarone.

## 2022-01-08 NOTE — SUBJECTIVE & OBJECTIVE
Interval History: Patient seen and examined.     Medications:  Continuous Infusions:   dexmedetomidine (PRECEDEX) infusion 1.4 mcg/kg/hr (01/08/22 0520)    NORepinephrine bitartrate-D5W 0.02 mcg/kg/min (01/06/22 0547)    propofoL 35 mcg/kg/min (01/08/22 0519)     Scheduled Meds:   atorvastatin  40 mg Oral Daily    enoxaparin  40 mg Subcutaneous Daily    fluconazole (DIFLUCAN) IVPB  200 mg Intravenous Q24H    furosemide (LASIX) injection  40 mg Intravenous Daily    hydrocortisone   Rectal BID    hydrocortisone sodium succinate  50 mg Intravenous Q8H    levalbuterol  1.25 mg Nebulization TID WAKE    magnesium oxide  400 mg Oral BID    pantoprazole  40 mg Intravenous Daily    piperacillin-tazobactam (ZOSYN) IVPB  4.5 g Intravenous Q8H    potassium bicarbonate  20 mEq Per NG tube BID     PRN Meds:sodium chloride, acetaminophen, influenza, morphine, morphine, sodium chloride 0.9%     Review of patient's allergies indicates:  No Known Allergies  Objective:     Vital Signs (Most Recent):  Temp: 98.1 °F (36.7 °C) (01/08/22 0700)  Pulse: 104 (01/08/22 0715)  Resp: 14 (01/08/22 0715)  BP: (!) 71/52 (01/08/22 0545)  SpO2: 100 % (01/08/22 0715) Vital Signs (24h Range):  Temp:  [97.3 °F (36.3 °C)-98.1 °F (36.7 °C)] 98.1 °F (36.7 °C)  Pulse:  [] 104  Resp:  [11-51] 14  SpO2:  [86 %-100 %] 100 %  BP: ()/(50-78) 71/52     Weight: 77.1 kg (170 lb)  Body mass index is 24.39 kg/m².    Intake/Output - Last 3 Shifts       01/06 0700 01/07 0659 01/07 0700 01/08 0659 01/08 0700 01/09 0659    I.V. (mL/kg) 1402 (18.2) 1525.7 (19.8)     Blood       NG/ 1535     IV Piggyback 712 802.9     Total Intake(mL/kg) 2846 (36.9) 3863.5 (50.1)     Urine (mL/kg/hr) 1500 (0.8) 2350 (1.3)     Other       Stool 650 525     Chest Tube 10 10     Total Output 2160 2885     Net +686 +978.5            Stool Occurrence  1 x           Physical Exam  Vitals reviewed.   Constitutional:       General: He is not in acute distress.      Appearance: He is ill-appearing and toxic-appearing.   Cardiovascular:      Rate and Rhythm: Normal rate and regular rhythm.   Pulmonary:      Effort: Respiratory distress present.      Breath sounds: Rales present.      Comments: R chest tube in place with no air leak in chamber   Abdominal:      General: Abdomen is flat.      Palpations: Abdomen is soft.   Musculoskeletal:      Comments: Abrasion to R knee   Skin:     General: Skin is warm and dry.         Significant Labs:  CBC:   Recent Labs   Lab 01/08/22  0342   WBC 17.00*   RBC 2.69*   HGB 7.5*   HCT 23.7*      MCV 88   MCH 27.9   MCHC 31.6*     BMP:   Recent Labs   Lab 01/08/22  0343   *   *   K 3.3*      CO2 39*   BUN 69*   CREATININE 1.1   CALCIUM 8.6*   MG 2.5       Significant Diagnostics:  CXR 1/7/2022:  Impression:     Resolution of pleural fluid collection within the right upper chest and improved aeration of left lower lung     No detrimental interval changes    Review of Systems   Unable to perform ROS: Intubated

## 2022-01-08 NOTE — EICU
Rounding (Video Assessment):  Video rounding completed.  Pt resting quiet on ventilator support w/ FiO2 60%, +8 peep.  No distress noted.  Infusing Precedex gtt at 1.4 mcg/kg/hr, Norepinephrine gtt at 0.12 mcg/kg/min & Propofol gtt at 35 mcg/kg/min.  B/P 99/71, HR 99, RR 14, POx 100%.

## 2022-01-08 NOTE — ASSESSMENT & PLAN NOTE
+ bradycardia, holding amiodarone.  1/8 patient now with AFib RVR, will restart amiodarone, decrease dose to 200mg, wean pressors as tolerated

## 2022-01-09 PROBLEM — R58 ACUTE HEMORRHAGE: Status: ACTIVE | Noted: 2022-01-01

## 2022-01-09 NOTE — PROGRESS NOTES
Pharmacokinetic Initial Assessment: IV Vancomycin    Assessment/Plan:    Initiate intravenous vancomycin with loading dose of 2000 mg once followed by a maintenance dose of vancomycin 1000mg IV every 12 hours  Desired empiric serum trough concentration is 15 to 20 mcg/mL  Draw vancomycin trough level 60 min prior to fourth dose on 01/10/2022 at approximately 1900  Pharmacy will continue to follow and monitor vancomycin.      Please contact pharmacy at extension [phone] with any questions regarding this assessment.     Thank you for the consult,   Sy Sue       Patient brief summary:  Neymar Patel is a 66 y.o. male initiated on antimicrobial therapy with IV Vancomycin for treatment of suspected  Pneumonia    Drug Allergies:   Review of patient's allergies indicates:  No Known Allergies    Actual Body Weight:   77.1kg    Renal Function:   Estimated Creatinine Clearance: 75 mL/min (based on SCr of 1 mg/dL).,     Dialysis Method (if applicable):  N/A    CBC (last 72 hours):  Recent Labs   Lab Result Units 01/07/22  0332 01/08/22  0342 01/08/22  1500 01/08/22 2144 01/09/22  0355   WBC K/uL 13.76* 17.00*  --  23.14* 28.80*   Hemoglobin g/dL 9.0* 7.5* 7.1* 8.0* 6.7*   Hematocrit % 26.9* 23.7* 22.1* 23.4* 19.7*   Platelets K/uL 165 187  --  174 149*   Gran % % 82.3* 81.9*  --  75.8* 70.0   Lymph % % 6.9* 6.3*  --  8.3* 8.0*   Mono % % 9.6 10.1  --  13.0 10.0   Eosinophil % % 0.0 0.2  --  0.2 0.0   Basophil % % 0.0 0.1  --  0.1 0.0   Differential Method  Automated Automated  --  Automated Manual       Metabolic Panel (last 72 hours):  Recent Labs   Lab Result Units 01/07/22  0332 01/08/22  0343 01/08/22  1600 01/08/22  2144 01/09/22  0355   Sodium mmol/L 141 146*  --  142 141   Potassium mmol/L 2.7* 3.3* 3.6 3.7 3.6   Chloride mmol/L 96 101  --  99 100   CO2 mmol/L 36* 39*  --  35* 31*   Glucose mg/dL 154* 128*  --  206* 255*   BUN mg/dL 75* 69*  --  63* 64*   Creatinine mg/dL 1.2 1.1  --  1.0 1.0   Albumin g/dL  1.9* 1.7*  --   --  1.3*   Total Bilirubin mg/dL 0.4 0.5  --   --  0.5   Alkaline Phosphatase U/L 93 79  --   --  76   AST U/L 55* 186*  --   --  98*   ALT U/L 50* 115*  --   --  91*   Magnesium mg/dL 2.6 2.5  --  2.4 2.2       Drug levels (last 3 results):  No results for input(s): VANCOMYCINRA, VANCOMYCINPE, VANCOMYCINTR in the last 72 hours.    Microbiologic Results:  Microbiology Results (last 7 days)       Procedure Component Value Units Date/Time    Culture, Body Fluid - Bactec [[id number]] Collected: 01/04/22 1640    Order Status: Completed Specimen: Body Fluid from Pleural Fluid Updated: 01/09/22 0612     Body Fluid Culture, Sterile No Growth to date      No Growth to date      No Growth to date      No Growth to date      No Growth to date    Culture, Body Fluid - Bactec [[id number]] Collected: 01/05/22 1232    Order Status: Completed Specimen: Body Fluid from Pleural Fluid Updated: 01/08/22 0812     Body Fluid Culture, Sterile No Growth to date      No Growth to date      No Growth to date    AFB culture [[id number]] Collected: 01/05/22 1232    Order Status: Completed Specimen: Body Fluid from Pleural Fluid Updated: 01/07/22 1508     AFB Culture & Smear Culture in progress     AFB CULTURE STAIN No acid fast bacilli seen.    AFB stain [[id number]] Collected: 01/05/22 1232    Order Status: Completed Specimen: Body Fluid from Pleural Fluid Updated: 01/06/22 1503     Direct Acid Fast No acid fast bacilli seen.    Gram stain [[id number]] Collected: 01/05/22 1232    Order Status: Completed Specimen: Body Fluid from Pleural Fluid Updated: 01/06/22 1354     Gram Stain Result No WBC's      No organisms seen    KOH prep [[id number]] Collected: 01/05/22 1232    Order Status: Completed Specimen: Body Fluid from Pleural Fluid Updated: 01/06/22 0823     KOH Prep No yeast or fungal elements seen    Fungus culture [[id number]] Collected: 01/05/22 1232    Order Status: Sent Specimen: Body Fluid from Pleural Fluid Updated:  01/06/22 0404    Culture, body fluid - Bactec [[id number]] Collected: 01/05/22 1232    Order Status: Canceled Specimen: Body Fluid from Pleural Fluid

## 2022-01-09 NOTE — ASSESSMENT & PLAN NOTE
ABG and cxr noted, + cxr much improved.  Increasing MV today for alkalosis, repeat abg this afternoon, family aware of gravity of case.  On ventilator, continue to monitor  Daily ABGs, reviewed

## 2022-01-09 NOTE — EICU
AF RVR suspected  Try metoprolol if BP tolerates- on pressors  Check labs  Get CXR to assess fluid on right- ? Blood  Check CBC also now and again     D/w RN

## 2022-01-09 NOTE — EICU
SBP borderline low  CXR similar pleural collection   ET high to adjust  Follow BP; monitor for Hb drop   O2 sat borderline to monitor   A flutter on EKG  D/w RN

## 2022-01-09 NOTE — PROGRESS NOTES
[facility] Medicine  Progress Note    Patient Name: Neymar Patel  MRN: [id number]  Patient Class: IP- Inpatient   Admission Date: 12/21/2021  Length of Stay: 18 days  Attending Physician: Raul Gan III, MD  Primary Care Provider: Landen Brown MD        Subjective:     Principal Problem:Acute on chronic respiratory failure with hypoxia        HPI:  Perpetual History  Patient is a 66-year-old male with a history of alcohol abuse atrial fibrillation hypertension peripheral artery disease fatty liver disease who recently had an episode of bronchitis and was treated in the outpatient setting.  The patient then began in having increasing weakness and lightheadedness and noticed his heart rate was elevated.  He presented to the emergency department and was found to have elevated alcohol levels and a heart rate in the 130s to 140s.  The patient is currently in AFib with RVR and has been started on a Cardizem drip by the emergency department.         Overview/Hospital Course:  12/23/21 [facility] patient reports that he is feeling better today, was able to get rest last night. Heart rate still elevated will adjust medications and monitor. Patient with hallucinations last night, patient is appropriate this am  12/24/21:   Rapid Response Call #1: Blood pressure initially low and patient heart rate in the 50s sinus Nakul.  Glucose within normal limits.  Fluid bolus 1 L was given with improvement in blood pressure.  Advised to hold any medication that will lower patient's heart rate.  Patient also complains of shortness of breath but according to respiratory therapist, patient did not take his breathing treatment because he did not think he needed it.  Patient wheezy expiratory and saturating in the high 80s on room air. not on any antibiotics currently so will start.  Labs show that patient has TANIKA possibly due to dehydration.  Will resuscitate with fluid.  Will recheck basic blood work and continue  to hydrate.  Treat accordingly will at started if patient is not already on it.  Patient more alert and oriented x4 moving all extremities with improvement of blood pressure into the 90s and heart rate remains in the 50s sinus Nakul saturating 94% on 2 L  Rapid Response Call #2:Rapid response team called again with subsequent intubation and transfer to MICU.   12/25/21: On minimal sedation and pressor support in the MICU.   12/26/21: Able to wean off of all pressor support, pH of 5 while on bicarb on AM labs. Will get rid of bicarb drip and switch to gentle IV hydration with LR. SBT tomorrow?  12/27/21:  Events over the weekend noted.  Patient is now intubated on the ventilator.  Patient was admitted with pneumonia atrial fibrillation and alcohol abuse and now also has a enterococcal urinary tract infection with sensitivities pending.  Patient has required to sedatives for agitation and combativeness.  12/28/21:  Patient's ABG has been reviewed.  Patient has a mild respiratory alkalosis and his respiratory rate has been changed.  We have encouraged respiratory and nursing to wean his sedation and titrate his tube feeds.  The patient's chest x-ray also seems like he is slightly volume overloaded therefore will discontinue IV fluids and give low dosing of Lasix.  Patient has enterococcal infection was ampicillin sensitive therefore Rocephin should be adequate.  Will change Zithromax to Levaquin.  12/29/21 FM:  Patient had an episode of desaturation yesterday.  Respiratory was able to deep suction and several mucus plugs and thick secretions were removed.  I will broaden patient antibiotics today to Zosyn and otherwise plan on increasing his diuretics today.  Patient has bilateral pulmonary infiltrates consistent with pulmonary edema.  12/30/21 FM:  Patient had a elevated temperature through the night.  Rocephin was changed to Zosyn yesterday and consideration of drug fever should be entertained.  Will add medicine for  MRSA infection today as well as antifungals.  Patient's chest x-ray seems to be improved with diuretics.  Continue diuretics cautiously.  12/31/21 CG: Antibiotic course broadened, tachycardic with an increase in PEEP. He is net negative over the past 24 hours.    1/1/22 CG: No acute overnight events. PEEP decreased to 10.   1/2/22 CG: CXR shows worsening fluid burden, heart rates in 110s-120s.   1/3/22 FM:  Patient is on an FiO2 of 55%.  His chest x-ray still shows pulmonary edema which is also consistent with his CT scan.  We will replace electrolytes transfuse and continue to diurese.  Hopefully we can wean his FiO2 down this weekend and wean him off the ventilator by the end of the week.  1/4/22 FM:  Patient's vital signs are stable this morning and he is sedated.  Nursing reports that when sedation is weaned the patient is awake opens eyes and is alert.  We are attempting a CPAP trial this morning as the patient is tolerating and FiO2 of 50% and could probably be weaned further.  Will reduce dosing of diuretics today.  Continue to replete potassium.  1/5/22 FM:  Yesterday afternoon the patient had a desaturation his oxygen and his chest x-ray showed a worsening right-sided pleural effusion.  A CT scan done 2 days ago showed some layering and there was a discussion about possible loculations.  The patient's chest tube initially had 200 cc of a serous drainage which is now bloody.  Patient's chest x-ray has improved after placement.  Patient's oxygenation today is improved but his condition is precarious.  I have spoken to the family and let them know a high risk and possible poor outcome.  1/6/22 FM:  Patient had a left-sided pleural effusion drained and about 600 cc of a serous fluid was removed.  Patient's right chest tube had 10 cc overnight of a bloody fluid.  Diagnostic testing is all pending.  Today the patient's chest x-ray is noted in his left lung seems to be hypoinflated.  I am increasing the tidal  volume slightly today and watching his airway pressures.  1/7/22 FM:  Patient's ABG shows a respiratory alkalosis.  We are reducing his minute ventilation.  Patient's chest x-ray is significantly improved today.  Will have Respiratory and Nursing try to wean sedation and CPAP trial today.  Patient is now on ventilator day 14.  Patient has had intermittent bradycardia and will hold amiodarone for now.  1/8 AA, weekend crosscover, patient on ventilator, repeat chest x-ray pending, amiodarone stopped secondary to bradycardia, history of AFib, patient was on amiodarone, stopped secondary to bradycardia, now patient increased heart rate, will restart amiodarone, will decrease dose, blood pressure appears stable, continue to wean nor epi, on stress dose steroids, surgery following, H&H dropped, possible transfusion  1/9 AA, weekend crosscover, patient started to have afib with RVR, eICU was called, adjustment made to pressors, started on metoprolol, on levophed, drop in H/H, had chest tube removed on 7th, no melena or BRBPR reported, surgery called to evaluate, suspect possible from previous chest tube site, transfuse prbc, serial H/Hs, follow up with surgery recs      Interval History: Patient seen and examined.     Medications:  Continuous Infusions:   dexmedetomidine (PRECEDEX) infusion 1.3 mcg/kg/hr (01/09/22 0352)    NORepinephrine bitartrate-D5W 0.25 mcg/kg/min (01/09/22 0353)    propofoL 35 mcg/kg/min (01/09/22 0417)     Scheduled Meds:   amiodarone  200 mg Oral Daily    atorvastatin  40 mg Oral Daily    enoxaparin  40 mg Subcutaneous Daily    fluconazole (DIFLUCAN) IVPB  200 mg Intravenous Q24H    hydrocortisone sodium succinate  50 mg Intravenous Q8H    levalbuterol  1.25 mg Nebulization TID WAKE    magnesium oxide  400 mg Oral BID    pantoprazole  40 mg Intravenous Daily    piperacillin-tazobactam (ZOSYN) IVPB  4.5 g Intravenous Q8H    potassium bicarbonate  20 mEq Per NG tube BID     PRN  Meds:sodium chloride, acetaminophen, influenza, morphine, morphine, sodium chloride 0.9%, Pharmacy to dose Vancomycin consult **AND** vancomycin - pharmacy to dose     Review of patient's allergies indicates:  No Known Allergies  Objective:     Vital Signs (Most Recent):  Temp: 97.4 °F (36.3 °C) (01/09/22 0545)  Pulse: 94 (01/09/22 0600)  Resp: (!) 44 (01/09/22 0600)  BP: (!) 71/35 (01/09/22 0600)  SpO2: (!) 84 % (01/09/22 0600) Vital Signs (24h Range):  Temp:  [97.1 °F (36.2 °C)-98.8 °F (37.1 °C)] 97.4 °F (36.3 °C)  Pulse:  [] 94  Resp:  [0-44] 44  SpO2:  [80 %-100 %] 84 %  BP: ()/(35-98) 71/35     Weight: 77.1 kg (170 lb)  Body mass index is 24.39 kg/m².    Intake/Output - Last 3 Shifts       01/07 0700  01/08 0659 01/08 0700 01/09 0659    I.V. (mL/kg) 1525.7 (19.8) 796 (10.3)    Blood  1155.8    NG/GT 1535 800    IV Piggyback 802.9 600    Total Intake(mL/kg) 3863.5 (50.1) 3351.8 (43.5)    Urine (mL/kg/hr) 2350 (1.3) 1875 (1)    Stool 525 525    Chest Tube 10     Total Output 2885 2400    Net +978.5 +951.8          Stool Occurrence 1 x           Physical Exam  Vitals reviewed.   Constitutional:       General: He is not in acute distress.     Appearance: He is ill-appearing and toxic-appearing.   Cardiovascular:      Rate and Rhythm: Normal rate and regular rhythm.   Pulmonary:      Effort: No respiratory distress.      Breath sounds: Rales present.   Abdominal:      General: Abdomen is flat.      Palpations: Abdomen is soft.   Musculoskeletal:      Comments: Abrasion to R knee   Skin:     General: Skin is warm and dry.         Significant Labs:  CBC:   Recent Labs   Lab 01/09/22  0355   WBC 28.80*   RBC 2.15*   HGB 6.7*   HCT 19.7*   *   MCV 92   MCH 31.2*   MCHC 34.0     BMP:   Recent Labs   Lab 01/09/22  0355   *      K 3.6      CO2 31*   BUN 64*   CREATININE 1.0   CALCIUM 7.1*   MG 2.2       Significant Diagnostics:  CXR 1/7/2022:  Impression:     Resolution of pleural  fluid collection within the right upper chest and improved aeration of left lower lung     No detrimental interval changes    Review of Systems   Unable to perform ROS: Intubated           Assessment/Plan:      * Acute on chronic respiratory failure with hypoxia  ABG and cxr noted, + cxr much improved.  Increasing MV today for alkalosis, repeat abg this afternoon, family aware of gravity of case.  On ventilator, continue to monitor  Daily ABGs, reviewed    Pleural effusion  ? Cause, ? Malignant ? Parapneumonic?  1/9 chest tube removed, now with drop in H/H, follow up with surgery recs, transfuse prbcs, patient with hematoma, surgery planning to evacuate and place chest tube    PNA (pneumonia)  Triple antibiotic therapy.   Plan as above  1/8 on IV antibiotic, chest tube removed yesterday, surgery following, continue to monitor  1/9 on IV abx, chest tube had been removed, now with decrease in H/H, transfuse, surgery notified, possible bleed from previous chest tube      Severe sepsis  Currently on abx, has stablizied, pressores are at low dose, remains critically ill.  -white count elevated, patient is on stress dose steroids, in setting of upward trend, will add vanc        UTI (urinary tract infection)  See abx      DVT prophylaxis  Hold eliquis for now in setting of TANIKA.       Atrial fibrillation with rapid ventricular response  + bradycardia, holding amiodarone.  1/8 patient now with AFib RVR, will restart amiodarone, decrease dose to 200mg, wean pressors as tolerated  1/9 afib with RVR last night, metoprolol was added, rate better controlled, continue follow up    Tobacco user  PRN Nicotine patch    Alcohol dependence  CIWA Protocol      Elevated LFTs  Elevated, likely in the setting of hypoperfusion.   - Continue to follow liver enzymes. Improving.   1/8 slight increase in LFTs, continue to monitor  1/9 LFTs trending down        TANIKA (acute kidney injury)  Noted, Cr, remaining around 1.5  Appears at  baseline        VTE Risk Mitigation (From admission, onward)         Ordered     Place sequential compression device  Until discontinued         01/05/22 0815     enoxaparin injection 40 mg  Daily         12/28/21 0816     IP VTE HIGH RISK PATIENT  Once         12/21/21 2233     Reason for No Pharmacological VTE Prophylaxis  Once        Question:  Reasons:  Answer:  Already adequately anticoagulated on oral Anticoagulants    12/21/21 2233                Discharge Planning   DAXA:      Code Status: Full Code   Is the patient medically ready for discharge?:     Reason for patient still in hospital (select all that apply): Patient unstable, Imaging and Consult recommendations  Discharge Plan A: Home with family,Home Health                  Gianfranco Hyatt MD  Department of Hospital Medicine   [place] - Intensive [facility]

## 2022-01-09 NOTE — EICU
Rounding (Video Assessment):  Yes    Intervention Initiated From:  Bedside    Alessandra Communicated with Bedside Nurse regarding:  Documentation    Nurse Notified:  Yes    Doctor Notified:  No    Comments: Video rounding completed. Patient labile arterial B/P decreased Levophed from 0.7 mcg/kg/min to Levophed 0.5 mcg/kg/min. Arterial B/P 66/35 (51) P 97-SR , R 21, Sa02 97%. Ventilator settings Vt 550; AC 12 Fi02 100%, Peep 8+Other drips: Propofol 40 mcg/kg/min; Precedex 1.3 mcg/kg/hr. Blood PRBC @ 150 cc/hr for H&H 6.7/19.7, Portable CXR completed for insertion of CT. [facility] as needed.

## 2022-01-09 NOTE — ASSESSMENT & PLAN NOTE
Patient with presumed acute hemorrhage from R chest tube site.  Chest tube placed on 1/4 with acute bleeding overnight.  Site hemostatic on 1/5 and remained so until tube removed on 1/7.  Patient with acute drop in Hgb for formation of chest wall hematoma seen on CXR 1/8 and 1/9.  Chest tube removal atraumatic and site of bleeding presumed to be from chest wall muscle vs. Intercostal muscle     --Wound urgently explored at bedside with evacuation of 2L mixture of coagulated blood and pleural fluid  --Brisk venous bleed from chest wall muscle identified and suture ligated   --Intercostal muscle and pleura explored with no signs of further bleeding  --32Fr chest tube placed to suction; Follow up CXR with improvement of R hemothorax   --Right IJ CVL and left radial arterial line placed   --Patient with increase in O2 saturation and able to decrease Levophed requirements after procedure  --Continue chest tube to suction  --If patient continues to bleed, will obtain CTA Chest to look for other sources of bleeding

## 2022-01-09 NOTE — NURSING
2044: patients HR in the 150s sustianed. Attempted to call  and left message to call .    2115: attempted Dr. Hyatt again.    2126: Called EICU. Reported HR in the 150s and decreased sats. Lopressor 2.5 mg IVP ordered also ordered CBC, CMP with Mag, EKG, Chest XRAY, and ABG.  PaO2 37 reported to E-ICU MD. Vent changes made. Levofed titrated up to 0.2 mcg and propofol down to 20mcg BP 68/43 BP now 116/53.    2210: Patient desatting E-ICU MD notified and family notified. E- ICU MD ordered ETT inserted to 27 cm lip. Repeat chest xray ordered.    2223: E-ICU ordered Lactic   Acid and TNI. Labs were drawn and brought to lab at that time.

## 2022-01-09 NOTE — ASSESSMENT & PLAN NOTE
Currently on abx, has stablizied, pressores are at low dose, remains critically ill.  -white count elevated, patient is on stress dose steroids, in setting of upward trend, will add vanc

## 2022-01-09 NOTE — PROGRESS NOTES
[facility] - Intensive Care  General Surgery  Progress Note    Subjective:     History of Present Illness:  67yo male with history of afib and EtOH abuse who presents with progressing respiratory failure.  Patient currently intubated and sedated with acute desaturation this afternoon.  CXR obtained demonstrated unchanged large right pleural effusion with no signs of pneumothorax.  Pleural effusion also seen on CT chest obtained during this admission.  General surgery has been consulted for drainage.       Post-Op Info:  * No surgery found *         Interval History:   Patient seen and examined.  Drop in Hgb overnight despite 2 Units PRBC with progressing desaturation and hypotension.  CXR demonstrated large R effusion vs hemothorax with right chest wall hematoma near the upper lobe.      Medications:  Continuous Infusions:   dexmedetomidine (PRECEDEX) infusion 1.4 mcg/kg/hr (01/09/22 1038)    NORepinephrine bitartrate-D5W 0.7 mcg/kg/min (01/09/22 0801)    propofoL 35 mcg/kg/min (01/09/22 1039)     Scheduled Meds:   amiodarone  200 mg Oral Daily    atorvastatin  40 mg Oral Daily    enoxaparin  40 mg Subcutaneous Daily    fluconazole (DIFLUCAN) IVPB  200 mg Intravenous Q24H    hydrocortisone sodium succinate  50 mg Intravenous Q8H    levalbuterol  1.25 mg Nebulization TID WAKE    magnesium oxide  400 mg Oral BID    pantoprazole  40 mg Intravenous Daily    piperacillin-tazobactam (ZOSYN) IVPB  4.5 g Intravenous Q8H    potassium bicarbonate  20 mEq Per NG tube BID    vancomycin (VANCOCIN) IVPB  1,000 mg Intravenous Q12H     PRN Meds:sodium chloride, acetaminophen, influenza, morphine, morphine, sodium chloride 0.9%, Pharmacy to dose Vancomycin consult **AND** vancomycin - pharmacy to dose     Review of patient's allergies indicates:  No Known Allergies  Objective:     Vital Signs (Most Recent):  Temp: 97.6 °F (36.4 °C) (01/09/22 1028)  Pulse: 109 (01/09/22 1028)  Resp: (!) 22 (01/09/22 1028)  BP: (!) 152/85  (01/09/22 1028)  SpO2: 97 % (01/09/22 1028) Vital Signs (24h Range):  Temp:  [97.1 °F (36.2 °C)-98.8 °F (37.1 °C)] 97.6 °F (36.4 °C)  Pulse:  [] 109  Resp:  [0-44] 22  SpO2:  [75 %-100 %] 97 %  BP: ()/(35-98) 152/85     Weight: 77.1 kg (170 lb)  Body mass index is 24.39 kg/m².    Intake/Output - Last 3 Shifts       01/07 0700  01/08 0659 01/08 0700 01/09 0659 01/09 0700  01/10 0659    I.V. (mL/kg) 1525.7 (19.8) 1888 (24.5)     Blood  1155.8 444.2    NG/GT 1535 800     IV Piggyback 802.9 600     Total Intake(mL/kg) 3863.5 (50.1) 4443.8 (57.6) 444.2 (5.8)    Urine (mL/kg/hr) 2350 (1.3) 1875 (1)     Stool 525 525     Chest Tube 10      Total Output 2885 2400     Net +978.5 +2043.8 +444.2           Stool Occurrence 1 x            Physical Exam  Constitutional:       General: He is in acute distress.      Appearance: He is ill-appearing and toxic-appearing.   HENT:      Mouth/Throat:      Mouth: Mucous membranes are dry.   Cardiovascular:      Rate and Rhythm: Regular rhythm. Tachycardia present.   Pulmonary:      Effort: Respiratory distress present.      Comments: Firm hematoma overlying previous R chest tube site.   Abdominal:      General: Abdomen is flat. There is no distension.      Palpations: Abdomen is soft.   Musculoskeletal:      Comments: Bilateral UE edema   Skin:     Capillary Refill: Capillary refill takes 2 to 3 seconds.      Coloration: Skin is pale.      Comments: Cold and clammy          Significant Labs:  CBC:   Recent Labs   Lab 01/09/22  0355   WBC 28.80*   RBC 2.15*   HGB 6.7*   HCT 19.7*   *   MCV 92   MCH 31.2*   MCHC 34.0     BMP:   Recent Labs   Lab 01/09/22  0355   *      K 3.6      CO2 31*   BUN 64*   CREATININE 1.0   CALCIUM 7.1*   MG 2.2       Significant Diagnostics:  I have reviewed all pertinent imaging results/findings within the past 24 hours.    Assessment/Plan:     Acute hemorrhage  Patient with presumed acute hemorrhage from R chest tube site.   Chest tube placed on 1/4 with acute bleeding overnight.  Site hemostatic on 1/5 and remained so until tube removed on 1/7.  Patient with acute drop in Hgb for formation of chest wall hematoma seen on CXR 1/8 and 1/9.  Chest tube removal atraumatic and site of bleeding presumed to be from chest wall muscle vs. Intercostal muscle     --Wound urgently explored at bedside with evacuation of 2L mixture of coagulated blood and pleural fluid  --Brisk venous bleed from chest wall muscle identified and suture ligated   --Intercostal muscle and pleura explored with no signs of further bleeding  --32Fr chest tube placed to suction; Follow up CXR with improvement of R hemothorax   --Right IJ CVL and left radial arterial line placed   --Patient with increase in O2 saturation and able to decrease Levophed requirements after procedure  --Continue chest tube to suction  --If patient continues to bleed, will obtain CTA Chest to look for other sources of bleeding       Pleural effusion  R chest tube removed   Occlusive dressing in place       PNA (pneumonia)  Continue R chest tube to wall suction, will remove when output < 100cc/24hrs  POD 1 s/p L thoracentesis - fluid sent off for culture   Small improvement in CXR        Macy Ni MD  General Surgery  [facility] - Intensive Care

## 2022-01-09 NOTE — NURSING
Patient noted with a heart rate of 150's sustained. Dr Hyatt paged.    2100: Dr Hyatt called again after no answer. Still no answer. Heart rate still 140's to 150's.     2115: E-ICU called with no answer.    2125: E-ICU Virtually in patients room.Patients heart rate is still elevated. He ordered Lopressor 2.5mg ivp. It was given. BP now 69/54, Increased Levofed to 0.15. Diprovan down to 25mcg and Precedex decreased to 0.7.     Patients sats decreased to 68-78% ordered ABG's PaO2  37 reported to E-ICU MD. Chest xray obtained and EKG results seen by E-ICU MD. Family notified of patients condition.    After review of the Chest xray MD orders ETT advance from 24 to 27 at lip. Tube advance and repeat chest xray ordered. Levofed increased to 0.2 mcg for BP 71/46.   O2 sats now 96 % on 100% FIO2.    BP stabilized at 101/58 on 0.25 Levofed.

## 2022-01-09 NOTE — ASSESSMENT & PLAN NOTE
+ bradycardia, holding amiodarone.  1/8 patient now with AFib RVR, will restart amiodarone, decrease dose to 200mg, wean pressors as tolerated  1/9 afib with RVR last night, metoprolol was added, rate better controlled, continue follow up

## 2022-01-09 NOTE — EICU
Rounding (Video Assessment):  Yes  Comments: Video assessment completed.  Continues on vent support (AC 12 , PEEP 8, FiO2 60%), sedated on propofol and precedex IV drips;  Norepinephrine IV drip also infusing (refer to MAR for rates), blood products presently infusing.  Afib noted on monitor, 's, /64, RR 18, POx 100%.

## 2022-01-09 NOTE — ASSESSMENT & PLAN NOTE
Triple antibiotic therapy.   Plan as above  1/8 on IV antibiotic, chest tube removed yesterday, surgery following, continue to monitor  1/9 on IV abx, chest tube had been removed, now with decrease in H/H, transfuse, surgery notified, possible bleed from previous chest tube

## 2022-01-09 NOTE — SUBJECTIVE & OBJECTIVE
Interval History: Patient seen and examined.     Medications:  Continuous Infusions:   dexmedetomidine (PRECEDEX) infusion 1.3 mcg/kg/hr (01/09/22 0352)    NORepinephrine bitartrate-D5W 0.25 mcg/kg/min (01/09/22 0353)    propofoL 35 mcg/kg/min (01/09/22 0417)     Scheduled Meds:   amiodarone  200 mg Oral Daily    atorvastatin  40 mg Oral Daily    enoxaparin  40 mg Subcutaneous Daily    fluconazole (DIFLUCAN) IVPB  200 mg Intravenous Q24H    hydrocortisone sodium succinate  50 mg Intravenous Q8H    levalbuterol  1.25 mg Nebulization TID WAKE    magnesium oxide  400 mg Oral BID    pantoprazole  40 mg Intravenous Daily    piperacillin-tazobactam (ZOSYN) IVPB  4.5 g Intravenous Q8H    potassium bicarbonate  20 mEq Per NG tube BID     PRN Meds:sodium chloride, acetaminophen, influenza, morphine, morphine, sodium chloride 0.9%, Pharmacy to dose Vancomycin consult **AND** vancomycin - pharmacy to dose     Review of patient's allergies indicates:  No Known Allergies  Objective:     Vital Signs (Most Recent):  Temp: 97.4 °F (36.3 °C) (01/09/22 0545)  Pulse: 94 (01/09/22 0600)  Resp: (!) 44 (01/09/22 0600)  BP: (!) 71/35 (01/09/22 0600)  SpO2: (!) 84 % (01/09/22 0600) Vital Signs (24h Range):  Temp:  [97.1 °F (36.2 °C)-98.8 °F (37.1 °C)] 97.4 °F (36.3 °C)  Pulse:  [] 94  Resp:  [0-44] 44  SpO2:  [80 %-100 %] 84 %  BP: ()/(35-98) 71/35     Weight: 77.1 kg (170 lb)  Body mass index is 24.39 kg/m².    Intake/Output - Last 3 Shifts       01/07 0700 01/08 0659 01/08 0700 01/09 0659    I.V. (mL/kg) 1525.7 (19.8) 796 (10.3)    Blood  1155.8    NG/GT 1535 800    IV Piggyback 802.9 600    Total Intake(mL/kg) 3863.5 (50.1) 3351.8 (43.5)    Urine (mL/kg/hr) 2350 (1.3) 1875 (1)    Stool 525 525    Chest Tube 10     Total Output 2885 2400    Net +978.5 +951.8          Stool Occurrence 1 x           Physical Exam  Vitals reviewed.   Constitutional:       General: He is not in acute distress.     Appearance: He is  ill-appearing and toxic-appearing.   Cardiovascular:      Rate and Rhythm: Normal rate and regular rhythm.   Pulmonary:      Effort: No respiratory distress.      Breath sounds: Rales present.   Abdominal:      General: Abdomen is flat.      Palpations: Abdomen is soft.   Musculoskeletal:      Comments: Abrasion to R knee   Skin:     General: Skin is warm and dry.         Significant Labs:  CBC:   Recent Labs   Lab 01/09/22  0355   WBC 28.80*   RBC 2.15*   HGB 6.7*   HCT 19.7*   *   MCV 92   MCH 31.2*   MCHC 34.0     BMP:   Recent Labs   Lab 01/09/22  0355   *      K 3.6      CO2 31*   BUN 64*   CREATININE 1.0   CALCIUM 7.1*   MG 2.2       Significant Diagnostics:  CXR 1/7/2022:  Impression:     Resolution of pleural fluid collection within the right upper chest and improved aeration of left lower lung     No detrimental interval changes    Review of Systems   Unable to perform ROS: Intubated

## 2022-01-09 NOTE — ASSESSMENT & PLAN NOTE
Elevated, likely in the setting of hypoperfusion.   - Continue to follow liver enzymes. Improving.   1/8 slight increase in LFTs, continue to monitor  1/9 LFTs trending down

## 2022-01-09 NOTE — SUBJECTIVE & OBJECTIVE
Interval History:   Patient seen and examined.  Drop in Hgb overnight despite 2 Units PRBC with progressing desaturation and hypotension.  CXR demonstrated large R effusion vs hemothorax with right chest wall hematoma near the upper lobe.      Medications:  Continuous Infusions:   dexmedetomidine (PRECEDEX) infusion 1.4 mcg/kg/hr (01/09/22 1038)    NORepinephrine bitartrate-D5W 0.7 mcg/kg/min (01/09/22 0801)    propofoL 35 mcg/kg/min (01/09/22 1039)     Scheduled Meds:   amiodarone  200 mg Oral Daily    atorvastatin  40 mg Oral Daily    enoxaparin  40 mg Subcutaneous Daily    fluconazole (DIFLUCAN) IVPB  200 mg Intravenous Q24H    hydrocortisone sodium succinate  50 mg Intravenous Q8H    levalbuterol  1.25 mg Nebulization TID WAKE    magnesium oxide  400 mg Oral BID    pantoprazole  40 mg Intravenous Daily    piperacillin-tazobactam (ZOSYN) IVPB  4.5 g Intravenous Q8H    potassium bicarbonate  20 mEq Per NG tube BID    vancomycin (VANCOCIN) IVPB  1,000 mg Intravenous Q12H     PRN Meds:sodium chloride, acetaminophen, influenza, morphine, morphine, sodium chloride 0.9%, Pharmacy to dose Vancomycin consult **AND** vancomycin - pharmacy to dose     Review of patient's allergies indicates:  No Known Allergies  Objective:     Vital Signs (Most Recent):  Temp: 97.6 °F (36.4 °C) (01/09/22 1028)  Pulse: 109 (01/09/22 1028)  Resp: (!) 22 (01/09/22 1028)  BP: (!) 152/85 (01/09/22 1028)  SpO2: 97 % (01/09/22 1028) Vital Signs (24h Range):  Temp:  [97.1 °F (36.2 °C)-98.8 °F (37.1 °C)] 97.6 °F (36.4 °C)  Pulse:  [] 109  Resp:  [0-44] 22  SpO2:  [75 %-100 %] 97 %  BP: ()/(35-98) 152/85     Weight: 77.1 kg (170 lb)  Body mass index is 24.39 kg/m².    Intake/Output - Last 3 Shifts       01/07 0700 01/08 0659 01/08 0700 01/09 0659 01/09 0700  01/10 0659    I.V. (mL/kg) 1525.7 (19.8) 1888 (24.5)     Blood  1155.8 444.2    NG/GT 1535 800     IV Piggyback 802.9 600     Total Intake(mL/kg) 3863.5 (50.1) 4443.8  (57.6) 444.2 (5.8)    Urine (mL/kg/hr) 2350 (1.3) 1875 (1)     Stool 525 525     Chest Tube 10      Total Output 2885 2400     Net +978.5 +2043.8 +444.2           Stool Occurrence 1 x            Physical Exam  Constitutional:       General: He is in acute distress.      Appearance: He is ill-appearing and toxic-appearing.   HENT:      Mouth/Throat:      Mouth: Mucous membranes are dry.   Cardiovascular:      Rate and Rhythm: Regular rhythm. Tachycardia present.   Pulmonary:      Effort: Respiratory distress present.      Comments: Firm hematoma overlying previous R chest tube site.   Abdominal:      General: Abdomen is flat. There is no distension.      Palpations: Abdomen is soft.   Musculoskeletal:      Comments: Bilateral UE edema   Skin:     Capillary Refill: Capillary refill takes 2 to 3 seconds.      Coloration: Skin is pale.      Comments: Cold and clammy          Significant Labs:  CBC:   Recent Labs   Lab 01/09/22  0355   WBC 28.80*   RBC 2.15*   HGB 6.7*   HCT 19.7*   *   MCV 92   MCH 31.2*   MCHC 34.0     BMP:   Recent Labs   Lab 01/09/22  0355   *      K 3.6      CO2 31*   BUN 64*   CREATININE 1.0   CALCIUM 7.1*   MG 2.2       Significant Diagnostics:  I have reviewed all pertinent imaging results/findings within the past 24 hours.

## 2022-01-10 NOTE — CARE UPDATE
Patient reintubated at this time per Dr. Quezada with 7.5mm tube [id number] at lip. Tube securely taped. BBS confirmed and CO2 detector with proper color change noted.

## 2022-01-10 NOTE — SUBJECTIVE & OBJECTIVE
Interval History: See     Review of Systems   Unable to perform ROS: Severe respiratory distress     Objective:     Vital Signs (Most Recent):  Temp: 98.4 °F (36.9 °C) (01/10/22 0357)  Pulse: 100 (01/10/22 0723)  Resp: (!) 32 (01/10/22 0723)  BP: (!) 117/57 (01/10/22 0416)  SpO2: 96 % (01/10/22 0723) Vital Signs (24h Range):  Temp:  [96.8 °F (36 °C)-98.8 °F (37.1 °C)] 98.4 °F (36.9 °C)  Pulse:  [] 100  Resp:  [12-46] 32  SpO2:  [75 %-100 %] 96 %  BP: ()/(49-95) 117/57  Arterial Line BP: ()/(48-88) 110/57     Weight: 77.1 kg (170 lb)  Body mass index is 24.39 kg/m².    Intake/Output Summary (Last 24 hours) at 1/10/2022 0821  Last data filed at 1/10/2022 0500  Gross per 24 hour   Intake 2715.29 ml   Output 4400 ml   Net -1684.71 ml      Physical Exam  Constitutional:       Appearance: He is ill-appearing.      Interventions: He is intubated.   HENT:      Head: Normocephalic and atraumatic.      Nose: Nose normal.      Mouth/Throat:      Mouth: Mucous membranes are moist.      Comments: ET Tube free of secretions.   Eyes:      Pupils: Pupils are equal, round, and reactive to light.   Cardiovascular:      Rate and Rhythm: Normal rate. Rhythm irregular.      Pulses: Normal pulses.   Pulmonary:      Effort: Prolonged expiration present. He is intubated.      Breath sounds: No stridor. Rhonchi and rales present. No wheezing.   Abdominal:      General: Abdomen is flat.      Palpations: Abdomen is soft.   Musculoskeletal:      Cervical back: Neck supple.      Right lower leg: No edema.      Left lower leg: No edema.   Skin:     General: Skin is warm.   Psychiatric:      Comments: Intubated and sedated         Significant Labs:   All pertinent labs within the past 24 hours have been reviewed.  CBC:   Recent Labs   Lab 01/09/22  1336 01/09/22  1815 01/10/22  0330   WBC 39.74* 37.61* 33.78*   HGB 11.6* 10.7* 9.8*   HCT 34.3* 31.3* 29.3*   * 120* 129*     CMP:   Recent Labs   Lab 01/09/22  0355  01/09/22  1336 01/10/22  0330    141 140   K 3.6 3.7 4.1    99 99   CO2 31* 34* 35*   * 121* 149*   BUN 64* 77* 77*   CREATININE 1.0 1.3 1.4   CALCIUM 7.1* 8.5* 8.4*   PROT 4.4* 5.2* 5.1*   ALBUMIN 1.3* 1.6* 1.5*   BILITOT 0.5 0.7 0.8   ALKPHOS 76 85 76   AST 98* 72* 51*   ALT 91* 87* 63*   ANIONGAP 10 8 6*   EGFRNONAA >60.0 56.9* 52.0*       Significant Imaging: I have reviewed all pertinent imaging results/findings within the past 24 hours.

## 2022-01-10 NOTE — PROGRESS NOTES
"[facility] Intensive Care  Adult Nutrition  Progress Note    SUMMARY       Recommendations    Recommendation/Intervention:   1. EN Recs: Vital HP @ 50 ml/hr with Andres BID. With propofol infusing at 18.5 ml/hr,this will provide a total of 1869 kcal (108% EEN), 105 gm protein (100% EPN), and 1,003 ml water.           2. Rec'd FWF of 30 ml q4-6h to prevent clogging.           3. RD to monitor and make recs accordingly    Goals: Goal rate by RD f/u.  Nutrition Goal Status: new  Communication of RD Recs: reviewed with physician    Assessment and Plan  Nutrition Problem  Inadequate protein-energy intake     Related to (etiology):   NPO/vent status     Signs and Symptoms (as evidenced by):   EEN < 25%      Interventions/Recommendations (treatment strategy):  1. EN Recs: Vital HP @ 50 ml/hr with Andres BID. With propofol infusing at 18.5 ml/hr,this will provide a total of 1869 kcal (108% EEN), 105 gm protein (100% EPN), and 1,003 ml water.           2. Rec'd FWF of 30 ml q4-6h to prevent clogging.           3. RD to monitor and make recs accordingly         Nutrition Diagnosis Status:   Resolved    Reason for Assessment    Reason For Assessment: RD follow-up  Diagnosis: pulmonary disease  Relevant Medical History: Tolerating TF at 40 ml/hr. Continue to advance to goal of 55. Propofol @ 23.1 ml/hr  General Information Comments: Tolerating TF above goal rate. Will replace order to ensure correct goal rate showing. Continue Andres BID. Propofol maxed out at 23.1 ml/hr (609 kcal).  Nutrition Discharge Planning: TBD    Nutrition Risk Screen    Nutrition Risk Screen: tube feeding or parenteral nutrition    Nutrition/Diet History    Food Allergies: NKFA  Factors Affecting Nutritional Intake: NPO,on mechanical ventilation    Anthropometrics    Temp: 98.8 °F (37.1 °C)  Height: 5' 10" (177.8 cm)  Height (inches): 70 in  Weight Method: Standard Scale  Weight: 77.1 kg (170 lb)  Weight (lb): 170 lb  Ideal Body Weight (IBW), Male: 166 " lb  % Ideal Body Weight, Male (lb): 102.41 %  BMI (Calculated): 24.4  BMI Grade: 18.5-24.9 - normal       Lab/Procedures/Meds    Pertinent Labs Reviewed: reviewed  Pertinent Medications Reviewed: reviewed    Physical Findings/Assessment         Estimated/Assessed Needs    Weight Used For Calorie Calculations: 77.1 kg (170 lb)  Energy Calorie Requirements (kcal): 1793 kcal  Energy Need Method: [facility]  Protein Requirements:  (1.2-1.5 gm/kg CBW)  Weight Used For Protein Calculations: 77.1 kg (170 lb)     Estimated Fluid Requirement Method: RDA Method  RDA Method (mL): 1793         Nutrition Prescription Ordered    Current Diet Order: NPO  Current Nutrition Support Formula Ordered: Other (Comment) (Vital HP)  Current Nutrition Support Rate Ordered: 40 (ml)    Evaluation of Received Nutrient/Fluid Intake    Enteral Calories (kcal): 1200  Enteral Protein (gm): 105  Enteral (Free Water) Fluid (mL): 802  Free Water Flush Fluid (mL): 1003  Total Calories (kcal): 1989  % Kcal Needs: 110% with propofol/tia/current rate  Total Protein (gm): 120  % Protein Needs: 104% with current rate and tia  Energy Calories Required: exceeds needs  Protein Required: exceeds needs  Fluid Required: not meeting needs  % Intake of Estimated Energy Needs: 75 - 100 %  % Meal Intake: NPO    Nutrition Risk    Level of Risk/Frequency of Follow-up: moderate - high     Monitor and Evaluation    Food and Nutrient Intake: enteral nutrition intake  Food and Nutrient Adminstration: enteral and parenteral nutrition administration  Anthropometric Measurements: weight change,weight  Biochemical Data, Medical Tests and Procedures: electrolyte and renal panel,glucose/endocrine profile,lipid profile,inflammatory profile  Nutrition-Focused Physical Findings: overall appearance     Nutrition Follow-Up    RD Follow-up?: Yes

## 2022-01-10 NOTE — PROGRESS NOTES
[facility] Medicine  Progress Note    Patient Name: Neymar Patel  MRN: [id number]  Patient Class: IP- Inpatient   Admission Date: 12/21/2021  Length of Stay: 19 days  Attending Physician: Raul Gan III, MD  Primary Care Provider: Landen Brown MD        Subjective:     Principal Problem:Acute on chronic respiratory failure with hypoxia        HPI:  Perpetual History  Patient is a 66-year-old male with a history of alcohol abuse atrial fibrillation hypertension peripheral artery disease fatty liver disease who recently had an episode of bronchitis and was treated in the outpatient setting.  The patient then began in having increasing weakness and lightheadedness and noticed his heart rate was elevated.  He presented to the emergency department and was found to have elevated alcohol levels and a heart rate in the 130s to 140s.  The patient is currently in AFib with RVR and has been started on a Cardizem drip by the emergency department.         Overview/Hospital Course:  12/23/21 [facility] patient reports that he is feeling better today, was able to get rest last night. Heart rate still elevated will adjust medications and monitor. Patient with hallucinations last night, patient is appropriate this am  12/24/21:   Rapid Response Call #1: Blood pressure initially low and patient heart rate in the 50s sinus Nakul.  Glucose within normal limits.  Fluid bolus 1 L was given with improvement in blood pressure.  Advised to hold any medication that will lower patient's heart rate.  Patient also complains of shortness of breath but according to respiratory therapist, patient did not take his breathing treatment because he did not think he needed it.  Patient wheezy expiratory and saturating in the high 80s on room air. not on any antibiotics currently so will start.  Labs show that patient has TANIKA possibly due to dehydration.  Will resuscitate with fluid.  Will recheck basic blood work and continue  to hydrate.  Treat accordingly will at started if patient is not already on it.  Patient more alert and oriented x4 moving all extremities with improvement of blood pressure into the 90s and heart rate remains in the 50s sinus Nakul saturating 94% on 2 L  Rapid Response Call #2:Rapid response team called again with subsequent intubation and transfer to MICU.   12/25/21: On minimal sedation and pressor support in the MICU.   12/26/21: Able to wean off of all pressor support, pH of 5 while on bicarb on AM labs. Will get rid of bicarb drip and switch to gentle IV hydration with LR. SBT tomorrow?  12/27/21:  Events over the weekend noted.  Patient is now intubated on the ventilator.  Patient was admitted with pneumonia atrial fibrillation and alcohol abuse and now also has a enterococcal urinary tract infection with sensitivities pending.  Patient has required to sedatives for agitation and combativeness.  12/28/21:  Patient's ABG has been reviewed.  Patient has a mild respiratory alkalosis and his respiratory rate has been changed.  We have encouraged respiratory and nursing to wean his sedation and titrate his tube feeds.  The patient's chest x-ray also seems like he is slightly volume overloaded therefore will discontinue IV fluids and give low dosing of Lasix.  Patient has enterococcal infection was ampicillin sensitive therefore Rocephin should be adequate.  Will change Zithromax to Levaquin.  12/29/21 FM:  Patient had an episode of desaturation yesterday.  Respiratory was able to deep suction and several mucus plugs and thick secretions were removed.  I will broaden patient antibiotics today to Zosyn and otherwise plan on increasing his diuretics today.  Patient has bilateral pulmonary infiltrates consistent with pulmonary edema.  12/30/21 FM:  Patient had a elevated temperature through the night.  Rocephin was changed to Zosyn yesterday and consideration of drug fever should be entertained.  Will add medicine for  MRSA infection today as well as antifungals.  Patient's chest x-ray seems to be improved with diuretics.  Continue diuretics cautiously.  12/31/21 CG: Antibiotic course broadened, tachycardic with an increase in PEEP. He is net negative over the past 24 hours.    1/1/22 CG: No acute overnight events. PEEP decreased to 10.   1/2/22 CG: CXR shows worsening fluid burden, heart rates in 110s-120s.   1/3/22 FM:  Patient is on an FiO2 of 55%.  His chest x-ray still shows pulmonary edema which is also consistent with his CT scan.  We will replace electrolytes transfuse and continue to diurese.  Hopefully we can wean his FiO2 down this weekend and wean him off the ventilator by the end of the week.  1/4/22 FM:  Patient's vital signs are stable this morning and he is sedated.  Nursing reports that when sedation is weaned the patient is awake opens eyes and is alert.  We are attempting a CPAP trial this morning as the patient is tolerating and FiO2 of 50% and could probably be weaned further.  Will reduce dosing of diuretics today.  Continue to replete potassium.  1/5/22 FM:  Yesterday afternoon the patient had a desaturation his oxygen and his chest x-ray showed a worsening right-sided pleural effusion.  A CT scan done 2 days ago showed some layering and there was a discussion about possible loculations.  The patient's chest tube initially had 200 cc of a serous drainage which is now bloody.  Patient's chest x-ray has improved after placement.  Patient's oxygenation today is improved but his condition is precarious.  I have spoken to the family and let them know a high risk and possible poor outcome.  1/6/22 FM:  Patient had a left-sided pleural effusion drained and about 600 cc of a serous fluid was removed.  Patient's right chest tube had 10 cc overnight of a bloody fluid.  Diagnostic testing is all pending.  Today the patient's chest x-ray is noted in his left lung seems to be hypoinflated.  I am increasing the tidal  volume slightly today and watching his airway pressures.  1/7/22 FM:  Patient's ABG shows a respiratory alkalosis.  We are reducing his minute ventilation.  Patient's chest x-ray is significantly improved today.  Will have Respiratory and Nursing try to wean sedation and CPAP trial today.  Patient is now on ventilator day 14.  Patient has had intermittent bradycardia and will hold amiodarone for now.  1/8 AA, weekend crosscover, patient on ventilator, repeat chest x-ray pending, amiodarone stopped secondary to bradycardia, history of AFib, patient was on amiodarone, stopped secondary to bradycardia, now patient increased heart rate, will restart amiodarone, will decrease dose, blood pressure appears stable, continue to wean nor epi, on stress dose steroids, surgery following, H&H dropped, possible transfusion  1/9 AA, weekend crosscover, patient started to have afib with RVR, eICU was called, adjustment made to pressors, started on metoprolol, on levophed, drop in H/H, had chest tube removed on 7th, no melena or BRBPR reported, surgery called to evaluate, suspect possible from previous chest tube site, transfuse prbc, serial H/Hs, follow up with surgery recs.  1/10/22 FM:  Events over the weekend noted.  Patient is now back on amiodarone and his heart rate is slightly improved.  Chest tube is back in place with 100 cc of a bloody thin fluid.  All cultures have been reviewed and there is no significant growth.  Patient has now been on broad-spectrum antibiotics and antifungals for greater than 10 days.  Patient's WBC count is going up.  Spoke with General surgery today about placing tracheostomy.      Interval History: See HC    Review of Systems   Unable to perform ROS: Severe respiratory distress     Objective:     Vital Signs (Most Recent):  Temp: 98.4 °F (36.9 °C) (01/10/22 0357)  Pulse: 100 (01/10/22 0723)  Resp: (!) 32 (01/10/22 0723)  BP: (!) 117/57 (01/10/22 0416)  SpO2: 96 % (01/10/22 0723) Vital Signs (24h  Range):  Temp:  [96.8 °F (36 °C)-98.8 °F (37.1 °C)] 98.4 °F (36.9 °C)  Pulse:  [] 100  Resp:  [12-46] 32  SpO2:  [75 %-100 %] 96 %  BP: ()/(49-95) 117/57  Arterial Line BP: ()/(48-88) 110/57     Weight: 77.1 kg (170 lb)  Body mass index is 24.39 kg/m².    Intake/Output Summary (Last 24 hours) at 1/10/2022 0821  Last data filed at 1/10/2022 0500  Gross per 24 hour   Intake 2715.29 ml   Output 4400 ml   Net -1684.71 ml      Physical Exam  Constitutional:       Appearance: He is ill-appearing.      Interventions: He is intubated.   HENT:      Head: Normocephalic and atraumatic.      Nose: Nose normal.      Mouth/Throat:      Mouth: Mucous membranes are moist.      Comments: ET Tube free of secretions.   Eyes:      Pupils: Pupils are equal, round, and reactive to light.   Cardiovascular:      Rate and Rhythm: Normal rate. Rhythm irregular.      Pulses: Normal pulses.   Pulmonary:      Effort: Prolonged expiration present. He is intubated.      Breath sounds: No stridor. Rhonchi and rales present. No wheezing.   Abdominal:      General: Abdomen is flat.      Palpations: Abdomen is soft.   Musculoskeletal:      Cervical back: Neck supple.      Right lower leg: No edema.      Left lower leg: No edema.   Skin:     General: Skin is warm.   Psychiatric:      Comments: Intubated and sedated         Significant Labs:   All pertinent labs within the past 24 hours have been reviewed.  CBC:   Recent Labs   Lab 01/09/22  1336 01/09/22  1815 01/10/22  0330   WBC 39.74* 37.61* 33.78*   HGB 11.6* 10.7* 9.8*   HCT 34.3* 31.3* 29.3*   * 120* 129*     CMP:   Recent Labs   Lab 01/09/22  0355 01/09/22  1336 01/10/22  0330    141 140   K 3.6 3.7 4.1    99 99   CO2 31* 34* 35*   * 121* 149*   BUN 64* 77* 77*   CREATININE 1.0 1.3 1.4   CALCIUM 7.1* 8.5* 8.4*   PROT 4.4* 5.2* 5.1*   ALBUMIN 1.3* 1.6* 1.5*   BILITOT 0.5 0.7 0.8   ALKPHOS 76 85 76   AST 98* 72* 51*   ALT 91* 87* 63*   ANIONGAP 10 8 6*    EGFRNONAA >60.0 56.9* 52.0*       Significant Imaging: I have reviewed all pertinent imaging results/findings within the past 24 hours.      Assessment/Plan:      * Acute on chronic respiratory failure with hypoxia  Will have GS trach this week, stop abx soon.    Acute hemorrhage        Pleural effusion  ? Cause, ? Malignant ? Parapneumonic?  1/9 chest tube removed, now with drop in H/H, follow up with surgery recs, transfuse prbcs, patient with hematoma, surgery planning to evacuate and place chest tube    PNA (pneumonia)        Severe sepsis  Suspect acute WBC increase related to stressfull events over weekend (blood products, A.Fib RVR, etc...)  Cont. To monitor and stopping abx soon.        UTI (urinary tract infection)  See abx      DVT prophylaxis  Hold eliquis for now in setting of TANIKA.      Atrial fibrillation with rapid ventricular response  HR improved, back on amiodarone.    Tobacco user  PRN Nicotine patch    Alcohol dependence        Elevated LFTs  LFTs trending down        TANIKA (acute kidney injury)  Noted, Cr, remaining around 1.5  Appears at baseline        VTE Risk Mitigation (From admission, onward)         Ordered     Place sequential compression device  Until discontinued         01/05/22 0815     IP VTE HIGH RISK PATIENT  Once         12/21/21 2233     Reason for No Pharmacological VTE Prophylaxis  Once        Question:  Reasons:  Answer:  Already adequately anticoagulated on oral Anticoagulants    12/21/21 2233                Discharge Planning   DAXA:      Code Status: Full Code   Is the patient medically ready for discharge?:     Reason for patient still in hospital (select all that apply): Patient unstable  Discharge Plan A: Home with family,Home Health                  Raul Gan III, MD  Department of Hospital Medicine   [place] - Intensive [facility]

## 2022-01-10 NOTE — PLAN OF CARE
Patient remains intubated and on Levofed, propofol and precedex. Blood pressures and oxygen levels are very labile. Any repositioning reuslts in drop in both. He received a total of four units of PRBC over the last 36 hours he was found to be bleeding in the Rt thoracic cavity. Dr Ni cauterized and placed  a 32fr Chest tube to -20 cm suction. A large amount of bloody drainage was removed. Patient also had a RT TLC and a Left Arterial line. His ETT will not stay connected to the circuit. Will be changed today per respiratory.

## 2022-01-10 NOTE — ASSESSMENT & PLAN NOTE
Suspect acute WBC increase related to stressfull events over weekend (blood products, A.Fib RVR, etc...)  Cont. To monitor and stopping abx soon.

## 2022-01-10 NOTE — PHYSICIAN QUERY
PT Name: Neymar Patel  MR #: [id number]    DOCUMENTATION CLARIFICATION      CDS/: HONEY Tse, RN, CDS               Contact information:[email or web address]    This form is a permanent document in the medical record.      Query Date: January 10, 2022    By submitting this query, we are merely seeking further clarification of documentation. Please utilize your independent clinical judgment when addressing the question(s) below.    The Medical Record contains the following:   Indicators  Supporting Clinical Findings Location in Medical Record   X Anemia documented  The patient has a worsening anemia today and has had bleeding related to his chest tube    Dr. Elvia POTTER, III, 1/12   X H&H  H&H dropped, possible transfusion     Drop in Hgb overnight despite 2 Units PRBC with progressing desaturation and hypotension.        1/6 1/7 1/8 1/8 1/8 1/9 1/9 1/9 1/10   Hgb 9.6 (L) 9.0 (L) 7.5 (L) 7.1 (L) 8.0 (L) 6.7 (L) 11.6 (L) 10.7 (L) 9.8 (L)   Hct 29.3 (L) 26.9 (L) 23.7 (L) 22.1 (L) 23.4 (L) 19.7 (LL) 34.3 (L) 31.3 (L) 29.3 (L)     Dr. Edmar POTTER, 1/8     General surgery, Dr. Ni, 1/9         Lab 1/6-1/10             X BP                    HR  Pulse:  [] 104   BP: ()/(50-78) 71/52     Pulse:  [] 109   BP: ()/(35-98) 152/85    Dr. Edmar POTTER, 1/8       General surgery, Dr. Ni, 1/9    GI bleeding documented       X Acute bleeding (Non GI site)  Patient with presumed acute hemorrhage from R chest tube site     Patient with acute drop in Hgb for formation of chest wall hematoma seen on CXR 1/8 and 1/9     Brisk venous bleed from chest wall muscle identified and suture ligated     General surgery, Dr. Ni, 1/9   X Transfusion(s)  Transfused 4 units PRBC    Lab 1/8   X Acute/Chronic illness  Acute on chronic respiratory failure with hypoxia, pleural effusion, pneumonia, severe sepsis, UTI, AFib RVR, tobacco user, alcohol dependence, elevated LFTs    , Dr. Hyatt,  1/8   X Treatments  1/9 chest tube removed, now with drop in H/H, follow up with surgery recs, transfuse prbcs, patient with hematoma, surgery planning to evacuate and place chest tube     Wound urgently explored at bedside with evacuation of 2L mixture of coagulated blood and pleural fluid     32Fr chest tube placed to suction; Follow up CXR with improvement of R hemothorax      Brisk venous bleed from chest wall muscle identified and suture ligated     , Dr. Hyatt, 1/9         General surgery, Dr. Ni, 1/9    Other       Provider, please specify the type of Anemia Diagnosis associated with above clinical findings.     [  x ] Acute blood loss anemia    [   ] Anemia, unspecified    [   ] Other Hematological Diagnosis (please specify): _________________   [   ] Clinically Undetermined              Please document in your progress notes daily for the duration of treatment, until resolved, and include in your discharge summary.    Form No. [id number]

## 2022-01-10 NOTE — EICU
Rounding (Video Assessment):  Yes    Intervention Initiated From:  Bedside    Alessandra Communicated with Bedside Nurse regarding:  Documentation    Nurse Notified:  No  Doctor Notified:  No    Comments: Video rounding completed. VS on cardiac monitor: P: 90-SR,; arterial line 113/53 (73), Sa02 100%. Ventilator settings: Vt 550; AC 12; Fi02 100%; Peep +8. IV drips: Levophed 0.3 mcg/kg/min; Propofol 50 mcg/kg/min. Precede 1.4 mcg/kg/hr. WCTM as needed.

## 2022-01-10 NOTE — PLAN OF CARE
Recommendations     Recommendation/Intervention:   1. EN Recs: Vital HP @ 50 ml/hr with Andres BID. With propofol infusing at 18.5 ml/hr,this will provide a total of 1869 kcal (108% EEN), 110 gm protein (100% EPN), and 1,003 ml water.           2. Rec'd FWF of 30 ml q4-6h to prevent clogging.           3. RD to monitor and make recs accordingly           Eleazar Jones, MEENAN, LDN

## 2022-01-10 NOTE — EICU
Rounding (Video Assessment):  Yes  Comments:  Video assessment completed.  Continues on vent support (AC 12, , PEEP 8). Successfully sedated on precedex  & propofol & resting comfortably in bed,  nad noted. HR 89, /54, RR 15, POx 94% on 100% FiO2

## 2022-01-10 NOTE — CARE UPDATE
ET tube adaptor disconnecting from ET tube. Placed adaptor back on ET tube. [place] Nena ELIZABETH at bedside.  notified.

## 2022-01-10 NOTE — ASSESSMENT & PLAN NOTE
Patient with presumed acute hemorrhage from R chest tube site.  Chest tube placed on 1/4 with acute bleeding overnight.  Site hemostatic on 1/5 and remained so until tube removed on 1/7.  Patient with acute drop in Hgb for formation of chest wall hematoma seen on CXR 1/8 and 1/9.  Chest tube removal atraumatic and site of bleeding presumed to be from chest wall muscle vs. Intercostal muscle     --Hgb stable with minimal chest tube output overnight  --Continue to follow

## 2022-01-10 NOTE — PROGRESS NOTES
[place] - Intensive Care  General Surgery  Progress Note    Subjective:     History of Present Illness:  67yo male with history of afib and EtOH abuse who presents with progressing respiratory failure.  Patient currently intubated and sedated with acute desaturation this afternoon.  CXR obtained demonstrated unchanged large right pleural effusion with no signs of pneumothorax.  Pleural effusion also seen on CT chest obtained during this admission.  General surgery has been consulted for drainage.       Post-Op Info:  * No surgery found *         Interval History:   Patient seen and examined.  L lung with complete opacification on AM CXR.  ET tube advanced to 27cm overnight resulting in suspected right main stem intubation.  ET tube exchanged by anesthesia. R chest tube with minimal output overnight.  Hgb stable.      Medications:  Continuous Infusions:   dexmedetomidine (PRECEDEX) infusion 0 mcg/kg/hr (01/10/22 1407)    NORepinephrine bitartrate-D5W 0.3 mcg/kg/min (01/10/22 1200)    propofoL 50 mcg/kg/min (01/10/22 1333)     Scheduled Meds:   amiodarone  200 mg Oral Daily    atorvastatin  40 mg Oral Daily    fluconazole (DIFLUCAN) IVPB  200 mg Intravenous Q24H    hydrocortisone sodium succinate  50 mg Intravenous Q8H    levalbuterol  1.25 mg Nebulization TID WAKE    magnesium oxide  400 mg Oral BID    pantoprazole  40 mg Intravenous Daily    piperacillin-tazobactam (ZOSYN) IVPB  4.5 g Intravenous Q8H    potassium bicarbonate  20 mEq Per NG tube BID    vancomycin (VANCOCIN) IVPB  1,000 mg Intravenous Q12H     PRN Meds:sodium chloride, acetaminophen, influenza, lorazepam, morphine, morphine, sodium chloride 0.9%, Pharmacy to dose Vancomycin consult **AND** vancomycin - pharmacy to dose     Review of patient's allergies indicates:  No Known Allergies  Objective:     Vital Signs (Most Recent):  Temp: 98.8 °F (37.1 °C) (01/10/22 1130)  Pulse: 93 (01/10/22 1300)  Resp: (!) 39 (01/10/22 1300)  BP: 118/64  (01/10/22 1300)  SpO2: 99 % (01/10/22 1300) Vital Signs (24h Range):  Temp:  [96.8 °F (36 °C)-98.8 °F (37.1 °C)] 98.8 °F (37.1 °C)  Pulse:  [] 93  Resp:  [12-46] 39  SpO2:  [81 %-100 %] 99 %  BP: ()/(52-95) 118/64  Arterial Line BP: ()/(42-81) 105/51     Weight: 77.1 kg (170 lb)  Body mass index is 24.39 kg/m².    Intake/Output - Last 3 Shifts       01/08 0700 01/09 0659 01/09 0700  01/10 0659 01/10 0700  01/11 0659    I.V. (mL/kg) 1888 (24.5) 1450.3 (18.8)     Blood 1155.8 726.7     NG/ 200     IV Piggyback 600 450     Total Intake(mL/kg) 4443.8 (57.6) 2827 (36.7)     Urine (mL/kg/hr) 1875 (1) 950 (0.5)     Other  2400     Stool 525 550     Chest Tube  500     Total Output 2400 4400     Net +2043.8 -1573                  Physical Exam  Constitutional:       Appearance: He is ill-appearing, toxic-appearing and diaphoretic.   Cardiovascular:      Rate and Rhythm: Normal rate and regular rhythm.   Pulmonary:      Breath sounds: Rhonchi present.      Comments: R chest wall incision C/D/I with sutures in place  R chest tube in place to wall suction.  No air leak detected.  Small amount of serosanguinous drainage in tubing  Abdominal:      General: Abdomen is flat. Bowel sounds are normal.      Palpations: Abdomen is soft.   Musculoskeletal:      Comments: Mild duskiness to R great toe.  No overt signs of ischemia   Skin:     General: Skin is warm.         Significant Labs:  CBC:   Recent Labs   Lab 01/10/22  0330   WBC 33.78*   RBC 3.34*   HGB 9.8*   HCT 29.3*   *   MCV 88   MCH 29.3   MCHC 33.4     BMP:   Recent Labs   Lab 01/10/22  0330   *      K 4.1   CL 99   CO2 35*   BUN 77*   CREATININE 1.4   CALCIUM 8.4*   MG 2.5       Significant Diagnostics:  I have reviewed all pertinent imaging results/findings within the past 24 hours.    Assessment/Plan:     Acute hemorrhage  Patient with presumed acute hemorrhage from R chest tube site.  Chest tube placed on 1/4 with acute  bleeding overnight.  Site hemostatic on 1/5 and remained so until tube removed on 1/7.  Patient with acute drop in Hgb for formation of chest wall hematoma seen on CXR 1/8 and 1/9.  Chest tube removal atraumatic and site of bleeding presumed to be from chest wall muscle vs. Intercostal muscle     --Hgb stable with minimal chest tube output overnight  --Continue to follow        Pleural effusion  L lung field with complete opacity this AM on CXR  Diffuse atelectasis due to right main stem intubation overnight versus worsening pleural effusion  Will perform repeat thoracentesis versus chest tube placement tomorrow if no improvement on imaging  Tracheostomy tube placement requested per primary.  Patient currently on 100% FiO2; patient will need to tolerate 50-60% prior to procedure.  Will continue to wean vent as tolerated.       PNA (pneumonia)  Continue R chest tube to wall suction, will remove when output < 100cc/24hrs  POD 1 s/p L thoracentesis - fluid sent off for culture   Small improvement in CXR        Macy Ni MD  General Surgery  [place] - Intensive Care

## 2022-01-10 NOTE — CARE UPDATE
Dr. Quezada and Pedro vs.   Zemuron 40 mg per anesthesia given.   ETT exchange done.   PCXR ordered post.   ETT 22 at lip

## 2022-01-10 NOTE — ANESTHESIA PROCEDURE NOTES
Intubation    Date/Time: 1/10/2022 10:15 AM  Performed by: Casey Quezada MD  Authorized by: Casey Quezada MD     Intubation:     Induction:  Intravenous    Intubated:  Postinduction    Mask Ventilation:  Easy with oral airway    Attempts:  2    Attempted By:  Staff anesthesiologist    Method of Intubation:  Direct    Laryngeal View Grade: Grade IIA - cords partially seen      Attempted By (2nd Attempt):  Staff anesthesiologist    Method of Intubation (2nd Attempt):  Direct    Laryngeal View Grade (2nd Attempt): Grade I - full view of cords      Difficult Airway Encountered?: No      Complications:  None    Airway Device:  Oral endotracheal tube    Airway Device Size:  7.5    Style/Cuff Inflation:  Cuffed    Inflation Amount (mL):  10    Tube secured:  22    Secured at:  The lips    Placement Verified By:  Colorimetric ETCO2 device    DIFFICULT INTUBATION DESCRIPTOR: facial hair obscured view first attemp.    Findings Post-Intubation:  BS equal bilateral

## 2022-01-10 NOTE — SUBJECTIVE & OBJECTIVE
Interval History:   Patient seen and examined.  L lung with complete opacification on AM CXR.  ET tube advanced to 27cm overnight resulting in suspected right main stem intubation.  ET tube exchanged by anesthesia. R chest tube with minimal output overnight.  Hgb stable.      Medications:  Continuous Infusions:   dexmedetomidine (PRECEDEX) infusion 0 mcg/kg/hr (01/10/22 1407)    NORepinephrine bitartrate-D5W 0.3 mcg/kg/min (01/10/22 1200)    propofoL 50 mcg/kg/min (01/10/22 1333)     Scheduled Meds:   amiodarone  200 mg Oral Daily    atorvastatin  40 mg Oral Daily    fluconazole (DIFLUCAN) IVPB  200 mg Intravenous Q24H    hydrocortisone sodium succinate  50 mg Intravenous Q8H    levalbuterol  1.25 mg Nebulization TID WAKE    magnesium oxide  400 mg Oral BID    pantoprazole  40 mg Intravenous Daily    piperacillin-tazobactam (ZOSYN) IVPB  4.5 g Intravenous Q8H    potassium bicarbonate  20 mEq Per NG tube BID    vancomycin (VANCOCIN) IVPB  1,000 mg Intravenous Q12H     PRN Meds:sodium chloride, acetaminophen, influenza, lorazepam, morphine, morphine, sodium chloride 0.9%, Pharmacy to dose Vancomycin consult **AND** vancomycin - pharmacy to dose     Review of patient's allergies indicates:  No Known Allergies  Objective:     Vital Signs (Most Recent):  Temp: 98.8 °F (37.1 °C) (01/10/22 1130)  Pulse: 93 (01/10/22 1300)  Resp: (!) 39 (01/10/22 1300)  BP: 118/64 (01/10/22 1300)  SpO2: 99 % (01/10/22 1300) Vital Signs (24h Range):  Temp:  [96.8 °F (36 °C)-98.8 °F (37.1 °C)] 98.8 °F (37.1 °C)  Pulse:  [] 93  Resp:  [12-46] 39  SpO2:  [81 %-100 %] 99 %  BP: ()/(52-95) 118/64  Arterial Line BP: ()/(42-81) 105/51     Weight: 77.1 kg (170 lb)  Body mass index is 24.39 kg/m².    Intake/Output - Last 3 Shifts       01/08 0700  01/09 0659 01/09 0700  01/10 0659 01/10 0700  01/11 0659    I.V. (mL/kg) 1888 (24.5) 1450.3 (18.8)     Blood 1155.8 726.7     NG/ 200     IV Piggyback 600 450     Total  Intake(mL/kg) 4443.8 (57.6) 2827 (36.7)     Urine (mL/kg/hr) 1875 (1) 950 (0.5)     Other  2400     Stool 525 550     Chest Tube  500     Total Output 2400 4400     Net +2043.8 -1573                  Physical Exam  Constitutional:       Appearance: He is ill-appearing, toxic-appearing and diaphoretic.   Cardiovascular:      Rate and Rhythm: Normal rate and regular rhythm.   Pulmonary:      Breath sounds: Rhonchi present.      Comments: R chest wall incision C/D/I with sutures in place  R chest tube in place to wall suction.  No air leak detected.  Small amount of serosanguinous drainage in tubing  Abdominal:      General: Abdomen is flat. Bowel sounds are normal.      Palpations: Abdomen is soft.   Musculoskeletal:      Comments: Mild duskiness to R great toe.  No overt signs of ischemia   Skin:     General: Skin is warm.         Significant Labs:  CBC:   Recent Labs   Lab 01/10/22  0330   WBC 33.78*   RBC 3.34*   HGB 9.8*   HCT 29.3*   *   MCV 88   MCH 29.3   MCHC 33.4     BMP:   Recent Labs   Lab 01/10/22  0330   *      K 4.1   CL 99   CO2 35*   BUN 77*   CREATININE 1.4   CALCIUM 8.4*   MG 2.5       Significant Diagnostics:  I have reviewed all pertinent imaging results/findings within the past 24 hours.

## 2022-01-10 NOTE — ASSESSMENT & PLAN NOTE
L lung field with complete opacity this AM on CXR  Diffuse atelectasis due to right main stem intubation overnight versus worsening pleural effusion  Will perform repeat thoracentesis versus chest tube placement tomorrow if no improvement on imaging  Tracheostomy tube placement requested per primary.  Patient currently on 100% FiO2; patient will need to tolerate 50-60% prior to procedure.  Will continue to wean vent as tolerated.

## 2022-01-11 NOTE — PLAN OF CARE
O2 weaned today.   Levophed at minimal dose.   PRBC transfusion in progress.   Sedation continued.   Daughter and daughter-in-law vs.  Status questions answered.   Wound care dora well.

## 2022-01-11 NOTE — PROGRESS NOTES
[facility] Medicine  Progress Note    Patient Name: Neymar Patel  MRN: [id number]  Patient Class: IP- Inpatient   Admission Date: 12/21/2021  Length of Stay: 20 days  Attending Physician: Raul Gan III, MD  Primary Care Provider: Landen Brown MD        Subjective:     Principal Problem:Acute on chronic respiratory failure with hypoxia        HPI:  Perpetual History  Patient is a 66-year-old male with a history of alcohol abuse atrial fibrillation hypertension peripheral artery disease fatty liver disease who recently had an episode of bronchitis and was treated in the outpatient setting.  The patient then began in having increasing weakness and lightheadedness and noticed his heart rate was elevated.  He presented to the emergency department and was found to have elevated alcohol levels and a heart rate in the 130s to 140s.  The patient is currently in AFib with RVR and has been started on a Cardizem drip by the emergency department.         Overview/Hospital Course:  12/23/21 [employer] patient reports that he is feeling better today, was able to get rest last night. Heart rate still elevated will adjust medications and monitor. Patient with hallucinations last night, patient is appropriate this am  12/24/21:   Rapid Response Call #1: Blood pressure initially low and patient heart rate in the 50s sinus Nakul.  Glucose within normal limits.  Fluid bolus 1 L was given with improvement in blood pressure.  Advised to hold any medication that will lower patient's heart rate.  Patient also complains of shortness of breath but according to respiratory therapist, patient did not take his breathing treatment because he did not think he needed it.  Patient wheezy expiratory and saturating in the high 80s on room air. not on any antibiotics currently so will start.  Labs show that patient has TANIKA possibly due to dehydration.  Will resuscitate with fluid.  Will recheck basic blood work and continue  to hydrate.  Treat accordingly will at started if patient is not already on it.  Patient more alert and oriented x4 moving all extremities with improvement of blood pressure into the 90s and heart rate remains in the 50s sinus Nakul saturating 94% on 2 L  Rapid Response Call #2:Rapid response team called again with subsequent intubation and transfer to MICU.   12/25/21: On minimal sedation and pressor support in the MICU.   12/26/21: Able to wean off of all pressor support, pH of 5 while on bicarb on AM labs. Will get rid of bicarb drip and switch to gentle IV hydration with LR. SBT tomorrow?  12/27/21:  Events over the weekend noted.  Patient is now intubated on the ventilator.  Patient was admitted with pneumonia atrial fibrillation and alcohol abuse and now also has a enterococcal urinary tract infection with sensitivities pending.  Patient has required to sedatives for agitation and combativeness.  12/28/21:  Patient's ABG has been reviewed.  Patient has a mild respiratory alkalosis and his respiratory rate has been changed.  We have encouraged respiratory and nursing to wean his sedation and titrate his tube feeds.  The patient's chest x-ray also seems like he is slightly volume overloaded therefore will discontinue IV fluids and give low dosing of Lasix.  Patient has enterococcal infection was ampicillin sensitive therefore Rocephin should be adequate.  Will change Zithromax to Levaquin.  12/29/21 FM:  Patient had an episode of desaturation yesterday.  Respiratory was able to deep suction and several mucus plugs and thick secretions were removed.  I will broaden patient antibiotics today to Zosyn and otherwise plan on increasing his diuretics today.  Patient has bilateral pulmonary infiltrates consistent with pulmonary edema.  12/30/21 FM:  Patient had a elevated temperature through the night.  Rocephin was changed to Zosyn yesterday and consideration of drug fever should be entertained.  Will add medicine for  MRSA infection today as well as antifungals.  Patient's chest x-ray seems to be improved with diuretics.  Continue diuretics cautiously.  12/31/21 CG: Antibiotic course broadened, tachycardic with an increase in PEEP. He is net negative over the past 24 hours.    1/1/22 CG: No acute overnight events. PEEP decreased to 10.   1/2/22 CG: CXR shows worsening fluid burden, heart rates in 110s-120s.   1/3/22 FM:  Patient is on an FiO2 of 55%.  His chest x-ray still shows pulmonary edema which is also consistent with his CT scan.  We will replace electrolytes transfuse and continue to diurese.  Hopefully we can wean his FiO2 down this weekend and wean him off the ventilator by the end of the week.  1/4/22 FM:  Patient's vital signs are stable this morning and he is sedated.  Nursing reports that when sedation is weaned the patient is awake opens eyes and is alert.  We are attempting a CPAP trial this morning as the patient is tolerating and FiO2 of 50% and could probably be weaned further.  Will reduce dosing of diuretics today.  Continue to replete potassium.  1/5/22 FM:  Yesterday afternoon the patient had a desaturation his oxygen and his chest x-ray showed a worsening right-sided pleural effusion.  A CT scan done 2 days ago showed some layering and there was a discussion about possible loculations.  The patient's chest tube initially had 200 cc of a serous drainage which is now bloody.  Patient's chest x-ray has improved after placement.  Patient's oxygenation today is improved but his condition is precarious.  I have spoken to the family and let them know a high risk and possible poor outcome.  1/6/22 FM:  Patient had a left-sided pleural effusion drained and about 600 cc of a serous fluid was removed.  Patient's right chest tube had 10 cc overnight of a bloody fluid.  Diagnostic testing is all pending.  Today the patient's chest x-ray is noted in his left lung seems to be hypoinflated.  I am increasing the tidal  volume slightly today and watching his airway pressures.  1/7/22 FM:  Patient's ABG shows a respiratory alkalosis.  We are reducing his minute ventilation.  Patient's chest x-ray is significantly improved today.  Will have Respiratory and Nursing try to wean sedation and CPAP trial today.  Patient is now on ventilator day 14.  Patient has had intermittent bradycardia and will hold amiodarone for now.  1/8 AA, weekend crosscover, patient on ventilator, repeat chest x-ray pending, amiodarone stopped secondary to bradycardia, history of AFib, patient was on amiodarone, stopped secondary to bradycardia, now patient increased heart rate, will restart amiodarone, will decrease dose, blood pressure appears stable, continue to wean nor epi, on stress dose steroids, surgery following, H&H dropped, possible transfusion  1/9 AA, weekend crosscover, patient started to have afib with RVR, eICU was called, adjustment made to pressors, started on metoprolol, on levophed, drop in H/H, had chest tube removed on 7th, no melena or BRBPR reported, surgery called to evaluate, suspect possible from previous chest tube site, transfuse prbc, serial H/Hs, follow up with surgery recs.  1/10/22 FM:  Events over the weekend noted.  Patient is now back on amiodarone and his heart rate is slightly improved.  Chest tube is back in place with 100 cc of a bloody thin fluid.  All cultures have been reviewed and there is no significant growth.  Patient has now been on broad-spectrum antibiotics and antifungals for greater than 10 days.  Patient's WBC count is going up.  Spoke with General surgery today about placing tracheostomy.  1/11/22 FM:  Patient's chest x-ray has improved this morning with better ventilation.  Yesterday the patient's ET tube was in his right mainstem and the tube was pulled back.  The patient has a worsening anemia today and has had bleeding related to his chest tube.  General surgery is planning on doing a tracheostomy toward  the end of the week.  Patient's white blood cell count is improved.  Patient's atrial fibrillation has worsened and we are titrating his amiodarone      Interval History: See HC    Review of Systems   Unable to perform ROS: Severe respiratory distress     Objective:     Vital Signs (Most Recent):  Temp: 98.1 °F (36.7 °C) (01/11/22 0800)  Pulse: 109 (01/11/22 0813)  Resp: 16 (01/11/22 0813)  BP: 123/66 (01/11/22 0800)  SpO2: 98 % (01/11/22 0813) Vital Signs (24h Range):  Temp:  [97.3 °F (36.3 °C)-98.8 °F (37.1 °C)] 98.1 °F (36.7 °C)  Pulse:  [] 109  Resp:  [11-54] 16  SpO2:  [81 %-100 %] 98 %  BP: ()/(52-74) 123/66  Arterial Line BP: ()/(42-70) 125/65     Weight: 77.1 kg (170 lb)  Body mass index is 24.39 kg/m².    Intake/Output Summary (Last 24 hours) at 1/11/2022 0851  Last data filed at 1/11/2022 0500  Gross per 24 hour   Intake 3815 ml   Output 2585 ml   Net 1230 ml      Physical Exam  Constitutional:       Appearance: He is ill-appearing.      Interventions: He is intubated.   HENT:      Head: Normocephalic and atraumatic.      Nose: Nose normal.      Mouth/Throat:      Mouth: Mucous membranes are moist.      Comments: ET Tube free of secretions.   Eyes:      Pupils: Pupils are equal, round, and reactive to light.   Cardiovascular:      Rate and Rhythm: Normal rate. Rhythm irregular.      Pulses: Normal pulses.   Pulmonary:      Effort: Prolonged expiration present. He is intubated.      Breath sounds: No stridor. Rhonchi and rales present. No wheezing.   Abdominal:      General: Abdomen is flat.      Palpations: Abdomen is soft.   Musculoskeletal:      Cervical back: Neck supple.      Right lower leg: No edema.      Left lower leg: No edema.   Skin:     General: Skin is warm.   Psychiatric:      Comments: Intubated and sedated         Significant Labs:   All pertinent labs within the past 24 hours have been reviewed.  CBC:   Recent Labs   Lab 01/09/22  1815 01/10/22  0330 01/11/22  0430   WBC  37.61* 33.78* 27.68*   HGB 10.7* 9.8* 7.3*   HCT 31.3* 29.3* 22.0*   * 129* 102*     CMP:   Recent Labs   Lab 01/09/22  1336 01/10/22  0330 01/11/22  0430    140 141   K 3.7 4.1 3.7   CL 99 99 101   CO2 34* 35* 34*   * 149* 149*   BUN 77* 77* 68*   CREATININE 1.3 1.4 1.1   CALCIUM 8.5* 8.4* 8.2*   PROT 5.2* 5.1* 5.0*   ALBUMIN 1.6* 1.5* 1.3*   BILITOT 0.7 0.8 0.6   ALKPHOS 85 76 71   AST 72* 51* 32   ALT 87* 63* 38   ANIONGAP 8 6* 6*   EGFRNONAA 56.9* 52.0* >60.0       Significant Imaging: I have reviewed all pertinent imaging results/findings within the past 24 hours.      Assessment/Plan:      * Acute on chronic respiratory failure with hypoxia  Will have GS trach this week, stop abx soon.  Improved aeration of left lung today.    Acute hemorrhage  Transfuse again today.      Pleural effusion  Cultures all negative, CT output noted.    PNA (pneumonia)        Severe sepsis  Suspect acute WBC increase related to stressfull events over weekend (blood products, A.Fib RVR, etc...)  Cont. To monitor and stopping abx soon.        UTI (urinary tract infection)  See abx      DVT prophylaxis  Hold eliquis for now in setting of TANIKA.      Atrial fibrillation with rapid ventricular response  Titrate amiodarone.    Tobacco user  PRN Nicotine patch    Alcohol dependence        Elevated LFTs  LFTs trending down        TANIKA (acute kidney injury)  Noted, Cr, remaining around 1.5  Appears at baseline        VTE Risk Mitigation (From admission, onward)         Ordered     Place sequential compression device  Until discontinued         01/05/22 0815     IP VTE HIGH RISK PATIENT  Once         12/21/21 2233     Reason for No Pharmacological VTE Prophylaxis  Once        Question:  Reasons:  Answer:  Already adequately anticoagulated on oral Anticoagulants    12/21/21 2233                Discharge Planning   DAXA:      Code Status: Full Code   Is the patient medically ready for discharge?:     Reason for patient still in  hospital (select all that apply): Patient unstable  Discharge Plan A: Home with family,Home Health                  Raul Gan III, MD  Department of Hospital Medicine   [place] - Intensive [facility], [place]

## 2022-01-11 NOTE — SUBJECTIVE & OBJECTIVE
Interval History: See     Review of Systems   Unable to perform ROS: Severe respiratory distress     Objective:     Vital Signs (Most Recent):  Temp: 98.1 °F (36.7 °C) (01/11/22 0800)  Pulse: 109 (01/11/22 0813)  Resp: 16 (01/11/22 0813)  BP: 123/66 (01/11/22 0800)  SpO2: 98 % (01/11/22 0813) Vital Signs (24h Range):  Temp:  [97.3 °F (36.3 °C)-98.8 °F (37.1 °C)] 98.1 °F (36.7 °C)  Pulse:  [] 109  Resp:  [11-54] 16  SpO2:  [81 %-100 %] 98 %  BP: ()/(52-74) 123/66  Arterial Line BP: ()/(42-70) 125/65     Weight: 77.1 kg (170 lb)  Body mass index is 24.39 kg/m².    Intake/Output Summary (Last 24 hours) at 1/11/2022 0851  Last data filed at 1/11/2022 0500  Gross per 24 hour   Intake 3815 ml   Output 2585 ml   Net 1230 ml      Physical Exam  Constitutional:       Appearance: He is ill-appearing.      Interventions: He is intubated.   HENT:      Head: Normocephalic and atraumatic.      Nose: Nose normal.      Mouth/Throat:      Mouth: Mucous membranes are moist.      Comments: ET Tube free of secretions.   Eyes:      Pupils: Pupils are equal, round, and reactive to light.   Cardiovascular:      Rate and Rhythm: Normal rate. Rhythm irregular.      Pulses: Normal pulses.   Pulmonary:      Effort: Prolonged expiration present. He is intubated.      Breath sounds: No stridor. Rhonchi and rales present. No wheezing.   Abdominal:      General: Abdomen is flat.      Palpations: Abdomen is soft.   Musculoskeletal:      Cervical back: Neck supple.      Right lower leg: No edema.      Left lower leg: No edema.   Skin:     General: Skin is warm.   Psychiatric:      Comments: Intubated and sedated         Significant Labs:   All pertinent labs within the past 24 hours have been reviewed.  CBC:   Recent Labs   Lab 01/09/22  1815 01/10/22  0330 01/11/22  0430   WBC 37.61* 33.78* 27.68*   HGB 10.7* 9.8* 7.3*   HCT 31.3* 29.3* 22.0*   * 129* 102*     CMP:   Recent Labs   Lab 01/09/22  1336 01/10/22  0336  01/11/22  0430    140 141   K 3.7 4.1 3.7   CL 99 99 101   CO2 34* 35* 34*   * 149* 149*   BUN 77* 77* 68*   CREATININE 1.3 1.4 1.1   CALCIUM 8.5* 8.4* 8.2*   PROT 5.2* 5.1* 5.0*   ALBUMIN 1.6* 1.5* 1.3*   BILITOT 0.7 0.8 0.6   ALKPHOS 85 76 71   AST 72* 51* 32   ALT 87* 63* 38   ANIONGAP 8 6* 6*   EGFRNONAA 56.9* 52.0* >60.0       Significant Imaging: I have reviewed all pertinent imaging results/findings within the past 24 hours.

## 2022-01-11 NOTE — PLAN OF CARE
Pt vitals stable, weaned levophed to 0.15mcg. remains on 80% Fio2. Patient ECG rhythm showed A-fib 's at beginning of the shift. Got in 150's briefly, now 110's at end of shift. No other significant changes. FMS remains patent and draining, dior catheter put out 900 mL of urine.

## 2022-01-11 NOTE — PLAN OF CARE
Pt remains sedated and intubated.   ETT exchanged today with SPO2 stable this pm.  FIO2 decreased to 80% per resp therapist.   Levophed decreased by 0.15  mcg with BP mean stable.   HR spontaneously increased this afternoon with rate 120  to 130.   No fever noted.   Urine output adequate.   Chest tube output 30 cc today.   Stool remains liquid dark green/black.   No TF residual noted.   Tube feeding rate decreased to 50 cc/hr

## 2022-01-11 NOTE — PROGRESS NOTES
Pharmacokinetic Assessment Follow Up: IV Vancomycin    Vancomycin serum concentration assessment(s):    The trough level was drawn correctly and can be used to guide therapy at this time. The measurement is above the desired definitive target range of 15 to 20 mcg/mL.    Vancomycin Regimen Plan:    Discontinue the scheduled vancomycin regimen and re-dose when the random level is less than 20 mcg/mL, next level to be drawn at 0430 on 1/11/22.    Drug levels (last 3 results):  Recent Labs   Lab Result Units 01/10/22  1900   Vancomycin-Trough ug/mL 22.8*       Pharmacy will continue to follow and monitor vancomycin.    Please contact pharmacy for questions regarding this assessment.    Thank you for the consult,   Eliana Matt       Patient brief summary:  Neymar Patel is a 66 y.o. male initiated on antimicrobial therapy with IV Vancomycin for treatment of pneumonia    Drug Allergies:   Review of patient's allergies indicates:  No Known Allergies    Actual Body Weight:   77.1 kg    Renal Function:   Estimated Creatinine Clearance: 53.6 mL/min (based on SCr of 1.4 mg/dL).,     Dialysis Method (if applicable):  N/A    CBC (last 72 hours):  Recent Labs   Lab Result Units 01/08/22  0342 01/08/22  1500 01/08/22  2144 01/09/22  0355 01/09/22  1336 01/09/22  1815 01/10/22  0330   WBC K/uL 17.00*  --  23.14* 28.80* 39.74* 37.61* 33.78*   Hemoglobin g/dL 7.5* 7.1* 8.0* 6.7* 11.6* 10.7* 9.8*   Hematocrit % 23.7* 22.1* 23.4* 19.7* 34.3* 31.3* 29.3*   Platelets K/uL 187  --  174 149* 127* 120* 129*   Gran % % 81.9*  --  75.8* 70.0  --   --  70.0   Lymph % % 6.3*  --  8.3* 8.0*  --   --  5.0*   Mono % % 10.1  --  13.0 10.0  --   --  6.0   Eosinophil % % 0.2  --  0.2 0.0  --   --  0.0   Basophil % % 0.1  --  0.1 0.0  --   --  0.0   Differential Method  Automated  --  Automated Manual  --   --  Manual       Metabolic Panel (last 72 hours):  Recent Labs   Lab Result Units 01/08/22  0343 01/08/22  1600 01/08/22  2144 01/09/22  0355  01/09/22  1336 01/10/22  0330   Sodium mmol/L 146*  --  142 141 141 140   Potassium mmol/L 3.3* 3.6 3.7 3.6 3.7 4.1   Chloride mmol/L 101  --  99 100 99 99   CO2 mmol/L 39*  --  35* 31* 34* 35*   Glucose mg/dL 128*  --  206* 255* 121* 149*   BUN mg/dL 69*  --  63* 64* 77* 77*   Creatinine mg/dL 1.1  --  1.0 1.0 1.3 1.4   Albumin g/dL 1.7*  --   --  1.3* 1.6* 1.5*   Total Bilirubin mg/dL 0.5  --   --  0.5 0.7 0.8   Alkaline Phosphatase U/L 79  --   --  76 85 76   AST U/L 186*  --   --  98* 72* 51*   ALT U/L 115*  --   --  91* 87* 63*   Magnesium mg/dL 2.5  --  2.4 2.2  --  2.5       Vancomycin Administrations:  vancomycin given in the last 96 hours                   vancomycin in dextrose 5 % 1 gram/250 mL IVPB 1,000 mg (mg) 1,000 mg New Bag 01/10/22 2018     1,000 mg New Bag  0732     1,000 mg New Bag 01/09/22 2031    vancomycin (VANCOCIN) 2,000 mg in dextrose 5 % 500 mL IVPB (mg) 2,000 mg New Bag 01/09/22 0752                Microbiologic Results:  Microbiology Results (last 7 days)     Procedure Component Value Units Date/Time    Culture, Body Fluid - Bactec [[id number]] Collected: 01/05/22 1232    Order Status: Completed Specimen: Body Fluid from Pleural Fluid Updated: 01/10/22 0812     Body Fluid Culture, Sterile No Growth to date      No Growth to date      No Growth to date      No Growth to date      No Growth to date    Culture, Body Fluid - Bactec [[id number]] Collected: 01/04/22 1640    Order Status: Completed Specimen: Body Fluid from Pleural Fluid Updated: 01/10/22 0612     Body Fluid Culture, Sterile No growth after 5 days.    AFB culture [[id number]] Collected: 01/05/22 1232    Order Status: Completed Specimen: Body Fluid from Pleural Fluid Updated: 01/07/22 1508     AFB Culture & Smear Culture in progress     AFB CULTURE STAIN No acid fast bacilli seen.    AFB stain [[id number]] Collected: 01/05/22 1232    Order Status: Completed Specimen: Body Fluid from Pleural Fluid Updated: 01/06/22 1503     Direct  Acid Fast No acid fast bacilli seen.    Gram stain [[id number]] Collected: 01/05/22 1232    Order Status: Completed Specimen: Body Fluid from Pleural Fluid Updated: 01/06/22 1354     Gram Stain Result No WBC's      No organisms seen    KOH prep [[id number]] Collected: 01/05/22 1232    Order Status: Completed Specimen: Body Fluid from Pleural Fluid Updated: 01/06/22 0823     KOH Prep No yeast or fungal elements seen    Fungus culture [[id number]] Collected: 01/05/22 1232    Order Status: Sent Specimen: Body Fluid from Pleural Fluid Updated: 01/06/22 0404    Culture, body fluid - Bactec [[id number]] Collected: 01/05/22 1232    Order Status: Canceled Specimen: Body Fluid from Pleural Fluid

## 2022-01-11 NOTE — EICU
[facility]yo assessment complete  Patient on ventilator  Levophed, Propofol, Precedex infusing  No one at bedside at this time

## 2022-01-11 NOTE — EICU
Rounding (Video Assessment):  Video rounding completed.  Pt resting quiet on ventilator support w/ FiO2 80%, +8 peep.  No distress noted.  Infusing Precedex gtt at 1.4 mcg/kg/hr, Norepinephrine at 0.15 mcg/kg/min & Propofol gtt at 50 mcg/kg/min.  B/P via arterial line 112/67, Hr irregular at 110, RR 16, POx 99%.

## 2022-01-11 NOTE — CARE UPDATE
Sudden drop of SPO2 noted. L/S Per ETT and orally with 100% O2.   No increase in SPO2 noted. Resp therapist called.   Pt with increased resp effort noted.   FIO2 increased to 80%.    to 130 at fib

## 2022-01-12 NOTE — EICU
Vidyo rounding complete  Patient mechanically ventilated PEEP 8, FiO2 100%  Levophed infusing st 0.15 mcq/kg/min  Propofol 50 mcq/kg/min  Precedex 1.4mcq/kg/hr  Afib on monitor ; /72 (90); SpO2 100%  Does not appear to be in any distress

## 2022-01-12 NOTE — PROGRESS NOTES
[facility] - Intensive Care  General Surgery  Progress Note    Subjective:     History of Present Illness:  65yo male with history of afib and EtOH abuse who presents with progressing respiratory failure.  Patient currently intubated and sedated with acute desaturation this afternoon.  CXR obtained demonstrated unchanged large right pleural effusion with no signs of pneumothorax.  Pleural effusion also seen on CT chest obtained during this admission.  General surgery has been consulted for drainage.       Post-Op Info:  * No surgery found *         Interval History:   Patient seen and examined.  Remains intubated and sedated on 100% FiO2.  MAP > 60 with Levo weaned late this afternoon.  R chest tube in place with minimal residual sanguinous drainage.     Medications:  Continuous Infusions:   dexmedetomidine (PRECEDEX) infusion 1.4 mcg/kg/hr (01/12/22 1607)    NORepinephrine bitartrate-D5W 0.15 mcg/kg/min (01/11/22 1223)    propofoL 50 mcg/kg/min (01/12/22 1308)     Scheduled Meds:   amiodarone  400 mg Oral Daily    atorvastatin  40 mg Oral Daily    enoxaparin  40 mg Subcutaneous Daily    fluconazole (DIFLUCAN) IVPB  200 mg Intravenous Q24H    hydrocortisone sodium succinate  50 mg Intravenous Q8H    ipratropium  0.5 mg Nebulization Q8H    levalbuterol  1.25 mg Nebulization Q8H    magnesium oxide  400 mg Oral BID    pantoprazole  40 mg Intravenous Daily    piperacillin-tazobactam (ZOSYN) IVPB  4.5 g Intravenous Q8H    potassium bicarbonate  20 mEq Per NG tube BID    vancomycin (VANCOCIN) IVPB  1,000 mg Intravenous Q24H     PRN Meds:sodium chloride, acetaminophen, influenza, lorazepam, morphine, morphine, sodium chloride 0.9%, Pharmacy to dose Vancomycin consult **AND** vancomycin - pharmacy to dose     Review of patient's allergies indicates:  No Known Allergies  Objective:     Vital Signs (Most Recent):  Temp: 97.5 °F (36.4 °C) (01/12/22 1520)  Pulse: (!) 113 (01/12/22 1640)  Resp: (!) 25 (01/12/22  1640)  BP: 119/67 (01/12/22 1600)  SpO2: 100 % (01/12/22 1640) Vital Signs (24h Range):  Temp:  [97.2 °F (36.2 °C)-98.9 °F (37.2 °C)] 97.5 °F (36.4 °C)  Pulse:  [] 113  Resp:  [22-56] 25  SpO2:  [85 %-100 %] 100 %  BP: ()/(54-74) 119/67  Arterial Line BP: ()/(54-90) 124/65     Weight: 77.1 kg (170 lb)  Body mass index is 24.39 kg/m².    Intake/Output - Last 3 Shifts       01/10 0700  01/11 0659 01/11 0700 01/12 0659 01/12 0700 01/13 0659    I.V. (mL/kg) 1437 (18.6) 1312 (17)     Blood  1791.8     NG/GT 1678 1562     IV Piggyback 700 400     Total Intake(mL/kg) 3815 (49.5) 5065.8 (65.7)     Urine (mL/kg/hr) 2150 (1.2) 2600 (1.4)     Other       Stool 375 350     Chest Tube 60 70     Total Output 2585 3020     Net +1230 +2045.8                  Physical Exam  Vitals reviewed.   Constitutional:       Appearance: He is ill-appearing and toxic-appearing.   Neck:      Comments: Right IJ CVL in place with no SOI  Cardiovascular:      Rate and Rhythm: Tachycardia present. Rhythm irregular.   Pulmonary:      Breath sounds: Rhonchi and rales present.      Comments: Ventilator-assisted breaths equal  Abdominal:      General: Abdomen is flat.      Palpations: Abdomen is soft.   Skin:     General: Skin is warm.      Coloration: Skin is pale.         Significant Labs:  CBC:   Recent Labs   Lab 01/12/22  0326   WBC 23.74*   RBC 3.23*   HGB 9.5*   HCT 28.4*   *   MCV 88   MCH 29.4   MCHC 33.5     BMP:   Recent Labs   Lab 01/12/22  0326   *      K 3.8      CO2 34*   BUN 64*   CREATININE 1.0   CALCIUM 8.7   MG 2.4       Significant Diagnostics:  I have reviewed all pertinent imaging results/findings within the past 24 hours.    Assessment/Plan:     Acute hemorrhage  Patient with presumed acute hemorrhage from R chest tube site.  Chest tube placed on 1/4 with acute bleeding overnight.  Site hemostatic on 1/5 and remained so until tube removed on 1/7.  Patient with acute drop in Hgb for  formation of chest wall hematoma seen on CXR 1/8 and 1/9.  Chest tube removal atraumatic and site of bleeding presumed to be from chest wall muscle vs. Intercostal muscle     --Hgb stable with minimal chest tube output overnight  --Continue to follow        Pleural effusion  -Tracheostomy tube placement requested per primary.  Patient currently on 100% FiO2; patient will need to tolerate 50-60% prior to procedure.  Will continue to wean vent as tolerated.   -Will perform PEG tube placement at that time as well   -Family informed of plan of care.  All questions and concerns addressed       PNA (pneumonia)  Continue R chest tube to wall suction, will remove when output < 100cc/24hrs  POD 1 s/p L thoracentesis - fluid sent off for culture   Small improvement in CXR        Macy Ni MD  General Surgery  [facility] - Intensive Care

## 2022-01-12 NOTE — PHYSICIAN QUERY
PT Name: Neymar Patel  MR #: [id number]     DOCUMENTATION CLARIFICATION     CDS/: HONEY Tse, RN, CDS               Contact information:[email or web address]   This form is a permanent document in the medical record.     Query Date: January 12, 2022    By submitting this query, we are merely seeking further clarification of documentation.  Please utilize your independent clinical judgment when addressing the question(s) below.  The Medical Record contains the following:  Indicators Supporting Clinical Findings Location in Medical Record   X HR         RR          BP        Temp /90 Pulse 142 RR 20 Temp 98.1 SpO2 97%    Vital Signs (24h Range):  Temp:  [97.6 °F (36.4 °C)-98.1 °F (36.7 °C)] 97.9 °F (36.6 °C)  Pulse:  [] 68  Resp:  [14-22] 18  SpO2:  [95 %-100 %] 97 %  BP: ()/() 113/78    Vital Signs (24h Range):  Temp:  [98.2 °F (36.8 °C)] 98.2 °F (36.8 °C)  Pulse:  [] 120  Resp:  [16-18] 16  SpO2:  [94 %-98 %] 95 %  BP: ()/(54-82) 123/82    Vital Signs (24h Range):  Temp:  [96.1 °F (35.6 °C)-97.9 °F (36.6 °C)] 97.8 °F (36.6 °C)  Pulse:  [] 56  Resp:  [14-31] 24  SpO2:  [69 %-100 %] 100 %  BP: ()/(44-87) 116/64    Vital Signs (24h Range):  Temp:  [93.56 °F (34.2 °C)-98.24 °F (36.8 °C)] 98 °F (36.7 °C)  Pulse:  [41-99] 60  Resp:  [24] 24  SpO2:  [81 %-100 %] 100 %  BP: (110-160)/() 139/77   ED, Dr. Colunga, 12/21    H&P, Dr. Gan, III, 12/22              CONNER POTTER, NP/Dr. Buckley, 12/23              , Dr. Bustillo, 12/25                HM, Dr. Gan, III, 12/27   X Lactic Acid          Procalcitonin    12/24 1/8 1/9   Lactate, Oz 8.4 (HH) 1.7 1.1        1/1   Procalcitonin 0.51 (H)        Lab 12/27-1/9          Lab 1/1   X WBC           Bands          CRP     12/21 12/23 12/24 12/25 12/26 12/27 12/28 12/29   WBC 4.91 3.48 (L) 8.89 14.04 (H) 9.26 5.81 4.38 6.56        12/31 1/1 1/2 1/3 1/5 1/6 1/7 1/8 1/9   WBC 11.67 8.90 6.00 7.07 13.44 (H) 13.91  (H) 13.76 (H) 17.0 (H) 28.80 (H)      1/9 1/10 1/11 1/12   WBC 39.74 (H) 33.78 (H) 27.68 (H) 23.74 (H)          Lab 12/21-12/29          Lab 12/31-1/9          Lab 1/9-1/12   X Culture(s) Urine culture: Enterococcus faecalis    Blood culture: No growth    Respiratory culture: No growth   Lab 12/21    Lab 12/25    Lab 12/28    AMS, Confusion, LOC, etc.       X Organ Dysfunction/Failure Atrial fibrillation with RVR    Acute on chronic respiratory failure  TANIKA in setting of hypotension  Elevated LFTs in setting of hypoperfusion    Acute on chronic respiratory failure-Likely in the setting of PNA, COPD.      ED, Dr. Colunga, 12/21    , Dr. Bustillo, 12/25        , Dr. Gan, III, 12/27   X Bacteremia or Sepsis / Septic Severe Sepsis , Dr. Gan, III, 12/27   X Known or Suspected Source of Infection documented Acute bronchitis vs PNA    Admitted to the hospital for pneumonia     Patient was admitted with pneumonia atrial fibrillation and alcohol abuse and now also has a enterococcal urinary tract infection    [place], GERALD Rowley/Dr. Kinney, 12/23    ED, Dr. Clarke, 12/24    , Dr. Gan, III, 12/27    (Failed) Outpatient Treatment      Medication     X Treatment Ceftriaxone 1g IVPB  Azithromycin 500mg IVPB    Add vanc, + enterococcus    Patient has enterococcal infection was ampicillin sensitive therefore Rocephin should be adequate.  Will change Zithromax to Levaquin.     Sodium Chloride 1000ml IV x 1 in ED    0.9% NaCl 500 ml once x 2    Azithromycin 500 mg IVPB q 24 hours  Ceftriaxone 1 g IVPB Q 24 hours    Ceftriaxone 1 g IVPB Q 12 hours   ED, Dr. Colunga, 12/21    , Dr. Gan, III, 12/27    , Dr. Gan, III, 12/28      MAR 12/21    MAR 12/24    MAR 12/24-12/28  MAR 12/24-12/27    MAR 12/27-12/29    Other          Provider, please further Clarify the Sepsis diagnosis associated with above clinical findings.    [ x  ] Sepsis due to Enterococcal Urinary Tract Infection    Present on admission Status:     [  x] Yes      [  ] No     [  ] Clinically Undetermined     [ x  ] Sepsis due to Pneumonia   Present on admission Status:     [ x ] Yes     [  ] No     [  ] Clinically Undetermined     [   ] Sepsis due to both Enterococcal Urinary Tract Infection and Pneumonia   Present on admission Status:     [  ] Yes     [  ] No     [  ] Clinically Undetermined     [   ] Sepsis due to other source, please specify:___________   Present on admission Status:     [  ] Yes     [  ] No     [  ] Clinically Undetermined     [   ] Other Infectious Disease (please specify): __________     [  ] Clinically Undetermined         Please document in your progress notes daily for the duration of treatment until resolved and include in your discharge summary.

## 2022-01-12 NOTE — RESPIRATORY THERAPY
01/12/22 0803   PRE-TX-O2   Oxygen Concentration (%) (S)  90  (decreased FIO2 at this time per Dr. Gan's orders)

## 2022-01-12 NOTE — SUBJECTIVE & OBJECTIVE
Interval History: See     Review of Systems   Unable to perform ROS: Severe respiratory distress     Objective:     Vital Signs (Most Recent):  Temp: 97.6 °F (36.4 °C) (01/12/22 0715)  Pulse: 109 (01/12/22 0800)  Resp: (!) 37 (01/12/22 0800)  BP: 109/70 (01/12/22 0800)  SpO2: 100 % (01/12/22 0800) Vital Signs (24h Range):  Temp:  [97.5 °F (36.4 °C)-98.9 °F (37.2 °C)] 97.6 °F (36.4 °C)  Pulse:  [] 109  Resp:  [15-56] 37  SpO2:  [86 %-100 %] 100 %  BP: ()/(56-75) 109/70  Arterial Line BP: ()/(54-90) 124/65     Weight: 77.1 kg (170 lb)  Body mass index is 24.39 kg/m².    Intake/Output Summary (Last 24 hours) at 1/12/2022 0822  Last data filed at 1/12/2022 0600  Gross per 24 hour   Intake 5065.84 ml   Output 3020 ml   Net 2045.84 ml      Physical Exam  Constitutional:       Appearance: He is ill-appearing.      Interventions: He is intubated.   HENT:      Head: Normocephalic and atraumatic.      Nose: Nose normal.      Mouth/Throat:      Mouth: Mucous membranes are moist.      Comments: ET Tube free of secretions.   Eyes:      Pupils: Pupils are equal, round, and reactive to light.   Cardiovascular:      Rate and Rhythm: Normal rate. Rhythm irregular.      Pulses: Normal pulses.   Pulmonary:      Effort: Prolonged expiration present. He is intubated.      Breath sounds: No stridor. Rhonchi and rales present. No wheezing.   Abdominal:      General: Abdomen is flat.      Palpations: Abdomen is soft.   Musculoskeletal:      Cervical back: Neck supple.      Right lower leg: No edema.      Left lower leg: No edema.   Skin:     General: Skin is warm.   Psychiatric:      Comments: Intubated and sedated         Significant Labs:   All pertinent labs within the past 24 hours have been reviewed.  CBC:   Recent Labs   Lab 01/11/22  0430 01/12/22  0326   WBC 27.68* 23.74*   HGB 7.3* 9.5*   HCT 22.0* 28.4*   * 118*     CMP:   Recent Labs   Lab 01/11/22  0430 01/12/22  0326    142   K 3.7 3.8     102   CO2 34* 34*   * 136*   BUN 68* 64*   CREATININE 1.1 1.0   CALCIUM 8.2* 8.7   PROT 5.0* 5.4*   ALBUMIN 1.3* 1.3*   BILITOT 0.6 0.6   ALKPHOS 71 93   AST 32 37   ALT 38 34   ANIONGAP 6* 6*   EGFRNONAA >60.0 >60.0       Significant Imaging: I have reviewed all pertinent imaging results/findings within the past 24 hours.

## 2022-01-12 NOTE — ASSESSMENT & PLAN NOTE
-Tracheostomy tube placement requested per primary.  Patient currently on 100% FiO2; patient will need to tolerate 50-60% prior to procedure.  Will continue to wean vent as tolerated.   -Will perform PEG tube placement at that time as well   -Family informed of plan of care.  All questions and concerns addressed

## 2022-01-12 NOTE — PLAN OF CARE
01/12/22 1647   Post-Acute Status   Post-Acute Authorization Placement   Post-Acute Placement Status Referrals Sent  (LTAC)   Discharge Delays None known at this time   Discharge Plan   Discharge Plan A Long-term acute care facility (LTAC)   Discharge Plan B Long-term acute care facility (LTAC)     ASHER spoke to the patient's daughter-in-law, Briseyda regarding LTAC consult. Briseyda is the point of contact for the patient. She agree to LTAC placement for the patient, and she gave a verbal consent for the patient choice for to be signed for LTAC placement at [facility] in [place], [place].     New referral was sent via [employer].

## 2022-01-12 NOTE — PLAN OF CARE
Patient remains intubated and sedated. Chest tube drainage has decreased. Still with bloody drainage.

## 2022-01-12 NOTE — RESPIRATORY THERAPY
01/12/22 1029   PRE-TX-O2   Oxygen Concentration (%) (S)  100   SpO2 (!) 92 %     Increased at this time per GLENN Peck due to oxygen saturation 88%.

## 2022-01-12 NOTE — PROGRESS NOTES
Pharmacokinetic Assessment Follow Up: IV Vancomycin    Vancomycin serum concentration assessment(s):    The random level was drawn correctly and can be used to guide therapy at this time. The measurement is within the desired definitive target range of 15 to 20 mcg/mL.    Vancomycin Regimen Plan:    Change regimen to Vancomycin 1000 mg IV every 24 hours with next serum trough concentration measured at 1000 prior to 3rd dose on 1/14/22    Drug levels (last 3 results):  Recent Labs   Lab Result Units 01/10/22  1900 01/11/22  0430 01/11/22  1554 01/12/22  0326   Vancomycin, Random ug/mL  --  29.1 22.1 18.2   Vancomycin-Trough ug/mL 22.8*  --   --   --        Pharmacy will continue to follow and monitor vancomycin.    Please contact pharmacy at extension [phone] for questions regarding this assessment.    Thank you for the consult,   HANNAH LEMA       Patient brief summary:  Neymar Patel is a 66 y.o. male initiated on antimicrobial therapy with IV Vancomycin for treatment of  pneumonia     The patient's current regimen is Vancomycin 1000mg IV every 12 hours.     Drug Allergies:   Review of patient's allergies indicates:  No Known Allergies    Actual Body Weight:   77.1kg     Renal Function:   Estimated Creatinine Clearance: 75 mL/min (based on SCr of 1 mg/dL).,     Dialysis Method (if applicable):  N/A    CBC (last 72 hours):  Recent Labs   Lab Result Units 01/09/22  1336 01/09/22  1815 01/10/22  0330 01/11/22  0430 01/12/22  0326   WBC K/uL 39.74* 37.61* 33.78* 27.68* 23.74*   Hemoglobin g/dL 11.6* 10.7* 9.8* 7.3* 9.5*   Hematocrit % 34.3* 31.3* 29.3* 22.0* 28.4*   Platelets K/uL 127* 120* 129* 102* 118*   Gran % %  --   --  70.0 76.0* 75.0*   Lymph % %  --   --  5.0* 2.0* 4.0*   Mono % %  --   --  6.0 7.0 12.0   Eosinophil % %  --   --  0.0 0.0 0.0   Basophil % %  --   --  0.0 0.0 0.0   Differential Method   --   --  Manual Manual Manual       Metabolic Panel (last 72 hours):  Recent Labs   Lab Result Units  01/09/22  1336 01/10/22  0330 01/11/22  0430 01/12/22  0326   Sodium mmol/L 141 140 141 142   Potassium mmol/L 3.7 4.1 3.7 3.8   Chloride mmol/L 99 99 101 102   CO2 mmol/L 34* 35* 34* 34*   Glucose mg/dL 121* 149* 149* 136*   BUN mg/dL 77* 77* 68* 64*   Creatinine mg/dL 1.3 1.4 1.1 1.0   Albumin g/dL 1.6* 1.5* 1.3* 1.3*   Total Bilirubin mg/dL 0.7 0.8 0.6 0.6   Alkaline Phosphatase U/L 85 76 71 93   AST U/L 72* 51* 32 37   ALT U/L 87* 63* 38 34   Magnesium mg/dL  --  2.5 2.5 2.4       Vancomycin Administrations:  vancomycin given in the last 96 hours                     vancomycin in dextrose 5 % 1 gram/250 mL IVPB 1,000 mg (mg) 1,000 mg New Bag 01/10/22 2018     1,000 mg New Bag  0732     1,000 mg New Bag 01/09/22 2031    vancomycin (VANCOCIN) 2,000 mg in dextrose 5 % 500 mL IVPB (mg) 2,000 mg New Bag 01/09/22 0752                    Microbiologic Results:  Microbiology Results (last 7 days)       Procedure Component Value Units Date/Time    Fungus culture [[id number]] Collected: 01/05/22 1232    Order Status: Completed Specimen: Body Fluid from Pleural Fluid Updated: 01/11/22 1104     Fungus (Mycology) Culture Culture in progress    Culture, Body Fluid - Bactec [[id number]] Collected: 01/05/22 1232    Order Status: Completed Specimen: Body Fluid from Pleural Fluid Updated: 01/11/22 0812     Body Fluid Culture, Sterile No growth after 5 days.    Culture, Body Fluid - Bactec [[id number]] Collected: 01/04/22 1640    Order Status: Completed Specimen: Body Fluid from Pleural Fluid Updated: 01/10/22 0612     Body Fluid Culture, Sterile No growth after 5 days.    AFB culture [[id number]] Collected: 01/05/22 1232    Order Status: Completed Specimen: Body Fluid from Pleural Fluid Updated: 01/07/22 1508     AFB Culture & Smear Culture in progress     AFB CULTURE STAIN No acid fast bacilli seen.    AFB stain [[id number]] Collected: 01/05/22 1232    Order Status: Completed Specimen: Body Fluid from Pleural Fluid Updated: 01/06/22  1503     Direct Acid Fast No acid fast bacilli seen.    Gram stain [[id number]] Collected: 01/05/22 1232    Order Status: Completed Specimen: Body Fluid from Pleural Fluid Updated: 01/06/22 1354     Gram Stain Result No WBC's      No organisms seen    KOH prep [[id number]] Collected: 01/05/22 1232    Order Status: Completed Specimen: Body Fluid from Pleural Fluid Updated: 01/06/22 0823     KOH Prep No yeast or fungal elements seen    Culture, body fluid - Bactec [[id number]] Collected: 01/05/22 1232    Order Status: Canceled Specimen: Body Fluid from Pleural Fluid

## 2022-01-12 NOTE — SUBJECTIVE & OBJECTIVE
Interval History:   Patient seen and examined.  Remains intubated and sedated on 100% FiO2.  MAP > 60 with Levo weaned late this afternoon.  R chest tube in place with minimal residual sanguinous drainage.     Medications:  Continuous Infusions:   dexmedetomidine (PRECEDEX) infusion 1.4 mcg/kg/hr (01/12/22 1607)    NORepinephrine bitartrate-D5W 0.15 mcg/kg/min (01/11/22 1223)    propofoL 50 mcg/kg/min (01/12/22 1308)     Scheduled Meds:   amiodarone  400 mg Oral Daily    atorvastatin  40 mg Oral Daily    enoxaparin  40 mg Subcutaneous Daily    fluconazole (DIFLUCAN) IVPB  200 mg Intravenous Q24H    hydrocortisone sodium succinate  50 mg Intravenous Q8H    ipratropium  0.5 mg Nebulization Q8H    levalbuterol  1.25 mg Nebulization Q8H    magnesium oxide  400 mg Oral BID    pantoprazole  40 mg Intravenous Daily    piperacillin-tazobactam (ZOSYN) IVPB  4.5 g Intravenous Q8H    potassium bicarbonate  20 mEq Per NG tube BID    vancomycin (VANCOCIN) IVPB  1,000 mg Intravenous Q24H     PRN Meds:sodium chloride, acetaminophen, influenza, lorazepam, morphine, morphine, sodium chloride 0.9%, Pharmacy to dose Vancomycin consult **AND** vancomycin - pharmacy to dose     Review of patient's allergies indicates:  No Known Allergies  Objective:     Vital Signs (Most Recent):  Temp: 97.5 °F (36.4 °C) (01/12/22 1520)  Pulse: (!) 113 (01/12/22 1640)  Resp: (!) 25 (01/12/22 1640)  BP: 119/67 (01/12/22 1600)  SpO2: 100 % (01/12/22 1640) Vital Signs (24h Range):  Temp:  [97.2 °F (36.2 °C)-98.9 °F (37.2 °C)] 97.5 °F (36.4 °C)  Pulse:  [] 113  Resp:  [22-56] 25  SpO2:  [85 %-100 %] 100 %  BP: ()/(54-74) 119/67  Arterial Line BP: ()/(54-90) 124/65     Weight: 77.1 kg (170 lb)  Body mass index is 24.39 kg/m².    Intake/Output - Last 3 Shifts       01/10 0700  01/11 0659 01/11 0700 01/12 0659 01/12 0700 01/13 0659    I.V. (mL/kg) 1437 (18.6) 1312 (17)     Blood  1791.8     NG/GT 1678 1562     IV Piggyback  700 400     Total Intake(mL/kg) 3815 (49.5) 5065.8 (65.7)     Urine (mL/kg/hr) 2150 (1.2) 2600 (1.4)     Other       Stool 375 350     Chest Tube 60 70     Total Output 2585 3020     Net +1230 +2045.8                  Physical Exam  Vitals reviewed.   Constitutional:       Appearance: He is ill-appearing and toxic-appearing.   Neck:      Comments: Right IJ CVL in place with no SOI  Cardiovascular:      Rate and Rhythm: Tachycardia present. Rhythm irregular.   Pulmonary:      Breath sounds: Rhonchi and rales present.      Comments: Ventilator-assisted breaths equal  Abdominal:      General: Abdomen is flat.      Palpations: Abdomen is soft.   Skin:     General: Skin is warm.      Coloration: Skin is pale.         Significant Labs:  CBC:   Recent Labs   Lab 01/12/22  0326   WBC 23.74*   RBC 3.23*   HGB 9.5*   HCT 28.4*   *   MCV 88   MCH 29.4   MCHC 33.5     BMP:   Recent Labs   Lab 01/12/22  0326   *      K 3.8      CO2 34*   BUN 64*   CREATININE 1.0   CALCIUM 8.7   MG 2.4       Significant Diagnostics:  I have reviewed all pertinent imaging results/findings within the past 24 hours.

## 2022-01-12 NOTE — PROGRESS NOTES
[place] - Intensive Care  Adult Nutrition  Progress Note    SUMMARY       Recommendations    Recommendation/Intervention:   1. EN Recs: Vital HP @ 50 ml/hr with Andres BID. With propofol infusing at 23.1 ml/hr,this will provide a total of 1989 kcal (110% EEN), 105 gm protein (100% EPN), and 1,003 ml water.           2. Rec'd FWF of 30 ml q4-6h to prevent clogging.           3. RD to monitor and make recs accordingly.    Goals: Goal rate by RD f/u.  Nutrition Goal Status: goal met  Communication of RD Recs: reviewed with physician    Assessment and Plan  Nutrition Problem  Inadequate protein-energy intake     Related to (etiology):   NPO/vent status     Signs and Symptoms (as evidenced by):   EEN < 25%      Interventions/Recommendations (treatment strategy):  1. EN Recs: Vital HP @ 50 ml/hr with Andres BID. With propofol infusing at 18.5 ml/hr,this will provide a total of 1869 kcal (108% EEN), 105 gm protein (100% EPN), and 1,003 ml water.           2. Rec'd FWF of 30 ml q4-6h to prevent clogging.           3. RD to monitor and make recs accordingly         Nutrition Diagnosis Status:   Resolved    No new Assessment & Plan notes have been filed under this hospital service since the last note was generated.  Service: Nutrition       Malnutrition Assessment                                       Reason for Assessment    Reason For Assessment: RD follow-up  Diagnosis: pulmonary disease  Relevant Medical History: Tolerating TF at 40 ml/hr. Continue to advance to goal of 55. Propofol @ 23.1 ml/hr  General Information Comments: Plan for trach placement this week. Propofol @ 23.1 ml/hr (609 kcal). Slightly exceeding needs with increased propofol rate. Tolerating tf without issues.  Nutrition Discharge Planning: TBD    Nutrition Risk Screen    Nutrition Risk Screen: tube feeding or parenteral nutrition    Nutrition/Diet History    Food Allergies: NKFA  Factors Affecting Nutritional Intake: NPO,on mechanical  "ventilation    Anthropometrics    Temp: 97.2 °F (36.2 °C)  Height: 5' 10" (177.8 cm)  Height (inches): 70 in  Weight Method: Standard Scale  Weight: 77.1 kg (170 lb)  Weight (lb): 170 lb  Ideal Body Weight (IBW), Male: 166 lb  % Ideal Body Weight, Male (lb): 102.41 %  BMI (Calculated): 24.4  BMI Grade: 18.5-24.9 - normal       Lab/Procedures/Meds    Pertinent Labs Reviewed: reviewed  Pertinent Medications Reviewed: reviewed    Physical Findings/Assessment         Estimated/Assessed Needs    Weight Used For Calorie Calculations: 77.1 kg (170 lb)  Energy Calorie Requirements (kcal): 1793 kcal  Energy Need Method: [facility]  Protein Requirements:  (1.2-1.5 gm/kg CBW)  Weight Used For Protein Calculations: 77.1 kg (170 lb)     Estimated Fluid Requirement Method: RDA Method  RDA Method (mL): 1793         Nutrition Prescription Ordered    Current Diet Order: NPO  Current Nutrition Support Formula Ordered: Other (Comment) (Vital HP with Andres BID)  Current Nutrition Support Rate Ordered: 55 (ml)    Evaluation of Received Nutrient/Fluid Intake    Enteral Calories (kcal): 1200  Enteral Protein (gm): 105  Enteral (Free Water) Fluid (mL): 802  Free Water Flush Fluid (mL): 1003  Total Calories (kcal): 1989  % Kcal Needs: 110% with propofol/andres/current rate  Total Protein (gm): 120  % Protein Needs: 104% with current rate and andres  Energy Calories Required: exceeds needs  Protein Required: exceeds needs  Fluid Required: not meeting needs  % Intake of Estimated Energy Needs: 75 - 100 %  % Meal Intake: NPO    Nutrition Risk    Level of Risk/Frequency of Follow-up: moderate - high     Monitor and Evaluation    Food and Nutrient Intake: enteral nutrition intake  Food and Nutrient Adminstration: enteral and parenteral nutrition administration  Anthropometric Measurements: weight change,weight  Biochemical Data, Medical Tests and Procedures: electrolyte and renal panel,glucose/endocrine profile,lipid profile,inflammatory " profile  Nutrition-Focused Physical Findings: overall appearance     Nutrition Follow-Up    RD Follow-up?: Yes

## 2022-01-12 NOTE — PROGRESS NOTES
[facility] Medicine  Progress Note    Patient Name: Neymar Patel  MRN: [id number]  Patient Class: IP- Inpatient   Admission Date: 12/21/2021  Length of Stay: 21 days  Attending Physician: Raul Gan III, MD  Primary Care Provider: Landen Brown MD        Subjective:     Principal Problem:Acute on chronic respiratory failure with hypoxia        HPI:  Perpetual History  Patient is a 66-year-old male with a history of alcohol abuse atrial fibrillation hypertension peripheral artery disease fatty liver disease who recently had an episode of bronchitis and was treated in the outpatient setting.  The patient then began in having increasing weakness and lightheadedness and noticed his heart rate was elevated.  He presented to the emergency department and was found to have elevated alcohol levels and a heart rate in the 130s to 140s.  The patient is currently in AFib with RVR and has been started on a Cardizem drip by the emergency department.         Overview/Hospital Course:  12/23/21 [facility] patient reports that he is feeling better today, was able to get rest last night. Heart rate still elevated will adjust medications and monitor. Patient with hallucinations last night, patient is appropriate this am  12/24/21:   Rapid Response Call #1: Blood pressure initially low and patient heart rate in the 50s sinus Nakul.  Glucose within normal limits.  Fluid bolus 1 L was given with improvement in blood pressure.  Advised to hold any medication that will lower patient's heart rate.  Patient also complains of shortness of breath but according to respiratory therapist, patient did not take his breathing treatment because he did not think he needed it.  Patient wheezy expiratory and saturating in the high 80s on room air. not on any antibiotics currently so will start.  Labs show that patient has TANIKA possibly due to dehydration.  Will resuscitate with fluid.  Will recheck basic blood work and continue  to hydrate.  Treat accordingly will at started if patient is not already on it.  Patient more alert and oriented x4 moving all extremities with improvement of blood pressure into the 90s and heart rate remains in the 50s sinus Nakul saturating 94% on 2 L  Rapid Response Call #2:Rapid response team called again with subsequent intubation and transfer to MICU.   12/25/21: On minimal sedation and pressor support in the MICU.   12/26/21: Able to wean off of all pressor support, pH of 5 while on bicarb on AM labs. Will get rid of bicarb drip and switch to gentle IV hydration with LR. SBT tomorrow?  12/27/21:  Events over the weekend noted.  Patient is now intubated on the ventilator.  Patient was admitted with pneumonia atrial fibrillation and alcohol abuse and now also has a enterococcal urinary tract infection with sensitivities pending.  Patient has required to sedatives for agitation and combativeness.  12/28/21:  Patient's ABG has been reviewed.  Patient has a mild respiratory alkalosis and his respiratory rate has been changed.  We have encouraged respiratory and nursing to wean his sedation and titrate his tube feeds.  The patient's chest x-ray also seems like he is slightly volume overloaded therefore will discontinue IV fluids and give low dosing of Lasix.  Patient has enterococcal infection was ampicillin sensitive therefore Rocephin should be adequate.  Will change Zithromax to Levaquin.  12/29/21 FM:  Patient had an episode of desaturation yesterday.  Respiratory was able to deep suction and several mucus plugs and thick secretions were removed.  I will broaden patient antibiotics today to Zosyn and otherwise plan on increasing his diuretics today.  Patient has bilateral pulmonary infiltrates consistent with pulmonary edema.  12/30/21 FM:  Patient had a elevated temperature through the night.  Rocephin was changed to Zosyn yesterday and consideration of drug fever should be entertained.  Will add medicine for  MRSA infection today as well as antifungals.  Patient's chest x-ray seems to be improved with diuretics.  Continue diuretics cautiously.  12/31/21 CG: Antibiotic course broadened, tachycardic with an increase in PEEP. He is net negative over the past 24 hours.    1/1/22 CG: No acute overnight events. PEEP decreased to 10.   1/2/22 CG: CXR shows worsening fluid burden, heart rates in 110s-120s.   1/3/22 FM:  Patient is on an FiO2 of 55%.  His chest x-ray still shows pulmonary edema which is also consistent with his CT scan.  We will replace electrolytes transfuse and continue to diurese.  Hopefully we can wean his FiO2 down this weekend and wean him off the ventilator by the end of the week.  1/4/22 FM:  Patient's vital signs are stable this morning and he is sedated.  Nursing reports that when sedation is weaned the patient is awake opens eyes and is alert.  We are attempting a CPAP trial this morning as the patient is tolerating and FiO2 of 50% and could probably be weaned further.  Will reduce dosing of diuretics today.  Continue to replete potassium.  1/5/22 FM:  Yesterday afternoon the patient had a desaturation his oxygen and his chest x-ray showed a worsening right-sided pleural effusion.  A CT scan done 2 days ago showed some layering and there was a discussion about possible loculations.  The patient's chest tube initially had 200 cc of a serous drainage which is now bloody.  Patient's chest x-ray has improved after placement.  Patient's oxygenation today is improved but his condition is precarious.  I have spoken to the family and let them know a high risk and possible poor outcome.  1/6/22 FM:  Patient had a left-sided pleural effusion drained and about 600 cc of a serous fluid was removed.  Patient's right chest tube had 10 cc overnight of a bloody fluid.  Diagnostic testing is all pending.  Today the patient's chest x-ray is noted in his left lung seems to be hypoinflated.  I am increasing the tidal  volume slightly today and watching his airway pressures.  1/7/22 FM:  Patient's ABG shows a respiratory alkalosis.  We are reducing his minute ventilation.  Patient's chest x-ray is significantly improved today.  Will have Respiratory and Nursing try to wean sedation and CPAP trial today.  Patient is now on ventilator day 14.  Patient has had intermittent bradycardia and will hold amiodarone for now.  1/8 AA, weekend crosscover, patient on ventilator, repeat chest x-ray pending, amiodarone stopped secondary to bradycardia, history of AFib, patient was on amiodarone, stopped secondary to bradycardia, now patient increased heart rate, will restart amiodarone, will decrease dose, blood pressure appears stable, continue to wean nor epi, on stress dose steroids, surgery following, H&H dropped, possible transfusion  1/9 AA, weekend crosscover, patient started to have afib with RVR, eICU was called, adjustment made to pressors, started on metoprolol, on levophed, drop in H/H, had chest tube removed on 7th, no melena or BRBPR reported, surgery called to evaluate, suspect possible from previous chest tube site, transfuse prbc, serial H/Hs, follow up with surgery recs.  1/10/22 FM:  Events over the weekend noted.  Patient is now back on amiodarone and his heart rate is slightly improved.  Chest tube is back in place with 100 cc of a bloody thin fluid.  All cultures have been reviewed and there is no significant growth.  Patient has now been on broad-spectrum antibiotics and antifungals for greater than 10 days.  Patient's WBC count is going up.  Spoke with General surgery today about placing tracheostomy.  1/11/22 FM:  Patient's chest x-ray has improved this morning with better ventilation.  Yesterday the patient's ET tube was in his right mainstem and the tube was pulled back.  The patient has a worsening anemia today and has had bleeding related to his chest tube.  General surgery is planning on doing a tracheostomy toward  the end of the week.  Patient's white blood cell count is improved.  Patient's atrial fibrillation has worsened and we are titrating his amiodarone.  1/12/22 FM:  We are weaning the patient's oxygen today.  Patient's chest x-ray shows improved aeration.  The plan is to have the patient have a tracheostomy your at the end of this week or this weekend and then to moved to an LTAC for vent weaning.      Interval History: See     Review of Systems   Unable to perform ROS: Severe respiratory distress     Objective:     Vital Signs (Most Recent):  Temp: 97.6 °F (36.4 °C) (01/12/22 0715)  Pulse: 109 (01/12/22 0800)  Resp: (!) 37 (01/12/22 0800)  BP: 109/70 (01/12/22 0800)  SpO2: 100 % (01/12/22 0800) Vital Signs (24h Range):  Temp:  [97.5 °F (36.4 °C)-98.9 °F (37.2 °C)] 97.6 °F (36.4 °C)  Pulse:  [] 109  Resp:  [15-56] 37  SpO2:  [86 %-100 %] 100 %  BP: ()/(56-75) 109/70  Arterial Line BP: ()/(54-90) 124/65     Weight: 77.1 kg (170 lb)  Body mass index is 24.39 kg/m².    Intake/Output Summary (Last 24 hours) at 1/12/2022 0822  Last data filed at 1/12/2022 0600  Gross per 24 hour   Intake 5065.84 ml   Output 3020 ml   Net 2045.84 ml      Physical Exam  Constitutional:       Appearance: He is ill-appearing.      Interventions: He is intubated.   HENT:      Head: Normocephalic and atraumatic.      Nose: Nose normal.      Mouth/Throat:      Mouth: Mucous membranes are moist.      Comments: ET Tube free of secretions.   Eyes:      Pupils: Pupils are equal, round, and reactive to light.   Cardiovascular:      Rate and Rhythm: Normal rate. Rhythm irregular.      Pulses: Normal pulses.   Pulmonary:      Effort: Prolonged expiration present. He is intubated.      Breath sounds: No stridor. Rhonchi and rales present. No wheezing.   Abdominal:      General: Abdomen is flat.      Palpations: Abdomen is soft.   Musculoskeletal:      Cervical back: Neck supple.      Right lower leg: No edema.      Left lower leg: No  edema.   Skin:     General: Skin is warm.   Psychiatric:      Comments: Intubated and sedated         Significant Labs:   All pertinent labs within the past 24 hours have been reviewed.  CBC:   Recent Labs   Lab 01/11/22 0430 01/12/22  0326   WBC 27.68* 23.74*   HGB 7.3* 9.5*   HCT 22.0* 28.4*   * 118*     CMP:   Recent Labs   Lab 01/11/22 0430 01/12/22  0326    142   K 3.7 3.8    102   CO2 34* 34*   * 136*   BUN 68* 64*   CREATININE 1.1 1.0   CALCIUM 8.2* 8.7   PROT 5.0* 5.4*   ALBUMIN 1.3* 1.3*   BILITOT 0.6 0.6   ALKPHOS 71 93   AST 32 37   ALT 38 34   ANIONGAP 6* 6*   EGFRNONAA >60.0 >60.0       Significant Imaging: I have reviewed all pertinent imaging results/findings within the past 24 hours.      Assessment/Plan:      * Acute on chronic respiratory failure with hypoxia  Will have GS trach this week, stop abx soon.  Improved aeration of left lung today.    Acute hemorrhage  Transfuse again today.      Pleural effusion  Cultures all negative, CT output noted.    PNA (pneumonia)        Severe sepsis  Suspect acute WBC increase related to stressfull events over weekend (blood products, A.Fib RVR, etc...)  Cont. To monitor and stopping abx soon.        UTI (urinary tract infection)  See abx      DVT prophylaxis  Hold eliquis for now in setting of TANIKA.      Atrial fibrillation with rapid ventricular response  Titrate amiodarone.    Tobacco user  PRN Nicotine patch    Alcohol dependence        Elevated LFTs  LFTs trending down        TANIKA (acute kidney injury)  Noted, Cr, remaining around 1.5  Appears at baseline        VTE Risk Mitigation (From admission, onward)         Ordered     Place sequential compression device  Until discontinued         01/05/22 0815     IP VTE HIGH RISK PATIENT  Once         12/21/21 2233     Reason for No Pharmacological VTE Prophylaxis  Once        Question:  Reasons:  Answer:  Already adequately anticoagulated on oral Anticoagulants    12/21/21 2233                 Discharge Planning   DAXA:      Code Status: Full Code   Is the patient medically ready for discharge?:     Reason for patient still in hospital (select all that apply): Patient unstable  Discharge Plan A: Home with family,Home Health                  Raul Gan III, MD  Department of Hospital Medicine   [place] - [facility]

## 2022-01-12 NOTE — PLAN OF CARE
Pt remains on vent with sedation - fio2 decreased to 90 percent this and norris 2 hours later fio2 increased to 100 percent due to sats in 80's (85 percent)- tolerating tube feedings - vital signs stable at present - remains on levophed infusion - bath given - tolerated - scd's in place - all extremities elevated on pillows - chest tube to right side - dressing dry and intact - iv's remain intact - will continue to monitor

## 2022-01-12 NOTE — EICU
Rounding (Video Assessment):  Yes    Comments: Video assessment completed.  Pt lying in bed.  ETT to vent.  Resp even and unlabored with vent support.  SaO2 100% with vent settings FiO2 100%, (+)8 PEEP.  IVF in progress include precedex at 1.4 mcg/kg/hr, propofol at 50 mcg/kg/min, and levophed at 0.07 mcg/kg/min.

## 2022-01-13 NOTE — PHYSICIAN QUERY
PT Name: Neymar Patel  MR #: [id number]  DOCUMENTATION CLARIFICATION     CDS/: HONEY Tse, RN, CDS               Contact information:[email or web address]     This form is a permanent document in the medical record.    Query Date: January 13, 2022    By submitting this query, we are merely seeking further clarification of documentation. Please utilize your independent clinical judgment when addressing the question(s) below.    The Medical Record contains the following:  Indicators Supporting Clinical Findings Location in Medical Record   X Surgery or Procedure performed Chest Tube Insertion-Placement location: right lateral  Drainage characteristics: yellow and serosanguinous  Drainage amount: 200 ml    32Fr right chest tube placed with return of 200cc serous fluid    R chest tube removed      General surgery, Dr. Ni, 1/4        General surgery, Dr. Ni, 1/4      General surgery, Dr. Ni, 1/7   X Estimated Blood Loss (EBL) Estimated blood loss (mL): 5   General surgery, Dr. Ni, 1/4   X Bleeding, hemorrhage, hematoma documented Bleeding from right intercostal muscles around chest tube overnight    R chest tube site with small hematoma.  Moderate clot burden in tube    Firm hematoma overlying previous R chest tube site.    Patient with presumed acute hemorrhage from R chest tube site    Patient with acute drop in Hgb for formation of chest wall hematoma seen on CXR 1/8 and 1/9.     Chest tube removal atraumatic and site of bleeding presumed to be from chest wall muscle vs. Intercostal muscle     CXR demonstrated large R effusion vs hemothorax with right chest wall hematoma near the upper lobe.      Follow up CXR with improvement of R hemothorax    The patient has a worsening anemia today and has had bleeding related to his chest tube     Acute hemorrhage   General surgery, Dr. Ni, 1/5            General surgery, Dr. Ni, 1/9                                  , Dr. Gan,  III, 1/11         X Evacuation of hematoma documented Wound urgently explored at bedside with evacuation of 2L mixture of coagulated blood and pleural fluid   General surgery, Dr. Ni, 1/9   X Anticoagulants/Antiplatelets/Blood thinners administration Enoxaparin 40 mg Subcutaneous daily    Enoxaparin not given due bleed this AM   MAR Started 12/28- Stopped 1/8    MAR 1/5   X Transfusion Transfused 4 units PRBC   Lab 1/8   X Treatment Right chest tube removed, Occlusive dressing in place    1/9 chest tube removed, now with drop in H/H, follow up with surgery recs, transfuse prbcs, patient with hematoma, surgery planning to evacuate and place chest tube    Wound urgently explored at bedside with evacuation of 2L mixture of coagulated blood and pleural fluid    Brisk venous bleed from chest wall muscle identified and suture ligated     Intercostal muscle and pleura explored with no signs of further bleeding    32Fr chest tube placed to suction   General surgery, Dr. Ni, 1/7    , Dr. Hyatt, 1/9          General surgery, Dr. Ni, 1/9   X Other History of alcohol abuse      1/5 1/9   Protime 10.6 10.1   INR 1.0 0.9   aPTT 25.7 23.5      HM, Dr. Gan, III, 1/10      Lab 1/5-1/9           Provider, please clarify if the Acute Hemorrhage is: (Check all that apply)    [   ] Due to (please specify): _______________     [   ] Due to Enoxaparin     [   ] Other (please specify): _____________     [ x ] Clinically Undetermined                              Please document in your progress notes daily for the duration of treatment, until resolved, and include in your discharge summary.    Form No. [id number]

## 2022-01-13 NOTE — CARE UPDATE
Heart rate remains elevated - morphine 2 mg ivp for pain and tylenol 1000 mg per tube for elevated temp of 100.6 - dr calhoun iii notified of elevated temp - fio2 increased to 90 percent sat prior to increase 86 to 87 percent - resp notified

## 2022-01-13 NOTE — PLAN OF CARE
Patient remains intubated and sedated. FIO@ remains at 100%. Propofol at 50 mcg, Precedex at 1.4 Mcg and Levofed at 0.07 mcg. His left arm  is weeping pink / yellow fluid. He is tolerating his tube feeding at 50cc/hr. His rt chest wall chest tube remains at -20 suction and a small amount of bloody drainage is noted.

## 2022-01-13 NOTE — PLAN OF CARE
Remains on vent with sedation - max temp today 100.6 - tylenol given and coverings removed - temp now 99.7 - heart rate afib with rvr - up to 140's and 150's this am - morphine given for possible pain - tylenol for elevated temp - also started on amiodarone infusion without bolus - pt had ng amiodarone this am - heart rate has improved some with medications - blood pressure is lower today - levophed has been increased throughout the day - dr lj estes noted of all of above - no more new orders - chg bath given today - chest tube remains in place with minimal drainage - fecal management in place with dark brown/green stool - dior catheter draining clear yellow urine - lasix given this am - no distress noted - will continue to monitor - new mepilex placed by devang robbins after evaluating sacral area with small healing scabbed area

## 2022-01-13 NOTE — SUBJECTIVE & OBJECTIVE
Interval History: See     Review of Systems   Unable to perform ROS: Severe respiratory distress     Objective:     Vital Signs (Most Recent):  Temp: 97.9 °F (36.6 °C) (01/13/22 0715)  Pulse: (!) 123 (01/13/22 0730)  Resp: (!) 38 (01/13/22 0730)  BP: (!) 103/58 (01/13/22 0730)  SpO2: 100 % (01/13/22 0730) Vital Signs (24h Range):  Temp:  [97.2 °F (36.2 °C)-99.1 °F (37.3 °C)] 97.9 °F (36.6 °C)  Pulse:  [101-134] 123  Resp:  [20-46] 38  SpO2:  [85 %-100 %] 100 %  BP: ()/(49-71) 103/58  Arterial Line BP: ()/(47-64) 98/55     Weight: 77.1 kg (170 lb)  Body mass index is 24.39 kg/m².    Intake/Output Summary (Last 24 hours) at 1/13/2022 0836  Last data filed at 1/13/2022 0522  Gross per 24 hour   Intake 3250.09 ml   Output 1580 ml   Net 1670.09 ml      Physical Exam  Constitutional:       Appearance: He is ill-appearing.      Interventions: He is intubated.   HENT:      Head: Normocephalic and atraumatic.      Nose: Nose normal.      Mouth/Throat:      Mouth: Mucous membranes are moist.      Comments: ET Tube free of secretions.   Eyes:      Pupils: Pupils are equal, round, and reactive to light.   Cardiovascular:      Rate and Rhythm: Normal rate. Rhythm irregular.      Pulses: Normal pulses.   Pulmonary:      Effort: Prolonged expiration present. He is intubated.      Breath sounds: No stridor. Rhonchi and rales present. No wheezing.   Abdominal:      General: Abdomen is flat.      Palpations: Abdomen is soft.   Musculoskeletal:      Cervical back: Neck supple.      Right lower leg: No edema.      Left lower leg: No edema.   Skin:     General: Skin is warm.   Psychiatric:      Comments: Intubated and sedated         Significant Labs:   All pertinent labs within the past 24 hours have been reviewed.  CBC:   Recent Labs   Lab 01/12/22  0326 01/13/22  0447   WBC 23.74* 18.14*   HGB 9.5* 9.1*   HCT 28.4* 28.4*   * 134*     CMP:   Recent Labs   Lab 01/12/22  0326 01/13/22  0447    143   K 3.8 4.0     103   CO2 34* 33*   * 130*   BUN 64* 65*   CREATININE 1.0 0.9   CALCIUM 8.7 8.5*   PROT 5.4* 5.4*   ALBUMIN 1.3* 1.2*   BILITOT 0.6 0.7   ALKPHOS 93 119   AST 37 46*   ALT 34 29   ANIONGAP 6* 7*   EGFRNONAA >60.0 >60.0       Significant Imaging: I have reviewed all pertinent imaging results/findings within the past 24 hours.

## 2022-01-13 NOTE — NURSING
Patient arterial line and cuff bp dropped to 70s/40s.. Increased Levofed to 0.09 mcg/kg/. BP now 100/60 s MAP High 60s

## 2022-01-13 NOTE — EICU
Rounding (Video Assessment):  Video rounding completed.  Pt resting quiet on ventilator support w/ FiO2 100%, +8 peep.  No distress noted.  Infusing Precedex gtt at 1.4 mcg/kg/hr, Norepinephrine gtt at 0.09 mcg/kg/min & Propofol gtt at 50 mcg/kg/min.  B/P 95/59, HR irregular at 124, RR 39, POx 100%

## 2022-01-13 NOTE — PLAN OF CARE
Contacted by Yanelis Jackson.  Discussed updates and current meds patient is on.  She states that they can take patients even if they are not trach and peg.  But she will discussed w/administration and see if they can take patient now or they have to wait.  She states she will call me back.    Addendum 1337:  Contacted by Yanelis Jackson.  States they are still reviewing patient but that she will be here tomorrow to evaluate patient.

## 2022-01-13 NOTE — PROGRESS NOTES
01/13/22 1246        Altered Skin Integrity 01/06/22 0000  midline Sacral spine #1 Contusion Purple or maroon localized area of discolored intact skin or non-intact skin or a blood-filled blister.   Date First Assessed/Time First Assessed: 01/06/22 0000   Altered Skin Integrity Present on Admission: suspected hospital acquired  Side: (c)   Orientation: midline  Location: Sacral spine  Wound Number: #1  Is this injury device related?: No  Primary ...   Description of Altered Skin Integrity Purple or maroon localized area of discolored intact skin or non-intact skin or a blood-filled blister.   Dressing Appearance Clean;Dry;Intact   Drainage Amount None   Appearance Other (see comments)  (Dry brown healing scab from possible shearing)   Periwound Area Intact;Dry;Pink   Wound Edges Irregular   Wound Length (cm) 2 cm   Wound Width (cm) 3 cm   Wound Surface Area (cm^2) 6 cm^2   Care Cleansed with:;Sterile normal saline   Dressing Applied;Foam  (Sacral silicone foam adhesive dressing)   Dressing Change Due 01/15/22

## 2022-01-13 NOTE — CARE UPDATE
Dr calhoun iii notified of elevated heart rate - a fib with rvr - will change amiodarone to infusion and dc po

## 2022-01-13 NOTE — EICU
trevin rounding complete  Patient on ventilator  Afib    Propofol 50; Precedex 1.4;  Levophed 0.15 infusing

## 2022-01-13 NOTE — PROGRESS NOTES
[facility] Medicine  Progress Note    Patient Name: Neymar Patel  MRN: [id number]  Patient Class: IP- Inpatient   Admission Date: 12/21/2021  Length of Stay: 22 days  Attending Physician: Raul Gan III, MD  Primary Care Provider: Landen Brown MD        Subjective:     Principal Problem:Acute on chronic respiratory failure with hypoxia        HPI:  Perpetual History  Patient is a 66-year-old male with a history of alcohol abuse atrial fibrillation hypertension peripheral artery disease fatty liver disease who recently had an episode of bronchitis and was treated in the outpatient setting.  The patient then began in having increasing weakness and lightheadedness and noticed his heart rate was elevated.  He presented to the emergency department and was found to have elevated alcohol levels and a heart rate in the 130s to 140s.  The patient is currently in AFib with RVR and has been started on a Cardizem drip by the emergency department.         Overview/Hospital Course:  12/23/21 [facility] patient reports that he is feeling better today, was able to get rest last night. Heart rate still elevated will adjust medications and monitor. Patient with hallucinations last night, patient is appropriate this am  12/24/21:   Rapid Response Call #1: Blood pressure initially low and patient heart rate in the 50s sinus Nakul.  Glucose within normal limits.  Fluid bolus 1 L was given with improvement in blood pressure.  Advised to hold any medication that will lower patient's heart rate.  Patient also complains of shortness of breath but according to respiratory therapist, patient did not take his breathing treatment because he did not think he needed it.  Patient wheezy expiratory and saturating in the high 80s on room air. not on any antibiotics currently so will start.  Labs show that patient has TANIKA possibly due to dehydration.  Will resuscitate with fluid.  Will recheck basic blood work and continue  to hydrate.  Treat accordingly will at started if patient is not already on it.  Patient more alert and oriented x4 moving all extremities with improvement of blood pressure into the 90s and heart rate remains in the 50s sinus Nakul saturating 94% on 2 L  Rapid Response Call #2:Rapid response team called again with subsequent intubation and transfer to MICU.   12/25/21: On minimal sedation and pressor support in the MICU.   12/26/21: Able to wean off of all pressor support, pH of 5 while on bicarb on AM labs. Will get rid of bicarb drip and switch to gentle IV hydration with LR. SBT tomorrow?  12/27/21:  Events over the weekend noted.  Patient is now intubated on the ventilator.  Patient was admitted with pneumonia atrial fibrillation and alcohol abuse and now also has a enterococcal urinary tract infection with sensitivities pending.  Patient has required to sedatives for agitation and combativeness.  12/28/21:  Patient's ABG has been reviewed.  Patient has a mild respiratory alkalosis and his respiratory rate has been changed.  We have encouraged respiratory and nursing to wean his sedation and titrate his tube feeds.  The patient's chest x-ray also seems like he is slightly volume overloaded therefore will discontinue IV fluids and give low dosing of Lasix.  Patient has enterococcal infection was ampicillin sensitive therefore Rocephin should be adequate.  Will change Zithromax to Levaquin.  12/29/21 FM:  Patient had an episode of desaturation yesterday.  Respiratory was able to deep suction and several mucus plugs and thick secretions were removed.  I will broaden patient antibiotics today to Zosyn and otherwise plan on increasing his diuretics today.  Patient has bilateral pulmonary infiltrates consistent with pulmonary edema.  12/30/21 FM:  Patient had a elevated temperature through the night.  Rocephin was changed to Zosyn yesterday and consideration of drug fever should be entertained.  Will add medicine for  MRSA infection today as well as antifungals.  Patient's chest x-ray seems to be improved with diuretics.  Continue diuretics cautiously.  12/31/21 CG: Antibiotic course broadened, tachycardic with an increase in PEEP. He is net negative over the past 24 hours.    1/1/22 CG: No acute overnight events. PEEP decreased to 10.   1/2/22 CG: CXR shows worsening fluid burden, heart rates in 110s-120s.   1/3/22 FM:  Patient is on an FiO2 of 55%.  His chest x-ray still shows pulmonary edema which is also consistent with his CT scan.  We will replace electrolytes transfuse and continue to diurese.  Hopefully we can wean his FiO2 down this weekend and wean him off the ventilator by the end of the week.  1/4/22 FM:  Patient's vital signs are stable this morning and he is sedated.  Nursing reports that when sedation is weaned the patient is awake opens eyes and is alert.  We are attempting a CPAP trial this morning as the patient is tolerating and FiO2 of 50% and could probably be weaned further.  Will reduce dosing of diuretics today.  Continue to replete potassium.  1/5/22 FM:  Yesterday afternoon the patient had a desaturation his oxygen and his chest x-ray showed a worsening right-sided pleural effusion.  A CT scan done 2 days ago showed some layering and there was a discussion about possible loculations.  The patient's chest tube initially had 200 cc of a serous drainage which is now bloody.  Patient's chest x-ray has improved after placement.  Patient's oxygenation today is improved but his condition is precarious.  I have spoken to the family and let them know a high risk and possible poor outcome.  1/6/22 FM:  Patient had a left-sided pleural effusion drained and about 600 cc of a serous fluid was removed.  Patient's right chest tube had 10 cc overnight of a bloody fluid.  Diagnostic testing is all pending.  Today the patient's chest x-ray is noted in his left lung seems to be hypoinflated.  I am increasing the tidal  volume slightly today and watching his airway pressures.  1/7/22 FM:  Patient's ABG shows a respiratory alkalosis.  We are reducing his minute ventilation.  Patient's chest x-ray is significantly improved today.  Will have Respiratory and Nursing try to wean sedation and CPAP trial today.  Patient is now on ventilator day 14.  Patient has had intermittent bradycardia and will hold amiodarone for now.  1/8 AA, weekend crosscover, patient on ventilator, repeat chest x-ray pending, amiodarone stopped secondary to bradycardia, history of AFib, patient was on amiodarone, stopped secondary to bradycardia, now patient increased heart rate, will restart amiodarone, will decrease dose, blood pressure appears stable, continue to wean nor epi, on stress dose steroids, surgery following, H&H dropped, possible transfusion  1/9 AA, weekend crosscover, patient started to have afib with RVR, eICU was called, adjustment made to pressors, started on metoprolol, on levophed, drop in H/H, had chest tube removed on 7th, no melena or BRBPR reported, surgery called to evaluate, suspect possible from previous chest tube site, transfuse prbc, serial H/Hs, follow up with surgery recs.  1/10/22 FM:  Events over the weekend noted.  Patient is now back on amiodarone and his heart rate is slightly improved.  Chest tube is back in place with 100 cc of a bloody thin fluid.  All cultures have been reviewed and there is no significant growth.  Patient has now been on broad-spectrum antibiotics and antifungals for greater than 10 days.  Patient's WBC count is going up.  Spoke with General surgery today about placing tracheostomy.  1/11/22 FM:  Patient's chest x-ray has improved this morning with better ventilation.  Yesterday the patient's ET tube was in his right mainstem and the tube was pulled back.  The patient has a worsening anemia today and has had bleeding related to his chest tube.  General surgery is planning on doing a tracheostomy toward  the end of the week.  Patient's white blood cell count is improved.  Patient's atrial fibrillation has worsened and we are titrating his amiodarone.  1/12/22 FM:  We are weaning the patient's oxygen today.  Patient's chest x-ray shows improved aeration.  The plan is to have the patient have a tracheostomy your at the end of this week or this weekend and then to moved to an LTAC for vent weaning.  1/13/2022 FM:  Patient today has not had much change to the night.  For some reason the patient is back on 100% FiO2 and is satting 100% today.  I have turned this down and encouraged weaning of oxygen.  The patient's chest x-ray has been reviewed.  Patient's chest tube output was 33 cc through the night and his creatinine is 0.9.      Interval History: See     Review of Systems   Unable to perform ROS: Severe respiratory distress     Objective:     Vital Signs (Most Recent):  Temp: 97.9 °F (36.6 °C) (01/13/22 0715)  Pulse: (!) 123 (01/13/22 0730)  Resp: (!) 38 (01/13/22 0730)  BP: (!) 103/58 (01/13/22 0730)  SpO2: 100 % (01/13/22 0730) Vital Signs (24h Range):  Temp:  [97.2 °F (36.2 °C)-99.1 °F (37.3 °C)] 97.9 °F (36.6 °C)  Pulse:  [101-134] 123  Resp:  [20-46] 38  SpO2:  [85 %-100 %] 100 %  BP: ()/(49-71) 103/58  Arterial Line BP: ()/(47-64) 98/55     Weight: 77.1 kg (170 lb)  Body mass index is 24.39 kg/m².    Intake/Output Summary (Last 24 hours) at 1/13/2022 0836  Last data filed at 1/13/2022 0522  Gross per 24 hour   Intake 3250.09 ml   Output 1580 ml   Net 1670.09 ml      Physical Exam  Constitutional:       Appearance: He is ill-appearing.      Interventions: He is intubated.   HENT:      Head: Normocephalic and atraumatic.      Nose: Nose normal.      Mouth/Throat:      Mouth: Mucous membranes are moist.      Comments: ET Tube free of secretions.   Eyes:      Pupils: Pupils are equal, round, and reactive to light.   Cardiovascular:      Rate and Rhythm: Normal rate. Rhythm irregular.      Pulses:  Normal pulses.   Pulmonary:      Effort: Prolonged expiration present. He is intubated.      Breath sounds: No stridor. Rhonchi and rales present. No wheezing.   Abdominal:      General: Abdomen is flat.      Palpations: Abdomen is soft.   Musculoskeletal:      Cervical back: Neck supple.      Right lower leg: No edema.      Left lower leg: No edema.   Skin:     General: Skin is warm.   Psychiatric:      Comments: Intubated and sedated         Significant Labs:   All pertinent labs within the past 24 hours have been reviewed.  CBC:   Recent Labs   Lab 01/12/22 0326 01/13/22 0447   WBC 23.74* 18.14*   HGB 9.5* 9.1*   HCT 28.4* 28.4*   * 134*     CMP:   Recent Labs   Lab 01/12/22 0326 01/13/22 0447    143   K 3.8 4.0    103   CO2 34* 33*   * 130*   BUN 64* 65*   CREATININE 1.0 0.9   CALCIUM 8.7 8.5*   PROT 5.4* 5.4*   ALBUMIN 1.3* 1.2*   BILITOT 0.6 0.7   ALKPHOS 93 119   AST 37 46*   ALT 34 29   ANIONGAP 6* 7*   EGFRNONAA >60.0 >60.0       Significant Imaging: I have reviewed all pertinent imaging results/findings within the past 24 hours.      Assessment/Plan:      * Acute on chronic respiratory failure with hypoxia  Will have GS trach this week, stop abx soon.  Improved aeration of left lung today.    Acute hemorrhage  Transfuse again today.      Pleural effusion  Cultures all negative, CT output noted.    PNA (pneumonia)  CXR noted, improved.      Severe sepsis  Suspect acute WBC increase related to stressfull events over weekend (blood products, A.Fib RVR, etc...)  Cont. To monitor and stopping abx soon.        UTI (urinary tract infection)  See abx      DVT prophylaxis  On LMWH.      Atrial fibrillation with rapid ventricular response  On amiodarone, rate noted.    Tobacco user  PRN Nicotine patch    Alcohol dependence        Elevated LFTs  LFTs trending down        TANIKA (acute kidney injury)  Noted, Cr, 0.9, improved.  Appears at baseline        VTE Risk Mitigation (From admission,  onward)         Ordered     enoxaparin injection 40 mg  Daily         01/12/22 0823     Place sequential compression device  Until discontinued         01/05/22 0815     IP VTE HIGH RISK PATIENT  Once         12/21/21 2233     Reason for No Pharmacological VTE Prophylaxis  Once        Question:  Reasons:  Answer:  Already adequately anticoagulated on oral Anticoagulants    12/21/21 2233                Discharge Planning   DAXA:      Code Status: Full Code   Is the patient medically ready for discharge?:     Reason for patient still in hospital (select all that apply): Patient unstable  Discharge Plan A: Long-term acute care facility (LTAC)   Discharge Delays: None known at this time              Raul Gan III, MD  Department of Hospital Medicine   [place] [facility]

## 2022-01-14 NOTE — PROGRESS NOTES
[facility] Medicine  Progress Note    Patient Name: Neymar Patel  MRN: [id number]  Patient Class: IP- Inpatient   Admission Date: 12/21/2021  Length of Stay: 23 days  Attending Physician: Raul Gan III, MD  Primary Care Provider: Landen Brown MD        Subjective:     Principal Problem:Acute on chronic respiratory failure with hypoxia        HPI:  Perpetual History  Patient is a 66-year-old male with a history of alcohol abuse atrial fibrillation hypertension peripheral artery disease fatty liver disease who recently had an episode of bronchitis and was treated in the outpatient setting.  The patient then began in having increasing weakness and lightheadedness and noticed his heart rate was elevated.  He presented to the emergency department and was found to have elevated alcohol levels and a heart rate in the 130s to 140s.  The patient is currently in AFib with RVR and has been started on a Cardizem drip by the emergency department.         Overview/Hospital Course:  12/23/21 [employer] patient reports that he is feeling better today, was able to get rest last night. Heart rate still elevated will adjust medications and monitor. Patient with hallucinations last night, patient is appropriate this am  12/24/21:   Rapid Response Call #1: Blood pressure initially low and patient heart rate in the 50s sinus Nakul.  Glucose within normal limits.  Fluid bolus 1 L was given with improvement in blood pressure.  Advised to hold any medication that will lower patient's heart rate.  Patient also complains of shortness of breath but according to respiratory therapist, patient did not take his breathing treatment because he did not think he needed it.  Patient wheezy expiratory and saturating in the high 80s on room air. not on any antibiotics currently so will start.  Labs show that patient has TANIKA possibly due to dehydration.  Will resuscitate with fluid.  Will recheck basic blood work and continue  to hydrate.  Treat accordingly will at started if patient is not already on it.  Patient more alert and oriented x4 moving all extremities with improvement of blood pressure into the 90s and heart rate remains in the 50s sinus Nakul saturating 94% on 2 L  Rapid Response Call #2:Rapid response team called again with subsequent intubation and transfer to MICU.   12/25/21: On minimal sedation and pressor support in the MICU.   12/26/21: Able to wean off of all pressor support, pH of 5 while on bicarb on AM labs. Will get rid of bicarb drip and switch to gentle IV hydration with LR. SBT tomorrow?  12/27/21:  Events over the weekend noted.  Patient is now intubated on the ventilator.  Patient was admitted with pneumonia atrial fibrillation and alcohol abuse and now also has a enterococcal urinary tract infection with sensitivities pending.  Patient has required to sedatives for agitation and combativeness.  12/28/21:  Patient's ABG has been reviewed.  Patient has a mild respiratory alkalosis and his respiratory rate has been changed.  We have encouraged respiratory and nursing to wean his sedation and titrate his tube feeds.  The patient's chest x-ray also seems like he is slightly volume overloaded therefore will discontinue IV fluids and give low dosing of Lasix.  Patient has enterococcal infection was ampicillin sensitive therefore Rocephin should be adequate.  Will change Zithromax to Levaquin.  12/29/21 FM:  Patient had an episode of desaturation yesterday.  Respiratory was able to deep suction and several mucus plugs and thick secretions were removed.  I will broaden patient antibiotics today to Zosyn and otherwise plan on increasing his diuretics today.  Patient has bilateral pulmonary infiltrates consistent with pulmonary edema.  12/30/21 FM:  Patient had a elevated temperature through the night.  Rocephin was changed to Zosyn yesterday and consideration of drug fever should be entertained.  Will add medicine for  MRSA infection today as well as antifungals.  Patient's chest x-ray seems to be improved with diuretics.  Continue diuretics cautiously.  12/31/21 CG: Antibiotic course broadened, tachycardic with an increase in PEEP. He is net negative over the past 24 hours.    1/1/22 CG: No acute overnight events. PEEP decreased to 10.   1/2/22 CG: CXR shows worsening fluid burden, heart rates in 110s-120s.   1/3/22 FM:  Patient is on an FiO2 of 55%.  His chest x-ray still shows pulmonary edema which is also consistent with his CT scan.  We will replace electrolytes transfuse and continue to diurese.  Hopefully we can wean his FiO2 down this weekend and wean him off the ventilator by the end of the week.  1/4/22 FM:  Patient's vital signs are stable this morning and he is sedated.  Nursing reports that when sedation is weaned the patient is awake opens eyes and is alert.  We are attempting a CPAP trial this morning as the patient is tolerating and FiO2 of 50% and could probably be weaned further.  Will reduce dosing of diuretics today.  Continue to replete potassium.  1/5/22 FM:  Yesterday afternoon the patient had a desaturation his oxygen and his chest x-ray showed a worsening right-sided pleural effusion.  A CT scan done 2 days ago showed some layering and there was a discussion about possible loculations.  The patient's chest tube initially had 200 cc of a serous drainage which is now bloody.  Patient's chest x-ray has improved after placement.  Patient's oxygenation today is improved but his condition is precarious.  I have spoken to the family and let them know a high risk and possible poor outcome.  1/6/22 FM:  Patient had a left-sided pleural effusion drained and about 600 cc of a serous fluid was removed.  Patient's right chest tube had 10 cc overnight of a bloody fluid.  Diagnostic testing is all pending.  Today the patient's chest x-ray is noted in his left lung seems to be hypoinflated.  I am increasing the tidal  volume slightly today and watching his airway pressures.  1/7/22 FM:  Patient's ABG shows a respiratory alkalosis.  We are reducing his minute ventilation.  Patient's chest x-ray is significantly improved today.  Will have Respiratory and Nursing try to wean sedation and CPAP trial today.  Patient is now on ventilator day 14.  Patient has had intermittent bradycardia and will hold amiodarone for now.  1/8 AA, weekend crosscover, patient on ventilator, repeat chest x-ray pending, amiodarone stopped secondary to bradycardia, history of AFib, patient was on amiodarone, stopped secondary to bradycardia, now patient increased heart rate, will restart amiodarone, will decrease dose, blood pressure appears stable, continue to wean nor epi, on stress dose steroids, surgery following, H&H dropped, possible transfusion  1/9 AA, weekend crosscover, patient started to have afib with RVR, eICU was called, adjustment made to pressors, started on metoprolol, on levophed, drop in H/H, had chest tube removed on 7th, no melena or BRBPR reported, surgery called to evaluate, suspect possible from previous chest tube site, transfuse prbc, serial H/Hs, follow up with surgery recs.  1/10/22 FM:  Events over the weekend noted.  Patient is now back on amiodarone and his heart rate is slightly improved.  Chest tube is back in place with 100 cc of a bloody thin fluid.  All cultures have been reviewed and there is no significant growth.  Patient has now been on broad-spectrum antibiotics and antifungals for greater than 10 days.  Patient's WBC count is going up.  Spoke with General surgery today about placing tracheostomy.  1/11/22 FM:  Patient's chest x-ray has improved this morning with better ventilation.  Yesterday the patient's ET tube was in his right mainstem and the tube was pulled back.  The patient has a worsening anemia today and has had bleeding related to his chest tube.  General surgery is planning on doing a tracheostomy toward  the end of the week.  Patient's white blood cell count is improved.  Patient's atrial fibrillation has worsened and we are titrating his amiodarone.  1/12/22 FM:  We are weaning the patient's oxygen today.  Patient's chest x-ray shows improved aeration.  The plan is to have the patient have a tracheostomy your at the end of this week or this weekend and then to moved to an LTAC for vent weaning.  1/13/2022 FM:  Patient today has not had much change to the night.  For some reason the patient is back on 100% FiO2 and is satting 100% today.  I have turned this down and encouraged weaning of oxygen.  The patient's chest x-ray has been reviewed.  Patient's chest tube output was 33 cc through the night and his creatinine is 0.9.  1/14/2021 FM:  Patient had an acute episode of hypoxia through the night.  His atrial fibrillation with RVR also worsened.  Patient has been loaded on digoxin and has been changed to an amiodarone drip.  Patient today also had tube feeds and his ET tube suctioning.  Patient has coarse bilateral breath sounds and acute worsening on his chest x-ray this morning.  I have spoken with family and informed them that complications continue to arise and despite making some gains and planning to have tracheostomy placed the patient has now declined again.      Interval History: See     Review of Systems   Unable to perform ROS: Intubated     Objective:     Vital Signs (Most Recent):  Temp: 99.86 °F (37.7 °C) (01/14/22 0700)  Pulse: (!) 126 (01/14/22 0730)  Resp: (!) 21 (01/14/22 0730)  BP: (!) 92/55 (01/14/22 0730)  SpO2: (!) 86 % (01/14/22 0730) Vital Signs (24h Range):  Temp:  [98.3 °F (36.8 °C)-100.6 °F (38.1 °C)] 99.86 °F (37.7 °C)  Pulse:  [112-141] 126  Resp:  [18-54] 21  SpO2:  [84 %-100 %] 86 %  BP: ()/(50-66) 92/55  Arterial Line BP: ()/(43-60) 97/50     Weight: 77.1 kg (170 lb)  Body mass index is 24.39 kg/m².    Intake/Output Summary (Last 24 hours) at 1/14/2022 0819  Last data  filed at 1/14/2022 0500  Gross per 24 hour   Intake 5101.63 ml   Output 1270 ml   Net 3831.63 ml      Physical Exam  Constitutional:       Appearance: He is ill-appearing.      Interventions: He is intubated.   HENT:      Head: Normocephalic and atraumatic.      Nose: Nose normal.      Mouth/Throat:      Mouth: Mucous membranes are moist.      Comments: ET Tube free of secretions.   Eyes:      Pupils: Pupils are equal, round, and reactive to light.   Cardiovascular:      Rate and Rhythm: Normal rate. Rhythm irregular.      Pulses: Normal pulses.   Pulmonary:      Effort: Prolonged expiration present. He is intubated.      Breath sounds: No stridor. Rhonchi and rales present. No wheezing.   Abdominal:      General: Abdomen is flat.      Palpations: Abdomen is soft.   Musculoskeletal:      Cervical back: Neck supple.   Skin:     General: Skin is warm.   Psychiatric:      Comments: Intubated and sedated         Significant Labs:   All pertinent labs within the past 24 hours have been reviewed.  CBC:   Recent Labs   Lab 01/13/22  0447 01/14/22  0352   WBC 18.14* 28.51*   HGB 9.1* 8.3*   HCT 28.4* 25.6*   * 175     CMP:   Recent Labs   Lab 01/13/22  0447 01/14/22  0352    141   K 4.0 3.4*    101   CO2 33* 32*   * 114*   BUN 65* 73*   CREATININE 0.9 1.1   CALCIUM 8.5* 8.8   PROT 5.4* 5.3*   ALBUMIN 1.2* 1.1*   BILITOT 0.7 1.2*   ALKPHOS 119 148*   AST 46* 78*   ALT 29 32   ANIONGAP 7* 8   EGFRNONAA >60.0 >60.0       Significant Imaging: I have reviewed all pertinent imaging results/findings within the past 24 hours.      Assessment/Plan:      * Acute on chronic respiratory failure with hypoxia  Acutely decline through the night.  Tube feedings found in his tracheostomy.  Multiple different possible etiologies but also have been considering the possibility of pulmonary embolism.  The patient had a improved chest x-ray yesterday with persistent hypoxemia.  Will try and get a CT scan today but the  patient's tenuous.  Family is aware of the gravity of the situation.    Acute hemorrhage  Transfuse again today.      Pleural effusion  Cultures all negative, CT output noted.    PNA (pneumonia)  CXR noted, + acute worsening, check for covid, ? aspiration    Severe sepsis  Suspect acute WBC increase related to stressfull events over weekend (blood products, A.Fib RVR, etc...)  Cont. To monitor and stopping abx soon.        UTI (urinary tract infection)  See abx      DVT prophylaxis  On LMWH.      Atrial fibrillation with rapid ventricular response  On amiodarone, dig, ask cards for assistance and update echo.    Tobacco user  PRN Nicotine patch    Alcohol dependence        Elevated LFTs          TANIKA (acute kidney injury)  Noted, Cr, 0.9, improved.  Appears at baseline        VTE Risk Mitigation (From admission, onward)         Ordered     enoxaparin injection 40 mg  Daily         01/12/22 0823     Place sequential compression device  Until discontinued         01/05/22 0815     IP VTE HIGH RISK PATIENT  Once         12/21/21 2233     Reason for No Pharmacological VTE Prophylaxis  Once        Question:  Reasons:  Answer:  Already adequately anticoagulated on oral Anticoagulants    12/21/21 2233                Discharge Planning   DAXA:      Code Status: Full Code   Is the patient medically ready for discharge?:     Reason for patient still in hospital (select all that apply): Patient unstable  Discharge Plan A: Long-term acute care facility (LTAC)   Discharge Delays: None known at this time              Raul Gan III, MD  Department of Hospital Medicine   [facility]

## 2022-01-14 NOTE — PLAN OF CARE
Pt Afib RVR increased to hr in 140's-160's. Dr. Valentin was notified, he ordered amiodarone 150mg loading dose IV x1, and if that doesn't work give 0.25mg of iv Digoxin. Then start patient on Digoxin 0.25mg daily. Pt still in Afib RVR rate decreased to 110's-120's. Levophed currently running at 0.28mcg. Right Chest Tube put out 20ml of sanguinous fluid. Pt still has low grade fever and Oxygen Saturation is high 90's on 70% Fio2.

## 2022-01-14 NOTE — EICU
Rounding (Video Assessment):  Yes    Intervention Initiated From:  [facility] / EICU    Alessandra Communicated with Bedside Nurse regarding:  Other    Nurse Notified:  No    Doctor Notified:  No    Comments: Video assessment complete.

## 2022-01-14 NOTE — PROGRESS NOTES
Pharmacokinetic Assessment Follow Up: IV Vancomycin    Vancomycin serum concentration assessment(s):    The trough level was drawn correctly and can be used to guide therapy at this time. The measurement is within the desired definitive target range of 15 to 20 mcg/mL.    Vancomycin Regimen Plan:    Continue regimen to Vancomycin 1000 mg IV every 24 hours with next serum trough concentration measured at 1000 prior to   dose on 1/16/22    Drug levels (last 3 results):  Recent Labs   Lab Result Units 01/11/22  1554 01/12/22  0326 01/14/22  1008   Vancomycin, Random ug/mL 22.1 18.2  --    Vancomycin-Trough ug/mL  --   --  19.8       Pharmacy will continue to follow and monitor vancomycin.    Please contact pharmacy at extension [phone] for questions regarding this assessment.    Thank you for the consult,   Jazmin Jauregui       Patient brief summary:  Neymar Patel is a 66 y.o. male initiated on antimicrobial therapy with IV Vancomycin for treatment of lower respiratory infection    The patient's current regimen is Vancomycin 1G q24h    Drug Allergies:   Review of patient's allergies indicates:  No Known Allergies    Actual Body Weight:   77.1 kg    Renal Function:   Estimated Creatinine Clearance: 68.2 mL/min (based on SCr of 1.1 mg/dL).,     Dialysis Method (if applicable):  N/A    CBC (last 72 hours):  Recent Labs   Lab Result Units 01/12/22 0326 01/13/22 0447 01/14/22  0352   WBC K/uL 23.74* 18.14* 28.51*   Hemoglobin g/dL 9.5* 9.1* 8.3*   Hematocrit % 28.4* 28.4* 25.6*   Platelets K/uL 118* 134* 175   Gran % % 75.0* 81.0* 72.0   Lymph % % 4.0* 4.0* 6.0*   Mono % % 12.0 6.0 4.0   Eosinophil % % 0.0 0.0 0.0   Basophil % % 0.0 0.0 0.0   Differential Method  Manual Manual Manual       Metabolic Panel (last 72 hours):  Recent Labs   Lab Result Units 01/12/22  0326 01/13/22 0447 01/14/22  0352   Sodium mmol/L 142 143 141   Potassium mmol/L 3.8 4.0 3.4*   Chloride mmol/L 102 103 101   CO2 mmol/L 34* 33* 32*    Glucose mg/dL 136* 130* 114*   BUN mg/dL 64* 65* 73*   Creatinine mg/dL 1.0 0.9 1.1   Albumin g/dL 1.3* 1.2* 1.1*   Total Bilirubin mg/dL 0.6 0.7 1.2*   Alkaline Phosphatase U/L 93 119 148*   AST U/L 37 46* 78*   ALT U/L 34 29 32   Magnesium mg/dL 2.4 2.5 2.6       Vancomycin Administrations:  vancomycin given in the last 96 hours                     vancomycin in dextrose 5 % 1 gram/250 mL IVPB 1,000 mg (mg) 1,000 mg New Bag 01/13/22 1058     1,000 mg New Bag 01/12/22 1019    vancomycin in dextrose 5 % 1 gram/250 mL IVPB 1,000 mg (mg) 1,000 mg New Bag 01/10/22 2018                    Microbiologic Results:  Microbiology Results (last 7 days)       Procedure Component Value Units Date/Time    Fungus culture [[id number]] Collected: 01/05/22 1232    Order Status: Completed Specimen: Body Fluid from Pleural Fluid Updated: 01/11/22 1104     Fungus (Mycology) Culture Culture in progress    Culture, Body Fluid - Bactec [[id number]] Collected: 01/05/22 1232    Order Status: Completed Specimen: Body Fluid from Pleural Fluid Updated: 01/11/22 0812     Body Fluid Culture, Sterile No growth after 5 days.    Culture, Body Fluid - Bactec [[id number]] Collected: 01/04/22 1640    Order Status: Completed Specimen: Body Fluid from Pleural Fluid Updated: 01/10/22 0612     Body Fluid Culture, Sterile No growth after 5 days.    AFB culture [[id number]] Collected: 01/05/22 1232    Order Status: Completed Specimen: Body Fluid from Pleural Fluid Updated: 01/07/22 1508     AFB Culture & Smear Culture in progress     AFB CULTURE STAIN No acid fast bacilli seen.

## 2022-01-14 NOTE — NURSING
Patient's oxygen sat down to 82%. Upon assessment of patient, oral secretions noted to be on patients face and in mouth, appearing to be tube feeding in color upon suctioning. Tube feeds stopped, residual checked with 0ml of residual. Gave patient 100% o2 on vent then increased fio2 to 75% per Lulu RT. ETT placement at 22cm at lip. Will notify Dr Elvia TAVERA when he makes rounds this AM.

## 2022-01-14 NOTE — EICU
Rounding (Video Assessment):  Yes    Intervention Initiated From:  [facility] / EICU    Comments:   Pt appears asleep, no distress noted, oral ETT intact to vent on settings of AC rate 12, , FiO2 70% and PEEP 8--Amio gtt infusing at 0.5 mg/min, Precedex gtt infusing at 1.4 mcg/kg/hr, Levo gtt infusing at 0.28 mcg/kg/min and Propofol gtt infusing at 40 mcg/kg/min--VS on bedside monitor reading , atrial fib, art line 94/50, RR 37 and O2 sat 97%

## 2022-01-14 NOTE — NURSING
Notified Dr Elvia TAVERA of patient running fever throughout day, tylenol given at 1010 and ice packs applied. Rechecked oral temp at 1330 and it was 100.5. he gave orders to start d5ns at 100ml/hr. Also notified him CT negative for PE. Verbalized understanding. Will cont to monitor.

## 2022-01-14 NOTE — NURSING
Spoke with Charo at Dr Gonzalez office concerning consult to cardio. Stated she will give MD the message. Will cont to monitor.

## 2022-01-14 NOTE — CARE UPDATE
01/14/22 0740   PRE-TX-O2   O2 Device (Oxygen Therapy) (S)  ventilator   $ Is the patient on Low Flow Oxygen? Yes   Oxygen Concentration (%) (S)  75   SpO2 (!) (S)  85 %   Pulse (!) 131   Resp (!) 38   Temp 99.86 °F (37.7 °C)   Vent Select   Charged w/in last 24h YES   Preset Conventional Ventilator Settings   Ventilation Type VC   Vent Mode A/C   Set Rate 12 BPM   Vt Set 550 mL   PEEP/CPAP 8 cmH20   Peak Flow 60 L/min   Plateau Set/Insp. Hold (sec) 0   I-Trigger Type  V-TRIG   Trigger Sensitivity Flow/I-Trigger 3 L/min   Patient Ventilator Parameters   Resp Rate Total 20 br/min   Peak Airway Pressure 35 cmH2O   Mean Airway Pressure 16 cmH20   Plateau Pressure 25 cmH20   Exhaled Vt 348 mL   Total Ve 11.4 mL   I:E Ratio Measured 1:1.20   Auto PEEP 1.2 cmH20   Conventional Ventilator Alarms   Ve High Alarm 24 L/min   Ve Low Alarm 4 L/min   Resp Rate High Alarm 40 br/min   Press High Alarm 60 cmH2O   Apnea Rate 14   Apnea Volume (mL) 550 mL   Apnea Oxygen Concentration  100   Apnea Flow Rate (L/min) 60   T Apnea 10 sec(s)

## 2022-01-14 NOTE — ASSESSMENT & PLAN NOTE
Acutely decline through the night.  Tube feedings found in his tracheostomy.  Multiple different possible etiologies but also have been considering the possibility of pulmonary embolism.  The patient had a improved chest x-ray yesterday with persistent hypoxemia.  Will try and get a CT scan today but the patient's tenuous.  Family is aware of the gravity of the situation.

## 2022-01-14 NOTE — PROGRESS NOTES
"[facility] - Intensive Care  Adult Nutrition  Progress Note    SUMMARY       Recommendations    Recommendation/Intervention:   1. EN Recs: Intiate Vital HP @ 10 ml/hr increasing q4-6h as tolerated to goal rate of 55 ml/hr.               -With propofol infusing at 23.1 ml/hr,this will provide a total of 1929 kcal (108% EEN), 115 gm protein (100% EPN), and 1,103 ml water.          2. Rec'd FWF of 30 ml q4-6h to prevent clogging.          3. RD to monitor and make recs accordingly.    Goals: Goal rate by RD f/u.  Nutrition Goal Status: new  Communication of RD Recs: reviewed with physician    Assessment and Plan  Nutrition Problem  Inadequate protein-energy intake     Related to (etiology):   NPO/vent status     Signs and Symptoms (as evidenced by):   EEN < 25%      Interventions/Recommendations (treatment strategy):  1. EN Recs: Vital HP @ 50 ml/hr with Andres BID. With propofol infusing at 18.5 ml/hr,this will provide a total of 1869 kcal (108% EEN), 105 gm protein (100% EPN), and 1,003 ml water.           2. Rec'd FWF of 30 ml q4-6h to prevent clogging.           3. RD to monitor and make recs accordingly         Nutrition Diagnosis Status:   Continues     Reason for Assessment    Reason For Assessment: RD follow-up  Diagnosis: pulmonary disease  Relevant Medical History: Tolerating TF at 40 ml/hr. Continue to advance to goal of 55. Propofol @ 23.1 ml/hr  General Information Comments: Tf on hold to to recent tube feeding suction from ET tube. Will monitor  Nutrition Discharge Planning: TBD    Nutrition Risk Screen    Nutrition Risk Screen: tube feeding or parenteral nutrition    Nutrition/Diet History    Food Allergies: NKFA  Factors Affecting Nutritional Intake: NPO,on mechanical ventilation    Anthropometrics    Temp: 97.16 °F (36.2 °C)  Height: 5' 10" (177.8 cm)  Height (inches): 70 in  Weight Method: Standard Scale  Weight: 77.1 kg (170 lb)  Weight (lb): 170 lb  Ideal Body Weight (IBW), Male: 166 lb  % Ideal Body " Weight, Male (lb): 102.41 %  BMI (Calculated): 24.4  BMI Grade: 18.5-24.9 - normal       Lab/Procedures/Meds    Pertinent Labs Reviewed: reviewed  Pertinent Medications Reviewed: reviewed    Physical Findings/Assessment         Estimated/Assessed Needs    Weight Used For Calorie Calculations: 77.1 kg (170 lb)  Energy Calorie Requirements (kcal): 1793 kcal  Energy Need Method: [facility]  Protein Requirements:  (1.2-1.5 gm/kg CBW)  Weight Used For Protein Calculations: 77.1 kg (170 lb)     Estimated Fluid Requirement Method: RDA Method  RDA Method (mL): 1793         Nutrition Prescription Ordered    Current Diet Order: NPO  Current Nutrition Support Formula Ordered: Other (Comment) (Vital HP with Andres BID)  Current Nutrition Support Rate Ordered: 55 (ml)    Evaluation of Received Nutrient/Fluid Intake    Enteral Calories (kcal): 1200  Enteral Protein (gm): 105  Enteral (Free Water) Fluid (mL): 802  Free Water Flush Fluid (mL): 1003  Total Calories (kcal): 1989  % Kcal Needs: 110% with propofol/andres/current rate  Total Protein (gm): 120  % Protein Needs: 104% with current rate and andres  Energy Calories Required: exceeds needs  Protein Required: exceeds needs  Fluid Required: not meeting needs  % Intake of Estimated Energy Needs: 25 - 50 %  % Meal Intake: NPO    Nutrition Risk    Level of Risk/Frequency of Follow-up: moderate - high     Monitor and Evaluation    Food and Nutrient Intake: enteral nutrition intake  Food and Nutrient Adminstration: enteral and parenteral nutrition administration  Anthropometric Measurements: weight change,weight  Biochemical Data, Medical Tests and Procedures: electrolyte and renal panel,glucose/endocrine profile,lipid profile,inflammatory profile  Nutrition-Focused Physical Findings: overall appearance     Nutrition Follow-Up    RD Follow-up?: Yes

## 2022-01-15 NOTE — SUBJECTIVE & OBJECTIVE
Interval History: See     Review of Systems   Unable to perform ROS: Intubated     Objective:     Vital Signs (Most Recent):  Temp: 98.06 °F (36.7 °C) (01/15/22 0800)  Pulse: (!) 119 (01/15/22 0812)  Resp: (!) 22 (01/15/22 0812)  BP: (!) 122/56 (01/15/22 0800)  SpO2: (!) 94 % (01/15/22 0812) Vital Signs (24h Range):  Temp:  [96.26 °F (35.7 °C)-102.1 °F (38.9 °C)] 98.06 °F (36.7 °C)  Pulse:  [112-127] 119  Resp:  [20-28] 22  SpO2:  [87 %-100 %] 94 %  BP: ()/(50-73) 122/56  Arterial Line BP: ()/(30-57) 111/51     Weight: 77.1 kg (170 lb)  Body mass index is 24.39 kg/m².    Intake/Output Summary (Last 24 hours) at 1/15/2022 0835  Last data filed at 1/15/2022 0500  Gross per 24 hour   Intake 5162.91 ml   Output 1015 ml   Net 4147.91 ml      Physical Exam  Constitutional:       Appearance: He is ill-appearing and toxic-appearing.      Interventions: He is intubated.   HENT:      Head: Normocephalic and atraumatic.      Nose: Nose normal.      Mouth/Throat:      Mouth: Mucous membranes are moist.      Comments: ET Tube free of secretions.   Eyes:      Pupils: Pupils are equal, round, and reactive to light.   Cardiovascular:      Rate and Rhythm: Normal rate. Rhythm irregular.      Pulses: Normal pulses.   Pulmonary:      Effort: Prolonged expiration present. He is intubated.      Breath sounds: No stridor. Rhonchi and rales present. No wheezing.      Comments: Chest tube  Abdominal:      General: Abdomen is flat.      Palpations: Abdomen is soft.   Musculoskeletal:      Cervical back: Neck supple.   Skin:     General: Skin is warm.   Psychiatric:      Comments: Intubated and sedated         Significant Labs:   All pertinent labs within the past 24 hours have been reviewed.  CBC:   Recent Labs   Lab 01/14/22  0352 01/15/22  0329   WBC 28.51* 28.91*   HGB 8.3* 6.7*   HCT 25.6* 20.8*    183     CMP:   Recent Labs   Lab 01/14/22  0352 01/15/22  0329    141   K 3.4* 3.5    103   CO2 32* 28    * 142*   BUN 73* 80*   CREATININE 1.1 1.5*   CALCIUM 8.8 7.9*   PROT 5.3* 4.8*   ALBUMIN 1.1* 0.9*   BILITOT 1.2* 0.9   ALKPHOS 148* 121   AST 78* 93*   ALT 32 28   ANIONGAP 8 10   EGFRNONAA >60.0 47.8*       Significant Imaging: I have reviewed all pertinent imaging results/findings within the past 24 hours.

## 2022-01-15 NOTE — NURSING
Notified Dr Herron of urinary output of 325ml with receiving lasix 80mg this morning. No new orders noted, continue to monitor output.

## 2022-01-15 NOTE — ASSESSMENT & PLAN NOTE
-Tracheostomy tube placement requested per primary.  Patient currently on 85% FiO2; patient will need to tolerate 50-60% prior to procedure.  Patient unstable on ventilator and would poorly tolerate surgical intervention   -CTA with RUL loculation - residual hematoma from R chest was hemorrhage   -Bilateral pleural effusions possible amenable to drainage - after discussing with medicine, will attempt bilateral thoracentesis tomorrow   -R chest tube ineffective in draining thoracic cavity - will consider second attempt at removing   -Prognosis poor; recommend goals of care discussion with family

## 2022-01-15 NOTE — PROGRESS NOTES
[facility] - Intensive Care  General Surgery  Progress Note    Subjective:     History of Present Illness:  65yo male with history of afib and EtOH abuse who presents with progressing respiratory failure.  Patient currently intubated and sedated with acute desaturation this afternoon.  CXR obtained demonstrated unchanged large right pleural effusion with no signs of pneumothorax.  Pleural effusion also seen on CT chest obtained during this admission.  General surgery has been consulted for drainage.       Post-Op Info:  * No surgery found *         Interval History:   Patient seen and examined.  Desaturation overnight requiring increase in FiO2 to 85%.  Levo requirements also increased to 0.3 with MAP mid 60s.  CTA yesterday negative for PE, yet revealing persistent large bilateral pleural effusions.  Moderate response to lasix with 500cc output.      Medications:  Continuous Infusions:   amiodarone in dextrose 5% 0.5 mg/min (01/14/22 2012)    dexmedetomidine (PRECEDEX) infusion 1.4 mcg/kg/hr (01/15/22 0606)    NORepinephrine bitartrate-D5W 0.33 mcg/kg/min (01/15/22 0607)    propofoL 50 mcg/kg/min (01/15/22 0606)     Scheduled Meds:   atorvastatin  40 mg Oral Daily    enoxaparin  40 mg Subcutaneous Daily    fluconazole (DIFLUCAN) IVPB  200 mg Intravenous Q24H    furosemide (LASIX) injection  80 mg Intravenous Q12H    hydrocortisone sodium succinate  25 mg Intravenous Q8H    ipratropium  0.5 mg Nebulization Q8H    levalbuterol  1.25 mg Nebulization Q8H    magnesium oxide  400 mg Oral BID    pantoprazole  40 mg Intravenous Daily    piperacillin-tazobactam (ZOSYN) IVPB  4.5 g Intravenous Q8H    vancomycin (VANCOCIN) IVPB  1,000 mg Intravenous Q24H     PRN Meds:sodium chloride, sodium chloride, acetaminophen, influenza, morphine, morphine, sodium chloride 0.9%, Pharmacy to dose Vancomycin consult **AND** vancomycin - pharmacy to dose     Review of patient's allergies indicates:  No Known  Allergies  Objective:     Vital Signs (Most Recent):  Temp: 98.06 °F (36.7 °C) (01/15/22 0800)  Pulse: (!) 119 (01/15/22 0812)  Resp: (!) 22 (01/15/22 0812)  BP: (!) 122/56 (01/15/22 0800)  SpO2: (!) 94 % (01/15/22 0812) Vital Signs (24h Range):  Temp:  [96.26 °F (35.7 °C)-102.1 °F (38.9 °C)] 98.06 °F (36.7 °C)  Pulse:  [112-127] 119  Resp:  [20-28] 22  SpO2:  [87 %-100 %] 94 %  BP: ()/(50-73) 122/56  Arterial Line BP: ()/(30-57) 111/51     Weight: 77.1 kg (170 lb)  Body mass index is 24.39 kg/m².    Intake/Output - Last 3 Shifts       01/13 0700  01/14 0659 01/14 0700  01/15 0659 01/15 0700  01/16 0659    P.O.       I.V. (mL/kg) 2363.6 (30.7) 3392.9 (44)     NG/GT 1988 220     IV Piggyback 750 1550     Total Intake(mL/kg) 5101.6 (66.2) 5162.9 (67)     Urine (mL/kg/hr) 1100 (0.6) 1000 (0.5)     Stool 100 0     Chest Tube 70 15     Total Output 1270 1015     Net +3831.6 +4147.9            Stool Occurrence  1 x           Physical Exam  Constitutional:       Appearance: He is ill-appearing, toxic-appearing and diaphoretic.   Cardiovascular:      Rate and Rhythm: Tachycardia present. Rhythm irregular.   Pulmonary:      Comments: Ventilator assisted breaths equal on PC FiO2 85% Sat 94%  -R chest tube in place with minimal output;  No air leak   -Chest tube site incision intact with stable ecchymosis   Abdominal:      General: There is distension.      Palpations: Abdomen is soft.   Genitourinary:     Comments: Bardales catheter in place   Musculoskeletal:      Right lower leg: Edema present.      Left lower leg: Edema present.      Comments: Bilateral feet warm to touch and slightly mottled    Skin:     General: Skin is warm.      Coloration: Skin is pale.         Significant Labs:  CBC:   Recent Labs   Lab 01/15/22  0329   WBC 28.91*   RBC 2.28*   HGB 6.7*   HCT 20.8*      MCV 91   MCH 29.4   MCHC 32.2     BMP:   Recent Labs   Lab 01/15/22  0329   *      K 3.5      CO2 28   BUN 80*    CREATININE 1.5*   CALCIUM 7.9*   MG 2.5       Significant Diagnostics:  I have reviewed all pertinent imaging results/findings within the past 24 hours.    Assessment/Plan:     Acute hemorrhage  Patient with presumed acute hemorrhage from R chest tube site.  Chest tube placed on 1/4 with acute bleeding overnight.  Site hemostatic on 1/5 and remained so until tube removed on 1/7.  Patient with acute drop in Hgb for formation of chest wall hematoma seen on CXR 1/8 and 1/9.  Chest tube removal atraumatic and site of bleeding presumed to be from chest wall muscle vs. Intercostal muscle     --Hgb stable with minimal chest tube output overnight  --Continue to follow        Pleural effusion  -Tracheostomy tube placement requested per primary.  Patient currently on 85% FiO2; patient will need to tolerate 50-60% prior to procedure.  Patient unstable on ventilator and would poorly tolerate surgical intervention   -CTA with RUL loculation - residual hematoma from R chest was hemorrhage   -Bilateral pleural effusions possible amenable to drainage - after discussing with medicine, will attempt bilateral thoracentesis tomorrow   -R chest tube ineffective in draining thoracic cavity - will consider second attempt at removing   -Prognosis poor; recommend goals of care discussion with family       PNA (pneumonia)  Continue R chest tube to wall suction, will remove when output < 100cc/24hrs  POD 1 s/p L thoracentesis - fluid sent off for culture   Small improvement in CXR        Macy Ni MD  General Surgery  [facility] - Intensive Care

## 2022-01-15 NOTE — ASSESSMENT & PLAN NOTE
Noted, Cr, 0.9, improved.  Appears at baseline    Lab Results   Component Value Date    CREATININE 1.5 (H) 01/15/2022    CREATININE 1.1 01/14/2022    CREATININE 0.9 01/13/2022

## 2022-01-15 NOTE — PLAN OF CARE
Patient remains intubated and sedated. Received 2 units of PRBCs today, tolerated well with no s/s of reaction. Remains of fio2 85% on vent. Levophed continued without much change. No fever today. Urinary output decreased, MD aware. Will cont to monitor.

## 2022-01-15 NOTE — SUBJECTIVE & OBJECTIVE
Interval History:   Patient seen and examined.  Desaturation overnight requiring increase in FiO2 to 85%.  Levo requirements also increased to 0.3 with MAP mid 60s.  CTA yesterday negative for PE, yet revealing persistent large bilateral pleural effusions.  Moderate response to lasix with 500cc output.      Medications:  Continuous Infusions:   amiodarone in dextrose 5% 0.5 mg/min (01/14/22 2012)    dexmedetomidine (PRECEDEX) infusion 1.4 mcg/kg/hr (01/15/22 0606)    NORepinephrine bitartrate-D5W 0.33 mcg/kg/min (01/15/22 0607)    propofoL 50 mcg/kg/min (01/15/22 0606)     Scheduled Meds:   atorvastatin  40 mg Oral Daily    enoxaparin  40 mg Subcutaneous Daily    fluconazole (DIFLUCAN) IVPB  200 mg Intravenous Q24H    furosemide (LASIX) injection  80 mg Intravenous Q12H    hydrocortisone sodium succinate  25 mg Intravenous Q8H    ipratropium  0.5 mg Nebulization Q8H    levalbuterol  1.25 mg Nebulization Q8H    magnesium oxide  400 mg Oral BID    pantoprazole  40 mg Intravenous Daily    piperacillin-tazobactam (ZOSYN) IVPB  4.5 g Intravenous Q8H    vancomycin (VANCOCIN) IVPB  1,000 mg Intravenous Q24H     PRN Meds:sodium chloride, sodium chloride, acetaminophen, influenza, morphine, morphine, sodium chloride 0.9%, Pharmacy to dose Vancomycin consult **AND** vancomycin - pharmacy to dose     Review of patient's allergies indicates:  No Known Allergies  Objective:     Vital Signs (Most Recent):  Temp: 98.06 °F (36.7 °C) (01/15/22 0800)  Pulse: (!) 119 (01/15/22 0812)  Resp: (!) 22 (01/15/22 0812)  BP: (!) 122/56 (01/15/22 0800)  SpO2: (!) 94 % (01/15/22 0812) Vital Signs (24h Range):  Temp:  [96.26 °F (35.7 °C)-102.1 °F (38.9 °C)] 98.06 °F (36.7 °C)  Pulse:  [112-127] 119  Resp:  [20-28] 22  SpO2:  [87 %-100 %] 94 %  BP: ()/(50-73) 122/56  Arterial Line BP: ()/(30-57) 111/51     Weight: 77.1 kg (170 lb)  Body mass index is 24.39 kg/m².    Intake/Output - Last 3 Shifts       01/13 0700  01/14  0659 01/14 0700  01/15 0659 01/15 0700  01/16 0659    P.O.       I.V. (mL/kg) 2363.6 (30.7) 3392.9 (44)     NG/GT 1988 220     IV Piggyback 750 1550     Total Intake(mL/kg) 5101.6 (66.2) 5162.9 (67)     Urine (mL/kg/hr) 1100 (0.6) 1000 (0.5)     Stool 100 0     Chest Tube 70 15     Total Output 1270 1015     Net +3831.6 +4147.9            Stool Occurrence  1 x           Physical Exam  Constitutional:       Appearance: He is ill-appearing, toxic-appearing and diaphoretic.   Cardiovascular:      Rate and Rhythm: Tachycardia present. Rhythm irregular.   Pulmonary:      Comments: Ventilator assisted breaths equal on PC FiO2 85% Sat 94%  -R chest tube in place with minimal output;  No air leak   -Chest tube site incision intact with stable ecchymosis   Abdominal:      General: There is distension.      Palpations: Abdomen is soft.   Genitourinary:     Comments: Bardales catheter in place   Musculoskeletal:      Right lower leg: Edema present.      Left lower leg: Edema present.      Comments: Bilateral feet warm to touch and slightly mottled    Skin:     General: Skin is warm.      Coloration: Skin is pale.         Significant Labs:  CBC:   Recent Labs   Lab 01/15/22  0329   WBC 28.91*   RBC 2.28*   HGB 6.7*   HCT 20.8*      MCV 91   MCH 29.4   MCHC 32.2     BMP:   Recent Labs   Lab 01/15/22  0329   *      K 3.5      CO2 28   BUN 80*   CREATININE 1.5*   CALCIUM 7.9*   MG 2.5       Significant Diagnostics:  I have reviewed all pertinent imaging results/findings within the past 24 hours.

## 2022-01-15 NOTE — PLAN OF CARE
Patient remains on ventilator, desat had to go up to 100% fio2 over night. Now back down to 85% FiO2. Pt had fever most of night (highest 102.1) Tylenol given and ice packs applied. Now 99.1. remains afib rvr 120's on amio gtt. Digoxin level still slightly elevated on am labs. Bardales output was 475ml of urine, and Chest tube output was 15ml. Patient received a CHG bath.

## 2022-01-15 NOTE — EICU
Rounding (Video Assessment):  Yes    Intervention Initiated From:  [facility] / EICU    Pt appears asleep, no distress noted, oral ETT intact to vent on settings of AC rate 12, , FiO2 100% and PEEP 8--IVF infusing at 100 cc/hr, Precedex gtt infusing at 1.4 mcg/kg/hr, Propofol infusing at 50 mcg/kg/min, Amio gtt infusing at 0.5 mg/min and Levo gtt infusing at 0.32 mcg/kg/min--VS on bedside monitor reading , difficult to see rhythm, appears ST vs atrial fib, art line 102/48, RR 28 and O2 sat 97%

## 2022-01-15 NOTE — ASSESSMENT & PLAN NOTE
See abx    Blood Culture, Routine   Date Value Ref Range Status   12/25/2021 No growth after 5 days.  Final     Urine Culture, Routine   Date Value Ref Range Status   12/25/2021 No growth  Final     Respiratory Culture   Date Value Ref Range Status   12/28/2021 Normal respiratory candi  Final

## 2022-01-15 NOTE — PLAN OF CARE
Patient had episodes throughout day where oxygen sat dropping into 80's and also hypotensive at times. Had to titrate O2 on vent and levophed. Currently at 85% fio2 on vent. CTA of chest negative today for PE. Tube feeds d/c. Tested for covid- negative.   Max temp 100.6 today. Will cont to monitor.

## 2022-01-15 NOTE — ASSESSMENT & PLAN NOTE
Suspect acute WBC increase related to stressfull events over weekend (blood products, A.Fib RVR, etc...)  Cont. To monitor and stopping abx soon.    Cultures remain negative given worsening leukocytosis will check blood cultures again

## 2022-01-15 NOTE — EICU
Rounding (Video Assessment):  Video rounding completed.  Pt resting quiet on ventilator support w/ FiO2 85%, +8 peep.  No distress noted.  Infusing Amiodarone gtt at 0.5 mg/min, Precedex gtt at 1.4 mcg/kg/hr, D5NS at 100 cc/hr, Noerepinephrine gtt at 0.33 mcg/kg/min & Propofol gtt at 50 mcg/kg/min.  B/P 090/53, Hr 116, RR 24, POx 93%.

## 2022-01-15 NOTE — PROGRESS NOTES
[facility] Medicine  Progress Note    Patient Name: Neymar Patel  MRN: [id number]  Patient Class: IP- Inpatient   Admission Date: 12/21/2021  Length of Stay: 24 days  Attending Physician: Raul Gan III, MD  Primary Care Provider: Landen Brown MD        Subjective:     Principal Problem:Acute on chronic respiratory failure with hypoxia        HPI:  Perpetual History  Patient is a 66-year-old male with a history of alcohol abuse atrial fibrillation hypertension peripheral artery disease fatty liver disease who recently had an episode of bronchitis and was treated in the outpatient setting.  The patient then began in having increasing weakness and lightheadedness and noticed his heart rate was elevated.  He presented to the emergency department and was found to have elevated alcohol levels and a heart rate in the 130s to 140s.  The patient is currently in AFib with RVR and has been started on a Cardizem drip by the emergency department.         Overview/Hospital Course:  12/23/21 [facility] patient reports that he is feeling better today, was able to get rest last night. Heart rate still elevated will adjust medications and monitor. Patient with hallucinations last night, patient is appropriate this am  12/24/21:   Rapid Response Call #1: Blood pressure initially low and patient heart rate in the 50s sinus Nakul.  Glucose within normal limits.  Fluid bolus 1 L was given with improvement in blood pressure.  Advised to hold any medication that will lower patient's heart rate.  Patient also complains of shortness of breath but according to respiratory therapist, patient did not take his breathing treatment because he did not think he needed it.  Patient wheezy expiratory and saturating in the high 80s on room air. not on any antibiotics currently so will start.  Labs show that patient has TANIKA possibly due to dehydration.  Will resuscitate with fluid.  Will recheck basic blood work and continue  to hydrate.  Treat accordingly will at started if patient is not already on it.  Patient more alert and oriented x4 moving all extremities with improvement of blood pressure into the 90s and heart rate remains in the 50s sinus Nakul saturating 94% on 2 L  Rapid Response Call #2:Rapid response team called again with subsequent intubation and transfer to MICU.   12/25/21: On minimal sedation and pressor support in the MICU.   12/26/21: Able to wean off of all pressor support, pH of 5 while on bicarb on AM labs. Will get rid of bicarb drip and switch to gentle IV hydration with LR. SBT tomorrow?  12/27/21:  Events over the weekend noted.  Patient is now intubated on the ventilator.  Patient was admitted with pneumonia atrial fibrillation and alcohol abuse and now also has a enterococcal urinary tract infection with sensitivities pending.  Patient has required to sedatives for agitation and combativeness.  12/28/21:  Patient's ABG has been reviewed.  Patient has a mild respiratory alkalosis and his respiratory rate has been changed.  We have encouraged respiratory and nursing to wean his sedation and titrate his tube feeds.  The patient's chest x-ray also seems like he is slightly volume overloaded therefore will discontinue IV fluids and give low dosing of Lasix.  Patient has enterococcal infection was ampicillin sensitive therefore Rocephin should be adequate.  Will change Zithromax to Levaquin.  12/29/21 FM:  Patient had an episode of desaturation yesterday.  Respiratory was able to deep suction and several mucus plugs and thick secretions were removed.  I will broaden patient antibiotics today to Zosyn and otherwise plan on increasing his diuretics today.  Patient has bilateral pulmonary infiltrates consistent with pulmonary edema.  12/30/21 FM:  Patient had a elevated temperature through the night.  Rocephin was changed to Zosyn yesterday and consideration of drug fever should be entertained.  Will add medicine for  MRSA infection today as well as antifungals.  Patient's chest x-ray seems to be improved with diuretics.  Continue diuretics cautiously.  12/31/21 CG: Antibiotic course broadened, tachycardic with an increase in PEEP. He is net negative over the past 24 hours.    1/1/22 CG: No acute overnight events. PEEP decreased to 10.   1/2/22 CG: CXR shows worsening fluid burden, heart rates in 110s-120s.   1/3/22 FM:  Patient is on an FiO2 of 55%.  His chest x-ray still shows pulmonary edema which is also consistent with his CT scan.  We will replace electrolytes transfuse and continue to diurese.  Hopefully we can wean his FiO2 down this weekend and wean him off the ventilator by the end of the week.  1/4/22 FM:  Patient's vital signs are stable this morning and he is sedated.  Nursing reports that when sedation is weaned the patient is awake opens eyes and is alert.  We are attempting a CPAP trial this morning as the patient is tolerating and FiO2 of 50% and could probably be weaned further.  Will reduce dosing of diuretics today.  Continue to replete potassium.  1/5/22 FM:  Yesterday afternoon the patient had a desaturation his oxygen and his chest x-ray showed a worsening right-sided pleural effusion.  A CT scan done 2 days ago showed some layering and there was a discussion about possible loculations.  The patient's chest tube initially had 200 cc of a serous drainage which is now bloody.  Patient's chest x-ray has improved after placement.  Patient's oxygenation today is improved but his condition is precarious.  I have spoken to the family and let them know a high risk and possible poor outcome.  1/6/22 FM:  Patient had a left-sided pleural effusion drained and about 600 cc of a serous fluid was removed.  Patient's right chest tube had 10 cc overnight of a bloody fluid.  Diagnostic testing is all pending.  Today the patient's chest x-ray is noted in his left lung seems to be hypoinflated.  I am increasing the tidal  volume slightly today and watching his airway pressures.  1/7/22 FM:  Patient's ABG shows a respiratory alkalosis.  We are reducing his minute ventilation.  Patient's chest x-ray is significantly improved today.  Will have Respiratory and Nursing try to wean sedation and CPAP trial today.  Patient is now on ventilator day 14.  Patient has had intermittent bradycardia and will hold amiodarone for now.  1/8 AA, weekend crosscover, patient on ventilator, repeat chest x-ray pending, amiodarone stopped secondary to bradycardia, history of AFib, patient was on amiodarone, stopped secondary to bradycardia, now patient increased heart rate, will restart amiodarone, will decrease dose, blood pressure appears stable, continue to wean nor epi, on stress dose steroids, surgery following, H&H dropped, possible transfusion  1/9 AA, weekend crosscover, patient started to have afib with RVR, eICU was called, adjustment made to pressors, started on metoprolol, on levophed, drop in H/H, had chest tube removed on 7th, no melena or BRBPR reported, surgery called to evaluate, suspect possible from previous chest tube site, transfuse prbc, serial H/Hs, follow up with surgery recs.  1/10/22 FM:  Events over the weekend noted.  Patient is now back on amiodarone and his heart rate is slightly improved.  Chest tube is back in place with 100 cc of a bloody thin fluid.  All cultures have been reviewed and there is no significant growth.  Patient has now been on broad-spectrum antibiotics and antifungals for greater than 10 days.  Patient's WBC count is going up.  Spoke with General surgery today about placing tracheostomy.  1/11/22 FM:  Patient's chest x-ray has improved this morning with better ventilation.  Yesterday the patient's ET tube was in his right mainstem and the tube was pulled back.  The patient has a worsening anemia today and has had bleeding related to his chest tube.  General surgery is planning on doing a tracheostomy toward  the end of the week.  Patient's white blood cell count is improved.  Patient's atrial fibrillation has worsened and we are titrating his amiodarone.  1/12/22 FM:  We are weaning the patient's oxygen today.  Patient's chest x-ray shows improved aeration.  The plan is to have the patient have a tracheostomy your at the end of this week or this weekend and then to moved to an LTAC for vent weaning.  1/13/2022 FM:  Patient today has not had much change to the night.  For some reason the patient is back on 100% FiO2 and is satting 100% today.  I have turned this down and encouraged weaning of oxygen.  The patient's chest x-ray has been reviewed.  Patient's chest tube output was 33 cc through the night and his creatinine is 0.9.  1/14/2021 FM:  Patient had an acute episode of hypoxia through the night.  His atrial fibrillation with RVR also worsened.  Patient has been loaded on digoxin and has been changed to an amiodarone drip.  Patient today also had tube feeds and his ET tube suctioning.  Patient has coarse bilateral breath sounds and acute worsening on his chest x-ray this morning.  I have spoken with family and informed them that complications continue to arise and despite making some gains and planning to have tracheostomy placed the patient has now declined again.  1/15 WAC:  Patient remains critically ill.  Hemoglobin has dropped this morning requiring blood transfusion.  Continue current care suspect poor outcome despite our best efforts.      Interval History: See     Review of Systems   Unable to perform ROS: Intubated     Objective:     Vital Signs (Most Recent):  Temp: 98.06 °F (36.7 °C) (01/15/22 0800)  Pulse: (!) 119 (01/15/22 0812)  Resp: (!) 22 (01/15/22 0812)  BP: (!) 122/56 (01/15/22 0800)  SpO2: (!) 94 % (01/15/22 0812) Vital Signs (24h Range):  Temp:  [96.26 °F (35.7 °C)-102.1 °F (38.9 °C)] 98.06 °F (36.7 °C)  Pulse:  [112-127] 119  Resp:  [20-28] 22  SpO2:  [87 %-100 %] 94 %  BP: ()/(50-73)  122/56  Arterial Line BP: ()/(30-57) 111/51     Weight: 77.1 kg (170 lb)  Body mass index is 24.39 kg/m².    Intake/Output Summary (Last 24 hours) at 1/15/2022 0835  Last data filed at 1/15/2022 0500  Gross per 24 hour   Intake 5162.91 ml   Output 1015 ml   Net 4147.91 ml      Physical Exam  Constitutional:       Appearance: He is ill-appearing and toxic-appearing.      Interventions: He is intubated.   HENT:      Head: Normocephalic and atraumatic.      Nose: Nose normal.      Mouth/Throat:      Mouth: Mucous membranes are moist.      Comments: ET Tube free of secretions.   Eyes:      Pupils: Pupils are equal, round, and reactive to light.   Cardiovascular:      Rate and Rhythm: Normal rate. Rhythm irregular.      Pulses: Normal pulses.   Pulmonary:      Effort: Prolonged expiration present. He is intubated.      Breath sounds: No stridor. Rhonchi and rales present. No wheezing.      Comments: Chest tube  Abdominal:      General: Abdomen is flat.      Palpations: Abdomen is soft.   Musculoskeletal:      Cervical back: Neck supple.   Skin:     General: Skin is warm.   Psychiatric:      Comments: Intubated and sedated         Significant Labs:   All pertinent labs within the past 24 hours have been reviewed.  CBC:   Recent Labs   Lab 01/14/22  0352 01/15/22  0329   WBC 28.51* 28.91*   HGB 8.3* 6.7*   HCT 25.6* 20.8*    183     CMP:   Recent Labs   Lab 01/14/22  0352 01/15/22  0329    141   K 3.4* 3.5    103   CO2 32* 28   * 142*   BUN 73* 80*   CREATININE 1.1 1.5*   CALCIUM 8.8 7.9*   PROT 5.3* 4.8*   ALBUMIN 1.1* 0.9*   BILITOT 1.2* 0.9   ALKPHOS 148* 121   AST 78* 93*   ALT 32 28   ANIONGAP 8 10   EGFRNONAA >60.0 47.8*       Significant Imaging: I have reviewed all pertinent imaging results/findings within the past 24 hours.      Assessment/Plan:      * Acute on chronic respiratory failure with hypoxia  Acutely decline through the night.  Tube feedings found in his tracheostomy.   Multiple different possible etiologies but also have been considering the possibility of pulmonary embolism.  The patient had a improved chest x-ray yesterday with persistent hypoxemia.  Will try and get a CT scan today but the patient's tenuous.  Family is aware of the gravity of the situation.    Acute hemorrhage  Transfuse again today.      Pleural effusion  Cultures all negative, CT output noted.    PNA (pneumonia)  CXR noted, + acute worsening, check for covid, ? aspiration    Severe sepsis  Suspect acute WBC increase related to stressfull events over weekend (blood products, A.Fib RVR, etc...)  Cont. To monitor and stopping abx soon.    Cultures remain negative given worsening leukocytosis will check blood cultures again        UTI (urinary tract infection)  See abx    Blood Culture, Routine   Date Value Ref Range Status   12/25/2021 No growth after 5 days.  Final     Urine Culture, Routine   Date Value Ref Range Status   12/25/2021 No growth  Final     Respiratory Culture   Date Value Ref Range Status   12/28/2021 Normal respiratory candi  Final              DVT prophylaxis  On LMWH.      Atrial fibrillation with rapid ventricular response  On amiodarone, dig, ask cards for assistance and update echo.    Tobacco user  PRN Nicotine patch    Alcohol dependence        Elevated LFTs          TANIKA (acute kidney injury)  Noted, Cr, 0.9, improved.  Appears at baseline    Lab Results   Component Value Date    CREATININE 1.5 (H) 01/15/2022    CREATININE 1.1 01/14/2022    CREATININE 0.9 01/13/2022              VTE Risk Mitigation (From admission, onward)         Ordered     enoxaparin injection 40 mg  Daily         01/12/22 0823     Place sequential compression device  Until discontinued         01/05/22 0815     IP VTE HIGH RISK PATIENT  Once         12/21/21 2233     Reason for No Pharmacological VTE Prophylaxis  Once        Question:  Reasons:  Answer:  Already adequately anticoagulated on oral Anticoagulants     12/21/21 2233                Discharge Planning   DAXA:      Code Status: Full Code   Is the patient medically ready for discharge?:     Reason for patient still in hospital (select all that apply): Treatment  Discharge Plan A: Long-term acute care facility (LTAC)   Discharge Delays: None known at this time  Critical Care Time: 60 minutes  Critical secondary to Patient has a condition that poses threat to life and bodily function: Severe Respiratory Distress     Critical care was time spent personally by me on the following activities: development of treatment plan with patient or surrogate and bedside caregivers, discussions with consultants, evaluation of patient's response to treatment, examination of patient, ordering and performing treatments and interventions, ordering and review of laboratory studies, ordering and review of radiographic studies, pulse oximetry, re-evaluation of patient's condition. This critical care time did not overlap with that of any other provider or involve time for any procedures.              Evangelist Herron Jr, MD  Department of Hospital Medicine   [place] [facility]

## 2022-01-16 NOTE — PLAN OF CARE
Patient remains intubated and sedated. Remains on levophed, had to increase slightly throughout the day to maintain MAP. Patient had bile colored oral secretions this morning so OGT placed to LIS, minimal output noted- Dr Herron and Dr Ni made aware of green secretions from mouth. WBC count increased today, MD aware. Urinary output critically low- 75mls for shift- Dr Herron made aware, no new orders given. Family, Briseyda and Janet, made aware of poor prognosis per Dr Herron. Remains hypothermic throughout shift with sury cisneros on, MD aware. Will cont to monitor.

## 2022-01-16 NOTE — ASSESSMENT & PLAN NOTE
1/15:  Status post 2 units packed red blood cells.    This is secondary to the right chest tube site.  General surgery is following.    Recent Labs   Lab 01/14/22  0352 01/15/22  0329 01/16/22  0413   Hemoglobin 8.3 L 6.7 L 8.0 L

## 2022-01-16 NOTE — PROGRESS NOTES
VANCOMYCIN DOSING BY PHARMACY DISCONTINUATION NOTE    Neymar Patel is a 66 y.o. male who had been consulted for vancomycin dosing.    The pharmacy consult for vancomycin dosing has been discontinued.     Vancomycin Dosing by Pharmacy Consult will sign-off. Please reconsult if necessary. Thank you for allowing us to participate in this patient's care.       Thank you for allowing pharmacy participate in this patient's care.     Jennifer Fried, PharmD  Clinical Pharmacist, [facility] Team  [email or web address]  office [phone]

## 2022-01-16 NOTE — ASSESSMENT & PLAN NOTE
See abx    Blood Culture, Routine   Date Value Ref Range Status   01/15/2022 No Growth to date  Preliminary     Urine Culture, Routine   Date Value Ref Range Status   12/25/2021 No growth  Final     Respiratory Culture   Date Value Ref Range Status   12/28/2021 Normal respiratory candi  Final

## 2022-01-16 NOTE — PROGRESS NOTES
[facility] Medicine  Progress Note    Patient Name: Neymar Patel  MRN: [id number]  Patient Class: IP- Inpatient   Admission Date: 12/21/2021  Length of Stay: 25 days  Attending Physician: Raul Gan III, MD  Primary Care Provider: Landen Brown MD        Subjective:     Principal Problem:Acute on chronic respiratory failure with hypoxia        HPI:  Perpetual History  Patient is a 66-year-old male with a history of alcohol abuse atrial fibrillation hypertension peripheral artery disease fatty liver disease who recently had an episode of bronchitis and was treated in the outpatient setting.  The patient then began in having increasing weakness and lightheadedness and noticed his heart rate was elevated.  He presented to the emergency department and was found to have elevated alcohol levels and a heart rate in the 130s to 140s.  The patient is currently in AFib with RVR and has been started on a Cardizem drip by the emergency department.         Overview/Hospital Course:  12/23/21 [facility] patient reports that he is feeling better today, was able to get rest last night. Heart rate still elevated will adjust medications and monitor. Patient with hallucinations last night, patient is appropriate this am  12/24/21:   Rapid Response Call #1: Blood pressure initially low and patient heart rate in the 50s sinus Nakul.  Glucose within normal limits.  Fluid bolus 1 L was given with improvement in blood pressure.  Advised to hold any medication that will lower patient's heart rate.  Patient also complains of shortness of breath but according to respiratory therapist, patient did not take his breathing treatment because he did not think he needed it.  Patient wheezy expiratory and saturating in the high 80s on room air. not on any antibiotics currently so will start.  Labs show that patient has TANIKA possibly due to dehydration.  Will resuscitate with fluid.  Will recheck basic blood work and continue  to hydrate.  Treat accordingly will at started if patient is not already on it.  Patient more alert and oriented x4 moving all extremities with improvement of blood pressure into the 90s and heart rate remains in the 50s sinus Nakul saturating 94% on 2 L  Rapid Response Call #2:Rapid response team called again with subsequent intubation and transfer to MICU.   12/25/21: On minimal sedation and pressor support in the MICU.   12/26/21: Able to wean off of all pressor support, pH of 5 while on bicarb on AM labs. Will get rid of bicarb drip and switch to gentle IV hydration with LR. SBT tomorrow?  12/27/21:  Events over the weekend noted.  Patient is now intubated on the ventilator.  Patient was admitted with pneumonia atrial fibrillation and alcohol abuse and now also has a enterococcal urinary tract infection with sensitivities pending.  Patient has required to sedatives for agitation and combativeness.  12/28/21:  Patient's ABG has been reviewed.  Patient has a mild respiratory alkalosis and his respiratory rate has been changed.  We have encouraged respiratory and nursing to wean his sedation and titrate his tube feeds.  The patient's chest x-ray also seems like he is slightly volume overloaded therefore will discontinue IV fluids and give low dosing of Lasix.  Patient has enterococcal infection was ampicillin sensitive therefore Rocephin should be adequate.  Will change Zithromax to Levaquin.  12/29/21 FM:  Patient had an episode of desaturation yesterday.  Respiratory was able to deep suction and several mucus plugs and thick secretions were removed.  I will broaden patient antibiotics today to Zosyn and otherwise plan on increasing his diuretics today.  Patient has bilateral pulmonary infiltrates consistent with pulmonary edema.  12/30/21 FM:  Patient had a elevated temperature through the night.  Rocephin was changed to Zosyn yesterday and consideration of drug fever should be entertained.  Will add medicine for  MRSA infection today as well as antifungals.  Patient's chest x-ray seems to be improved with diuretics.  Continue diuretics cautiously.  12/31/21 CG: Antibiotic course broadened, tachycardic with an increase in PEEP. He is net negative over the past 24 hours.    1/1/22 CG: No acute overnight events. PEEP decreased to 10.   1/2/22 CG: CXR shows worsening fluid burden, heart rates in 110s-120s.   1/3/22 FM:  Patient is on an FiO2 of 55%.  His chest x-ray still shows pulmonary edema which is also consistent with his CT scan.  We will replace electrolytes transfuse and continue to diurese.  Hopefully we can wean his FiO2 down this weekend and wean him off the ventilator by the end of the week.  1/4/22 FM:  Patient's vital signs are stable this morning and he is sedated.  Nursing reports that when sedation is weaned the patient is awake opens eyes and is alert.  We are attempting a CPAP trial this morning as the patient is tolerating and FiO2 of 50% and could probably be weaned further.  Will reduce dosing of diuretics today.  Continue to replete potassium.  1/5/22 FM:  Yesterday afternoon the patient had a desaturation his oxygen and his chest x-ray showed a worsening right-sided pleural effusion.  A CT scan done 2 days ago showed some layering and there was a discussion about possible loculations.  The patient's chest tube initially had 200 cc of a serous drainage which is now bloody.  Patient's chest x-ray has improved after placement.  Patient's oxygenation today is improved but his condition is precarious.  I have spoken to the family and let them know a high risk and possible poor outcome.  1/6/22 FM:  Patient had a left-sided pleural effusion drained and about 600 cc of a serous fluid was removed.  Patient's right chest tube had 10 cc overnight of a bloody fluid.  Diagnostic testing is all pending.  Today the patient's chest x-ray is noted in his left lung seems to be hypoinflated.  I am increasing the tidal  volume slightly today and watching his airway pressures.  1/7/22 FM:  Patient's ABG shows a respiratory alkalosis.  We are reducing his minute ventilation.  Patient's chest x-ray is significantly improved today.  Will have Respiratory and Nursing try to wean sedation and CPAP trial today.  Patient is now on ventilator day 14.  Patient has had intermittent bradycardia and will hold amiodarone for now.  1/8 AA, weekend crosscover, patient on ventilator, repeat chest x-ray pending, amiodarone stopped secondary to bradycardia, history of AFib, patient was on amiodarone, stopped secondary to bradycardia, now patient increased heart rate, will restart amiodarone, will decrease dose, blood pressure appears stable, continue to wean nor epi, on stress dose steroids, surgery following, H&H dropped, possible transfusion  1/9 AA, weekend crosscover, patient started to have afib with RVR, eICU was called, adjustment made to pressors, started on metoprolol, on levophed, drop in H/H, had chest tube removed on 7th, no melena or BRBPR reported, surgery called to evaluate, suspect possible from previous chest tube site, transfuse prbc, serial H/Hs, follow up with surgery recs.  1/10/22 FM:  Events over the weekend noted.  Patient is now back on amiodarone and his heart rate is slightly improved.  Chest tube is back in place with 100 cc of a bloody thin fluid.  All cultures have been reviewed and there is no significant growth.  Patient has now been on broad-spectrum antibiotics and antifungals for greater than 10 days.  Patient's WBC count is going up.  Spoke with General surgery today about placing tracheostomy.  1/11/22 FM:  Patient's chest x-ray has improved this morning with better ventilation.  Yesterday the patient's ET tube was in his right mainstem and the tube was pulled back.  The patient has a worsening anemia today and has had bleeding related to his chest tube.  General surgery is planning on doing a tracheostomy toward  the end of the week.  Patient's white blood cell count is improved.  Patient's atrial fibrillation has worsened and we are titrating his amiodarone.  1/12/22 FM:  We are weaning the patient's oxygen today.  Patient's chest x-ray shows improved aeration.  The plan is to have the patient have a tracheostomy your at the end of this week or this weekend and then to moved to an LTAC for vent weaning.  1/13/2022 FM:  Patient today has not had much change to the night.  For some reason the patient is back on 100% FiO2 and is satting 100% today.  I have turned this down and encouraged weaning of oxygen.  The patient's chest x-ray has been reviewed.  Patient's chest tube output was 33 cc through the night and his creatinine is 0.9.  1/14/2021 FM:  Patient had an acute episode of hypoxia through the night.  His atrial fibrillation with RVR also worsened.  Patient has been loaded on digoxin and has been changed to an amiodarone drip.  Patient today also had tube feeds and his ET tube suctioning.  Patient has coarse bilateral breath sounds and acute worsening on his chest x-ray this morning.  I have spoken with family and informed them that complications continue to arise and despite making some gains and planning to have tracheostomy placed the patient has now declined again.  1/15 WAC:  Patient remains critically ill.  Hemoglobin has dropped this morning requiring blood transfusion.  Continue current care suspect poor outcome despite our best efforts.  1/16 WC:  Patient remains critically ill.  Minimal improvement from yesterday.  Status post packed red blood cells      Interval History: See     Review of Systems   Unable to perform ROS: Intubated     Objective:     Vital Signs (Most Recent):  Temp: (!) 94.5 °F (34.7 °C) (01/16/22 0925)  Pulse: 91 (01/16/22 0940)  Resp: 15 (01/16/22 0940)  BP: (!) 91/50 (01/16/22 0940)  SpO2: 96 % (01/16/22 0940) Vital Signs (24h Range):  Temp:  [94.5 °F (34.7 °C)-98.42 °F (36.9 °C)] 94.5  °F (34.7 °C)  Pulse:  [] 91  Resp:  [14-27] 15  SpO2:  [82 %-99 %] 96 %  BP: ()/() 91/50  Arterial Line BP: ()/(43-61) 94/46     Weight: 77.1 kg (170 lb)  Body mass index is 24.39 kg/m².    Intake/Output Summary (Last 24 hours) at 1/16/2022 0949  Last data filed at 1/16/2022 0600  Gross per 24 hour   Intake 3076.1 ml   Output 532 ml   Net 2544.1 ml      Physical Exam  Constitutional:       Appearance: He is ill-appearing and toxic-appearing.      Interventions: He is intubated.   HENT:      Head: Normocephalic and atraumatic.      Nose: Nose normal.      Mouth/Throat:      Mouth: Mucous membranes are moist.      Comments: ET Tube free of secretions.   Eyes:      Pupils: Pupils are equal, round, and reactive to light.   Cardiovascular:      Rate and Rhythm: Normal rate. Rhythm irregular.      Pulses: Normal pulses.   Pulmonary:      Effort: Prolonged expiration present. He is intubated.      Breath sounds: No stridor. Rhonchi and rales present. No wheezing.      Comments: Chest tube  Abdominal:      General: Abdomen is flat.      Palpations: Abdomen is soft.   Musculoskeletal:      Cervical back: Neck supple.   Skin:     General: Skin is warm.   Psychiatric:      Comments: Intubated and sedated         Significant Labs:   All pertinent labs within the past 24 hours have been reviewed.  CBC:   Recent Labs   Lab 01/15/22  0329 01/16/22  0413   WBC 28.91* 32.54*   HGB 6.7* 8.0*   HCT 20.8* 23.5*    189     CMP:   Recent Labs   Lab 01/15/22  0329 01/16/22  0413    137   K 3.5 3.8    101   CO2 28 25   * 138*   BUN 80* 92*   CREATININE 1.5* 1.8*   CALCIUM 7.9* 8.2*   PROT 4.8* 4.9*   ALBUMIN 0.9* 0.8*   BILITOT 0.9 0.9   ALKPHOS 121 101   AST 93* 98*   ALT 28 28   ANIONGAP 10 11   EGFRNONAA 47.8* 38.4*       Significant Imaging: I have reviewed all pertinent imaging results/findings within the past 24 hours.      Assessment/Plan:      * Acute on chronic respiratory failure  with hypoxia  Acutely decline through the night.  Tube feedings found in his tracheostomy.  Multiple different possible etiologies but also have been considering the possibility of pulmonary embolism.  The patient had a improved chest x-ray yesterday with persistent hypoxemia.  Will try and get a CT scan today but the patient's tenuous.  Family is aware of the gravity of the situation.    Acute hemorrhage  1/15:  Status post 2 units packed red blood cells.    This is secondary to the right chest tube site.  General surgery is following.    Recent Labs   Lab 01/14/22  0352 01/15/22  0329 01/16/22  0413   Hemoglobin 8.3 L 6.7 L 8.0 L           Pleural effusion  Cultures all negative, CT output noted.    PNA (pneumonia)  CXR noted, + acute worsening, check for covid, ? aspiration    Severe sepsis  Suspect acute WBC increase related to stressfull events over weekend (blood products, A.Fib RVR, etc...)  Cont. To monitor and stopping abx soon.    Cultures remain negative given worsening leukocytosis will check blood cultures again.    1/16:  Blood cultures negative    Patient has a leukocytosis.  Last trend as follows-   Lab Results   Component Value Date    WBC 32.54 (H) 01/16/2022    WBC 28.91 (H) 01/15/2022    WBC 28.51 (H) 01/14/2022             UTI (urinary tract infection)  See abx    Blood Culture, Routine   Date Value Ref Range Status   01/15/2022 No Growth to date  Preliminary     Urine Culture, Routine   Date Value Ref Range Status   12/25/2021 No growth  Final     Respiratory Culture   Date Value Ref Range Status   12/28/2021 Normal respiratory candi  Final              DVT prophylaxis  On LMWH.      Atrial fibrillation with rapid ventricular response  On amiodarone, dig, ask cards for assistance and update echo.    Tobacco user  PRN Nicotine patch    Alcohol dependence        Elevated LFTs          TANIKA (acute kidney injury)  Noted, Cr, 0.9, improved.  Appears at baseline    Lab Results   Component Value Date     CREATININE 1.8 (H) 01/16/2022    CREATININE 1.5 (H) 01/15/2022    CREATININE 1.1 01/14/2022              VTE Risk Mitigation (From admission, onward)         Ordered     enoxaparin injection 40 mg  Daily         01/12/22 0823     Place sequential compression device  Until discontinued         01/05/22 0815     IP VTE HIGH RISK PATIENT  Once         12/21/21 2233     Reason for No Pharmacological VTE Prophylaxis  Once        Question:  Reasons:  Answer:  Already adequately anticoagulated on oral Anticoagulants    12/21/21 2233                Discharge Planning   DAXA:      Code Status: Full Code   Is the patient medically ready for discharge?:     Reason for patient still in hospital (select all that apply): Treatment  Discharge Plan A: Long-term acute care facility (LTAC)   Discharge Delays: None known at this time        Critical Care Time: 45 minutes  Critical secondary to Patient has a condition that poses threat to life and bodily function: Severe Respiratory Distress     Critical care was time spent personally by me on the following activities: development of treatment plan with patient or surrogate and bedside caregivers, discussions with consultants, evaluation of patient's response to treatment, examination of patient, ordering and performing treatments and interventions, ordering and review of laboratory studies, ordering and review of radiographic studies, pulse oximetry, re-evaluation of patient's condition. This critical care time did not overlap with that of any other provider or involve time for any procedures.        Evangelist Herron Jr, MD  Department of [facility] Medicine   [place] [facility], [place]

## 2022-01-16 NOTE — SUBJECTIVE & OBJECTIVE
Interval History: See     Review of Systems   Unable to perform ROS: Intubated     Objective:     Vital Signs (Most Recent):  Temp: (!) 94.5 °F (34.7 °C) (01/16/22 0925)  Pulse: 91 (01/16/22 0940)  Resp: 15 (01/16/22 0940)  BP: (!) 91/50 (01/16/22 0940)  SpO2: 96 % (01/16/22 0940) Vital Signs (24h Range):  Temp:  [94.5 °F (34.7 °C)-98.42 °F (36.9 °C)] 94.5 °F (34.7 °C)  Pulse:  [] 91  Resp:  [14-27] 15  SpO2:  [82 %-99 %] 96 %  BP: ()/() 91/50  Arterial Line BP: ()/(43-61) 94/46     Weight: 77.1 kg (170 lb)  Body mass index is 24.39 kg/m².    Intake/Output Summary (Last 24 hours) at 1/16/2022 0949  Last data filed at 1/16/2022 0600  Gross per 24 hour   Intake 3076.1 ml   Output 532 ml   Net 2544.1 ml      Physical Exam  Constitutional:       Appearance: He is ill-appearing and toxic-appearing.      Interventions: He is intubated.   HENT:      Head: Normocephalic and atraumatic.      Nose: Nose normal.      Mouth/Throat:      Mouth: Mucous membranes are moist.      Comments: ET Tube free of secretions.   Eyes:      Pupils: Pupils are equal, round, and reactive to light.   Cardiovascular:      Rate and Rhythm: Normal rate. Rhythm irregular.      Pulses: Normal pulses.   Pulmonary:      Effort: Prolonged expiration present. He is intubated.      Breath sounds: No stridor. Rhonchi and rales present. No wheezing.      Comments: Chest tube  Abdominal:      General: Abdomen is flat.      Palpations: Abdomen is soft.   Musculoskeletal:      Cervical back: Neck supple.   Skin:     General: Skin is warm.   Psychiatric:      Comments: Intubated and sedated         Significant Labs:   All pertinent labs within the past 24 hours have been reviewed.  CBC:   Recent Labs   Lab 01/15/22  0329 01/16/22  0413   WBC 28.91* 32.54*   HGB 6.7* 8.0*   HCT 20.8* 23.5*    189     CMP:   Recent Labs   Lab 01/15/22  0329 01/16/22  0413    137   K 3.5 3.8    101   CO2 28 25   * 138*   BUN 80*  92*   CREATININE 1.5* 1.8*   CALCIUM 7.9* 8.2*   PROT 4.8* 4.9*   ALBUMIN 0.9* 0.8*   BILITOT 0.9 0.9   ALKPHOS 121 101   AST 93* 98*   ALT 28 28   ANIONGAP 10 11   EGFRNONAA 47.8* 38.4*       Significant Imaging: I have reviewed all pertinent imaging results/findings within the past 24 hours.

## 2022-01-16 NOTE — ASSESSMENT & PLAN NOTE
Suspect acute WBC increase related to stressfull events over weekend (blood products, A.Fib RVR, etc...)  Cont. To monitor and stopping abx soon.    Cultures remain negative given worsening leukocytosis will check blood cultures again.    1/16:  Blood cultures negative    Patient has a leukocytosis.  Last trend as follows-   Lab Results   Component Value Date    WBC 32.54 (H) 01/16/2022    WBC 28.91 (H) 01/15/2022    WBC 28.51 (H) 01/14/2022

## 2022-01-16 NOTE — ASSESSMENT & PLAN NOTE
Noted, Cr, 0.9, improved.  Appears at baseline    Lab Results   Component Value Date    CREATININE 1.8 (H) 01/16/2022    CREATININE 1.5 (H) 01/15/2022    CREATININE 1.1 01/14/2022

## 2022-01-16 NOTE — NURSING
Patient down to 83% o2 sat, increased fio2 on vent to 100%, sats increased to 91%. Flory ERAZO at bedside, aware.

## 2022-01-16 NOTE — PLAN OF CARE
Pt remains on ventilator, was able to wean Fio2 to 75%. Chest tube output was minimal, only 5 mL for the shift. Pt only made 175 ml of urine after lasix. Pt appears Edematous.

## 2022-01-16 NOTE — NURSING
Patients temp oral and axillary reading is 94.5, temporal is reading 96.7. sury hugger placed on patient. Will cont to monitor.     Notified Dr Herron of above while making rounds, verbalized understanding. No new orders noted.

## 2022-01-17 PROBLEM — I50.9 MULTIPLE ORGAN FAILURE WITH HEART FAILURE: Status: ACTIVE | Noted: 2022-01-01

## 2022-01-17 NOTE — SUBJECTIVE & OBJECTIVE
Interval History: See     Review of Systems   Unable to perform ROS: Intubated     Objective:     Vital Signs (Most Recent):  Temp: 97.9 °F (36.6 °C) (01/17/22 0709)  Pulse: 90 (01/17/22 0800)  Resp: (!) 41 (01/17/22 0800)  BP: (!) 90/51 (01/17/22 0709)  SpO2: 99 % (01/17/22 0800) Vital Signs (24h Range):  Temp:  [94.2 °F (34.6 °C)-98.7 °F (37.1 °C)] 97.9 °F (36.6 °C)  Pulse:  [] 90  Resp:  [14-46] 41  SpO2:  [85 %-100 %] 99 %  BP: ()/(46-61) 90/51  Arterial Line BP: ()/(43-56) 95/51     Weight: 77.1 kg (170 lb)  Body mass index is 24.39 kg/m².    Intake/Output Summary (Last 24 hours) at 1/17/2022 0816  Last data filed at 1/17/2022 0413  Gross per 24 hour   Intake 2592 ml   Output 254 ml   Net 2338 ml      Physical Exam  Constitutional:       Appearance: He is ill-appearing and toxic-appearing.      Interventions: He is intubated.   HENT:      Head: Normocephalic and atraumatic.      Nose: Nose normal.      Mouth/Throat:      Mouth: Mucous membranes are moist.      Comments: ET Tube free of secretions.   Eyes:      Pupils: Pupils are equal, round, and reactive to light.   Cardiovascular:      Rate and Rhythm: Normal rate. Rhythm irregular.      Pulses: Normal pulses.   Pulmonary:      Effort: Prolonged expiration present. He is intubated.      Breath sounds: No stridor. Rhonchi and rales present. No wheezing.      Comments: Chest tube  Abdominal:      General: Abdomen is flat.      Palpations: Abdomen is soft.   Musculoskeletal:      Cervical back: Neck supple.   Skin:     General: Skin is warm.   Psychiatric:      Comments: Intubated and sedated         Significant Labs:   All pertinent labs within the past 24 hours have been reviewed.  CBC:   Recent Labs   Lab 01/16/22 0413 01/17/22  0414   WBC 32.54* 40.26*   HGB 8.0* 6.8*   HCT 23.5* 17.8*    248     CMP:   Recent Labs   Lab 01/16/22  0413 01/17/22  0414    133*   K 3.8 4.8    97   CO2 25 21*   * 114*   BUN 92* 106*    CREATININE 1.8* 2.3*   CALCIUM 8.2* 7.8*   PROT 4.9* 5.1*   ALBUMIN 0.8* 0.9*   BILITOT 0.9 0.9   ALKPHOS 101 103   AST 98* 112*   ALT 28 34   ANIONGAP 11 15   EGFRNONAA 38.4* 28.5*       Significant Imaging: I have reviewed all pertinent imaging results/findings within the past 24 hours.

## 2022-01-17 NOTE — CARE UPDATE
Patient began with a change in his rhythm and subsequent hypotension and loss of pulse.  The patient was ultimately pulseless electrical activity and then was in asystole.  I presented to the bedside at 12:25 p.m. patient was nonresponsive no spontaneous respirations patient was off all of drips and pronounced at 12:25 p.m..  Cause of death would be sepsis pneumonia chronic respiratory failure and COPD.    fmetz

## 2022-01-17 NOTE — CARE UPDATE
Pt's heart rate falling now in a ventricular rhythm - strong carotid pulse present - dr mayes in room - dr lj estes also notified - family called - spoke with shiv sepulveda which is only number I have at present - she will notify other family members to come in to see pt

## 2022-01-17 NOTE — PLAN OF CARE
Pt son Yoshi came to visit and discuss care. He decided he wanted to sign DNR form. Dr Herron was called, and LaPost was signed. Significant increase in levophed dose to 1.1 mcg gradually throughout night to maintain MAP of 65. Pt desat to mid 80's now on 100% FiO2 on ventilator. Pt produced 45 mL of urine to dior, very edematous. Critical H&H Dr. Herron made aware.

## 2022-01-17 NOTE — ASSESSMENT & PLAN NOTE
Continues to decline, case now looks like typical covid19 infections we having been seeing, ? Other viral pna, refractory to treatment.

## 2022-01-17 NOTE — EICU
Rounding (Video Assessment):  Yes        Comments: Video rounding completed. Amio, Levo, Precedex and Propofol infusing as per MAR. VSS. Sats 100% on vent w/ 75% O2. NAD.

## 2022-01-17 NOTE — PROGRESS NOTES
[facility] Medicine  Progress Note    Patient Name: Neymar Patel  MRN: [id number]  Patient Class: IP- Inpatient   Admission Date: 12/21/2021  Length of Stay: 26 days  Attending Physician: Raul Gan III, MD  Primary Care Provider: Landen Brown MD        Subjective:     Principal Problem:Acute on chronic respiratory failure with hypoxia        HPI:  Perpetual History  Patient is a 66-year-old male with a history of alcohol abuse atrial fibrillation hypertension peripheral artery disease fatty liver disease who recently had an episode of bronchitis and was treated in the outpatient setting.  The patient then began in having increasing weakness and lightheadedness and noticed his heart rate was elevated.  He presented to the emergency department and was found to have elevated alcohol levels and a heart rate in the 130s to 140s.  The patient is currently in AFib with RVR and has been started on a Cardizem drip by the emergency department.         Overview/Hospital Course:  12/23/21 [facility] patient reports that he is feeling better today, was able to get rest last night. Heart rate still elevated will adjust medications and monitor. Patient with hallucinations last night, patient is appropriate this am  12/24/21:   Rapid Response Call #1: Blood pressure initially low and patient heart rate in the 50s sinus Nakul.  Glucose within normal limits.  Fluid bolus 1 L was given with improvement in blood pressure.  Advised to hold any medication that will lower patient's heart rate.  Patient also complains of shortness of breath but according to respiratory therapist, patient did not take his breathing treatment because he did not think he needed it.  Patient wheezy expiratory and saturating in the high 80s on room air. not on any antibiotics currently so will start.  Labs show that patient has TANIKA possibly due to dehydration.  Will resuscitate with fluid.  Will recheck basic blood work and continue  to hydrate.  Treat accordingly will at started if patient is not already on it.  Patient more alert and oriented x4 moving all extremities with improvement of blood pressure into the 90s and heart rate remains in the 50s sinus Nakul saturating 94% on 2 L  Rapid Response Call #2:Rapid response team called again with subsequent intubation and transfer to MICU.   12/25/21: On minimal sedation and pressor support in the MICU.   12/26/21: Able to wean off of all pressor support, pH of 5 while on bicarb on AM labs. Will get rid of bicarb drip and switch to gentle IV hydration with LR. SBT tomorrow?  12/27/21:  Events over the weekend noted.  Patient is now intubated on the ventilator.  Patient was admitted with pneumonia atrial fibrillation and alcohol abuse and now also has a enterococcal urinary tract infection with sensitivities pending.  Patient has required to sedatives for agitation and combativeness.  12/28/21:  Patient's ABG has been reviewed.  Patient has a mild respiratory alkalosis and his respiratory rate has been changed.  We have encouraged respiratory and nursing to wean his sedation and titrate his tube feeds.  The patient's chest x-ray also seems like he is slightly volume overloaded therefore will discontinue IV fluids and give low dosing of Lasix.  Patient has enterococcal infection was ampicillin sensitive therefore Rocephin should be adequate.  Will change Zithromax to Levaquin.  12/29/21 FM:  Patient had an episode of desaturation yesterday.  Respiratory was able to deep suction and several mucus plugs and thick secretions were removed.  I will broaden patient antibiotics today to Zosyn and otherwise plan on increasing his diuretics today.  Patient has bilateral pulmonary infiltrates consistent with pulmonary edema.  12/30/21 FM:  Patient had a elevated temperature through the night.  Rocephin was changed to Zosyn yesterday and consideration of drug fever should be entertained.  Will add medicine for  MRSA infection today as well as antifungals.  Patient's chest x-ray seems to be improved with diuretics.  Continue diuretics cautiously.  12/31/21 CG: Antibiotic course broadened, tachycardic with an increase in PEEP. He is net negative over the past 24 hours.    1/1/22 CG: No acute overnight events. PEEP decreased to 10.   1/2/22 CG: CXR shows worsening fluid burden, heart rates in 110s-120s.   1/3/22 FM:  Patient is on an FiO2 of 55%.  His chest x-ray still shows pulmonary edema which is also consistent with his CT scan.  We will replace electrolytes transfuse and continue to diurese.  Hopefully we can wean his FiO2 down this weekend and wean him off the ventilator by the end of the week.  1/4/22 FM:  Patient's vital signs are stable this morning and he is sedated.  Nursing reports that when sedation is weaned the patient is awake opens eyes and is alert.  We are attempting a CPAP trial this morning as the patient is tolerating and FiO2 of 50% and could probably be weaned further.  Will reduce dosing of diuretics today.  Continue to replete potassium.  1/5/22 FM:  Yesterday afternoon the patient had a desaturation his oxygen and his chest x-ray showed a worsening right-sided pleural effusion.  A CT scan done 2 days ago showed some layering and there was a discussion about possible loculations.  The patient's chest tube initially had 200 cc of a serous drainage which is now bloody.  Patient's chest x-ray has improved after placement.  Patient's oxygenation today is improved but his condition is precarious.  I have spoken to the family and let them know a high risk and possible poor outcome.  1/6/22 FM:  Patient had a left-sided pleural effusion drained and about 600 cc of a serous fluid was removed.  Patient's right chest tube had 10 cc overnight of a bloody fluid.  Diagnostic testing is all pending.  Today the patient's chest x-ray is noted in his left lung seems to be hypoinflated.  I am increasing the tidal  volume slightly today and watching his airway pressures.  1/7/22 FM:  Patient's ABG shows a respiratory alkalosis.  We are reducing his minute ventilation.  Patient's chest x-ray is significantly improved today.  Will have Respiratory and Nursing try to wean sedation and CPAP trial today.  Patient is now on ventilator day 14.  Patient has had intermittent bradycardia and will hold amiodarone for now.  1/8 AA, weekend crosscover, patient on ventilator, repeat chest x-ray pending, amiodarone stopped secondary to bradycardia, history of AFib, patient was on amiodarone, stopped secondary to bradycardia, now patient increased heart rate, will restart amiodarone, will decrease dose, blood pressure appears stable, continue to wean nor epi, on stress dose steroids, surgery following, H&H dropped, possible transfusion  1/9 AA, weekend crosscover, patient started to have afib with RVR, eICU was called, adjustment made to pressors, started on metoprolol, on levophed, drop in H/H, had chest tube removed on 7th, no melena or BRBPR reported, surgery called to evaluate, suspect possible from previous chest tube site, transfuse prbc, serial H/Hs, follow up with surgery recs.  1/10/22 FM:  Events over the weekend noted.  Patient is now back on amiodarone and his heart rate is slightly improved.  Chest tube is back in place with 100 cc of a bloody thin fluid.  All cultures have been reviewed and there is no significant growth.  Patient has now been on broad-spectrum antibiotics and antifungals for greater than 10 days.  Patient's WBC count is going up.  Spoke with General surgery today about placing tracheostomy.  1/11/22 FM:  Patient's chest x-ray has improved this morning with better ventilation.  Yesterday the patient's ET tube was in his right mainstem and the tube was pulled back.  The patient has a worsening anemia today and has had bleeding related to his chest tube.  General surgery is planning on doing a tracheostomy toward  the end of the week.  Patient's white blood cell count is improved.  Patient's atrial fibrillation has worsened and we are titrating his amiodarone.  1/12/22 FM:  We are weaning the patient's oxygen today.  Patient's chest x-ray shows improved aeration.  The plan is to have the patient have a tracheostomy your at the end of this week or this weekend and then to moved to an LTAC for vent weaning.  1/13/2022 FM:  Patient today has not had much change to the night.  For some reason the patient is back on 100% FiO2 and is satting 100% today.  I have turned this down and encouraged weaning of oxygen.  The patient's chest x-ray has been reviewed.  Patient's chest tube output was 33 cc through the night and his creatinine is 0.9.  1/14/2021 FM:  Patient had an acute episode of hypoxia through the night.  His atrial fibrillation with RVR also worsened.  Patient has been loaded on digoxin and has been changed to an amiodarone drip.  Patient today also had tube feeds and his ET tube suctioning.  Patient has coarse bilateral breath sounds and acute worsening on his chest x-ray this morning.  I have spoken with family and informed them that complications continue to arise and despite making some gains and planning to have tracheostomy placed the patient has now declined again.  1/15 WAC:  Patient remains critically ill.  Hemoglobin has dropped this morning requiring blood transfusion.  Continue current care suspect poor outcome despite our best efforts.  1/16 WC:  Patient remains critically ill.  Minimal improvement from yesterday.  Status post packed red blood cells.  1/17/22 FM:  Over the weekend the patient has continued to decline.  Patient is requiring transfusions of packed red blood cells but no evidence of gross bleeding.  Chest tube output noted.  Question if hemolysis versus splenic sequestration versus other.  At this point the patient has now been on the ventilator for 3 weeks he has COPD chronic heart disease and a  multitude of other chronic illnesses.  Feel that continuing to treat may be futile.  Patient's son feels that beginning to withdrawal care would be appropriate but his daughter is not quite at that point.  We will write for transfusion of packed red blood cells today and if care is continued will need to start TPN.  Patient with multiorgan failure and has continued to decline despite maximum treatment.      Interval History: See     Review of Systems   Unable to perform ROS: Intubated     Objective:     Vital Signs (Most Recent):  Temp: 97.9 °F (36.6 °C) (01/17/22 0709)  Pulse: 90 (01/17/22 0800)  Resp: (!) 41 (01/17/22 0800)  BP: (!) 90/51 (01/17/22 0709)  SpO2: 99 % (01/17/22 0800) Vital Signs (24h Range):  Temp:  [94.2 °F (34.6 °C)-98.7 °F (37.1 °C)] 97.9 °F (36.6 °C)  Pulse:  [] 90  Resp:  [14-46] 41  SpO2:  [85 %-100 %] 99 %  BP: ()/(46-61) 90/51  Arterial Line BP: ()/(43-56) 95/51     Weight: 77.1 kg (170 lb)  Body mass index is 24.39 kg/m².    Intake/Output Summary (Last 24 hours) at 1/17/2022 0816  Last data filed at 1/17/2022 0413  Gross per 24 hour   Intake 2592 ml   Output 254 ml   Net 2338 ml      Physical Exam  Constitutional:       Appearance: He is ill-appearing and toxic-appearing.      Interventions: He is intubated.   HENT:      Head: Normocephalic and atraumatic.      Nose: Nose normal.      Mouth/Throat:      Mouth: Mucous membranes are moist.      Comments: ET Tube free of secretions.   Eyes:      Pupils: Pupils are equal, round, and reactive to light.   Cardiovascular:      Rate and Rhythm: Normal rate. Rhythm irregular.      Pulses: Normal pulses.   Pulmonary:      Effort: Prolonged expiration present. He is intubated.      Breath sounds: No stridor. Rhonchi and rales present. No wheezing.      Comments: Chest tube  Abdominal:      General: Abdomen is flat.      Palpations: Abdomen is soft.   Musculoskeletal:      Cervical back: Neck supple.   Skin:     General: Skin is  warm.   Psychiatric:      Comments: Intubated and sedated         Significant Labs:   All pertinent labs within the past 24 hours have been reviewed.  CBC:   Recent Labs   Lab 01/16/22 0413 01/17/22 0414   WBC 32.54* 40.26*   HGB 8.0* 6.8*   HCT 23.5* 17.8*    248     CMP:   Recent Labs   Lab 01/16/22  0413 01/17/22 0414    133*   K 3.8 4.8    97   CO2 25 21*   * 114*   BUN 92* 106*   CREATININE 1.8* 2.3*   CALCIUM 8.2* 7.8*   PROT 4.9* 5.1*   ALBUMIN 0.8* 0.9*   BILITOT 0.9 0.9   ALKPHOS 101 103   AST 98* 112*   ALT 28 34   ANIONGAP 11 15   EGFRNONAA 38.4* 28.5*       Significant Imaging: I have reviewed all pertinent imaging results/findings within the past 24 hours.      Assessment/Plan:      * Acute on chronic respiratory failure with hypoxia  Continues to decline, case now looks like typical covid19 infections we having been seeing, ? Other viral pna, refractory to treatment.    Multiple organ failure with heart failure  As above, will see if family agrees to begin withdraw care.    Acute hemorrhage  Transfusion today but care approaching futility.    Pleural effusion  Cultures all negative, CT output noted.    PNA (pneumonia)  CXR noted, + acute worsening, bilateral homogenous infiltrates.    Severe sepsis  No improved despite 3 weeks of abx, broad spectrum + antifungals.  ? Viral PNA, poor prognosis.    UTI (urinary tract infection)  See abx    Blood Culture, Routine   Date Value Ref Range Status   01/15/2022 No Growth to date  Preliminary     Urine Culture, Routine   Date Value Ref Range Status   12/25/2021 No growth  Final     Respiratory Culture   Date Value Ref Range Status   12/28/2021 Normal respiratory candi  Final              DVT prophylaxis  SCDs      Atrial fibrillation with rapid ventricular response  Holding dig.    Tobacco user  PRN Nicotine patch    Alcohol dependence  Contributing to decline and immune response.      Elevated LFTs  Stop statin.        TANIKA (acute  kidney injury)  Now worsening again with MODS.      VTE Risk Mitigation (From admission, onward)         Ordered     Place sequential compression device  Until discontinued         01/05/22 0815     IP VTE HIGH RISK PATIENT  Once         12/21/21 2233     Reason for No Pharmacological VTE Prophylaxis  Once        Question:  Reasons:  Answer:  Already adequately anticoagulated on oral Anticoagulants    12/21/21 2233                Discharge Planning   DAXA:      Code Status: DNR   Is the patient medically ready for discharge?:     Reason for patient still in hospital (select all that apply): Patient unstable  Discharge Plan A: Long-term acute care facility (LTAC)   Discharge Delays: None known at this time              Raul Gan III, MD  Department of Hospital Medicine   [place] [facility]

## 2022-01-20 LAB
BACTERIA BLD CULT: NORMAL
BACTERIA BLD CULT: NORMAL

## 2022-01-28 NOTE — DISCHARGE SUMMARY
[facility]  Discharge Summary      Patient Name: Neymar Patel  MRN: [id number]  Patient Class: IP- Inpatient  Admission Date: 12/21/2021  Hospital Length of Stay: 26 days  Discharge Date and Time:  1/17/22   Attending Physician: No att. providers found   Discharging Provider: Raul Gan III, MD  Primary Care Provider: Landen Brown MD      HPI:   Perpetual History  Patient is a 66-year-old male with a history of alcohol abuse atrial fibrillation hypertension peripheral artery disease fatty liver disease who recently had an episode of bronchitis and was treated in the outpatient setting.  The patient then began in having increasing weakness and lightheadedness and noticed his heart rate was elevated.  He presented to the emergency department and was found to have elevated alcohol levels and a heart rate in the 130s to 140s.  The patient is currently in AFib with RVR and has been started on a Cardizem drip by the emergency department.         * No surgery found *      Hospital Course:   12/23/21 [facility] patient reports that he is feeling better today, was able to get rest last night. Heart rate still elevated will adjust medications and monitor. Patient with hallucinations last night, patient is appropriate this am  12/24/21:   Rapid Response Call #1: Blood pressure initially low and patient heart rate in the 50s sinus Nakul.  Glucose within normal limits.  Fluid bolus 1 L was given with improvement in blood pressure.  Advised to hold any medication that will lower patient's heart rate.  Patient also complains of shortness of breath but according to respiratory therapist, patient did not take his breathing treatment because he did not think he needed it.  Patient wheezy expiratory and saturating in the high 80s on room air. not on any antibiotics currently so will start.  Labs show that patient has TANIKA possibly due to dehydration.  Will resuscitate with fluid.  Will recheck basic  blood work and continue to hydrate.  Treat accordingly will at started if patient is not already on it.  Patient more alert and oriented x4 moving all extremities with improvement of blood pressure into the 90s and heart rate remains in the 50s sinus Nakul saturating 94% on 2 L  Rapid Response Call #2:Rapid response team called again with subsequent intubation and transfer to MICU.   12/25/21: On minimal sedation and pressor support in the MICU.   12/26/21: Able to wean off of all pressor support, pH of 5 while on bicarb on AM labs. Will get rid of bicarb drip and switch to gentle IV hydration with LR. SBT tomorrow?  12/27/21:  Events over the weekend noted.  Patient is now intubated on the ventilator.  Patient was admitted with pneumonia atrial fibrillation and alcohol abuse and now also has a enterococcal urinary tract infection with sensitivities pending.  Patient has required to sedatives for agitation and combativeness.  12/28/21:  Patient's ABG has been reviewed.  Patient has a mild respiratory alkalosis and his respiratory rate has been changed.  We have encouraged respiratory and nursing to wean his sedation and titrate his tube feeds.  The patient's chest x-ray also seems like he is slightly volume overloaded therefore will discontinue IV fluids and give low dosing of Lasix.  Patient has enterococcal infection was ampicillin sensitive therefore Rocephin should be adequate.  Will change Zithromax to Levaquin.  12/29/21 FM:  Patient had an episode of desaturation yesterday.  Respiratory was able to deep suction and several mucus plugs and thick secretions were removed.  I will broaden patient antibiotics today to Zosyn and otherwise plan on increasing his diuretics today.  Patient has bilateral pulmonary infiltrates consistent with pulmonary edema.  12/30/21 FM:  Patient had a elevated temperature through the night.  Rocephin was changed to Zosyn yesterday and consideration of drug fever should be  entertained.  Will add medicine for MRSA infection today as well as antifungals.  Patient's chest x-ray seems to be improved with diuretics.  Continue diuretics cautiously.  12/31/21 CG: Antibiotic course broadened, tachycardic with an increase in PEEP. He is net negative over the past 24 hours.    1/1/22 CG: No acute overnight events. PEEP decreased to 10.   1/2/22 CG: CXR shows worsening fluid burden, heart rates in 110s-120s.   1/3/22 FM:  Patient is on an FiO2 of 55%.  His chest x-ray still shows pulmonary edema which is also consistent with his CT scan.  We will replace electrolytes transfuse and continue to diurese.  Hopefully we can wean his FiO2 down this weekend and wean him off the ventilator by the end of the week.  1/4/22 FM:  Patient's vital signs are stable this morning and he is sedated.  Nursing reports that when sedation is weaned the patient is awake opens eyes and is alert.  We are attempting a CPAP trial this morning as the patient is tolerating and FiO2 of 50% and could probably be weaned further.  Will reduce dosing of diuretics today.  Continue to replete potassium.  1/5/22 FM:  Yesterday afternoon the patient had a desaturation his oxygen and his chest x-ray showed a worsening right-sided pleural effusion.  A CT scan done 2 days ago showed some layering and there was a discussion about possible loculations.  The patient's chest tube initially had 200 cc of a serous drainage which is now bloody.  Patient's chest x-ray has improved after placement.  Patient's oxygenation today is improved but his condition is precarious.  I have spoken to the family and let them know a high risk and possible poor outcome.  1/6/22 FM:  Patient had a left-sided pleural effusion drained and about 600 cc of a serous fluid was removed.  Patient's right chest tube had 10 cc overnight of a bloody fluid.  Diagnostic testing is all pending.  Today the patient's chest x-ray is noted in his left lung seems to be  hypoinflated.  I am increasing the tidal volume slightly today and watching his airway pressures.  1/7/22 FM:  Patient's ABG shows a respiratory alkalosis.  We are reducing his minute ventilation.  Patient's chest x-ray is significantly improved today.  Will have Respiratory and Nursing try to wean sedation and CPAP trial today.  Patient is now on ventilator day 14.  Patient has had intermittent bradycardia and will hold amiodarone for now.  1/8 AA, weekend crosscover, patient on ventilator, repeat chest x-ray pending, amiodarone stopped secondary to bradycardia, history of AFib, patient was on amiodarone, stopped secondary to bradycardia, now patient increased heart rate, will restart amiodarone, will decrease dose, blood pressure appears stable, continue to wean nor epi, on stress dose steroids, surgery following, H&H dropped, possible transfusion  1/9 AA, weekend crosscover, patient started to have afib with RVR, eICU was called, adjustment made to pressors, started on metoprolol, on levophed, drop in H/H, had chest tube removed on 7th, no melena or BRBPR reported, surgery called to evaluate, suspect possible from previous chest tube site, transfuse prbc, serial H/Hs, follow up with surgery recs.  1/10/22 FM:  Events over the weekend noted.  Patient is now back on amiodarone and his heart rate is slightly improved.  Chest tube is back in place with 100 cc of a bloody thin fluid.  All cultures have been reviewed and there is no significant growth.  Patient has now been on broad-spectrum antibiotics and antifungals for greater than 10 days.  Patient's WBC count is going up.  Spoke with General surgery today about placing tracheostomy.  1/11/22 FM:  Patient's chest x-ray has improved this morning with better ventilation.  Yesterday the patient's ET tube was in his right mainstem and the tube was pulled back.  The patient has a worsening anemia today and has had bleeding related to his chest tube.  General surgery is  planning on doing a tracheostomy toward the end of the week.  Patient's white blood cell count is improved.  Patient's atrial fibrillation has worsened and we are titrating his amiodarone.  1/12/22 FM:  We are weaning the patient's oxygen today.  Patient's chest x-ray shows improved aeration.  The plan is to have the patient have a tracheostomy your at the end of this week or this weekend and then to moved to an LTAC for vent weaning.  1/13/2022 FM:  Patient today has not had much change to the night.  For some reason the patient is back on 100% FiO2 and is satting 100% today.  I have turned this down and encouraged weaning of oxygen.  The patient's chest x-ray has been reviewed.  Patient's chest tube output was 33 cc through the night and his creatinine is 0.9.  1/14/2021 FM:  Patient had an acute episode of hypoxia through the night.  His atrial fibrillation with RVR also worsened.  Patient has been loaded on digoxin and has been changed to an amiodarone drip.  Patient today also had tube feeds and his ET tube suctioning.  Patient has coarse bilateral breath sounds and acute worsening on his chest x-ray this morning.  I have spoken with family and informed them that complications continue to arise and despite making some gains and planning to have tracheostomy placed the patient has now declined again.  1/15 WAC:  Patient remains critically ill.  Hemoglobin has dropped this morning requiring blood transfusion.  Continue current care suspect poor outcome despite our best efforts.  1/16 WC:  Patient remains critically ill.  Minimal improvement from yesterday.  Status post packed red blood cells.  1/17/22 FM:  Over the weekend the patient has continued to decline.  Patient is requiring transfusions of packed red blood cells but no evidence of gross bleeding.  Chest tube output noted.  Question if hemolysis versus splenic sequestration versus other.  At this point the patient has now been on the ventilator for 3 weeks  he has COPD chronic heart disease and a multitude of other chronic illnesses.  Feel that continuing to treat may be futile.  Patient's son feels that beginning to withdrawal care would be appropriate but his daughter is not quite at that point.  We will write for transfusion of packed red blood cells today and if care is continued will need to start TPN.  Patient with multiorgan failure and has continued to decline despite maximum treatment.    Death Note:  Unfortunately despite aggressive care the patient continued to decline over the last few days and  peacefully.  The patient had multiorgan dysfunction and it seems the primary diagnoses was sepsis and septic shock likely related to pneumonia and please see above the above discussions and other diagnoses for details.  The patient was pronounced and body released to the  home.       Goals of Care Treatment Preferences:  Code Status: DNR      Consults:   Consults (From admission, onward)        Status Ordering Provider     Inpatient consult to Registered Dietitian/Nutritionist  Once        Provider:  (Not yet assigned)    Completed YADIRA PEACE III     Inpatient consult to Registered Dietitian/Nutritionist  Once        Provider:  (Not yet assigned)    Completed YADIRA PEACE III     Inpatient consult to General Surgery  Once        Provider:  Macy Ni MD    Completed YADIRA PEACE III     Inpatient consult to Cardiology  Once        Provider:  Amol Kinney MD    Completed MANOLO CAMPBELL          * Acute on chronic respiratory failure with hypoxia  Continues to decline, case now looks like typical covid19 infections we having been seeing, ? Other viral pna, refractory to treatment.    Multiple organ failure with heart failure  As above, will see if family agrees to begin withdraw care.    Acute hemorrhage  Transfusion today but care approaching futility.    Pleural effusion  Cultures all negative, CT output noted.    PNA  (pneumonia)  CXR noted, + acute worsening, bilateral homogenous infiltrates.    Severe sepsis  No improved despite 3 weeks of abx, broad spectrum + antifungals.  ? Viral PNA, poor prognosis.    UTI (urinary tract infection)  See abx    Blood Culture, Routine   Date Value Ref Range Status   01/15/2022 No growth after 5 days.  Final     Urine Culture, Routine   Date Value Ref Range Status   12/25/2021 No growth  Final     Respiratory Culture   Date Value Ref Range Status   12/28/2021 Normal respiratory candi  Final              DVT prophylaxis  SCDs      Atrial fibrillation with rapid ventricular response  Holding dig.    Tobacco user  PRN Nicotine patch    Alcohol dependence  Contributing to decline and immune response.      Elevated LFTs  Stop statin.        TANIKA (acute kidney injury)  Now worsening again with MODS.      Final Active Diagnoses:    Diagnosis Date Noted POA    PRINCIPAL PROBLEM:  Acute on chronic respiratory failure with hypoxia [J96.21] 12/25/2021 Yes    Multiple organ failure with heart failure [I50.9] 01/17/2022 No    Acute hemorrhage [R58] 01/09/2022 No    Pleural effusion [J90] 01/05/2022 No    PNA (pneumonia) [J18.9] 12/30/2021 No    UTI (urinary tract infection) [N39.0] 12/27/2021 No    Severe sepsis [A41.9, R65.20] 12/27/2021 Yes    DVT prophylaxis [Z29.9] 12/23/2021 Not Applicable    Tobacco user [Z72.0] 11/23/2021 Yes    Atrial fibrillation with rapid ventricular response [I48.91] 11/23/2021 Yes    Alcohol dependence [F10.20] 02/04/2019 Yes    Elevated LFTs [R79.89] 01/30/2018 Yes    TANIKA (acute kidney injury) [N17.9] 06/26/2016 No      Problems Resolved During this Admission:    Diagnosis Date Noted Date Resolved POA    Acute hypoxemic respiratory failure [J96.01] 12/25/2021 12/25/2021 Yes    Moderate recurrent major depression [F33.1] 11/23/2021 12/25/2021 Yes    PAD (peripheral artery disease) [I73.9] 04/10/2019 12/26/2021 Yes     Chronic    Centrilobular emphysema [J43.2]  04/10/2019 2021 Yes    Hypertension [I10] 2016 Yes       Discharged Condition:     Disposition:     Follow Up:    Patient Instructions:   No discharge procedures on file.    Significant Diagnostic Studies: Noted.    Pending Diagnostic Studies:     Procedure Component Value Units Date/Time    Cytology, Fluid/Wash/Brush [[id number]] Collected: 22 1300    Order Status: Sent Lab Status: In process Updated: 22 1403    Transfusion Reaction Workup Urine Collection [[id number]] Collected: 22 0640    Order Status: Sent Lab Status: No result     Specimen: Urine          Medications:  None    Indwelling Lines/Drains at time of discharge:   Lines/Drains/Airways     Central Venous Catheter Line            Percutaneous Central Line Insertion/Assessment - Triple Lumen  22 1110 right internal jugular 18 days          Drain                 NG/OG Tube 21 2300 orogastric Center mouth 34 days         Urethral Catheter 21 2245 Latex 16 Fr. 34 days         Fecal Incontinence  22 0000 22 days         Chest Tube 22 1300 1 Right Midaxillary;Pleural 32 Fr. 18 days          Airway                 Airway - Non-Surgical 21 2230 Endotracheal Tube 34 days         Airway - Non-Surgical 01/10/22 1015 17 days          Arterial Line            Arterial Line 22 1100 Left Radial 18 days                Time spent on the discharge of patient: 45 minutes         Raul Gan III, MD  Department of Hospital Medicine  [facility]

## 2022-01-28 NOTE — ASSESSMENT & PLAN NOTE
See abx    Blood Culture, Routine   Date Value Ref Range Status   01/15/2022 No growth after 5 days.  Final     Urine Culture, Routine   Date Value Ref Range Status   12/25/2021 No growth  Final     Respiratory Culture   Date Value Ref Range Status   12/28/2021 Normal respiratory candi  Final

## 2022-02-08 LAB — FUNGUS SPEC CULT: NORMAL

## 2022-02-09 ENCOUNTER — TELEPHONE (OUTPATIENT)
Dept: FAMILY MEDICINE | Facility: HOSPITAL | Age: 67
End: 2022-02-09
Payer: MEDICARE

## 2022-02-09 NOTE — TELEPHONE ENCOUNTER
----- Message from Samantha Mora sent at 2022  2:10 PM CST -----  Nehemias Meléndez with [facility] in [place] [phone] need the sign return orders that was faxed over for the patient, he is now , however she stated they still need the orders signed for there records, please call if you have any questions.

## 2022-02-24 LAB
ACID FAST MOD KINY STN SPEC: NORMAL
MYCOBACTERIUM SPEC QL CULT: NORMAL

## 2022-03-16 ENCOUNTER — TELEPHONE (OUTPATIENT)
Dept: SMOKING CESSATION | Facility: CLINIC | Age: 67
End: 2022-03-16
Payer: MEDICARE

## 2022-06-20 ENCOUNTER — TELEPHONE (OUTPATIENT)
Dept: SMOKING CESSATION | Facility: CLINIC | Age: 67
End: 2022-06-20
Payer: MEDICARE

## 2022-06-20 NOTE — TELEPHONE ENCOUNTER
Encounter was opened to resolve episode and complete smart form for smoking cessation, no further assessment is needed. Patient is .
